# Patient Record
Sex: MALE | Race: WHITE | NOT HISPANIC OR LATINO | Employment: UNEMPLOYED | URBAN - METROPOLITAN AREA
[De-identification: names, ages, dates, MRNs, and addresses within clinical notes are randomized per-mention and may not be internally consistent; named-entity substitution may affect disease eponyms.]

---

## 2018-11-03 LAB — HCV AB SER-ACNC: <0.1

## 2018-11-27 LAB — HCV AB SER-ACNC: <0.1

## 2019-02-21 ENCOUNTER — OFFICE VISIT (OUTPATIENT)
Dept: FAMILY MEDICINE CLINIC | Facility: CLINIC | Age: 29
End: 2019-02-21
Payer: COMMERCIAL

## 2019-02-21 VITALS
OXYGEN SATURATION: 98 % | SYSTOLIC BLOOD PRESSURE: 128 MMHG | HEART RATE: 92 BPM | HEIGHT: 70 IN | WEIGHT: 200 LBS | BODY MASS INDEX: 28.63 KG/M2 | DIASTOLIC BLOOD PRESSURE: 84 MMHG

## 2019-02-21 DIAGNOSIS — F41.9 ANXIETY: Primary | ICD-10-CM

## 2019-02-21 DIAGNOSIS — M48.07 SPINAL STENOSIS OF LUMBOSACRAL REGION: ICD-10-CM

## 2019-02-21 DIAGNOSIS — F32.A DEPRESSION, UNSPECIFIED DEPRESSION TYPE: ICD-10-CM

## 2019-02-21 PROBLEM — M48.00 SPINAL STENOSIS: Status: ACTIVE | Noted: 2019-02-21

## 2019-02-21 PROCEDURE — 1036F TOBACCO NON-USER: CPT | Performed by: FAMILY MEDICINE

## 2019-02-21 PROCEDURE — 3008F BODY MASS INDEX DOCD: CPT | Performed by: FAMILY MEDICINE

## 2019-02-21 PROCEDURE — 99204 OFFICE O/P NEW MOD 45 MIN: CPT | Performed by: FAMILY MEDICINE

## 2019-02-21 PROCEDURE — 3725F SCREEN DEPRESSION PERFORMED: CPT | Performed by: FAMILY MEDICINE

## 2019-02-21 RX ORDER — CLONAZEPAM 1 MG/1
1 TABLET ORAL 3 TIMES DAILY
Qty: 90 TABLET | Refills: 3 | Status: SHIPPED | OUTPATIENT
Start: 2019-02-21 | End: 2019-06-13 | Stop reason: SDUPTHER

## 2019-02-21 RX ORDER — HYDROXYZINE PAMOATE 50 MG/1
50 CAPSULE ORAL 3 TIMES DAILY PRN
Qty: 90 CAPSULE | Refills: 2 | Status: SHIPPED | OUTPATIENT
Start: 2019-02-21 | End: 2019-05-26

## 2019-02-21 RX ORDER — TIZANIDINE 4 MG/1
4 TABLET ORAL EVERY 8 HOURS PRN
Qty: 90 TABLET | Refills: 3 | Status: SHIPPED | OUTPATIENT
Start: 2019-02-21 | End: 2019-06-13

## 2019-02-21 RX ORDER — BUPROPION HYDROCHLORIDE 300 MG/1
TABLET ORAL
COMMUNITY
Start: 2019-01-20 | End: 2019-02-21 | Stop reason: SDUPTHER

## 2019-02-21 RX ORDER — CYCLOBENZAPRINE HCL 5 MG
TABLET ORAL
COMMUNITY
Start: 2019-01-08 | End: 2019-02-21

## 2019-02-21 RX ORDER — BUPROPION HYDROCHLORIDE 300 MG/1
300 TABLET ORAL EVERY MORNING
Qty: 90 TABLET | Refills: 3 | Status: SHIPPED | OUTPATIENT
Start: 2019-02-21 | End: 2019-05-26

## 2019-02-21 RX ORDER — DULOXETIN HYDROCHLORIDE 60 MG/1
CAPSULE, DELAYED RELEASE ORAL
COMMUNITY
Start: 2019-01-21 | End: 2019-02-21

## 2019-02-21 RX ORDER — NAPROXEN 500 MG/1
500 TABLET ORAL 2 TIMES DAILY WITH MEALS
Qty: 180 TABLET | Refills: 3 | Status: SHIPPED | OUTPATIENT
Start: 2019-02-21 | End: 2019-05-26

## 2019-02-21 NOTE — PROGRESS NOTES
[address]  [facility]        NAME: Sheldon Pratt is a 33 y o  male  : 1990    MRN: [id number]  DATE: 2019  TIME: 10:54 AM    Assessment and Plan   Anxiety [F41 9]  1  Anxiety  clonazePAM (KlonoPIN) 1 mg tablet    hydrOXYzine pamoate (VISTARIL) 50 mg capsule   2  Depression, unspecified depression type  buPROPion (WELLBUTRIN XL) 300 mg 24 hr tablet   3  Spinal stenosis of lumbosacral region  tiZANidine (ZANAFLEX) 4 mg tablet         Patient Instructions     Patient Instructions   New pt----Hx depr/anx---s/p spinal stenosis/discectomy---surg          Chief Complaint     Chief Complaint   Patient presents with    New Patient     Med Refills     Depression     elevated PHQ-9         History of Present Illness       Pt w/ lumbar surg x2---depr and anxiety for many yrs---currently on wellbutrin---tapering off cymbalta due to side effects    Depression   Pertinent negatives include no abdominal pain, arthralgias, chest pain, congestion, fatigue, fever, myalgias, nausea, numbness, sore throat, vomiting or weakness  Review of Systems   Review of Systems   Constitutional: Negative for fatigue, fever and unexpected weight change  HENT: Negative for congestion, sinus pain and sore throat  Eyes: Negative for visual disturbance  Respiratory: Negative for shortness of breath and wheezing  Cardiovascular: Negative for chest pain and palpitations  Gastrointestinal: Negative for abdominal pain, nausea and vomiting  Musculoskeletal: Positive for back pain  Negative for arthralgias and myalgias  Neurological: Negative for syncope, weakness and numbness  Psychiatric/Behavioral: Positive for depression and dysphoric mood  Negative for confusion and suicidal ideas  The patient is nervous/anxious            Current Medications       Current Outpatient Medications:     buPROPion (WELLBUTRIN XL) 300 mg 24 hr tablet, Take 1 tablet (300 mg total) by mouth every morning, Disp: 90 tablet, Rfl: 3    clonazePAM (KlonoPIN) 1 mg tablet, Take 1 tablet (1 mg total) by mouth 3 (three) times a day, Disp: 90 tablet, Rfl: 3    hydrOXYzine pamoate (VISTARIL) 50 mg capsule, Take 1 capsule (50 mg total) by mouth 3 (three) times a day as needed for itching, Disp: 90 capsule, Rfl: 2    tiZANidine (ZANAFLEX) 4 mg tablet, Take 1 tablet (4 mg total) by mouth every 8 (eight) hours as needed for muscle spasms, Disp: 90 tablet, Rfl: 3    Current Allergies     Allergies as of 02/21/2019    (No Known Allergies)            The following portions of the patient's history were reviewed and updated as appropriate: allergies, current medications, past family history, past medical history, past social history, past surgical history and problem list      Past Medical History:   Diagnosis Date    Allergic     Anxiety     Depression     Spinal stenosis        Past Surgical History:   Procedure Laterality Date    BACK SURGERY      FRACTURE SURGERY      left foot reconstruted     SPINE SURGERY      hernated disc        Family History   Problem Relation Age of Onset    Hypertension Mother     Hyperlipidemia Father     Heart disease Father     Depression Maternal Grandmother     Hypertension Maternal Grandmother     Squamous cell carcinoma Maternal Grandfather     Alcohol abuse Maternal Grandfather     Alzheimer's disease Paternal Grandmother     Dementia Paternal Grandmother     Alcohol abuse Paternal Uncle          Medications have been verified  Objective   /84   Pulse 92   Ht 5' 10" (1 778 m)   Wt 90 7 kg (200 lb)   SpO2 98%   BMI 28 70 kg/m²        Physical Exam     Physical Exam   Constitutional: He is oriented to person, place, and time  Vital signs are normal  He appears well-developed and well-nourished  HENT:   Right Ear: Ear canal normal  Tympanic membrane is not injected  Left Ear: Ear canal normal  Tympanic membrane is not injected     Nose: Nose normal    Mouth/Throat: Oropharynx is clear and moist    Eyes: Pupils are equal, round, and reactive to light  Conjunctivae and EOM are normal  Right eye exhibits no discharge  Left eye exhibits no discharge  Neck: Normal range of motion  Neck supple  No thyromegaly present  Cardiovascular: Normal rate, regular rhythm and normal heart sounds  No murmur heard  Pulmonary/Chest: Effort normal and breath sounds normal  No respiratory distress  He has no wheezes  Abdominal: Soft  Bowel sounds are normal  He exhibits no distension  There is no tenderness  Musculoskeletal: Normal range of motion  Lymphadenopathy:     He has no cervical adenopathy  Neurological: He is alert and oriented to person, place, and time  He has normal strength and normal reflexes  He is not disoriented  No sensory deficit  Gait normal    Skin: Skin is warm and dry  Psychiatric: He has a normal mood and affect   His speech is normal and behavior is normal  Judgment and thought content normal  Cognition and memory are normal

## 2019-02-21 NOTE — PATIENT INSTRUCTIONS
New pt----Hx depr/anx---s/p spinal stenosis/discectomy---surg---add klonopin--did well in past---failed flexeril/cymbalta

## 2019-05-25 ENCOUNTER — HOSPITAL ENCOUNTER (EMERGENCY)
Facility: HOSPITAL | Age: 29
End: 2019-05-26
Attending: EMERGENCY MEDICINE
Payer: COMMERCIAL

## 2019-05-25 DIAGNOSIS — F32.A DEPRESSION: Primary | ICD-10-CM

## 2019-05-25 DIAGNOSIS — R45.851 SUICIDAL IDEATIONS: ICD-10-CM

## 2019-05-25 DIAGNOSIS — F19.10 POLYSUBSTANCE ABUSE (HCC): ICD-10-CM

## 2019-05-25 LAB — ETHANOL EXG-MCNC: 0 MG/DL

## 2019-05-25 PROCEDURE — 99285 EMERGENCY DEPT VISIT HI MDM: CPT

## 2019-05-25 PROCEDURE — 99284 EMERGENCY DEPT VISIT MOD MDM: CPT | Performed by: PHYSICIAN ASSISTANT

## 2019-05-25 PROCEDURE — 82075 ASSAY OF BREATH ETHANOL: CPT | Performed by: PHYSICIAN ASSISTANT

## 2019-05-26 VITALS
RESPIRATION RATE: 16 BRPM | TEMPERATURE: 97.9 F | HEART RATE: 72 BPM | DIASTOLIC BLOOD PRESSURE: 56 MMHG | SYSTOLIC BLOOD PRESSURE: 105 MMHG | OXYGEN SATURATION: 99 %

## 2019-05-26 LAB
AMPHETAMINES SERPL QL SCN: NEGATIVE
BARBITURATES UR QL: NEGATIVE
BENZODIAZ UR QL: NEGATIVE
COCAINE UR QL: POSITIVE
METHADONE UR QL: POSITIVE
OPIATES UR QL SCN: NEGATIVE
PCP UR QL: NEGATIVE
THC UR QL: NEGATIVE

## 2019-05-26 PROCEDURE — 80307 DRUG TEST PRSMV CHEM ANLYZR: CPT | Performed by: PHYSICIAN ASSISTANT

## 2019-05-26 RX ORDER — FLUOXETINE HYDROCHLORIDE 40 MG/1
40 CAPSULE ORAL DAILY
COMMUNITY
End: 2019-06-13 | Stop reason: ALTCHOICE

## 2019-05-26 RX ORDER — FLUOXETINE 10 MG/1
40 CAPSULE ORAL ONCE
Status: COMPLETED | OUTPATIENT
Start: 2019-05-26 | End: 2019-05-26

## 2019-05-26 RX ORDER — LORAZEPAM 0.5 MG/1
0.5 TABLET ORAL ONCE
Status: COMPLETED | OUTPATIENT
Start: 2019-05-26 | End: 2019-05-26

## 2019-05-26 RX ORDER — FLUOXETINE 10 MG/1
40 CAPSULE ORAL DAILY
Status: DISCONTINUED | OUTPATIENT
Start: 2019-05-26 | End: 2019-05-26

## 2019-05-26 RX ORDER — DOXEPIN HYDROCHLORIDE 100 MG/1
10 CAPSULE ORAL
COMMUNITY
End: 2019-06-13

## 2019-05-26 RX ADMIN — LORAZEPAM 0.5 MG: 0.5 TABLET ORAL at 04:29

## 2019-05-26 RX ADMIN — FLUOXETINE 40 MG: 10 CAPSULE ORAL at 12:20

## 2019-06-12 RX ORDER — LORAZEPAM 2 MG/1
TABLET ORAL
COMMUNITY
Start: 2019-06-09 | End: 2019-06-13

## 2019-06-13 ENCOUNTER — OFFICE VISIT (OUTPATIENT)
Dept: FAMILY MEDICINE CLINIC | Facility: CLINIC | Age: 29
End: 2019-06-13
Payer: COMMERCIAL

## 2019-06-13 VITALS
DIASTOLIC BLOOD PRESSURE: 82 MMHG | OXYGEN SATURATION: 98 % | WEIGHT: 202 LBS | BODY MASS INDEX: 28.92 KG/M2 | SYSTOLIC BLOOD PRESSURE: 122 MMHG | HEIGHT: 70 IN | TEMPERATURE: 99.7 F | HEART RATE: 100 BPM

## 2019-06-13 DIAGNOSIS — Z00.00 WELLNESS EXAMINATION: ICD-10-CM

## 2019-06-13 DIAGNOSIS — M48.07 SPINAL STENOSIS OF LUMBOSACRAL REGION: Primary | ICD-10-CM

## 2019-06-13 DIAGNOSIS — F41.9 ANXIETY: ICD-10-CM

## 2019-06-13 PROCEDURE — 3725F SCREEN DEPRESSION PERFORMED: CPT | Performed by: FAMILY MEDICINE

## 2019-06-13 PROCEDURE — 99395 PREV VISIT EST AGE 18-39: CPT | Performed by: FAMILY MEDICINE

## 2019-06-13 PROCEDURE — 3008F BODY MASS INDEX DOCD: CPT | Performed by: FAMILY MEDICINE

## 2019-06-13 PROCEDURE — 99214 OFFICE O/P EST MOD 30 MIN: CPT | Performed by: FAMILY MEDICINE

## 2019-06-13 RX ORDER — FLUOXETINE HYDROCHLORIDE 20 MG/1
CAPSULE ORAL
COMMUNITY
Start: 2019-05-07 | End: 2019-06-13 | Stop reason: SDUPTHER

## 2019-06-13 RX ORDER — NAPROXEN SODIUM 220 MG
TABLET ORAL
Refills: 0 | COMMUNITY
Start: 2019-06-07 | End: 2019-07-26 | Stop reason: HOSPADM

## 2019-06-13 RX ORDER — CLONAZEPAM 1 MG/1
TABLET ORAL
Qty: 120 TABLET | Refills: 3 | Status: SHIPPED | OUTPATIENT
Start: 2019-06-13 | End: 2019-07-26 | Stop reason: HOSPADM

## 2019-06-13 RX ORDER — FLUOXETINE HYDROCHLORIDE 20 MG/1
20 CAPSULE ORAL 2 TIMES DAILY
Qty: 180 CAPSULE | Refills: 3 | Status: SHIPPED | OUTPATIENT
Start: 2019-06-13 | End: 2019-07-26 | Stop reason: HOSPADM

## 2019-06-20 ENCOUNTER — HOSPITAL ENCOUNTER (EMERGENCY)
Facility: HOSPITAL | Age: 29
Discharge: HOME | End: 2019-06-20
Attending: EMERGENCY MEDICINE
Payer: COMMERCIAL

## 2019-06-20 ENCOUNTER — APPOINTMENT (EMERGENCY)
Dept: NON INVASIVE DIAGNOSTICS | Facility: HOSPITAL | Age: 29
End: 2019-06-20
Payer: COMMERCIAL

## 2019-06-20 VITALS
OXYGEN SATURATION: 100 % | WEIGHT: 201.94 LBS | RESPIRATION RATE: 20 BRPM | SYSTOLIC BLOOD PRESSURE: 121 MMHG | BODY MASS INDEX: 28.98 KG/M2 | DIASTOLIC BLOOD PRESSURE: 70 MMHG | TEMPERATURE: 100.4 F | HEART RATE: 77 BPM

## 2019-06-20 DIAGNOSIS — M79.89 SWELLING OF LIMB: ICD-10-CM

## 2019-06-20 DIAGNOSIS — L03.90 CELLULITIS: Primary | ICD-10-CM

## 2019-06-20 PROCEDURE — 99284 EMERGENCY DEPT VISIT MOD MDM: CPT | Performed by: EMERGENCY MEDICINE

## 2019-06-20 PROCEDURE — 99283 EMERGENCY DEPT VISIT LOW MDM: CPT

## 2019-06-20 PROCEDURE — 93971 EXTREMITY STUDY: CPT | Performed by: SURGERY

## 2019-06-20 PROCEDURE — 93971 EXTREMITY STUDY: CPT

## 2019-06-20 RX ORDER — CEPHALEXIN 500 MG/1
500 CAPSULE ORAL 4 TIMES DAILY
Qty: 28 CAPSULE | Refills: 0 | Status: SHIPPED | OUTPATIENT
Start: 2019-06-20 | End: 2019-06-27

## 2019-06-20 RX ORDER — CEPHALEXIN 250 MG/1
500 CAPSULE ORAL ONCE
Status: COMPLETED | OUTPATIENT
Start: 2019-06-20 | End: 2019-06-20

## 2019-06-20 RX ADMIN — CEPHALEXIN 500 MG: 250 CAPSULE ORAL at 07:40

## 2019-06-22 ENCOUNTER — APPOINTMENT (EMERGENCY)
Dept: RADIOLOGY | Facility: HOSPITAL | Age: 29
End: 2019-06-22
Payer: COMMERCIAL

## 2019-06-22 ENCOUNTER — HOSPITAL ENCOUNTER (EMERGENCY)
Facility: HOSPITAL | Age: 29
Discharge: HOME | End: 2019-06-22
Attending: EMERGENCY MEDICINE | Admitting: EMERGENCY MEDICINE
Payer: COMMERCIAL

## 2019-06-22 VITALS
OXYGEN SATURATION: 98 % | DIASTOLIC BLOOD PRESSURE: 68 MMHG | TEMPERATURE: 97.6 F | SYSTOLIC BLOOD PRESSURE: 122 MMHG | RESPIRATION RATE: 18 BRPM | HEART RATE: 70 BPM | WEIGHT: 201.94 LBS | BODY MASS INDEX: 28.98 KG/M2

## 2019-06-22 DIAGNOSIS — L02.91 ABSCESS: ICD-10-CM

## 2019-06-22 DIAGNOSIS — L03.90 CELLULITIS: Primary | ICD-10-CM

## 2019-06-22 LAB
ANION GAP SERPL CALCULATED.3IONS-SCNC: 8 MMOL/L (ref 4–13)
BASOPHILS # BLD AUTO: 0.05 THOUSANDS/ΜL (ref 0–0.1)
BASOPHILS NFR BLD AUTO: 0 % (ref 0–1)
BUN SERPL-MCNC: 10 MG/DL (ref 5–25)
CALCIUM SERPL-MCNC: 9.2 MG/DL (ref 8.3–10.1)
CHLORIDE SERPL-SCNC: 101 MMOL/L (ref 100–108)
CK MB SERPL-MCNC: 3.4 NG/ML (ref 0–5)
CK MB SERPL-MCNC: <1 % (ref 0–2.5)
CK SERPL-CCNC: 421 U/L (ref 39–308)
CO2 SERPL-SCNC: 30 MMOL/L (ref 21–32)
CREAT SERPL-MCNC: 1.04 MG/DL (ref 0.6–1.3)
EOSINOPHIL # BLD AUTO: 0.13 THOUSAND/ΜL (ref 0–0.61)
EOSINOPHIL NFR BLD AUTO: 1 % (ref 0–6)
ERYTHROCYTE [DISTWIDTH] IN BLOOD BY AUTOMATED COUNT: 13.1 % (ref 11.6–15.1)
GFR SERPL CREATININE-BSD FRML MDRD: 97 ML/MIN/1.73SQ M
GLUCOSE SERPL-MCNC: 104 MG/DL (ref 65–140)
HCT VFR BLD AUTO: 35.4 % (ref 36.5–49.3)
HGB BLD-MCNC: 11.8 G/DL (ref 12–17)
IMM GRANULOCYTES # BLD AUTO: 0.08 THOUSAND/UL (ref 0–0.2)
IMM GRANULOCYTES NFR BLD AUTO: 1 % (ref 0–2)
LYMPHOCYTES # BLD AUTO: 1.44 THOUSANDS/ΜL (ref 0.6–4.47)
LYMPHOCYTES NFR BLD AUTO: 9 % (ref 14–44)
MCH RBC QN AUTO: 29.4 PG (ref 26.8–34.3)
MCHC RBC AUTO-ENTMCNC: 33.3 G/DL (ref 31.4–37.4)
MCV RBC AUTO: 88 FL (ref 82–98)
MONOCYTES # BLD AUTO: 1.17 THOUSAND/ΜL (ref 0.17–1.22)
MONOCYTES NFR BLD AUTO: 7 % (ref 4–12)
NEUTROPHILS # BLD AUTO: 13.48 THOUSANDS/ΜL (ref 1.85–7.62)
NEUTS SEG NFR BLD AUTO: 82 % (ref 43–75)
NRBC BLD AUTO-RTO: 0 /100 WBCS
PLATELET # BLD AUTO: 349 THOUSANDS/UL (ref 149–390)
PMV BLD AUTO: 10.3 FL (ref 8.9–12.7)
POTASSIUM SERPL-SCNC: 3.6 MMOL/L (ref 3.5–5.3)
RBC # BLD AUTO: 4.02 MILLION/UL (ref 3.88–5.62)
SODIUM SERPL-SCNC: 139 MMOL/L (ref 136–145)
WBC # BLD AUTO: 16.35 THOUSAND/UL (ref 4.31–10.16)

## 2019-06-22 PROCEDURE — 99283 EMERGENCY DEPT VISIT LOW MDM: CPT

## 2019-06-22 PROCEDURE — 36415 COLL VENOUS BLD VENIPUNCTURE: CPT | Performed by: EMERGENCY MEDICINE

## 2019-06-22 PROCEDURE — 82550 ASSAY OF CK (CPK): CPT | Performed by: EMERGENCY MEDICINE

## 2019-06-22 PROCEDURE — 82553 CREATINE MB FRACTION: CPT | Performed by: EMERGENCY MEDICINE

## 2019-06-22 PROCEDURE — 10061 I&D ABSCESS COMP/MULTIPLE: CPT | Performed by: EMERGENCY MEDICINE

## 2019-06-22 PROCEDURE — 80048 BASIC METABOLIC PNL TOTAL CA: CPT | Performed by: EMERGENCY MEDICINE

## 2019-06-22 PROCEDURE — 99284 EMERGENCY DEPT VISIT MOD MDM: CPT | Performed by: EMERGENCY MEDICINE

## 2019-06-22 PROCEDURE — 85025 COMPLETE CBC W/AUTO DIFF WBC: CPT | Performed by: EMERGENCY MEDICINE

## 2019-06-22 PROCEDURE — 73080 X-RAY EXAM OF ELBOW: CPT

## 2019-06-22 RX ORDER — KETOROLAC TROMETHAMINE 30 MG/ML
15 INJECTION, SOLUTION INTRAMUSCULAR; INTRAVENOUS ONCE
Status: DISCONTINUED | OUTPATIENT
Start: 2019-06-22 | End: 2019-06-22

## 2019-06-22 RX ORDER — SULFAMETHOXAZOLE AND TRIMETHOPRIM 800; 160 MG/1; MG/1
2 TABLET ORAL ONCE
Status: COMPLETED | OUTPATIENT
Start: 2019-06-22 | End: 2019-06-22

## 2019-06-22 RX ORDER — SULFAMETHOXAZOLE AND TRIMETHOPRIM 800; 160 MG/1; MG/1
2 TABLET ORAL 2 TIMES DAILY
Qty: 40 TABLET | Refills: 0 | Status: SHIPPED | OUTPATIENT
Start: 2019-06-22 | End: 2019-07-02

## 2019-06-22 RX ORDER — LIDOCAINE HYDROCHLORIDE AND EPINEPHRINE 10; 10 MG/ML; UG/ML
10 INJECTION, SOLUTION INFILTRATION; PERINEURAL ONCE
Status: COMPLETED | OUTPATIENT
Start: 2019-06-22 | End: 2019-06-22

## 2019-06-22 RX ORDER — NAPROXEN 500 MG/1
500 TABLET ORAL ONCE
Status: COMPLETED | OUTPATIENT
Start: 2019-06-22 | End: 2019-06-22

## 2019-06-22 RX ADMIN — NAPROXEN 500 MG: 500 TABLET ORAL at 09:18

## 2019-06-22 RX ADMIN — SULFAMETHOXAZOLE AND TRIMETHOPRIM 2 TABLET: 800; 160 TABLET ORAL at 10:38

## 2019-06-22 RX ADMIN — LIDOCAINE HYDROCHLORIDE,EPINEPHRINE BITARTRATE 10 ML: 10; .01 INJECTION, SOLUTION INFILTRATION; PERINEURAL at 09:18

## 2019-06-24 ENCOUNTER — HOSPITAL ENCOUNTER (EMERGENCY)
Facility: HOSPITAL | Age: 29
Discharge: HOME | End: 2019-06-24
Attending: EMERGENCY MEDICINE | Admitting: EMERGENCY MEDICINE
Payer: COMMERCIAL

## 2019-06-24 VITALS
TEMPERATURE: 97.2 F | RESPIRATION RATE: 20 BRPM | SYSTOLIC BLOOD PRESSURE: 116 MMHG | OXYGEN SATURATION: 99 % | WEIGHT: 200 LBS | HEART RATE: 63 BPM | HEIGHT: 70 IN | DIASTOLIC BLOOD PRESSURE: 67 MMHG | BODY MASS INDEX: 28.63 KG/M2

## 2019-06-24 DIAGNOSIS — Z51.89 WOUND CHECK, ABSCESS: Primary | ICD-10-CM

## 2019-06-24 PROCEDURE — 99282 EMERGENCY DEPT VISIT SF MDM: CPT

## 2019-06-24 PROCEDURE — 99024 POSTOP FOLLOW-UP VISIT: CPT | Performed by: PHYSICIAN ASSISTANT

## 2019-06-24 NOTE — ED PROVIDER NOTES
History  Chief Complaint   Patient presents with    Wound Check     Patient states he was seen here  for abcess in his L AC  Patient states h enoticed puss today and he came back  Patient is a 34 y/o M that presents to the ED with abscess to left AC  He states he had it packed yesterday and has purulent discharge today and was concerned about the pus  No fevers, chills, nausea, or vomiting  He is currently on bactrim and keflex  History provided by:  Patient  Wound Check    He was treated in the ED yesterday  Previous treatment in the ED includes I&D of abscess  Treatments since wound repair include oral antibiotics  There has been colored discharge from the wound  The redness has improved  The swelling has improved  There is no pain present  He has no difficulty moving the affected extremity or digit  Prior to Admission Medications   Prescriptions Last Dose Informant Patient Reported? Taking?    FLUoxetine (PROzac) 20 mg capsule   No No   Sig: Take 1 capsule (20 mg total) by mouth 2 (two) times a day   METHADONE HCL PO   Yes No   Sig: Take 155 mg by mouth daily   RA SENNA 8 6 MG tablet   Yes No   Sig: take 1 once daily if needed   cephalexin (KEFLEX) 500 mg capsule   No No   Sig: Take 1 capsule (500 mg total) by mouth 4 (four) times a day for 7 days   clonazePAM (KlonoPIN) 1 mg tablet   No No   Si QID   sulfamethoxazole-trimethoprim (BACTRIM DS) 800-160 mg per tablet   No No   Sig: Take 2 tablets by mouth 2 (two) times a day for 10 days smx-tmp DS (BACTRIM) 800-160 mg tabs (1tab q12 D10)      Facility-Administered Medications: None       Past Medical History:   Diagnosis Date    Allergic     Anxiety     Depression     Spinal stenosis        Past Surgical History:   Procedure Laterality Date    BACK SURGERY      FRACTURE SURGERY      left foot reconstruted     SPINE SURGERY      hernated disc        Family History   Problem Relation Age of Onset    Hypertension Mother  Hyperlipidemia Father     Heart disease Father     Depression Maternal Grandmother     Hypertension Maternal Grandmother     Squamous cell carcinoma Maternal Grandfather     Alcohol abuse Maternal Grandfather     Alzheimer's disease Paternal Grandmother     Dementia Paternal Grandmother     Alcohol abuse Paternal Uncle      I have reviewed and agree with the history as documented  Social History     Tobacco Use    Smoking status: Former Smoker     Last attempt to quit: 2017     Years since quittin 8    Smokeless tobacco: Current User    Tobacco comment: Vapes   Substance Use Topics    Alcohol use: Never     Frequency: Never     Binge frequency: Never    Drug use: Yes     Types: Cocaine        Review of Systems   Constitutional: Negative for chills and fever  HENT: Negative  Skin: Positive for wound  All other systems reviewed and are negative  Physical Exam  Physical Exam   Constitutional: He appears well-developed and well-nourished  HENT:   Head: Normocephalic  Eyes: Conjunctivae are normal    Cardiovascular: Normal rate  Pulmonary/Chest: Effort normal    Skin: Skin is warm and dry  No rash noted  No erythema or warmth  He has a packing in left [facility] that has purulent discharge  Nursing note and vitals reviewed        Vital Signs  ED Triage Vitals [19 1559]   Temperature Pulse Respirations Blood Pressure SpO2   (!) 97 2 °F (36 2 °C) 63 20 116/67 99 %      Temp src Heart Rate Source Patient Position - Orthostatic VS BP Location FiO2 (%)   -- -- -- -- --      Pain Score       4           Vitals:    19 1559   BP: 116/67   Pulse: 63         Visual Acuity      ED Medications  Medications - No data to display    Diagnostic Studies  Results Reviewed     None                 No orders to display              Procedures  Procedures       ED Course                               MDM  Number of Diagnoses or Management Options  Wound check, abscess: minor  Patient Progress  Patient progress: stable      Disposition  Final diagnoses:   Wound check, abscess     Time reflects when diagnosis was documented in both MDM as applicable and the Disposition within this note     Time User Action Codes Description Comment    6/24/2019  6:12 PM Eleonora Deng Add [Z51 89] Wound check, abscess       ED Disposition     ED Disposition Condition Date/Time Comment    Discharge Stable Mon Jun 24, 2019  6:12 PM Pilar Earing discharge to home/self care  Follow-up Information     Follow up With Specialties Details Why Contact Info Additional Information    [address] [facility] Emergency Medicine In 1 day for packing removal  [address]  [address]  [phone] [address], [facility], [place], [place], [address]          Discharge Medication List as of 6/24/2019  6:13 PM      CONTINUE these medications which have NOT CHANGED    Details   cephalexin (KEFLEX) 500 mg capsule Take 1 capsule (500 mg total) by mouth 4 (four) times a day for 7 days, Starting Thu 6/20/2019, Until Thu 6/27/2019, Print      clonazePAM (KlonoPIN) 1 mg tablet 1 QID, Normal      FLUoxetine (PROzac) 20 mg capsule Take 1 capsule (20 mg total) by mouth 2 (two) times a day, Starting Thu 6/13/2019, Normal      METHADONE HCL PO Take 155 mg by mouth daily, Historical Med      RA SENNA 8 6 MG tablet take 1 once daily if needed, Historical Med      sulfamethoxazole-trimethoprim (BACTRIM DS) 800-160 mg per tablet Take 2 tablets by mouth 2 (two) times a day for 10 days smx-tmp DS (BACTRIM) 800-160 mg tabs (1tab q12 D10), Starting Sat 6/22/2019, Until Tue 7/2/2019, Print           No discharge procedures on file      ED Provider  Electronically Signed by           Karoline Paz PA-C  06/24/19 3811

## 2019-06-25 ENCOUNTER — TRANSCRIBE ORDERS (OUTPATIENT)
Dept: ADMINISTRATIVE | Facility: HOSPITAL | Age: 29
End: 2019-06-25

## 2019-06-25 ENCOUNTER — HOSPITAL ENCOUNTER (OUTPATIENT)
Dept: NON INVASIVE DIAGNOSTICS | Facility: HOSPITAL | Age: 29
Discharge: HOME | End: 2019-06-25
Payer: COMMERCIAL

## 2019-06-25 ENCOUNTER — HOSPITAL ENCOUNTER (EMERGENCY)
Facility: HOSPITAL | Age: 29
Discharge: HOME | End: 2019-06-25
Attending: EMERGENCY MEDICINE
Payer: COMMERCIAL

## 2019-06-25 VITALS
RESPIRATION RATE: 20 BRPM | DIASTOLIC BLOOD PRESSURE: 70 MMHG | WEIGHT: 199.96 LBS | TEMPERATURE: 98.5 F | SYSTOLIC BLOOD PRESSURE: 121 MMHG | BODY MASS INDEX: 28.69 KG/M2 | HEART RATE: 77 BPM | OXYGEN SATURATION: 100 %

## 2019-06-25 DIAGNOSIS — F11.20 OPIOID TYPE DEPENDENCE, CONTINUOUS (HCC): ICD-10-CM

## 2019-06-25 DIAGNOSIS — F11.20 OPIOID TYPE DEPENDENCE, CONTINUOUS (HCC): Primary | ICD-10-CM

## 2019-06-25 DIAGNOSIS — Z48.00 ENCOUNTER FOR ABSCESS PACKING REMOVAL: Primary | ICD-10-CM

## 2019-06-25 LAB
ATRIAL RATE: 54 BPM
P AXIS: 50 DEGREES
PR INTERVAL: 166 MS
QRS AXIS: 35 DEGREES
QRSD INTERVAL: 96 MS
QT INTERVAL: 448 MS
QTC INTERVAL: 424 MS
T WAVE AXIS: 38 DEGREES
VENTRICULAR RATE: 54 BPM

## 2019-06-25 PROCEDURE — 93005 ELECTROCARDIOGRAM TRACING: CPT

## 2019-06-25 PROCEDURE — 99024 POSTOP FOLLOW-UP VISIT: CPT | Performed by: PHYSICIAN ASSISTANT

## 2019-06-25 PROCEDURE — 93010 ELECTROCARDIOGRAM REPORT: CPT | Performed by: INTERNAL MEDICINE

## 2019-06-25 PROCEDURE — 99282 EMERGENCY DEPT VISIT SF MDM: CPT

## 2019-06-25 NOTE — DISCHARGE INSTRUCTIONS
Warm soaks to your left arm 4-5 times a day for 20-30 minutes  Keep covered until there is no drainage  You may shower now  COntinue your antibiotics  Follow up with family doctor in 3-4 days for recheck

## 2019-06-25 NOTE — ED PROVIDER NOTES
History  Chief Complaint   Patient presents with    Wound Check     Returns to ED for redressing and packing removal of left anti cubital absess  No fever or chills  Patient is a 34 y/o M that presents to the ED for packing removal from abscess left arm  He currently has small amount of purulent drainage and small amount of blood  No surrounding erythema  History provided by:  Patient  Wound Check    He was treated in the ED 2 to 3 days ago  Previous treatment in the ED includes oral antibiotics and I&D of abscess  Treatments since wound repair include oral antibiotics  There has been bloody discharge from the wound  There is no redness present  There is no swelling present  There is no pain present  He has no difficulty moving the affected extremity or digit  Prior to Admission Medications   Prescriptions Last Dose Informant Patient Reported? Taking?    FLUoxetine (PROzac) 20 mg capsule   No No   Sig: Take 1 capsule (20 mg total) by mouth 2 (two) times a day   METHADONE HCL PO   Yes No   Sig: Take 155 mg by mouth daily   RA SENNA 8 6 MG tablet   Yes No   Sig: take 1 once daily if needed   cephalexin (KEFLEX) 500 mg capsule   No No   Sig: Take 1 capsule (500 mg total) by mouth 4 (four) times a day for 7 days   clonazePAM (KlonoPIN) 1 mg tablet   No No   Si QID   sulfamethoxazole-trimethoprim (BACTRIM DS) 800-160 mg per tablet   No No   Sig: Take 2 tablets by mouth 2 (two) times a day for 10 days smx-tmp DS (BACTRIM) 800-160 mg tabs (1tab q12 D10)      Facility-Administered Medications: None       Past Medical History:   Diagnosis Date    Allergic     Anxiety     Depression     Spinal stenosis        Past Surgical History:   Procedure Laterality Date    BACK SURGERY      FRACTURE SURGERY      left foot reconstruted     SPINE SURGERY      hernated disc        Family History   Problem Relation Age of Onset    Hypertension Mother     Hyperlipidemia Father     Heart disease Father    Linda Marcano Depression Maternal Grandmother     Hypertension Maternal Grandmother     Squamous cell carcinoma Maternal Grandfather     Alcohol abuse Maternal Grandfather     Alzheimer's disease Paternal Grandmother     Dementia Paternal Grandmother     Alcohol abuse Paternal Uncle      I have reviewed and agree with the history as documented  Social History     Tobacco Use    Smoking status: Former Smoker     Last attempt to quit: 2017     Years since quittin 8    Smokeless tobacco: Current User    Tobacco comment: Vapes   Substance Use Topics    Alcohol use: Never     Frequency: Never     Binge frequency: Never    Drug use: Yes     Types: Cocaine        Review of Systems   Constitutional: Negative for chills and fever  All other systems reviewed and are negative  Physical Exam  Physical Exam   Constitutional: He appears well-developed and well-nourished  HENT:   Head: Normocephalic and atraumatic  Eyes: Conjunctivae are normal    Cardiovascular: Normal rate  Pulses:       Radial pulses are 2+ on the left side  Pulmonary/Chest: Effort normal    Musculoskeletal:   Packing left AC, no surrounding erythema  He has small amount of purulent drainage, small amount of bloody drainage  No pain  Neurological: He is alert  He is not disoriented  Skin: Skin is warm and dry  No rash noted  He is not diaphoretic  No erythema  No pallor  Nursing note and vitals reviewed        Vital Signs  ED Triage Vitals [19 1121]   Temperature Pulse Respirations Blood Pressure SpO2   98 5 °F (36 9 °C) 77 20 121/70 100 %      Temp Source Heart Rate Source Patient Position - Orthostatic VS BP Location FiO2 (%)   Tympanic Monitor Sitting Right arm --      Pain Score       4           Vitals:    19 1121   BP: 121/70   Pulse: 77   Patient Position - Orthostatic VS: Sitting         Visual Acuity      ED Medications  Medications - No data to display    Diagnostic Studies  Results Reviewed     None No orders to display              Procedures  Procedures       ED Course     1120:  Packing removed from left [facility] with forceps  Bandage applied  MDM  Number of Diagnoses or Management Options  Encounter for abscess packing removal: minor  Patient Progress  Patient progress: improved      Disposition  Final diagnoses:   Encounter for abscess packing removal     Time reflects when diagnosis was documented in both MDM as applicable and the Disposition within this note     Time User Action Codes Description Comment    6/25/2019 11:33 AM Dank Houston [Z48 00] Encounter for abscess packing removal       ED Disposition     ED Disposition Condition Date/Time Comment    Discharge Stable Tue Jun 25, 2019 11:33 AM Reena Castellanos discharge to home/self care  Follow-up Information     Follow up With Specialties Details Why Contact Info    Keri Edmondson MD Family Medicine Call in 3 days For recheck [address]  [address] [address]  [facility] [address]  [phone]            Discharge Medication List as of 6/25/2019 11:35 AM      CONTINUE these medications which have NOT CHANGED    Details   cephalexin (KEFLEX) 500 mg capsule Take 1 capsule (500 mg total) by mouth 4 (four) times a day for 7 days, Starting Thu 6/20/2019, Until Thu 6/27/2019, Print      clonazePAM (KlonoPIN) 1 mg tablet 1 QID, Normal      FLUoxetine (PROzac) 20 mg capsule Take 1 capsule (20 mg total) by mouth 2 (two) times a day, Starting Thu 6/13/2019, Normal      METHADONE HCL PO Take 155 mg by mouth daily, Historical Med      RA SENNA 8 6 MG tablet take 1 once daily if needed, Historical Med      sulfamethoxazole-trimethoprim (BACTRIM DS) 800-160 mg per tablet Take 2 tablets by mouth 2 (two) times a day for 10 days smx-tmp DS (BACTRIM) 800-160 mg tabs (1tab q12 D10), Starting Sat 6/22/2019, Until Tue 7/2/2019, Print           No discharge procedures on file      ED Provider  Electronically Signed by Tiffany Cardenas PA-C  06/25/19 1144

## 2019-06-26 DIAGNOSIS — Z71.89 COMPLEX CARE COORDINATION: Primary | ICD-10-CM

## 2019-07-16 ENCOUNTER — HOSPITAL ENCOUNTER (INPATIENT)
Facility: HOSPITAL | Age: 29
LOS: 9 days | Discharge: HOME | DRG: 751 | End: 2019-07-26
Attending: EMERGENCY MEDICINE | Admitting: PSYCHIATRY & NEUROLOGY
Payer: COMMERCIAL

## 2019-07-16 DIAGNOSIS — Z00.8 MEDICAL CLEARANCE FOR PSYCHIATRIC ADMISSION: Primary | ICD-10-CM

## 2019-07-16 DIAGNOSIS — F32.A DEPRESSION: ICD-10-CM

## 2019-07-16 DIAGNOSIS — F33.2 SEVERE EPISODE OF RECURRENT MAJOR DEPRESSIVE DISORDER, WITHOUT PSYCHOTIC FEATURES (HCC): ICD-10-CM

## 2019-07-16 DIAGNOSIS — R45.851 DEPRESSION WITH SUICIDAL IDEATION: Primary | ICD-10-CM

## 2019-07-16 DIAGNOSIS — F32.A DEPRESSION WITH SUICIDAL IDEATION: Primary | ICD-10-CM

## 2019-07-16 DIAGNOSIS — F41.9 ANXIETY: ICD-10-CM

## 2019-07-16 DIAGNOSIS — Z00.8 MEDICAL CLEARANCE FOR PSYCHIATRIC ADMISSION: ICD-10-CM

## 2019-07-16 DIAGNOSIS — F41.1 GENERALIZED ANXIETY DISORDER: ICD-10-CM

## 2019-07-16 LAB
ALBUMIN SERPL BCP-MCNC: 3.8 G/DL (ref 3.5–5)
ALP SERPL-CCNC: 69 U/L (ref 46–116)
ALT SERPL W P-5'-P-CCNC: 58 U/L (ref 12–78)
AMPHETAMINES SERPL QL SCN: NEGATIVE
ANION GAP SERPL CALCULATED.3IONS-SCNC: 7 MMOL/L (ref 4–13)
AST SERPL W P-5'-P-CCNC: 78 U/L (ref 5–45)
BARBITURATES UR QL: NEGATIVE
BASOPHILS # BLD AUTO: 0.06 THOUSANDS/ΜL (ref 0–0.1)
BASOPHILS NFR BLD AUTO: 1 % (ref 0–1)
BENZODIAZ UR QL: NEGATIVE
BILIRUB SERPL-MCNC: 0.6 MG/DL (ref 0.2–1)
BUN SERPL-MCNC: 15 MG/DL (ref 5–25)
CALCIUM SERPL-MCNC: 8.9 MG/DL (ref 8.3–10.1)
CHLORIDE SERPL-SCNC: 103 MMOL/L (ref 100–108)
CO2 SERPL-SCNC: 30 MMOL/L (ref 21–32)
COCAINE UR QL: NEGATIVE
CREAT SERPL-MCNC: 1.29 MG/DL (ref 0.6–1.3)
EOSINOPHIL # BLD AUTO: 0.4 THOUSAND/ΜL (ref 0–0.61)
EOSINOPHIL NFR BLD AUTO: 5 % (ref 0–6)
ERYTHROCYTE [DISTWIDTH] IN BLOOD BY AUTOMATED COUNT: 13.6 % (ref 11.6–15.1)
ETHANOL EXG-MCNC: 0 MG/DL
GFR SERPL CREATININE-BSD FRML MDRD: 74 ML/MIN/1.73SQ M
GLUCOSE SERPL-MCNC: 119 MG/DL (ref 65–140)
HCT VFR BLD AUTO: 32.7 % (ref 36.5–49.3)
HGB BLD-MCNC: 11 G/DL (ref 12–17)
IMM GRANULOCYTES # BLD AUTO: 0.01 THOUSAND/UL (ref 0–0.2)
IMM GRANULOCYTES NFR BLD AUTO: 0 % (ref 0–2)
LYMPHOCYTES # BLD AUTO: 3.41 THOUSANDS/ΜL (ref 0.6–4.47)
LYMPHOCYTES NFR BLD AUTO: 46 % (ref 14–44)
MCH RBC QN AUTO: 29.3 PG (ref 26.8–34.3)
MCHC RBC AUTO-ENTMCNC: 33.6 G/DL (ref 31.4–37.4)
MCV RBC AUTO: 87 FL (ref 82–98)
METHADONE UR QL: POSITIVE
MONOCYTES # BLD AUTO: 0.79 THOUSAND/ΜL (ref 0.17–1.22)
MONOCYTES NFR BLD AUTO: 11 % (ref 4–12)
NEUTROPHILS # BLD AUTO: 2.72 THOUSANDS/ΜL (ref 1.85–7.62)
NEUTS SEG NFR BLD AUTO: 37 % (ref 43–75)
NRBC BLD AUTO-RTO: 0 /100 WBCS
OPIATES UR QL SCN: NEGATIVE
PCP UR QL: NEGATIVE
PLATELET # BLD AUTO: 270 THOUSANDS/UL (ref 149–390)
PMV BLD AUTO: 11 FL (ref 8.9–12.7)
POTASSIUM SERPL-SCNC: 3.8 MMOL/L (ref 3.5–5.3)
PROT SERPL-MCNC: 8 G/DL (ref 6.4–8.2)
RBC # BLD AUTO: 3.75 MILLION/UL (ref 3.88–5.62)
SODIUM SERPL-SCNC: 140 MMOL/L (ref 136–145)
THC UR QL: NEGATIVE
WBC # BLD AUTO: 7.39 THOUSAND/UL (ref 4.31–10.16)

## 2019-07-16 PROCEDURE — 85025 COMPLETE CBC W/AUTO DIFF WBC: CPT | Performed by: PHYSICIAN ASSISTANT

## 2019-07-16 PROCEDURE — 99285 EMERGENCY DEPT VISIT HI MDM: CPT

## 2019-07-16 PROCEDURE — 82075 ASSAY OF BREATH ETHANOL: CPT | Performed by: PHYSICIAN ASSISTANT

## 2019-07-16 PROCEDURE — 99285 EMERGENCY DEPT VISIT HI MDM: CPT | Performed by: PHYSICIAN ASSISTANT

## 2019-07-16 PROCEDURE — 80307 DRUG TEST PRSMV CHEM ANLYZR: CPT | Performed by: PHYSICIAN ASSISTANT

## 2019-07-16 PROCEDURE — 80053 COMPREHEN METABOLIC PANEL: CPT | Performed by: PHYSICIAN ASSISTANT

## 2019-07-16 PROCEDURE — 36415 COLL VENOUS BLD VENIPUNCTURE: CPT | Performed by: PHYSICIAN ASSISTANT

## 2019-07-16 RX ORDER — TRAZODONE HYDROCHLORIDE 50 MG/1
50 TABLET ORAL
Status: CANCELLED | OUTPATIENT
Start: 2019-07-16

## 2019-07-16 RX ORDER — RISPERIDONE 1 MG/1
2 TABLET, ORALLY DISINTEGRATING ORAL
Status: CANCELLED | OUTPATIENT
Start: 2019-07-16

## 2019-07-16 RX ORDER — LORAZEPAM 2 MG/ML
2 INJECTION INTRAMUSCULAR EVERY 4 HOURS PRN
Status: CANCELLED | OUTPATIENT
Start: 2019-07-16

## 2019-07-16 RX ORDER — ACETAMINOPHEN 325 MG/1
650 TABLET ORAL EVERY 4 HOURS PRN
Status: CANCELLED | OUTPATIENT
Start: 2019-07-16

## 2019-07-16 RX ORDER — ACETAMINOPHEN 325 MG/1
650 TABLET ORAL EVERY 6 HOURS PRN
Status: CANCELLED | OUTPATIENT
Start: 2019-07-16

## 2019-07-16 RX ORDER — OLANZAPINE 10 MG/1
5 INJECTION, POWDER, LYOPHILIZED, FOR SOLUTION INTRAMUSCULAR
Status: CANCELLED | OUTPATIENT
Start: 2019-07-16

## 2019-07-16 RX ORDER — CARISOPRODOL 350 MG/1
TABLET ORAL
COMMUNITY
Start: 2018-06-08 | End: 2019-07-26 | Stop reason: HOSPADM

## 2019-07-16 RX ORDER — HYDROXYZINE 50 MG/1
50 TABLET, FILM COATED ORAL EVERY 4 HOURS PRN
Status: CANCELLED | OUTPATIENT
Start: 2019-07-16

## 2019-07-16 RX ORDER — HALOPERIDOL 5 MG/ML
5 INJECTION INTRAMUSCULAR EVERY 6 HOURS PRN
Status: CANCELLED | OUTPATIENT
Start: 2019-07-16

## 2019-07-16 RX ORDER — IBUPROFEN 400 MG/1
800 TABLET ORAL EVERY 8 HOURS PRN
Status: CANCELLED | OUTPATIENT
Start: 2019-07-16

## 2019-07-16 RX ORDER — NICOTINE 21 MG/24HR
1 PATCH, TRANSDERMAL 24 HOURS TRANSDERMAL DAILY
Status: CANCELLED | OUTPATIENT
Start: 2019-07-17

## 2019-07-16 RX ORDER — MAGNESIUM HYDROXIDE/ALUMINUM HYDROXICE/SIMETHICONE 120; 1200; 1200 MG/30ML; MG/30ML; MG/30ML
30 SUSPENSION ORAL EVERY 4 HOURS PRN
Status: CANCELLED | OUTPATIENT
Start: 2019-07-16

## 2019-07-16 NOTE — ED PROVIDER NOTES
History  Chief Complaint   Patient presents with    Depression     pt presents to ED stating he is severely depressed and has very bad anxiety  States he is not suicidal but if he were to die it would be ok  Pt would like to talk to our crisis dept, pt willing to sign 12      Patient is a 33year old male with PMH of depression, anxiety, chronic back pain, and opioid dependence presents to the ED complaining of worsening anxiety, depression, and SI X 1 week  He stopped taking doxepin 8 days ago due to intolerable side effects including dry mouth and sleeping too long  Now patient reports only sleeping 2-3 hours per night  He has had suicidal ideation in the past  Typically has passive SI where "he would rather just die"  Lately has been thinking of trying to overdose on medications but reports he does not want to do that to himself or hurt his family  Patient reports he is college educated and wants to get back on track and get help  Patient has had inpatient psychiatric stays in the past  He sees a therapist outpatient but not a psychiatrist  Destinee Barrerad are prescribed by PCP  Denies recent stressors, visual/auditory hallucinations, manic symptoms, and HI  Denies headache, dizziness, lightheadedness, chest pain, palpitations, SOB, dyspnea, abdominal pain, weakness, numbness  History provided by:  Patient  Depression   Presenting symptoms: depression and suicidal thoughts    Presenting symptoms: no aggressive behavior, no bizarre behavior, no delusions, no disorganized speech, no hallucinations, no homicidal ideas, no paranoid behavior and no self-mutilation    Degree of incapacity (severity):  Severe  Onset quality:  Gradual  Duration:  1 week  Timing:  Constant  Progression:  Worsening  Chronicity:  Chronic  Context: recent medication change    Context: not alcohol use, not drug abuse, not noncompliant and not stressful life event    Treatment compliance:   All of the time  Associated symptoms: anxiety and insomnia    Associated symptoms: no abdominal pain, no chest pain and no headaches    Risk factors: hx of mental illness    Risk factors: no hx of suicide attempts        Prior to Admission Medications   Prescriptions Last Dose Informant Patient Reported? Taking? FLUoxetine (PROzac) 20 mg capsule   No No   Sig: Take 1 capsule (20 mg total) by mouth 2 (two) times a day   METHADONE HCL PO   Yes No   Sig: Take 155 mg by mouth daily   RA SENNA 8 6 MG tablet   Yes No   Sig: take 1 once daily if needed   carisoprodol (SOMA) 350 mg tablet   Yes Yes   clonazePAM (KlonoPIN) 1 mg tablet   No No   Si QID      Facility-Administered Medications: None       Past Medical History:   Diagnosis Date    Allergic     Anxiety     Depression     Spinal stenosis        Past Surgical History:   Procedure Laterality Date    BACK SURGERY      FRACTURE SURGERY      left foot reconstruted     SPINE SURGERY      hernated disc        Family History   Problem Relation Age of Onset    Hypertension Mother     Hyperlipidemia Father     Heart disease Father     Depression Maternal Grandmother     Hypertension Maternal Grandmother     Squamous cell carcinoma Maternal Grandfather     Alcohol abuse Maternal Grandfather     Alzheimer's disease Paternal Grandmother     Dementia Paternal Grandmother     Alcohol abuse Paternal Uncle      I have reviewed and agree with the history as documented  Social History     Tobacco Use    Smoking status: Former Smoker     Last attempt to quit: 2017     Years since quittin 8    Smokeless tobacco: Current User    Tobacco comment: Vapes   Substance Use Topics    Alcohol use: Never     Frequency: Never     Binge frequency: Never    Drug use: Not Currently     Types: Cocaine        Review of Systems   Constitutional: Negative for chills, diaphoresis and fever  Respiratory: Negative for cough, chest tightness and shortness of breath      Cardiovascular: Negative for chest pain and palpitations  Gastrointestinal: Negative for abdominal pain, constipation, diarrhea, nausea and vomiting  Genitourinary: Negative for dysuria and hematuria  Musculoskeletal: Positive for back pain (chronic)  Skin: Negative for rash  Neurological: Negative for dizziness, seizures, syncope, weakness, numbness and headaches  Psychiatric/Behavioral: Positive for depression, sleep disturbance (insomnia) and suicidal ideas  Negative for hallucinations, homicidal ideas, paranoia and self-injury  The patient is nervous/anxious and has insomnia  All other systems reviewed and are negative  Physical Exam  Physical Exam   Constitutional: He is oriented to person, place, and time  He appears well-developed and well-nourished  He is cooperative  No distress  HENT:   Head: Normocephalic and atraumatic  Nose: Nose normal    Mouth/Throat: Oropharynx is clear and moist    Eyes: Conjunctivae are normal    Neck: Normal range of motion  Cardiovascular: Normal rate and regular rhythm  Exam reveals no gallop and no friction rub  No murmur heard  Pulmonary/Chest: Effort normal and breath sounds normal  No respiratory distress  He has no wheezes  He has no rales  Abdominal: Soft  Bowel sounds are normal  He exhibits no distension and no mass  There is no tenderness  Musculoskeletal: Normal range of motion  He exhibits no edema  Neurological: He is alert and oriented to person, place, and time  Skin: Skin is warm and dry  No rash noted  He is not diaphoretic  No erythema  No pallor  Psychiatric: His speech is normal  Judgment normal  His mood appears anxious  He is slowed  He is not actively hallucinating  Thought content is not paranoid  Cognition and memory are normal  He exhibits a depressed mood  He expresses suicidal ideation  He expresses no homicidal ideation  He expresses suicidal plans (overdose on medications)  Nursing note and vitals reviewed        Vital Signs  ED Triage Vitals Temperature Pulse Respirations Blood Pressure SpO2   07/16/19 1728 07/16/19 1737 07/16/19 1737 07/16/19 1737 07/16/19 1737   99 2 °F (37 3 °C) 66 14 124/79 98 %      Temp Source Heart Rate Source Patient Position - Orthostatic VS BP Location FiO2 (%)   07/16/19 1728 07/16/19 1737 07/16/19 1737 07/16/19 1737 --   Tympanic Monitor Sitting Right arm       Pain Score       07/16/19 1902       No Pain           Vitals:    07/16/19 1737 07/16/19 2035   BP: 124/79 97/58   Pulse: 66 (!) 45   Patient Position - Orthostatic VS: Sitting Lying         Visual Acuity      ED Medications  Medications - No data to display    Diagnostic Studies  Results Reviewed     Procedure Component Value Units Date/Time    Rapid drug screen, urine [[id number]]  (Abnormal) Collected:  07/16/19 1817    Lab Status:  Final result Specimen:  Urine, Clean Catch Updated:  07/16/19 1908     Amph/Meth UR Negative     Barbiturate Ur Negative     Benzodiazepine Urine Negative     Cocaine Urine Negative     Methadone Urine Positive     Opiate Urine Negative     PCP Ur Negative     THC Urine Negative    Narrative:       Presumptive report  If requested, specimen will be sent to reference lab for confirmation  FOR MEDICAL PURPOSES ONLY  IF CONFIRMATION NEEDED PLEASE CONTACT THE LAB WITHIN 5 DAYS      Drug Screen Cutoff Levels:  AMPHETAMINE/METHAMPHETAMINES  1000 ng/mL  BARBITURATES     200 ng/mL  BENZODIAZEPINES     200 ng/mL  COCAINE      300 ng/mL  METHADONE      300 ng/mL  OPIATES      300 ng/mL  PHENCYCLIDINE     25 ng/mL  THC       50 ng/mL      Comprehensive metabolic panel [[id number]]  (Abnormal) Collected:  07/16/19 1810    Lab Status:  Final result Specimen:  Blood from Arm, Right Updated:  07/16/19 1850     Sodium 140 mmol/L      Potassium 3 8 mmol/L      Chloride 103 mmol/L      CO2 30 mmol/L      ANION GAP 7 mmol/L      BUN 15 mg/dL      Creatinine 1 29 mg/dL      Glucose 119 mg/dL      Calcium 8 9 mg/dL      AST 78 U/L      ALT 58 U/L Alkaline Phosphatase 69 U/L      Total Protein 8 0 g/dL      Albumin 3 8 g/dL      Total Bilirubin 0 60 mg/dL      eGFR 74 ml/min/1 73sq m     Narrative:       [place] guidelines for Chronic Kidney Disease (CKD):     Stage 1 with normal or high GFR (GFR > 90 mL/min/1 73 square meters)    Stage 2 Mild CKD (GFR = 60-89 mL/min/1 73 square meters)    Stage 3A Moderate CKD (GFR = 45-59 mL/min/1 73 square meters)    Stage 3B Moderate CKD (GFR = 30-44 mL/min/1 73 square meters)    Stage 4 Severe CKD (GFR = 15-29 mL/min/1 73 square meters)    Stage 5 End Stage CKD (GFR <15 mL/min/1 73 square meters)  Note: GFR calculation is accurate only with a steady state creatinine    CBC and differential [[id number]]  (Abnormal) Collected:  07/16/19 1810    Lab Status:  Final result Specimen:  Blood from Arm, Right Updated:  07/16/19 1833     WBC 7 39 Thousand/uL      RBC 3 75 Million/uL      Hemoglobin 11 0 g/dL      Hematocrit 32 7 %      MCV 87 fL      MCH 29 3 pg      MCHC 33 6 g/dL      RDW 13 6 %      MPV 11 0 fL      Platelets 788 Thousands/uL      nRBC 0 /100 WBCs      Neutrophils Relative 37 %      Immat GRANS % 0 %      Lymphocytes Relative 46 %      Monocytes Relative 11 %      Eosinophils Relative 5 %      Basophils Relative 1 %      Neutrophils Absolute 2 72 Thousands/µL      Immature Grans Absolute 0 01 Thousand/uL      Lymphocytes Absolute 3 41 Thousands/µL      Monocytes Absolute 0 79 Thousand/µL      Eosinophils Absolute 0 40 Thousand/µL      Basophils Absolute 0 06 Thousands/µL     POCT alcohol breath test [[id number]]  (Normal) Resulted:  07/16/19 1754    Lab Status:  Final result Updated:  07/16/19 1754     EXTBreath Alcohol 0 000                 No orders to display              Procedures  Procedures       ED Course  ED Course as of Jul 16 2156   Tue Jul 16, 2019 2044 Patient currently talking to [facility] from crisis over the phone  2132 Patient signed 615, faxed back to [facility]  6390 Care transferred to Dr Chelo Valencia  Bed search in progress  MDM  Number of Diagnoses or Management Options  Depression with suicidal ideation: new and requires workup  Diagnosis management comments: Patient with worsening depression and SI, will consult crisis  Amount and/or Complexity of Data Reviewed  Clinical lab tests: ordered and reviewed    Patient Progress  Patient progress: stable      Disposition  Final diagnoses:   Depression with suicidal ideation     Time reflects when diagnosis was documented in both MDM as applicable and the Disposition within this note     Time User Action Codes Description Comment    7/16/2019  9:55 PM Ac Hernandez Add [F32 9,  Y97 [id number]] Depression with suicidal ideation       ED Disposition     None      Follow-up Information    None         Patient's Medications   Discharge Prescriptions    No medications on file     No discharge procedures on file      ED Provider  Electronically Signed by           Yolette Grant PA-C  07/16/19 2379

## 2019-07-16 NOTE — ED NOTES
This patient UNABLE to provide urine sample  Urine label already scanned       [facility]  07/16/19 9490

## 2019-07-16 NOTE — ED NOTES
Yamileth Boss) called back and acknowledged page, brief description given, Beverly Merritt will call  back     Jose Mena RN  07/16/19 [phone]

## 2019-07-16 NOTE — ED NOTES
Dinner tray delivered to patient        Jed Jackson [facility]  07/16/19 7978

## 2019-07-16 NOTE — ED NOTES
Pt belongings placed into locker #87  Shirt  Shoes  Pants  Bottle of clear liquid  Appears to be water       Kimberlee Martin  07/16/19 2462

## 2019-07-16 NOTE — ED NOTES
Both side rails were pulled up into position due to this patient not being able to stay awake and leaning out of the bed       Aldo Langley  07/16/19 6037

## 2019-07-16 NOTE — ED NOTES
Mother at pt bedside       Yazan Jackson [facility]  07/16/19 3000

## 2019-07-16 NOTE — ED NOTES
Patient brought bookbag filled with clothes, razer, two mobile phones, two juuls, medications, personal items such as shampoo, conditioner, body wash, deodorant        [facility]  07/16/19 1993

## 2019-07-17 PROBLEM — F41.1 GENERALIZED ANXIETY DISORDER: Status: ACTIVE | Noted: 2019-07-17

## 2019-07-17 PROBLEM — F33.2 SEVERE EPISODE OF RECURRENT MAJOR DEPRESSIVE DISORDER, WITHOUT PSYCHOTIC FEATURES (HCC): Status: ACTIVE | Noted: 2019-07-17

## 2019-07-17 PROCEDURE — 99254 IP/OBS CNSLTJ NEW/EST MOD 60: CPT | Performed by: PHYSICIAN ASSISTANT

## 2019-07-17 PROCEDURE — 99223 1ST HOSP IP/OBS HIGH 75: CPT | Performed by: PSYCHIATRY & NEUROLOGY

## 2019-07-17 RX ORDER — ACETAMINOPHEN 325 MG/1
650 TABLET ORAL EVERY 6 HOURS PRN
Status: DISCONTINUED | OUTPATIENT
Start: 2019-07-17 | End: 2019-07-20

## 2019-07-17 RX ORDER — TRAZODONE HYDROCHLORIDE 50 MG/1
50 TABLET ORAL
Status: DISCONTINUED | OUTPATIENT
Start: 2019-07-17 | End: 2019-07-26 | Stop reason: HOSPADM

## 2019-07-17 RX ORDER — ACETAMINOPHEN 325 MG/1
650 TABLET ORAL EVERY 4 HOURS PRN
Status: DISCONTINUED | OUTPATIENT
Start: 2019-07-17 | End: 2019-07-20

## 2019-07-17 RX ORDER — MAGNESIUM HYDROXIDE/ALUMINUM HYDROXICE/SIMETHICONE 120; 1200; 1200 MG/30ML; MG/30ML; MG/30ML
30 SUSPENSION ORAL EVERY 4 HOURS PRN
Status: DISCONTINUED | OUTPATIENT
Start: 2019-07-17 | End: 2019-07-26 | Stop reason: HOSPADM

## 2019-07-17 RX ORDER — IBUPROFEN 800 MG/1
800 TABLET ORAL EVERY 8 HOURS PRN
Status: DISCONTINUED | OUTPATIENT
Start: 2019-07-17 | End: 2019-07-26 | Stop reason: HOSPADM

## 2019-07-17 RX ORDER — MIRTAZAPINE 15 MG/1
15 TABLET, FILM COATED ORAL
Status: DISCONTINUED | OUTPATIENT
Start: 2019-07-17 | End: 2019-07-23

## 2019-07-17 RX ORDER — HALOPERIDOL 5 MG/ML
5 INJECTION INTRAMUSCULAR EVERY 6 HOURS PRN
Status: DISCONTINUED | OUTPATIENT
Start: 2019-07-17 | End: 2019-07-26 | Stop reason: HOSPADM

## 2019-07-17 RX ORDER — METHADONE HYDROCHLORIDE 10 MG/1
30 TABLET ORAL DAILY
Status: DISCONTINUED | OUTPATIENT
Start: 2019-07-17 | End: 2019-07-18

## 2019-07-17 RX ORDER — NICOTINE 21 MG/24HR
1 PATCH, TRANSDERMAL 24 HOURS TRANSDERMAL DAILY
Status: DISCONTINUED | OUTPATIENT
Start: 2019-07-17 | End: 2019-07-26 | Stop reason: HOSPADM

## 2019-07-17 RX ORDER — HYDROXYZINE 50 MG/1
50 TABLET, FILM COATED ORAL EVERY 4 HOURS PRN
Status: DISCONTINUED | OUTPATIENT
Start: 2019-07-17 | End: 2019-07-20

## 2019-07-17 RX ORDER — OLANZAPINE 10 MG/1
5 INJECTION, POWDER, LYOPHILIZED, FOR SOLUTION INTRAMUSCULAR
Status: DISCONTINUED | OUTPATIENT
Start: 2019-07-17 | End: 2019-07-26 | Stop reason: HOSPADM

## 2019-07-17 RX ORDER — LORAZEPAM 1 MG/1
1 TABLET ORAL EVERY 6 HOURS PRN
Status: DISCONTINUED | OUTPATIENT
Start: 2019-07-17 | End: 2019-07-26 | Stop reason: HOSPADM

## 2019-07-17 RX ORDER — LORAZEPAM 2 MG/ML
2 INJECTION INTRAMUSCULAR EVERY 4 HOURS PRN
Status: DISCONTINUED | OUTPATIENT
Start: 2019-07-17 | End: 2019-07-26 | Stop reason: HOSPADM

## 2019-07-17 RX ORDER — RISPERIDONE 2 MG/1
2 TABLET, ORALLY DISINTEGRATING ORAL
Status: DISCONTINUED | OUTPATIENT
Start: 2019-07-17 | End: 2019-07-26 | Stop reason: HOSPADM

## 2019-07-17 RX ADMIN — NICOTINE 1 PATCH: 21 PATCH, EXTENDED RELEASE TRANSDERMAL at 10:44

## 2019-07-17 RX ADMIN — MIRTAZAPINE 15 MG: 15 TABLET, FILM COATED ORAL at 21:08

## 2019-07-17 RX ADMIN — LORAZEPAM 1 MG: 1 TABLET ORAL at 19:37

## 2019-07-17 RX ADMIN — METHADONE HYDROCHLORIDE 30 MG: 10 TABLET ORAL at 15:57

## 2019-07-17 NOTE — PROGRESS NOTES
Pt C/O feeling shaky requesting  Klonopin, stated he was taking  mg qid ordered by his primary care DR, medicated as directed
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
## 2019-07-17 NOTE — PROGRESS NOTES
BRITTANY notified due to pt level of sedation and abnormal VS  At bedside to assess  Pt responded to questions appropriately  Oriented x4  VS obtained  No new orders at this time  Will monitor

## 2019-07-17 NOTE — PLAN OF CARE
Pt progressing  Vital signs are improving  Encouraging pt to be active  Charted VS an hour after Methodone dose  VSS  Pt alert and oriented  Pleasant, was not irritable after being asked to wait  Will continue to monitor vital signs  Denied all symptoms

## 2019-07-17 NOTE — ED NOTES
Patient is accepted at [facility]  Patient is accepted by Dr Malorie Whyte per [employer]     Patient may go to the floor upon completion of report  Nurse report is to be called to  prior to patient transfer  Charge RN from 2W will contact ED for report

## 2019-07-17 NOTE — PROGRESS NOTES
07/17/19 0748   Team Meeting   Meeting Type Daily Rounds   Team Members Present   Team Members Present Physician;Nurse;   Physician Team Member Dr Carrie Barahona Team Member EMMA [facility] Management Team Member Alvarez Fischer   Patient/Family Present   Patient Present No   Patient's Family Present No     201 from [facility] ED  SI with plan to overdose  Recently stopped Doxopin  Has been getting into arguments with his mother  Complaining of back pain  Pt is on Methadone maintenance

## 2019-07-17 NOTE — PROGRESS NOTES
Pt cooperative, pleasant, resting quietly, does not appear sedated  Denies feeling lightheaded or dizzy  Ate dinner, drank fluids  Would like to resume taking klonopin and methadone to avoid seizure/withdrawal  Educated on prn use

## 2019-07-17 NOTE — ED NOTES
Insurance Authorization for admission:   Phone call placed to [facility]  Phone number: [phone]   Spoke to Pete Bates  3 days approved  Level of care:Inpatient Psychiatric 201 admit  Review on to be  dtermined  Authorization # pending admission    EVS (Eligibility Verification System) called - [phone]  Automated system indicates: Eligible for [employer] Authorization for Transportation:    Phone call placed to **  Phone number **  Spoke to **     Authorization #: **

## 2019-07-17 NOTE — H&P
Psychiatric Evaluation - Behavioral Health   Maybelle Fleischer 33 y o  male MRN: [id number]  Unit/Bed#: [facility] 251-01 Encounter: [id number]    Assessment/Plan   Principal Problem:    Severe episode of recurrent major depressive disorder, without psychotic features ([facility] [address] )  Active Problems:    Generalized anxiety disorder    Plan:   1  Check admission labs  2  Collaborate with family for baseline assessment and disposition planning  3  Methadone dosing is conformed but I will not restart methadone as patient is hypotensive and bradycardic  4  Add mirtazapine 15 mg at bedtime for depression and insomnia management  5  Add Ativan 1 mg q 6 hours p r n  For benzodiazepine withdrawal management as patient was on Klonopin  I will not start Klonopin taper as patient is currently hypotensive and bradycardic  Risks, benefits and possible side effects of Medications:   Risks, benefits, and possible side effects of medications explained to patient and patient verbalizes understanding  Chief Complaint: "I don't want to go on living like this"    History of Present Illness     Patient is a 33 y o  male presents on 12 with recent worsening of depression, anxiety and noncompliance with medication for last 1 week  Patient reports diagnosis of major depression and reports recent worsening of depression with low energy, decline in interest, poor sleep, racing mind, guilt, hopelessness and suicidal ideations with plan to overdose on medications  Patient also reports recent worsening of anxiety and reports taking Klonopin 1 mg 4 times daily with poor response  Patient is denying history of manic or psychotic symptoms  Patient was on doxepin for sleep but reports having increased sedation and dry mouth resulting in patient not taking this medication for last 8 days and this resulted in decline in sleep of 2-3 hours only and causing worsening depression and suicidal thoughts      On admission evaluation today patient was evidently sedated but has not received any medications yet  Patient reports that he is catching up on his sleep and later was cooperative with evaluation but with evident sedation  Patient is oriented to time, place and person  Patient reports that he is on fluoxetine morning dose, doxepin bedtime does and Klonopin 4 times daily  He also reports getting methadone 165 mg daily dosing and his last dose was 2 days ago  Patient was in emergency room yesterday so did not receive yesterday his dose of methadone  Patient is currently sedated with hypotension and bradycardia  He understand the risk of restarting these medication and causing risk of worsening of these symptoms and sudden cardiac death  Patient agreed with plan of starting methadone at low dose but only when his vital signs has normalized  Patient remained appropriate during entire evaluation and agreed with above treatment planning  Patient is currently denying any benzodiazepine withdrawal symptoms and agreed with plan of doing benzo taper and starting alternative medication for anxiety once he is more stable from medical standpoint  He is consenting for safety on the unit  Medical Review Of Systems:  sedation    Psychiatric Review Of Systems:  sleep: yes  appetite changes: no  weight changes: no  energy/anergy: yes  interest/pleasure/anhedonia: yes  somatic symptoms: yes  anxiety/panic: yes  linden: no  guilty/hopeless: yes  self injurious behavior/risky behavior: no    Historical Information     Past Psychiatric History:   History prior inpatient psychiatric admissions  Currently in treatment with PCP  Past Suicide attempts: denies  Past Violent behavior: no  Past Psychiatric medication trial:  Prozac, Wellbutrin, Klonopin, methadone    Substance Abuse History:  Denies  Patient is currently on methadone treatment        Social History     Tobacco History     Smoking Status  Light Tobacco Smoker Last attempt to quit  9/1/2017 Smoking Frequency  0 25 packs/day    Smokeless Tobacco Use  Current User          Alcohol History     Alcohol Use Status  Never          Drug Use     Drug Use Status  Not Currently          Sexual Activity     Sexually Active  Not Asked          Activities of Daily Living    Not Asked               Additional Substance Use Detail     Questions Responses    Substance Use Assessment Substance use within the past 12 months    Alcohol Use Frequency Denies use in past 12 months    Cannabis frequency Never used    Comment: Never used on 7/17/2019     Heroin Frequency Past abuse    Cocaine frequency Past occasional use    Comment: Past occasional use on 7/17/2019     Crack Cocaine Frequency Denies use in past 12 months    Methamphetamine Frequency Denies use in past 12 months    Narcotic Frequency Denies use in past 12 months    Benzodiazepine Frequency Denies use in past 12 months    Amphetamine frequency Denies use in past 12 months    Barbituate Frequency Denies use use in past 12 months    Inhalant frequency Never used    Comment: Never used on 7/17/2019     Hallucinogen frequency Never used    Comment: Never used on 7/17/2019     Ecstasy frequency Never used    Comment: Never used on 7/17/2019     Other drug frequency Never used    Comment: Never used on 7/17/2019     Opiate frequency Past abuse    Last reviewed by Ezio Castillo RN on 7/17/2019        I have assessed this patient for substance use within the past 12 months    Family Psychiatric History:   Not known to patient    Social History:  Lives with mother but not sure if he can return  Not employed  Financial stressors    Past Medical History:   Diagnosis Date    Allergic     Anxiety     Depression     Spinal stenosis            Meds/Allergies   all current active meds have been reviewed  No Known Allergies    Objective   Vital signs in last 24 hours:  Temp:  [95 4 °F (35 2 °C)-99 2 °F (37 3 °C)] 97 5 °F (36 4 °C)  HR:  [39-82] 82  Resp:  [14-18] 16  BP: ()/(50-79) 90/53    No intake or output data in the 24 hours ending 07/17/19 1153    Mental Status Evaluation:  Appearance:  casually dressed   Behavior:  guarded   Speech:  soft   Mood:  anxious and depressed   Affect:  constricted   Language: naming objects   Thought Process:  circumstantial   Thought Content:  obsessions   Perceptual Disturbances: None   Risk Potential: Suicidal Ideations without plan, Homicidal Ideations none and Potential for Aggression No   Sensorium:  person and place   Cognition:  grossly intact   Consciousness:  awake    Attention: attention span appeared shorter than expected for age   Intellect: normal   Fund of Knowledge: awareness of current events: fair   Insight:  limited   Judgment: limited   Muscle Strength and Tone: arm(s): bilateral   Gait/Station: inbed   Motor Activity: no abnormal movements     Memory: Short and long term memory  fair       Laboratory results:    I have personally reviewed all pertinent laboratory/tests results    Labs in last 72 hours:   Recent Labs     07/16/19  1810   WBC 7 39   RBC 3 75*   HGB 11 0*   HCT 32 7*      RDW 13 6   NEUTROABS 2 72   SODIUM 140   K 3 8      CO2 30   BUN 15   CREATININE 1 29   GLUC 119   CALCIUM 8 9   AST 78*   ALT 58   ALKPHOS 69   TP 8 0   ALB 3 8   TBILI 0 60     Admission Labs:   Admission on 07/16/2019   Component Date Value    WBC 07/16/2019 7 39     RBC 07/16/2019 3 75*    Hemoglobin 07/16/2019 11 0*    Hematocrit 07/16/2019 32 7*    MCV 07/16/2019 87     MCH 07/16/2019 29 3     MCHC 07/16/2019 33 6     RDW 07/16/2019 13 6     MPV 07/16/2019 11 0     Platelets 18/40/3625 270     nRBC 07/16/2019 0     Neutrophils Relative 07/16/2019 37*    Immat GRANS % 07/16/2019 0     Lymphocytes Relative 07/16/2019 46*    Monocytes Relative 07/16/2019 11     Eosinophils Relative 07/16/2019 5     Basophils Relative 07/16/2019 1     Neutrophils Absolute 07/16/2019 2 72     Immature Grans Absolute 07/16/2019 0 01     Lymphocytes Absolute 07/16/2019 3 41     Monocytes Absolute 07/16/2019 0 79     Eosinophils Absolute 07/16/2019 0 40     Basophils Absolute 07/16/2019 0 06     Sodium 07/16/2019 140     Potassium 07/16/2019 3 8     Chloride 07/16/2019 103     CO2 07/16/2019 30     ANION GAP 07/16/2019 7     BUN 07/16/2019 15     Creatinine 07/16/2019 1 29     Glucose 07/16/2019 119     Calcium 07/16/2019 8 9     AST 07/16/2019 78*    ALT 07/16/2019 58     Alkaline Phosphatase 07/16/2019 69     Total Protein 07/16/2019 8 0     Albumin 07/16/2019 3 8     Total Bilirubin 07/16/2019 0 60     eGFR 07/16/2019 74     Amph/Meth UR 07/16/2019 Negative     Barbiturate Ur 07/16/2019 Negative     Benzodiazepine Urine 07/16/2019 Negative     Cocaine Urine 07/16/2019 Negative     Methadone Urine 07/16/2019 Positive*    Opiate Urine 07/16/2019 Negative     PCP Ur 07/16/2019 Negative     THC Urine 07/16/2019 Negative     EXTBreath Alcohol 07/16/2019 0 000      Risks / Benefits of Treatment:     Risks, benefits, and possible side effects of medications explained to patient  The patient verbalizes understanding and agreement for treatment  Counseling / Coordination of Care:     Patient's presentation on admission and proposed treatment plan discussed with treatment team   Diagnosis, medication changes and treatment plan reviewed with patient  Recent stressors discussed with patient     Events leading to admission reviewed with patient  Importance of medication and treatment compliance reviewed with patient          Inpatient Psychiatric Certification:     Certification: Based upon physical, mental and social evaluations, I certify that inpatient psychiatric services are medically necessary for this patient for a duration of 7 midnights for the treatment of Severe episode of recurrent major depressive disorder, without psychotic features ([facility] [address] )    Available alternative community resources do not meet the patient's mental health care needs  I further attest that an established written individualized plan of care has been implemented and is outlined in the patient's medical records  This note has been constructed using a voice recognition system  There may be translation, syntax,  or grammatical errors  If you have any questions, please contact the dictating provider

## 2019-07-17 NOTE — PROGRESS NOTES
Tech reported that pt had low bpm this am - 43 bpm   Went in to check pt  Pt pale, waxy skin  Manual pulse L radial - 39 bpm   Pulse was strong and steady  Had low pulse last nite on admit    Pt just woke and asking for breakfast

## 2019-07-17 NOTE — PROGRESS NOTES
Pt lying in bed this am   Pt said he didn't feel good from withdrawal   Was sleeping at the time, woke him  Monitoring pts pulse and bp  Both of which have been low this am   Pt encouraged to eat and drink, specially with adding Methodone dosing  Pt alert and cooperative  Denied SI, HI and AVH

## 2019-07-17 NOTE — TREATMENT PLAN
TREATMENT PLAN REVIEW - [address] Koko Atkins Gamma 28 y o  1990 male MRN: [id number]    [address] [place] [address] Room / Bed: [facility] 251/[facility] 251-01 Encounter: [id number]        Admit Date/Time:  7/16/2019  5:26 PM    Treatment Team: Attending Provider: Shirley Mcintyre; Patient Care Technician: Tila Galeana; Care Manager: Naif Garcia, MARCELINO; Registered Nurse: Jessica Jimenez RN; Patient Care Assistant: Pastor Benton;  Patient Care Technician: Dolores Woodruff; Medications RN: Fermin Syed RN; : Kaylin Miramontes    Diagnosis: Principal Problem:    Severe episode of recurrent major depressive disorder, without psychotic features [facility]  Active Problems:    Generalized anxiety disorder    Patient Strengths/Assets: cooperative, compliant with medication, motivated, motivation for treatment/growth, patient is on a voluntary commitment      Patient Barriers/Limitations: low self esteem    Short Term Goals: decrease in depressive symptoms, decrease in anxiety symptoms, decrease in suicidal thoughts    Long Term Goals: improvement in depression, improvement in anxiety, stabilization of mood, free of suicidal thoughts, acceptance of need for psychiatric medications, acceptance of need for psychiatric treatment, acceptance of need for psychiatric follow up after discharge    Progress Towards Goals: starting psychiatric medications as prescribed    Recommended Treatment: medication management, patient medication education, group therapy, milieu therapy, continued Behavioral Health psychiatric evaluation/assessment process     Treatment Frequency: daily medication monitoring, group and milieu therapy daily, monitoring through interdisciplinary rounds, monitoring through weekly patient care conferences    Expected Discharge Date: 7 days - 7/24/2019    Discharge Plan: referral for outpatient medication management with a psychiatrist, referral for outpatient psychotherapy    Treatment Plan Created/Updated By: Tim Johns

## 2019-07-17 NOTE — ED NOTES
Woke patient up to take his V/S  Offered him the restroom and something to eat and drink  Patient denies needing anything at this time       Abril Melo  07/16/19 5576

## 2019-07-17 NOTE — ED NOTES
Intake / Safety assessment completed with patient over the phone which he was agreeable to  Patient very sleepy while talking with crisis worker and provided limited information  Patient presents to ED due to increased depression / anxiety  Suicidal ideation with plan to OD on pills  States he does not want to live  Feeling hopeless / helpless  Denies homicidal ideation or hallucinations  Poor sleep  Patient reports that he is currently being seen by a therapist, but he could not recall therapist name and that he is prescribed medication by his PCP  History of substance use  Patient is requesting to sign [phone] which was faxed to him and Dr mehta signatures

## 2019-07-17 NOTE — TREATMENT PLAN
RN will meet with the patient at least twice per day to assess any concerns and will give education on prescribed medications, diagnosis, and healthy coping skills
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
## 2019-07-18 PROCEDURE — 99233 SBSQ HOSP IP/OBS HIGH 50: CPT | Performed by: PSYCHIATRY & NEUROLOGY

## 2019-07-18 PROCEDURE — 93005 ELECTROCARDIOGRAM TRACING: CPT

## 2019-07-18 RX ORDER — METHADONE HYDROCHLORIDE 10 MG/ML
165 CONCENTRATE ORAL DAILY
Status: DISCONTINUED | OUTPATIENT
Start: 2019-07-18 | End: 2019-07-26 | Stop reason: HOSPADM

## 2019-07-18 RX ORDER — CLONAZEPAM 0.5 MG/1
0.5 TABLET ORAL 3 TIMES DAILY
Status: DISCONTINUED | OUTPATIENT
Start: 2019-07-18 | End: 2019-07-26 | Stop reason: HOSPADM

## 2019-07-18 RX ORDER — METHADONE HYDROCHLORIDE 10 MG/ML
165 CONCENTRATE ORAL DAILY
Status: DISCONTINUED | OUTPATIENT
Start: 2019-07-18 | End: 2019-07-18

## 2019-07-18 RX ORDER — LANOLIN ALCOHOL/MO/W.PET/CERES
3 CREAM (GRAM) TOPICAL
Status: DISCONTINUED | OUTPATIENT
Start: 2019-07-18 | End: 2019-07-26 | Stop reason: HOSPADM

## 2019-07-18 RX ADMIN — MELATONIN 3 MG: at 21:00

## 2019-07-18 RX ADMIN — NICOTINE POLACRILEX 4 MG: 4 GUM, CHEWING ORAL at 15:23

## 2019-07-18 RX ADMIN — NICOTINE 1 PATCH: 21 PATCH, EXTENDED RELEASE TRANSDERMAL at 08:49

## 2019-07-18 RX ADMIN — CLONAZEPAM 0.5 MG: 0.5 TABLET ORAL at 20:59

## 2019-07-18 RX ADMIN — CLONAZEPAM 0.5 MG: 0.5 TABLET ORAL at 12:14

## 2019-07-18 RX ADMIN — NICOTINE POLACRILEX 4 MG: 4 GUM, CHEWING ORAL at 19:49

## 2019-07-18 RX ADMIN — LORAZEPAM 1 MG: 1 TABLET ORAL at 06:28

## 2019-07-18 RX ADMIN — NICOTINE POLACRILEX 4 MG: 4 GUM, CHEWING ORAL at 12:14

## 2019-07-18 RX ADMIN — MIRTAZAPINE 15 MG: 15 TABLET, FILM COATED ORAL at 21:00

## 2019-07-18 RX ADMIN — METHADONE HYDROCHLORIDE 165 MG: 10 CONCENTRATE ORAL at 11:07

## 2019-07-18 RX ADMIN — CLONAZEPAM 0.5 MG: 0.5 TABLET ORAL at 15:48

## 2019-07-18 NOTE — PROGRESS NOTES
Pt slept throughout the night -restless  Woke briefly and states being upset that he is not on his regularly scheduled methadone dose  Was educated to reason for this which pt understanding of  Able to fall back asleep soon after

## 2019-07-18 NOTE — PROGRESS NOTES
Progress Note - Behavioral Health   Vandana Caldwell 33 y o  male MRN: [id number]  Unit/Bed#: [facility] 251-01 Encounter: [id number]    Assessment/Plan   Principal Problem:    Severe episode of recurrent major depressive disorder, without psychotic features ([address] )  Active Problems:    Generalized anxiety disorder    Subjective:  Patient is compliant with medication with no acute side effects  Patient reports persistence of depression, anxiety with passive death wishes  Patient continues to express symptoms consistent with opiate withdrawal symptom of nausea, chills and body ache  Patient reports that he had low blood pressure at baseline and currently is not having any symptoms of hypotension  Patient agreed with plan of resuming the dose of methadone 165 mg daily and monitoring him closely for any signs of worsening hypotension or sedation  Patient was on Klonopin 1 mg 4 times daily dosing but understand the risk of taking this much high dose frequency and agreed with plan of reducing the dose to Klonopin 0 5 mg t i d   We discussed the other medication for anxiety including gabapentin, hydroxyzine, paroxetine-but patient reports either not responding to them or having a side effects on these medication  Patient agreed with plan of staying on lower doses of clonazepam for anxiety management  Patient is consenting for safety on the unit but remained seclusive to self for most part  Patient is encouraged to participate in milieu therapy      Current Medications:    Current Facility-Administered Medications:  acetaminophen 650 mg Oral Q6H PRN Lalito Vieyra MD   acetaminophen 650 mg Oral Q4H PRN Lalito Vieyra MD   aluminum-magnesium hydroxide-simethicone 30 mL Oral Q4H PRN Lalito Vieyra MD   clonazePAM 0 5 mg Oral TID Jesus Edmonds   haloperidol lactate 5 mg Intramuscular Q6H PRN Lalito Vieyra MD   hydrOXYzine HCL 50 mg Oral Q4H PRN Princess See MD   ibuprofen 800 mg Oral Q8H PRN Princess See MD LORazepam 2 mg Intramuscular Q4H PRN Lalito Vieyra MD   LORazepam 1 mg Oral Q6H PRN Jesus Edmonds   magnesium hydroxide 30 mL Oral Daily PRN Princess See MD   methadone 165 mg Oral Daily Jesus Edmonds   mirtazapine 15 mg Oral HS Jesus Edmonds   nicotine 1 patch Transdermal Daily Lalito Vieyra MD   nicotine polacrilex 4 mg Oral Q2H PRN Jesus Edmonds   OLANZapine 5 mg Intramuscular Q3H PRN Princess See MD   risperiDONE 2 mg Oral Q3H PRN Princess See MD   traZODone 50 mg Oral HS PRN Princess See MD       Behavioral Health Medications: all current active meds have been reviewed  Vital signs in last 24 hours:  Temp:  [97 8 °F (36 6 °C)-98 7 °F (37 1 °C)] 98 1 °F (36 7 °C)  HR:  [58-92] 83  Resp:  [15-16] 16  BP: (102-124)/(54-74) 112/66    Laboratory results:    I have personally reviewed all pertinent laboratory/tests results    Labs in last 72 hours:   Recent Labs     07/16/19  1810   WBC 7 39   RBC 3 75*   HGB 11 0*   HCT 32 7*      RDW 13 6   NEUTROABS 2 72   SODIUM 140   K 3 8      CO2 30   BUN 15   CREATININE 1 29   GLUC 119   CALCIUM 8 9   AST 78*   ALT 58   ALKPHOS 69   TP 8 0   ALB 3 8   TBILI 0 60     Admission Labs:   Admission on 07/16/2019   Component Date Value    WBC 07/16/2019 7 39     RBC 07/16/2019 3 75*    Hemoglobin 07/16/2019 11 0*    Hematocrit 07/16/2019 32 7*    MCV 07/16/2019 87     MCH 07/16/2019 29 3     MCHC 07/16/2019 33 6     RDW 07/16/2019 13 6     MPV 07/16/2019 11 0     Platelets 84/43/7612 270     nRBC 07/16/2019 0     Neutrophils Relative 07/16/2019 37*    Immat GRANS % 07/16/2019 0     Lymphocytes Relative 07/16/2019 46*    Monocytes Relative 07/16/2019 11     Eosinophils Relative 07/16/2019 5     Basophils Relative 07/16/2019 1     Neutrophils Absolute 07/16/2019 2 72     Immature Grans Absolute 07/16/2019 0 01     Lymphocytes Absolute 07/16/2019 3 41     Monocytes Absolute 07/16/2019 0 79     Eosinophils Absolute 07/16/2019 0 40     Basophils Absolute 07/16/2019 0 06     Sodium 07/16/2019 140     Potassium 07/16/2019 3 8     Chloride 07/16/2019 103     CO2 07/16/2019 30     ANION GAP 07/16/2019 7     BUN 07/16/2019 15     Creatinine 07/16/2019 1 29     Glucose 07/16/2019 119     Calcium 07/16/2019 8 9     AST 07/16/2019 78*    ALT 07/16/2019 58     Alkaline Phosphatase 07/16/2019 69     Total Protein 07/16/2019 8 0     Albumin 07/16/2019 3 8     Total Bilirubin 07/16/2019 0 60     eGFR 07/16/2019 74     Amph/Meth UR 07/16/2019 Negative     Barbiturate Ur 07/16/2019 Negative     Benzodiazepine Urine 07/16/2019 Negative     Cocaine Urine 07/16/2019 Negative     Methadone Urine 07/16/2019 Positive*    Opiate Urine 07/16/2019 Negative     PCP Ur 07/16/2019 Negative     THC Urine 07/16/2019 Negative     EXTBreath Alcohol 07/16/2019 0 000        Psychiatric Review of Systems:  Behavior over the last 24 hours:  unchanged  Sleep: insomnia  Appetite: normal  Medication side effects: No  ROS: nausea    Mental Status Evaluation:  Appearance:  casually dressed   Behavior:  guarded   Speech:  normal volume   Mood:  anxious and depressed   Affect:  constricted   Language naming objects   Thought Process:  circumstantial   Thought Content:  obsessions   Perceptual Disturbances: None   Risk Potential: Suicidal Ideations without plan, Homicidal Ideations none and Potential for Aggression No   Sensorium:  person, place and time/date   Cognition:  grossly intact   Consciousness:  awake    Attention: attention span appeared shorter than expected for age   Insight:  limited   Judgment: limited   Intellect fair   Gait/Station: normal gait/station   Motor Activity: no abnormal movements     Memory: Short and long term memory  fair     Progress Toward Goals: slow progress    Recommended Treatment:   Increase the dose of methadone to his outpatient dose of 165 mg daily and monitor closely for any signs of worsening hypotension or sedation  Initiate Klonopin at low dose of 0 5 mg t i d  For anxiety management  Check EKG for baseline QTC after methadone which was within normal limit today  Continue mirtazapine 15 mg at bedtime for depression and insomnia management  Add melatonin 3 mg at bedtime for additional benefit with insomnia management  Continue with group therapy, milieu therapy and occupational therapy  Continue following current medications:   Current Facility-Administered Medications:  acetaminophen 650 mg Oral Q6H PRN Lalito Vieyra MD   acetaminophen 650 mg Oral Q4H PRN Lalito Vieyra MD   aluminum-magnesium hydroxide-simethicone 30 mL Oral Q4H PRN Lalito Vieyra MD   clonazePAM 0 5 mg Oral TID Jesus Edmonds   haloperidol lactate 5 mg Intramuscular Q6H PRN Lalito Vieyra MD   hydrOXYzine HCL 50 mg Oral Q4H PRN Lalito Vieyra MD   ibuprofen 800 mg Oral Q8H PRN Lalito Vieyra MD   LORazepam 2 mg Intramuscular Q4H PRN Lalito Vieyra MD   LORazepam 1 mg Oral Q6H PRN Jesus Edmonds   magnesium hydroxide 30 mL Oral Daily PRN Chantal Camarillo MD   methadone 165 mg Oral Daily Jesus Edmonds   mirtazapine 15 mg Oral HS Jesus Edmonds   nicotine 1 patch Transdermal Daily Lalito Vieyra MD   nicotine polacrilex 4 mg Oral Q2H PRN Jesus Edmonds   OLANZapine 5 mg Intramuscular Q3H PRN Lalito Vieyra MD   risperiDONE 2 mg Oral Q3H PRN Chantal Camarillo MD   traZODone 50 mg Oral HS PRN Chantal Camarillo MD       Risks, benefits and possible side effects of Medications:   Risks, benefits, and possible side effects of medications explained to patient and patient verbalizes understanding  This note has been constructed using a voice recognition system  There may be translation, syntax,  or grammatical errors  If you have any questions, please contact the dictating provider

## 2019-07-18 NOTE — PROGRESS NOTES
Treatment team mtg:  Discussed diagnosis and medications  Reviewed vital signs  Discussed methadone dosing  Discussed risks of klonopin QID and will work on ativan taper  Discharge planning for next week

## 2019-07-18 NOTE — PROGRESS NOTES
07/18/19 0810   Team Meeting   Meeting Type Daily Rounds   Team Members Present   Team Members Present Physician;Nurse;   Physician Team Member Dr Anthony Pinon Team Member EMMA [facility] Management Team Member Char/ [address]   Patient/Family Present   Patient Present No   Patient's Family Present No     Seclusive for most of the day  Tremulous during the day received Ativan during the day and again at night  Methadone started at 30 mg  Methadone will increase today  Pt taking Klonopin 1 mg QID  Pt is agreeable to an Ativan taper  Options will be discussed regarding other anti anxiety medications

## 2019-07-18 NOTE — PROGRESS NOTES
Pt feeling withdrawal from klonopin, accepted ativan prn, vitals stable  Pt reports partially effective due to level of tolerance  Some discomfort due to continued withdrawal symptoms

## 2019-07-18 NOTE — PROGRESS NOTES
Pt denies SI/HI/AVH  Seclusive to room  Pleasant during interaction  Reports feeling depressed but that his primary focus is getting the increased dose of Methadone  Says he will be able to attend groups following administration

## 2019-07-18 NOTE — PROGRESS NOTES
Pt was focused on resuming his prior methadone dosage throughout day shift  Remained cooperative but was frequently requesting this  This evening pt appears mildly sedated  Pt reports feeling better after receiving methadone  Pt denies SI but says he is very depressed  Pt said that he will be starting a new antidepressant tomorrow and is hopeful this will help  No questions/concerns at this time

## 2019-07-18 NOTE — PROGRESS NOTES
Fax received from [address] [place] [address] with  Methadone dose  He was getting 90mg  7/2 is the last dose he received there

## 2019-07-19 LAB
ATRIAL RATE: 47 BPM
P AXIS: 58 DEGREES
PR INTERVAL: 168 MS
QRS AXIS: 47 DEGREES
QRSD INTERVAL: 116 MS
QT INTERVAL: 450 MS
QTC INTERVAL: 398 MS
T WAVE AXIS: 50 DEGREES
VENTRICULAR RATE: 47 BPM

## 2019-07-19 PROCEDURE — 99232 SBSQ HOSP IP/OBS MODERATE 35: CPT | Performed by: PSYCHIATRY & NEUROLOGY

## 2019-07-19 PROCEDURE — 93010 ELECTROCARDIOGRAM REPORT: CPT | Performed by: INTERNAL MEDICINE

## 2019-07-19 RX ORDER — ESCITALOPRAM OXALATE 10 MG/1
10 TABLET ORAL DAILY
Status: DISCONTINUED | OUTPATIENT
Start: 2019-07-19 | End: 2019-07-26 | Stop reason: HOSPADM

## 2019-07-19 RX ADMIN — CLONAZEPAM 0.5 MG: 0.5 TABLET ORAL at 21:08

## 2019-07-19 RX ADMIN — NICOTINE POLACRILEX 4 MG: 4 GUM, CHEWING ORAL at 21:08

## 2019-07-19 RX ADMIN — ACETAMINOPHEN 650 MG: 325 TABLET, FILM COATED ORAL at 15:56

## 2019-07-19 RX ADMIN — CLONAZEPAM 0.5 MG: 0.5 TABLET ORAL at 09:06

## 2019-07-19 RX ADMIN — NICOTINE POLACRILEX 4 MG: 4 GUM, CHEWING ORAL at 12:24

## 2019-07-19 RX ADMIN — NICOTINE 1 PATCH: 21 PATCH, EXTENDED RELEASE TRANSDERMAL at 09:05

## 2019-07-19 RX ADMIN — MIRTAZAPINE 15 MG: 15 TABLET, FILM COATED ORAL at 21:08

## 2019-07-19 RX ADMIN — METHADONE HYDROCHLORIDE 165 MG: 10 CONCENTRATE ORAL at 09:07

## 2019-07-19 RX ADMIN — MELATONIN 3 MG: at 21:08

## 2019-07-19 RX ADMIN — CLONAZEPAM 0.5 MG: 0.5 TABLET ORAL at 15:56

## 2019-07-19 RX ADMIN — ESCITALOPRAM OXALATE 10 MG: 10 TABLET ORAL at 12:23

## 2019-07-19 RX ADMIN — NICOTINE POLACRILEX 4 MG: 4 GUM, CHEWING ORAL at 09:11

## 2019-07-19 NOTE — PROGRESS NOTES
Pt is calm and cooperative, pleasant during interaction  Pt OOB briefly this afternoon  Showered and attended dinner  Pt is now napping in bed  Will continue to monitor and support

## 2019-07-19 NOTE — PROGRESS NOTES
Progress Note - Behavioral Health   Gabriela Griffin 33 y o  male MRN: [id number]  Unit/Bed#: [facility] 251-01 Encounter: [id number]    Assessment/Plan   Principal Problem:    Severe episode of recurrent major depressive disorder, without psychotic features ([facility] [address] )  Active Problems:    Generalized anxiety disorder    - will begin patient on Lexapro-10 mg per day  - will monitor EKG closely  - patient has not been given p r n  medications as per now  - symptoms of nausea and diarrhea consistent with opiate withdrawal symptoms have completely improved  - patient will remain on Klonopin 0 5 mg t i d  - will continue to follow closely and in for symptom improvement  Subjective:  No overnight events as per nursing reports  Patient states that his depression remains a 6- 7/10  His anxiety is improving to a subjective scale of 5/10  Patient denies any visual or auditory hallucinations  Patient denies any suicidal or homicidal ideation or intent  He feels safe in the unit and consents for safety  He is beginning to sleep better, claiming he can fall asleep but it is harder to stay asleep  He is having poor appetite  Patient is calm, very cooperative  Denies panic attacks  Displays very fine tremors that are mild in nature, otherwise no signs of withdrawal from benzos  Symptoms of nausea and diarrhea that he presented yesterday are now improved      Current Medications:    Current Facility-Administered Medications:  acetaminophen 650 mg Oral Q6H PRN Lalito Vieyra MD   acetaminophen 650 mg Oral Q4H PRN Lalito Vieyra MD   aluminum-magnesium hydroxide-simethicone 30 mL Oral Q4H PRN Lalito Vieyra MD   clonazePAM 0 5 mg Oral TID Jesus Edmonds   escitalopram 10 mg Oral Daily Jesus Edmonds   haloperidol lactate 5 mg Intramuscular Q6H PRN Lalito Vieyra MD   hydrOXYzine HCL 50 mg Oral Q4H PRN Lalito Vieyra MD   ibuprofen 800 mg Oral Q8H PRN Lalito Vieyra MD   LORazepam 2 mg Intramuscular Q4H PRN Scarlett Ritchie MD   LORazepam 1 mg Oral Q6H PRN Jesus Edmonds   magnesium hydroxide 30 mL Oral Daily PRN Lalito Vieyra MD   melatonin 3 mg Oral HS Jesus Edmonds   methadone 165 mg Oral Daily Jesus Edmonds   mirtazapine 15 mg Oral HS Jesus Edmonds   nicotine 1 patch Transdermal Daily Lalito Vieyra MD   nicotine polacrilex 4 mg Oral Q2H PRN Jesus Edmonds   OLANZapine 5 mg Intramuscular Q3H PRN Scarlett Ritchie MD   risperiDONE 2 mg Oral Q3H PRN Scarlett Ritchie MD   traZODone 50 mg Oral HS PRN Scarlett Ritchie MD       Behavioral Health Medications: all current active meds have been reviewed and continue current psychiatric medications  Vital signs in last 24 hours:  Temp:  [97 9 °F (36 6 °C)-98 4 °F (36 9 °C)] 98 3 °F (36 8 °C)  HR:  [53-69] 56  Resp:  [17-18] 17  BP: (106-110)/(56-63) 109/56    Laboratory results:  I have personally reviewed all pertinent laboratory/tests results  Psychiatric Review of Systems:  Behavior over the last 24 hours:  improved  Sleep: insomnia  Appetite: poor  Medication side effects: No  ROS: no complaints other than a mild fine tremor on hands bilaterally  Mental Status Evaluation:  Appearance:  casually dressed   Behavior:  normal and coopertaive   Speech:  normal pitch and normal volume   Mood:  depressed   Affect:  blunted and mood-congruent   Language naming objects and repeating phrases   Thought Process:  goal directed   Thought Content:  normal, no suicidal or homicidal ideation  Perceptual Disturbances: None and At who visual or auditory hallucinations     Risk Potential: Potential for Aggression No   Sensorium:  person, place, time/date and situation   Cognition:  grossly intact   Consciousness:  alert and awake    Attention: attention span and concentration were age appropriate   Insight:  fair   Judgment: fair   Intellect    Gait/Station: normal gait/station   Motor Activity: no abnormal movements     Memory: Short and long term memory intact     Progress Toward Goals:   Patient is slowly progressing toward her goals  His anxiety feels better controlled and improving day by day  In terms of the depression he feels that he remained depressed  The plan is to start him on Lexapro following with monitor of his EKG to monitor his QTC interval   He concerns for safety in the unit  Patient remains calm awake participates in milieu groups as much as he is able to and is looking forward to improve depressive symptoms slowly  Patient is aware of our treatment plan and consents with it    Recommended Treatment:     Continue with group therapy, milieu therapy and occupational therapy      Continue following current medications:   Current Facility-Administered Medications:  acetaminophen 650 mg Oral Q6H PRN Edmund Eisenmenger, MD   acetaminophen 650 mg Oral Q4H PRN Lalito Vieyra MD   aluminum-magnesium hydroxide-simethicone 30 mL Oral Q4H PRN Lalito Vieyra MD   clonazePAM 0 5 mg Oral TID [facility]   escitalopram 10 mg Oral Daily [facility]   haloperidol lactate 5 mg Intramuscular Q6H PRN Lalito Vieyra MD   hydrOXYzine HCL 50 mg Oral Q4H PRN Lalito Vieyra MD   ibuprofen 800 mg Oral Q8H PRN Lalito Vieyra MD   LORazepam 2 mg Intramuscular Q4H PRN Lalito Vieyra MD   LORazepam 1 mg Oral Q6H PRN [facility]   magnesium hydroxide 30 mL Oral Daily PRN Lalito Vieyra MD   melatonin 3 mg Oral HS [facility]   methadone 165 mg Oral Daily [facility]   mirtazapine 15 mg Oral HS [facility]   nicotine 1 patch Transdermal Daily Lalito Vieyra MD   nicotine polacrilex 4 mg Oral Q2H PRN [facility]   OLANZapine 5 mg Intramuscular Q3H PRN Lalito Vieyra MD   risperiDONE 2 mg Oral Q3H PRN Edmund Eisenmenger, MD   traZODone 50 mg Oral HS PRN Edmund Eisenmenger, MD       Risks, benefits and possible side effects of Medications:   Risks, benefits, and possible side effects of medications explained to patient and patient verbalizes understanding  This note has been constructed using a voice recognition system  There may be translation, syntax,  or grammatical errors  If you have any questions, please contact the dictating provider

## 2019-07-19 NOTE — PLAN OF CARE
Problem: SELF HARM/SUICIDALITY  Goal: Will have no self-injury during hospital stay  Description  INTERVENTIONS:  - Q 15 MINUTES: Routine safety checks  - Q WAKING SHIFT & PRN: Assess risk to determine if routine checks are adequate to maintain patient safety  - Encourage patient to participate actively in care by formulating a plan to combat response to suicidal ideation, identify supports and resources  Outcome: Progressing     Problem: DEPRESSION  Goal: Will be euthymic at discharge  Description  INTERVENTIONS:  - Administer medication as ordered  - Provide emotional support via 1:1 interaction with staff  - Encourage involvement in milieu/groups/activities  - Monitor for social isolation  Outcome: Progressing     Problem: ANXIETY  Goal: Will report anxiety at manageable levels  Description  INTERVENTIONS:  - Administer medication as ordered  - Teach and encourage coping skills  - Provide emotional support  - Assess patient/family for anxiety and ability to cope  Outcome: Progressing  Goal: By discharge: Patient will verbalize 2 strategies to deal with anxiety  Description  Interventions:  - Identify any obvious source/trigger to anxiety  - Staff will assist patient in applying identified coping technique/skills  - Encourage attendance of scheduled groups and activities  Outcome: Progressing     Problem: DISCHARGE PLANNING  Goal: Discharge to home or other facility with appropriate resources  Description  INTERVENTIONS:  - Identify barriers to discharge w/patient and caregiver  - Arrange for needed discharge resources and transportation as appropriate  - Identify discharge learning needs (meds, wound care, etc )  - Arrange for interpretive services to assist at discharge as needed  - Refer to Case Management Department for coordinating discharge planning if the patient needs post-hospital services based on physician/advanced practitioner order or complex needs related to functional status, cognitive ability, or social support system  Outcome: Progressing     Problem: Ineffective Coping  Goal: Participates in unit activities  Description  Interventions:  - Provide therapeutic environment   - Provide required programming   - Redirect inappropriate behaviors   Outcome: Progressing

## 2019-07-19 NOTE — PROGRESS NOTES
Pt calm and pleasant  Visible in milieu throughout morning  Appears less sedated today  No SI  Denies any questions/concerns at this time

## 2019-07-19 NOTE — PROGRESS NOTES
07/19/19 7421   Team Meeting   Meeting Type Daily Rounds   Team Members Present   Team Members Present Physician;Nurse;;Occupational Therapist   Physician Team Member Dr Wilton Armenta Team Member [facility] Management Team Member Alvarez Crews   Patient/Family Present   Patient Present No   Patient's Family Present No     Denying SI  Attended some groups  Pleasant  Pt is likely for dc late next week  Methadone dose has been restarted on previous dose  Klonopin is being reduced

## 2019-07-20 PROCEDURE — 93005 ELECTROCARDIOGRAM TRACING: CPT

## 2019-07-20 PROCEDURE — 99232 SBSQ HOSP IP/OBS MODERATE 35: CPT | Performed by: PSYCHIATRY & NEUROLOGY

## 2019-07-20 RX ORDER — GABAPENTIN 300 MG/1
300 CAPSULE ORAL 3 TIMES DAILY PRN
Status: DISCONTINUED | OUTPATIENT
Start: 2019-07-20 | End: 2019-07-21

## 2019-07-20 RX ORDER — IBUPROFEN 600 MG/1
600 TABLET ORAL EVERY 6 HOURS PRN
Status: DISCONTINUED | OUTPATIENT
Start: 2019-07-20 | End: 2019-07-26 | Stop reason: HOSPADM

## 2019-07-20 RX ORDER — IBUPROFEN 400 MG/1
400 TABLET ORAL EVERY 6 HOURS PRN
Status: DISCONTINUED | OUTPATIENT
Start: 2019-07-20 | End: 2019-07-26 | Stop reason: HOSPADM

## 2019-07-20 RX ADMIN — CLONAZEPAM 0.5 MG: 0.5 TABLET ORAL at 21:30

## 2019-07-20 RX ADMIN — CLONAZEPAM 0.5 MG: 0.5 TABLET ORAL at 08:21

## 2019-07-20 RX ADMIN — IBUPROFEN 400 MG: 400 TABLET ORAL at 09:08

## 2019-07-20 RX ADMIN — CLONAZEPAM 0.5 MG: 0.5 TABLET ORAL at 15:31

## 2019-07-20 RX ADMIN — NICOTINE POLACRILEX 4 MG: 4 GUM, CHEWING ORAL at 08:24

## 2019-07-20 RX ADMIN — NICOTINE 1 PATCH: 21 PATCH, EXTENDED RELEASE TRANSDERMAL at 08:19

## 2019-07-20 RX ADMIN — NICOTINE POLACRILEX 4 MG: 4 GUM, CHEWING ORAL at 12:22

## 2019-07-20 RX ADMIN — GABAPENTIN 300 MG: 300 CAPSULE ORAL at 09:08

## 2019-07-20 RX ADMIN — GABAPENTIN 300 MG: 300 CAPSULE ORAL at 12:21

## 2019-07-20 RX ADMIN — GABAPENTIN 300 MG: 300 CAPSULE ORAL at 21:32

## 2019-07-20 RX ADMIN — METHADONE HYDROCHLORIDE 165 MG: 10 CONCENTRATE ORAL at 08:20

## 2019-07-20 RX ADMIN — MELATONIN 3 MG: at 21:30

## 2019-07-20 RX ADMIN — NICOTINE POLACRILEX 4 MG: 4 GUM, CHEWING ORAL at 21:32

## 2019-07-20 RX ADMIN — MIRTAZAPINE 15 MG: 15 TABLET, FILM COATED ORAL at 21:30

## 2019-07-20 RX ADMIN — ESCITALOPRAM OXALATE 10 MG: 10 TABLET ORAL at 08:21

## 2019-07-20 NOTE — PROGRESS NOTES
Pt pleasant during interaction  Says "I'm living the dream" when asked how he is doing  No SI  Reviewed lexapro with pt  Pt said he has taken several antidepressants in the past and felt they were ineffective  Reviewed importance of taking this regularly and following up with outpatient doctor  Pt hopeful this med will be more effective for him  No questions/concerns at this time

## 2019-07-20 NOTE — PROGRESS NOTES
Progress Note - Behavioral Health     Gerry Bosworth 33 y o  male MRN: @MRN   Unit/Bed#: [facility] 251-01 Encounter: [id number]    Per nursing report and sign out, patient has been seclusive, some sedation with meds  Started on lexapro  pleasant    Gerry Bosworth reports today that he is feeling a little better physically, but still struggling with depression and anxiety  Was on prozac, changed to lexapro  Today, depression 5, anxiety 5/10  He has had hopeless feeling, deathwish, but no SI or HI  No AVH  Atarax makes him feel more anxious he feels, gabapentin made him swell high dose( 800mg )  TID  Energy: low  Sleep: improving  Appetite: hit or miss, some improvement  Medication side effects: No   ROS: back problems (h/o surgeries), old foot injury   4/10 pain today    Mental Status Evaluation:    Appearance:  age appropriate   Behavior:  pleasant, cooperative, with good eye contact   Speech:  Normal volume, regular rate and rhythm   Mood:  depressed and anxious   Affect:  mood congruent, constricted       Thought Process:  Linear and goal directed   Associations: intact associations   Thought Content:  normal and appropriate   Perceptual Disturbances: no auditory or visual hallcunations   Risk Potential: Suicidal ideation - Yes, deathwish but would not hasten death or pursue suicidal behavior  Homicidal ideation - None  Potential for aggression - No   Sensorium:  A&Ox3   Cognition:  grossly intact   Consciousness:  Alert & Awake   Memory:  recent and remote memory grossly intact   Attention: attention span and concentration are age appropriate           Insight:  fair   Judgment: fair   Muscle Strength  Muscle Tone normal  normal   Gait/Station: normal gait/station with good balance   Motor Activity: no abnormal movements       Vital signs in last 24 hours:    Temp:  [97 8 °F (36 6 °C)-98 3 °F (36 8 °C)] 98 3 °F (36 8 °C)  HR:  [56-73] 71  Resp:  [15-17] 15  BP: ()/(51-67) 107/55    Laboratory results:  I have personally reviewed all pertinent laboratory/tests results  Assessment/Plan   Principal Problem:    Severe episode of recurrent major depressive disorder, without psychotic features ([address] )  Active Problems:    Generalized anxiety disorder      Jhonny Neil is tolerating meds ok, still not stable with mental health, need improvement  Contracts for safety  Recommended Treatment:     Planned medication and treatment changes: All current active medications have been reviewed  Encourage group therapy, milieu therapy and occupational therapy  [address] checks as unit standard unless ordered or noted otherwise      Current Facility-Administered Medications:  acetaminophen 650 mg Oral Q6H PRN Scarlett Ritchie MD   acetaminophen 650 mg Oral Q4H PRN Lalito Vieyra MD   aluminum-magnesium hydroxide-simethicone 30 mL Oral Q4H PRN Lalito Vieyra MD   clonazePAM 0 5 mg Oral TID [facility]   escitalopram 10 mg Oral Daily [facility]   haloperidol lactate 5 mg Intramuscular Q6H PRN Lalito Vieyra MD   hydrOXYzine HCL 50 mg Oral Q4H PRN Lalito Vieyra MD   ibuprofen 800 mg Oral Q8H PRN Lalito Vieyra MD   LORazepam 2 mg Intramuscular Q4H PRN Scarlett Ritchie MD   LORazepam 1 mg Oral Q6H PRN [facility]   magnesium hydroxide 30 mL Oral Daily PRN Scarlett Ritchie MD   melatonin 3 mg Oral HS [facility]   methadone 165 mg Oral Daily [facility]   mirtazapine 15 mg Oral HS [facility]   nicotine 1 patch Transdermal Daily Lalito Vieyra MD   nicotine polacrilex 4 mg Oral Q2H PRN [facility]   OLANZapine 5 mg Intramuscular Q3H PRN Lalito Vieyra MD   risperiDONE 2 mg Oral Q3H PRN Scarlett Ritchie MD   traZODone 50 mg Oral HS PRN Lalito Vieyra MD       1) Add gabapentin 300mg TID PRN mild-mod anxiety (helped in past, some edema at high doses, but no issues at low   Atarax makes him anxious/restless)  2) Continue to support patient and engage them in the programs available as feasible and appropriate  Continue case management support and therapy  Continue discharge planning  3) continue meds otherwise  4) Change tylenol to ibuprofen  Risks / Benefits of Treatment:    Risks, benefits, and possible side effects of medications explained to patient and patient verbalizes understanding and agreement for treatment  Counseling / Coordination of Care:    Medication changes reviewed with nursing staff  Medications, treatment progress and treatment plan reviewed with patient        Siva Bee III, DO  7/20/2019  7:14 AM

## 2019-07-20 NOTE — PROGRESS NOTES
Daily rounding   Status: scant, seclusive  No SI  Mild sedation at times  Slept well    Meds: Started lexapro

## 2019-07-21 PROCEDURE — 99232 SBSQ HOSP IP/OBS MODERATE 35: CPT | Performed by: PSYCHIATRY & NEUROLOGY

## 2019-07-21 RX ORDER — GABAPENTIN 300 MG/1
300 CAPSULE ORAL 3 TIMES DAILY
Status: DISCONTINUED | OUTPATIENT
Start: 2019-07-21 | End: 2019-07-26 | Stop reason: HOSPADM

## 2019-07-21 RX ADMIN — CLONAZEPAM 0.5 MG: 0.5 TABLET ORAL at 21:08

## 2019-07-21 RX ADMIN — NICOTINE POLACRILEX 4 MG: 4 GUM, CHEWING ORAL at 08:25

## 2019-07-21 RX ADMIN — CLONAZEPAM 0.5 MG: 0.5 TABLET ORAL at 08:23

## 2019-07-21 RX ADMIN — GABAPENTIN 300 MG: 300 CAPSULE ORAL at 15:35

## 2019-07-21 RX ADMIN — GABAPENTIN 300 MG: 300 CAPSULE ORAL at 21:08

## 2019-07-21 RX ADMIN — ESCITALOPRAM OXALATE 10 MG: 10 TABLET ORAL at 08:23

## 2019-07-21 RX ADMIN — GABAPENTIN 300 MG: 300 CAPSULE ORAL at 09:50

## 2019-07-21 RX ADMIN — MIRTAZAPINE 15 MG: 15 TABLET, FILM COATED ORAL at 21:08

## 2019-07-21 RX ADMIN — METHADONE HYDROCHLORIDE 165 MG: 10 CONCENTRATE ORAL at 08:25

## 2019-07-21 RX ADMIN — CLONAZEPAM 0.5 MG: 0.5 TABLET ORAL at 15:35

## 2019-07-21 RX ADMIN — NICOTINE 1 PATCH: 21 PATCH, EXTENDED RELEASE TRANSDERMAL at 08:22

## 2019-07-21 RX ADMIN — NICOTINE POLACRILEX 4 MG: 4 GUM, CHEWING ORAL at 21:09

## 2019-07-21 RX ADMIN — IBUPROFEN 400 MG: 400 TABLET ORAL at 12:34

## 2019-07-21 RX ADMIN — NICOTINE POLACRILEX 4 MG: 4 GUM, CHEWING ORAL at 12:04

## 2019-07-21 RX ADMIN — NICOTINE POLACRILEX 4 MG: 4 GUM, CHEWING ORAL at 18:23

## 2019-07-21 RX ADMIN — NICOTINE POLACRILEX 4 MG: 4 GUM, CHEWING ORAL at 15:36

## 2019-07-21 RX ADMIN — MELATONIN 3 MG: at 21:08

## 2019-07-21 NOTE — PROGRESS NOTES
Progress Note - Behavioral Health     Jose Horta 33 y o  male MRN: @MRN   Unit/Bed#: [facility] 251-01 Encounter: [id number]    Per nursing report and sign out, patient no acute issues over past 24hr  No SI    Jose Horta reports today that he likes gabapentin, helps some with anxiety  No swelling or issues with it (had in past at higher doses)  Anxiety 5-6/10, depression 6/10  Energy: low  Sleep: improved  Appetite: normal  Medication side effects: No   ROS: foot pain, LBP 3-10    Mental Status Evaluation:    Appearance:  age appropriate   Behavior:  pleasant, cooperative, with good eye contact   Speech:  Normal volume, regular rate and rhythm   Mood:  depressed and anxious   Affect:  mood congruent, constricted       Thought Process:  Linear and goal directed   Associations: intact associations   Thought Content:  normal and appropriate   Perceptual Disturbances: no auditory or visual hallcunations   Risk Potential: Suicidal ideation - Yes, deathwish but would not hasten death or pursue suicidal behavior, contracts for safety on the unit  Homicidal ideation - None  Potential for aggression - No   Sensorium:  A&Ox3   Cognition:  grossly intact   Consciousness:  Alert & Awake   Memory:  recent and remote memory grossly intact   Attention: attention span and concentration are age appropriate           Insight:  fair   Judgment: fair   Muscle Strength  Muscle Tone normal  normal   Gait/Station: normal gait/station with good balance   Motor Activity: no abnormal movements       Vital signs in last 24 hours:    Temp:  [97 9 °F (36 6 °C)-99 2 °F (37 3 °C)] 97 9 °F (36 6 °C)  HR:  [67-79] 77  Resp:  [16-18] 16  BP: (104-118)/(57-62) 118/62    Laboratory results:  I have personally reviewed all pertinent laboratory/tests results      Assessment/Plan   Principal Problem:    Severe episode of recurrent major depressive disorder, [facility] psychotic features [facility]  Active Problems:    Generalized anxiety disorder      Shalom Carvalho Aleida Figueroa is doing a little better with gabapentin, would like it scheduled  Recommended Treatment:     Planned medication and treatment changes: All current active medications have been reviewed  Encourage group therapy, milieu therapy and occupational therapy  [address] checks as unit standard unless ordered or noted otherwise      Current Facility-Administered Medications:  aluminum-magnesium hydroxide-simethicone 30 mL Oral Q4H PRN Lalito Vieyra MD   clonazePAM 0 5 mg Oral TID [facility]   escitalopram 10 mg Oral Daily [facility]   gabapentin 300 mg Oral TID PRN Tanya Niya III, DO   haloperidol lactate 5 mg Intramuscular Q6H PRN Claire Montiel MD   ibuprofen 400 mg Oral Q6H PRN Tanya Niya III, DO   ibuprofen 600 mg Oral Q6H PRN Tanya Niya III, DO   ibuprofen 800 mg Oral Q8H PRN Lalito Vieyra MD   LORazepam 2 mg Intramuscular Q4H PRN Lalito Vieyra MD   [facility]   magnesium hydroxide 30 mL Oral Daily PRN Lalito Vieyra MD   melatonin 3 mg Oral HS [facility]   methadone 165 mg Oral Daily [facility]   mirtazapine 15 mg Oral HS [facility]   nicotine 1 patch Transdermal Daily Lalito Vieyra MD   nicotine polacrilex 4 mg Oral Q2H PRN [facility]   OLANZapine 5 mg Intramuscular Q3H PRN Lalito Vieyra MD   risperiDONE 2 mg Oral Q3H PRN Lalito Vieyra MD   traZODone 50 mg Oral HS PRN Lalito Vieyra MD       1) gabapentin scheduled  2) continue treatment otherwise  3) Continue to support patient and engage them in the programs available as feasible and appropriate  Continue case management support and therapy  Continue discharge planning  Risks / Benefits of Treatment:    Risks, benefits, and possible side effects of medications explained to patient and patient verbalizes understanding and agreement for treatment      Counseling / Coordination of Care:    Medication changes reviewed with nursing staff   Medications, treatment progress and treatment plan reviewed with patient        Eliu Bansal III, DO  7/21/2019  8:21 AM

## 2019-07-21 NOTE — PROGRESS NOTES
Pt lying in bed napping since dinner  Pt reports continued depression, but hopeful new start on Lexapro will help  Denies SI, HI, AVH  Encouraged to join peers watching a movie  Will continue to monitor

## 2019-07-21 NOTE — PROGRESS NOTES
Requested & received Neurontin 300 mg po prn at 2132 for anxiety (Friend=15) Good effect obtained, as observed asleep in bed within the hr  Will continue to monitor

## 2019-07-21 NOTE — PROGRESS NOTES
Gave PRN Ibuprofin 400mg for elevated temp  99 6F  Effective for use  Pt also had towel over his head while he is napping  Temp now 99 2F

## 2019-07-21 NOTE — PROGRESS NOTES
Pt frequently at med room seeking PRNs  Encouraged coping skills  Does not attend groups  Naps most of the day  Out at times but mostly meals, meds and prns

## 2019-07-21 NOTE — PROGRESS NOTES
Pt observed napping on and off throughout morning  Calm and pleasant  Scant in conversation  Denies SI  Denies any questions/concerns

## 2019-07-22 LAB
ATRIAL RATE: 53 BPM
P AXIS: 59 DEGREES
PR INTERVAL: 162 MS
QRS AXIS: 63 DEGREES
QRSD INTERVAL: 104 MS
QT INTERVAL: 444 MS
QTC INTERVAL: 416 MS
T WAVE AXIS: 49 DEGREES
VENTRICULAR RATE: 53 BPM

## 2019-07-22 PROCEDURE — 99232 SBSQ HOSP IP/OBS MODERATE 35: CPT | Performed by: PSYCHIATRY & NEUROLOGY

## 2019-07-22 PROCEDURE — 93010 ELECTROCARDIOGRAM REPORT: CPT | Performed by: INTERNAL MEDICINE

## 2019-07-22 RX ADMIN — CLONAZEPAM 0.5 MG: 0.5 TABLET ORAL at 16:02

## 2019-07-22 RX ADMIN — NICOTINE 1 PATCH: 21 PATCH, EXTENDED RELEASE TRANSDERMAL at 08:21

## 2019-07-22 RX ADMIN — CLONAZEPAM 0.5 MG: 0.5 TABLET ORAL at 21:02

## 2019-07-22 RX ADMIN — MIRTAZAPINE 15 MG: 15 TABLET, FILM COATED ORAL at 21:01

## 2019-07-22 RX ADMIN — NICOTINE POLACRILEX 4 MG: 4 GUM, CHEWING ORAL at 08:22

## 2019-07-22 RX ADMIN — NICOTINE POLACRILEX 4 MG: 4 GUM, CHEWING ORAL at 12:24

## 2019-07-22 RX ADMIN — NICOTINE POLACRILEX 4 MG: 4 GUM, CHEWING ORAL at 16:04

## 2019-07-22 RX ADMIN — METHADONE HYDROCHLORIDE 165 MG: 10 CONCENTRATE ORAL at 08:51

## 2019-07-22 RX ADMIN — MELATONIN 3 MG: at 21:01

## 2019-07-22 RX ADMIN — NICOTINE POLACRILEX 4 MG: 4 GUM, CHEWING ORAL at 21:04

## 2019-07-22 RX ADMIN — ESCITALOPRAM OXALATE 10 MG: 10 TABLET ORAL at 08:19

## 2019-07-22 RX ADMIN — GABAPENTIN 300 MG: 300 CAPSULE ORAL at 08:19

## 2019-07-22 RX ADMIN — CLONAZEPAM 0.5 MG: 0.5 TABLET ORAL at 08:19

## 2019-07-22 RX ADMIN — GABAPENTIN 300 MG: 300 CAPSULE ORAL at 16:02

## 2019-07-22 RX ADMIN — IBUPROFEN 400 MG: 400 TABLET ORAL at 08:21

## 2019-07-22 RX ADMIN — IBUPROFEN 600 MG: 600 TABLET ORAL at 21:04

## 2019-07-22 RX ADMIN — GABAPENTIN 300 MG: 300 CAPSULE ORAL at 21:01

## 2019-07-22 NOTE — PROGRESS NOTES
07/22/19 0729   Team Meeting   Meeting Type Daily Rounds   Team Members Present   Team Members Present Physician;Nurse;;Occupational Therapist   Physician Team Member Dr Jesus Ravi Team Member EMMA [facility] Management Team Member Jillian Soni   OT Team Member Hamida   Patient/Family Present   Patient Present No   Patient's Family Present No     Scant still depressed  Q4 Vitals  No concerns at present

## 2019-07-22 NOTE — PROGRESS NOTES
Pt is pleasant and appropriate during interaction  Denies SI/HI  Mood is slowly improving  Social with peers and roommate  Attending groups and meals  Denies any needs currently

## 2019-07-22 NOTE — PLAN OF CARE
Problem: SELF HARM/SUICIDALITY  Goal: Will have no self-injury during hospital stay  Description  INTERVENTIONS:  - Q 15 MINUTES: Routine safety checks  - Q WAKING SHIFT & PRN: Assess risk to determine if routine checks are adequate to maintain patient safety  - Encourage patient to participate actively in care by formulating a plan to combat response to suicidal ideation, identify supports and resources  Outcome: Progressing     Problem: DEPRESSION  Goal: Will be euthymic at discharge  Description  INTERVENTIONS:  - Administer medication as ordered  - Provide emotional support via 1:1 interaction with staff  - Encourage involvement in milieu/groups/activities  - Monitor for social isolation  Outcome: Progressing     Problem: ANXIETY  Goal: Will report anxiety at manageable levels  Description  INTERVENTIONS:  - Administer medication as ordered  - Teach and encourage coping skills  - Provide emotional support  - Assess patient/family for anxiety and ability to cope  Outcome: Progressing  Goal: By discharge: Patient will verbalize 2 strategies to deal with anxiety  Description  Interventions:  - Identify any obvious source/trigger to anxiety  - Staff will assist patient in applying identified coping technique/skills  - Encourage attendance of scheduled groups and activities  Outcome: Progressing     Problem: Ineffective Coping  Goal: Participates in unit activities  Description  Interventions:  - Provide therapeutic environment   - Provide required programming   - Redirect inappropriate behaviors   Outcome: Progressing

## 2019-07-22 NOTE — PROGRESS NOTES
Pt denies SI/HI  Rates depression a 4 and anxiety a 5 or 6  Pt is hopeful that Lexapro will reduce his sx  Reports eating and sleeping well

## 2019-07-23 PROCEDURE — 99232 SBSQ HOSP IP/OBS MODERATE 35: CPT | Performed by: PSYCHIATRY & NEUROLOGY

## 2019-07-23 RX ORDER — MIRTAZAPINE 15 MG/1
30 TABLET, FILM COATED ORAL
Status: DISCONTINUED | OUTPATIENT
Start: 2019-07-23 | End: 2019-07-26 | Stop reason: HOSPADM

## 2019-07-23 RX ADMIN — NICOTINE POLACRILEX 4 MG: 4 GUM, CHEWING ORAL at 12:05

## 2019-07-23 RX ADMIN — NICOTINE 1 PATCH: 21 PATCH, EXTENDED RELEASE TRANSDERMAL at 08:00

## 2019-07-23 RX ADMIN — CLONAZEPAM 0.5 MG: 0.5 TABLET ORAL at 15:49

## 2019-07-23 RX ADMIN — MIRTAZAPINE 30 MG: 15 TABLET, FILM COATED ORAL at 21:03

## 2019-07-23 RX ADMIN — IBUPROFEN 400 MG: 400 TABLET ORAL at 15:51

## 2019-07-23 RX ADMIN — CLONAZEPAM 0.5 MG: 0.5 TABLET ORAL at 08:07

## 2019-07-23 RX ADMIN — GABAPENTIN 300 MG: 300 CAPSULE ORAL at 15:49

## 2019-07-23 RX ADMIN — MELATONIN 3 MG: at 21:03

## 2019-07-23 RX ADMIN — GABAPENTIN 300 MG: 300 CAPSULE ORAL at 21:03

## 2019-07-23 RX ADMIN — GABAPENTIN 300 MG: 300 CAPSULE ORAL at 08:07

## 2019-07-23 RX ADMIN — METHADONE HYDROCHLORIDE 165 MG: 10 CONCENTRATE ORAL at 08:07

## 2019-07-23 RX ADMIN — NICOTINE POLACRILEX 4 MG: 4 GUM, CHEWING ORAL at 21:05

## 2019-07-23 RX ADMIN — ESCITALOPRAM OXALATE 10 MG: 10 TABLET ORAL at 08:07

## 2019-07-23 RX ADMIN — NICOTINE POLACRILEX 4 MG: 4 GUM, CHEWING ORAL at 08:08

## 2019-07-23 RX ADMIN — CLONAZEPAM 0.5 MG: 0.5 TABLET ORAL at 21:03

## 2019-07-23 RX ADMIN — NICOTINE POLACRILEX 4 MG: 4 GUM, CHEWING ORAL at 15:52

## 2019-07-23 RX ADMIN — NICOTINE POLACRILEX 4 MG: 4 GUM, CHEWING ORAL at 18:49

## 2019-07-23 NOTE — PROGRESS NOTES
Pt calm and cooperative  Said that he is anxious but does not appear anxious  Naps frequently  Out in the milieu more today than before  He said that he sometimes feels "hopeless"  He was studying for a biochemical degree  Encouraged him to get better to complete it  He said that he would like to do that  Offered hope and encouragement    Pt denied SI, HI

## 2019-07-23 NOTE — PROGRESS NOTES
Progress Note - Behavioral Health   Marquita Camarillo 33 y o  male MRN: [id number]  Unit/Bed#: U 251-01 Encounter: [id number]    Assessment/Plan   Principal Problem:    Severe episode of recurrent major depressive disorder, without psychotic features ([facility] [address] )  Active Problems:    Generalized anxiety disorder    - Will continue his current medications   - Tolerating his Methadone dose without any signs of overdose or withdrawal   - Vital signs have remained within normal limits  - Patient is seen more awake  Will continue to encourage participation in group therapy and discourage him from retrieving to room to sleep during daytime  - Will continue to monitor patient's received prn - none at this time as per notes, other than a dose of Ativan prn on 7/18    Subjective:  According to the nursing sign out patient was seen at the nurses station asking for a dose of Ativan p r n  Due to anxiety  He was seen very sleepy throughout the day as per the nursing report  No overnight events  Patient is complaining of "feeling anxious on and off" with a feeling of "racing thoughts" and feeling he "cannot take a full deep breath" secondary to anxiety  He states his depressive symptoms are improving to a 4/10  He denies any visual or auditory hallucinations  Patient denies any suicidal or homicidal thoughts, and instead just states that he is feeling intermittently anxious in the unit  He does feel safe in consents for safety  Patient states has a good appetite, and that is he is sleeping well  Patient states that he is hopeful with his new antidepressive combination treatment, and that he tries to relate am coping skills to deal with symptoms of anxiety  Patient is compliant with medications and shows no side effects or adverse reactions  No signs of manic symptoms or PTSD  No signs of EPS        Current Medications:    Current Facility-Administered Medications:  aluminum-magnesium hydroxide-simethicone 30 mL Oral Q4H PRN Mark Campuzano MD   clonazePAM 0 5 mg Oral TID [facility]   escitalopram 10 mg Oral Daily [facility]   gabapentin 300 mg Oral TID Dedrick Garcia III, DO   haloperidol lactate 5 mg Intramuscular Q6H PRN Mark Campuzano MD   ibuprofen 400 mg Oral Q6H PRN Dedrick Garcia III, DO   ibuprofen 600 mg Oral Q6H PRN Dedrick Garcia III, DO   ibuprofen 800 mg Oral Q8H PRN Lalito Vieyra MD   LORazepam 2 mg Intramuscular Q4H PRN Lalito Vieyra MD   LORazepam 1 mg Oral Q6H PRN Jesus Edmonds   magnesium hydroxide 30 mL Oral Daily PRN Lalito Vieyra MD   melatonin 3 mg Oral HS [facility]   methadone 165 mg Oral Daily [facility]   mirtazapine 15 mg Oral HS [facility]   nicotine 1 patch Transdermal Daily Lalito Vieyra MD   nicotine polacrilex 4 mg Oral Q2H PRN Jesus Edmonds   OLANZapine 5 mg Intramuscular Q3H PRN Lalito Vieyra MD   risperiDONE 2 mg Oral Q3H PRN Mark Campuzano MD   traZODone 50 mg Oral HS PRN Mark Campuzano MD       Behavioral Health Medications: all current active meds have been reviewed and continue current psychiatric medications  Vital signs in last 24 hours:  Temp:  [97 8 °F (36 6 °C)-97 9 °F (36 6 °C)] 97 8 °F (36 6 °C)  HR:  [64-87] 87  Resp:  [18] 18  BP: (114-115)/(57-61) 114/61    Laboratory results:  I have personally reviewed all pertinent laboratory/tests results  Psychiatric Review of Systems:  Behavior over the last 24 hours:  improved  Sleep: hypersomnia  Appetite: normal  Medication side effects: No  ROS: no complaints    Mental Status Evaluation:  Appearance:  casually dressed   Behavior:  normal and No psychomotor agitation  No tremors    Patient remains calm and cooperative   Speech:  increased latency of response and soft   Mood:  anxious   Affect:  flat   Language naming objects and repeating phrases   Thought Process:  goal directed and logical   Thought Content:  normal   Perceptual Disturbances: None   Risk Potential: Suicidal Ideations without plan   Sensorium:  person, place and time/date   Cognition:  grossly intact   Consciousness:  alert and awake    Attention: attention span and concentration were age appropriate   Insight:  limited   Judgment: limited   Intellect    Gait/Station: normal gait/station   Motor Activity: no abnormal movements     Memory: Short and long term memory  intact     Progress Toward Goals:   Patient is progressing towards her goals  Patient continues to be focused on his dose of benzodiazepines from home, and continues to verbalize that he does not feel that he could be discharged on a reduced dose  Patient is tolerating his methadone without any adverse events  Despite patient is stating that he feels anxious he does not display any agitation and is able to collect his thoughts and redirect himself using his coping skills in the unit  After discussing with patient at length I recommended patient to participate in group therapy and milieu groups to learn more coping skills and relaxation techniques and I encouraged him to avoid retrieving to his room to nap during daytime  Recommended Treatment:     Continue with group therapy, milieu therapy and occupational therapy      Continue following current medications:   Current Facility-Administered Medications:  aluminum-magnesium hydroxide-simethicone 30 mL Oral Q4H PRN Lalito Vieyra MD   clonazePAM 0 5 mg Oral TID Jesus Edmonds   escitalopram 10 mg Oral Daily Jesus Edmonds   gabapentin 300 mg Oral TID Nikolay Hoof III, DO   haloperidol lactate 5 mg Intramuscular Q6H PRN Murray Stephen MD   ibuprofen 400 mg Oral Q6H PRN Nikolay Hoof III, DO   ibuprofen 600 mg Oral Q6H PRN Nikolay Hoof III, DO   ibuprofen 800 mg Oral Q8H PRN Lalito Vieyra MD   LORazepam 2 mg Intramuscular Q4H PRN Lalito Vieyra MD   LORazepam 1 mg Oral Q6H PRN Jesus Edmonds   magnesium hydroxide 30 mL Oral Daily PRN Murray Stephen MD   melatonin 3 mg Oral HS Jesus Edmonds   methadone 165 mg Oral Daily Jesus Edmonds   mirtazapine 15 mg Oral HS Jesus Edmonds   nicotine 1 patch Transdermal Daily Lalito Vieyra MD   nicotine polacrilex 4 mg Oral Q2H PRN Jesus Edmonds   OLANZapine 5 mg Intramuscular Q3H PRN Lalito Vieyra MD   risperiDONE 2 mg Oral Q3H PRN Benja Stevenson MD   traZODone 50 mg Oral HS PRN Benja Stevenson MD       Risks, benefits and possible side effects of Medications:   Risks, benefits, and possible side effects of medications explained to patient and patient verbalizes understanding  This note has been constructed using a voice recognition system  There may be translation, syntax,  or grammatical errors  If you have any questions, please contact the dictating provider

## 2019-07-23 NOTE — CASE MANAGEMENT
CM met with pt to discuss dc planning  Pt is still in agreement with referral to [facility]  Pt states he is still feeling anxious but depression has improved  CM will continue to monitor  CM will explore other options such as PHP with pt before dc

## 2019-07-23 NOTE — PROGRESS NOTES
07/23/19 0720   Team Meeting   Meeting Type Daily Rounds   Team Members Present   Team Members Present Physician;Nurse;;Occupational Therapist   Physician Team Member Dr Sherif Christensen Team Member [facility] Management Team Member Rosa Duffy   OT Team Member Hamida   Patient/Family Present   Patient Present No   Patient's Family Present No     Pt is denying SI  Depression and anxiety has improved  Pt is still medication seeking  Pt is agreeable for only a walk in to [facility] drug and alcohol  Pt is motivated to do IOP level of care  Pt slept a lot through the day

## 2019-07-24 PROCEDURE — 99232 SBSQ HOSP IP/OBS MODERATE 35: CPT | Performed by: PSYCHIATRY & NEUROLOGY

## 2019-07-24 RX ADMIN — ESCITALOPRAM OXALATE 10 MG: 10 TABLET ORAL at 08:09

## 2019-07-24 RX ADMIN — CLONAZEPAM 0.5 MG: 0.5 TABLET ORAL at 08:09

## 2019-07-24 RX ADMIN — CLONAZEPAM 0.5 MG: 0.5 TABLET ORAL at 15:47

## 2019-07-24 RX ADMIN — NICOTINE POLACRILEX 4 MG: 4 GUM, CHEWING ORAL at 08:11

## 2019-07-24 RX ADMIN — GABAPENTIN 300 MG: 300 CAPSULE ORAL at 08:09

## 2019-07-24 RX ADMIN — CLONAZEPAM 0.5 MG: 0.5 TABLET ORAL at 20:59

## 2019-07-24 RX ADMIN — NICOTINE POLACRILEX 4 MG: 4 GUM, CHEWING ORAL at 10:36

## 2019-07-24 RX ADMIN — MIRTAZAPINE 30 MG: 15 TABLET, FILM COATED ORAL at 21:00

## 2019-07-24 RX ADMIN — GABAPENTIN 300 MG: 300 CAPSULE ORAL at 20:59

## 2019-07-24 RX ADMIN — MELATONIN 3 MG: at 21:00

## 2019-07-24 RX ADMIN — IBUPROFEN 600 MG: 600 TABLET ORAL at 08:11

## 2019-07-24 RX ADMIN — METHADONE HYDROCHLORIDE 165 MG: 10 CONCENTRATE ORAL at 08:15

## 2019-07-24 RX ADMIN — GABAPENTIN 300 MG: 300 CAPSULE ORAL at 15:47

## 2019-07-24 RX ADMIN — NICOTINE POLACRILEX 4 MG: 4 GUM, CHEWING ORAL at 21:01

## 2019-07-24 RX ADMIN — NICOTINE POLACRILEX 4 MG: 4 GUM, CHEWING ORAL at 15:48

## 2019-07-24 RX ADMIN — NICOTINE 1 PATCH: 21 PATCH, EXTENDED RELEASE TRANSDERMAL at 08:08

## 2019-07-24 NOTE — PROGRESS NOTES
Patient is pleasant during interaction  Denies SI/HI  Rates anxiety a 3 or 4 and depression a 5  Pt says he is starting an anxiety journal that was dropped off by his mom  Visible on unit  Attends groups

## 2019-07-24 NOTE — PROGRESS NOTES
Mother stated pt can not return home after discharge the patient  Requested list of housing  In area so he  Can continue his treatment for hepatitis and get his methadone at clinic in [facility]   Given list of rooms to rent

## 2019-07-24 NOTE — CASE MANAGEMENT
CM met with pt who reports that mother is resistant to pt coming back  Pt is very aware of the difficulty of securing housing in [facility] without income  Pt reports that his mother will ultimately take him back at time of dc  Pt reports that he needs to stay in the area due to connection with [facility] and his need for Methadone Maintenance in the area  Pt also states he would like to go back to [facility] to continue with treatment and get connected with mental health support as well as drug and alcohol support  Anticipated dc of  Friday

## 2019-07-24 NOTE — PROGRESS NOTES
Progress Note - Behavioral Health   Monalisa Rico 33 y o  male MRN: [id number]  Unit/Bed#: [facility] 251-01 Encounter: [id number]    Assessment/Plan   Principal Problem:    Severe episode of recurrent major depressive disorder, without psychotic features ([address] )  Active Problems:    Generalized anxiety disorder    - Remeron was increased yesterday  Patient tolerating well with no sign of side effects  - patient is improving his clinical picture overall  We are planning for a Friday discharge, which patient is agreeing to  - Patient tolerating his recently lowered dose of Clonazepam  Discussed with patient in presence of attending  Patient agreed to this dose as his new dose of clonazepam upon discharge  Subjective:  No overnight events as per nursing notes  Patient states no significant complaints other than a mild migraine  Patient states slept very well and has a good appetite  He denies any signs of anxiety today  He states his depression is decreased to a 4/10  Patient denies any suicidal or homicidal ideation  He denies any visual or auditory hallucinations  Patient is compliant with medications and tolerating accordingly  Patient displays no signs of medications adverse effects  No signs of manic symptoms  No PTSD  Primary interview patient is seen smiling at me a more relaxed and significantly more talkative  Patient went into detail about his past history of spinal stenosis and needed surgery in his 25s which is when he began taking prescription opioids and where he had started having opiate abuse and subsequent addiction    Current Medications:    Current Facility-Administered Medications:  aluminum-magnesium hydroxide-simethicone 30 mL Oral Q4H PRN Lalito Vieyra MD   clonazePAM 0 5 mg Oral TID Jesus Edmonds   escitalopram 10 mg Oral Daily Jesus Edmonds   gabapentin 300 mg Oral TID Guillaume Aaron III, DO   haloperidol lactate 5 mg Intramuscular Q6H PRN Lynn Morales MD   ibuprofen 400 mg Oral Q6H PRN Alejandro Awa III, DO   ibuprofen 600 mg Oral Q6H PRN Alejandro Billings III, DO   ibuprofen 800 mg Oral Q8H PRN Lalito Vieyra MD   LORazepam 2 mg Intramuscular Q4H PRN Lalito Vieyra MD   LORazepam 1 mg Oral Q6H PRN Jesus Edmonds   magnesium hydroxide 30 mL Oral Daily PRN Lalito Vieyra MD   melatonin 3 mg Oral HS Jesus Edmonds   methadone 165 mg Oral Daily Jesusghada Edmonds   mirtazapine 30 mg Oral HS Rommel Mendez MD   nicotine 1 patch Transdermal Daily Lalito Vieyra MD   nicotine polacrilex 4 mg Oral Q2H PRN Jesus Edmonds   OLANZapine 5 mg Intramuscular Q3H PRN Christina Mcqueen MD   risperiDONE 2 mg Oral Q3H PRN Christina Mcqueen MD   traZODone 50 mg Oral HS PRN Christina Mcqueen MD       Behavioral Health Medications: all current active meds have been reviewed  Vital signs in last 24 hours:  Temp:  [96 6 °F (35 9 °C)-97 9 °F (36 6 °C)] 96 6 °F (35 9 °C)  HR:  [71-80] 80  Resp:  [16-18] 16  BP: (110-123)/(72-73) 110/72    Laboratory results:  I have personally reviewed all pertinent laboratory/tests results      Psychiatric Review of Systems:  Behavior over the last 24 hours:  improved  Sleep: normal  Appetite: normal  Medication side effects: No  ROS: no complaints    Mental Status Evaluation:  Appearance:  age appropriate, bearded and casually dressed   Behavior:  normal   Speech:  normal pitch and normal volume   Mood:  normal   Affect:  mood-congruent   Language naming objects and repeating phrases   Thought Process:  normal   Thought Content:  normal   Perceptual Disturbances: None   Risk Potential: Potential for Aggression No   Sensorium:  person, place and time/date   Cognition:  grossly intact   Consciousness:  alert and awake    Attention: attention span and concentration were age appropriate   Insight:  fair   Judgment: fair   Intellect    Gait/Station: normal gait/station and normal balance   Motor Activity: no abnormal movements     Memory: Short and long term memory  intact     Progress Toward Goals:     Recommended Treatment:     Continue with group therapy, milieu therapy and occupational therapy  Continue following current medications:   Current Facility-Administered Medications:  aluminum-magnesium hydroxide-simethicone 30 mL Oral Q4H PRN Lalito Vieyra MD   clonazePAM 0 5 mg Oral TID [facility]   escitalopram 10 mg Oral Daily [facility]   gabapentin 300 mg Oral TID Tanya Niya III, DO   haloperidol lactate 5 mg Intramuscular Q6H PRN Claire Montiel MD   ibuprofen 400 mg Oral Q6H PRN Tanya Niya III, DO   ibuprofen 600 mg Oral Q6H PRN Tanya Niya III, DO   ibuprofen 800 mg Oral Q8H PRN Lalito Vieyra MD   LORazepam 2 mg Intramuscular Q4H PRN Lalito Vieyra MD   LORazepam 1 mg Oral Q6H PRN [facility]   magnesium hydroxide 30 mL Oral Daily PRN Lalito Vieyra MD   melatonin 3 mg Oral HS [facility]   methadone 165 mg Oral Daily [facility]   mirtazapine 30 mg Oral HS Ramiro Fraire MD   nicotine 1 patch Transdermal Daily Lalito Vieyra MD   nicotine polacrilex 4 mg Oral Q2H PRN [facility]   OLANZapine 5 mg Intramuscular Q3H PRN Lalito Vieyra MD   risperiDONE 2 mg Oral Q3H PRN Claire Montiel MD   traZODone 50 mg Oral HS PRN Claire Montiel MD       Risks, benefits and possible side effects of Medications:   Risks, benefits, and possible side effects of medications explained to patient and patient verbalizes understanding  This note has been constructed using a voice recognition system  There may be translation, syntax,  or grammatical errors  If you have any questions, please contact the dictating provider

## 2019-07-24 NOTE — PROGRESS NOTES
07/24/19 0742   Team Meeting   Meeting Type Daily Rounds   Team Members Present   Team Members Present Physician;Nurse;;Occupational Therapist   Physician Team Member Dr Dipika Robles Team Member [facility] Management Team Member Char/ [address]   OT Team Member Hamida   Patient/Family Present   Patient Present No   Patient's Family Present No     Pt denies SI  Calm and cooperative  Feeling hopeless  Standing by med room waiting for his medications  Pt is frequently at the med room throughout the day

## 2019-07-25 LAB
ALBUMIN SERPL BCP-MCNC: 3.1 G/DL (ref 3.5–5)
ALP SERPL-CCNC: 63 U/L (ref 46–116)
ALT SERPL W P-5'-P-CCNC: 70 U/L (ref 12–78)
AST SERPL W P-5'-P-CCNC: 57 U/L (ref 5–45)
BILIRUB DIRECT SERPL-MCNC: 0.12 MG/DL (ref 0–0.2)
BILIRUB SERPL-MCNC: 0.3 MG/DL (ref 0.2–1)
PROT SERPL-MCNC: 7.1 G/DL (ref 6.4–8.2)

## 2019-07-25 PROCEDURE — 80076 HEPATIC FUNCTION PANEL: CPT | Performed by: PSYCHIATRY & NEUROLOGY

## 2019-07-25 PROCEDURE — 99232 SBSQ HOSP IP/OBS MODERATE 35: CPT | Performed by: PHYSICIAN ASSISTANT

## 2019-07-25 RX ADMIN — NICOTINE POLACRILEX 4 MG: 4 GUM, CHEWING ORAL at 11:28

## 2019-07-25 RX ADMIN — NICOTINE POLACRILEX 4 MG: 4 GUM, CHEWING ORAL at 20:59

## 2019-07-25 RX ADMIN — IBUPROFEN 800 MG: 800 TABLET ORAL at 08:02

## 2019-07-25 RX ADMIN — CLONAZEPAM 0.5 MG: 0.5 TABLET ORAL at 08:00

## 2019-07-25 RX ADMIN — CLONAZEPAM 0.5 MG: 0.5 TABLET ORAL at 20:59

## 2019-07-25 RX ADMIN — GABAPENTIN 300 MG: 300 CAPSULE ORAL at 20:58

## 2019-07-25 RX ADMIN — METHADONE HYDROCHLORIDE 165 MG: 10 CONCENTRATE ORAL at 08:03

## 2019-07-25 RX ADMIN — ESCITALOPRAM OXALATE 10 MG: 10 TABLET ORAL at 08:00

## 2019-07-25 RX ADMIN — CLONAZEPAM 0.5 MG: 0.5 TABLET ORAL at 16:02

## 2019-07-25 RX ADMIN — GABAPENTIN 300 MG: 300 CAPSULE ORAL at 16:02

## 2019-07-25 RX ADMIN — MELATONIN 3 MG: at 21:01

## 2019-07-25 RX ADMIN — GABAPENTIN 300 MG: 300 CAPSULE ORAL at 08:00

## 2019-07-25 RX ADMIN — NICOTINE 1 PATCH: 21 PATCH, EXTENDED RELEASE TRANSDERMAL at 08:01

## 2019-07-25 RX ADMIN — NICOTINE POLACRILEX 4 MG: 4 GUM, CHEWING ORAL at 16:02

## 2019-07-25 RX ADMIN — MIRTAZAPINE 30 MG: 15 TABLET, FILM COATED ORAL at 21:00

## 2019-07-25 NOTE — PROGRESS NOTES
Patient pleasant during interaction  Denies SI/HI/AVH  Excited about anticipated discharge tomorrow  Attended groups today, social out in milieu  Currently napping in bedroom

## 2019-07-25 NOTE — PROGRESS NOTES
Pt appears to have slept throughout the night without difficulty  Cooperative with labs this morning

## 2019-07-25 NOTE — PROGRESS NOTES
Pt lying in bed, woken at time of room checks  Pleasant in conversation  Reports anxiety, depression but improving  Denies SI, HI, AVH  Pt reports readiness to D/C on Friday, going to mother's home, temporarily as pt is looking to get own housing  No questions, concerns at this time

## 2019-07-25 NOTE — DISCHARGE INSTR - OTHER ORDERS
If you are in a Crisis situation Call [phone] to speak to a trained crisis worker - Anytime - Day or Night  You may also call one of the numbers below:  Kayla Zaidi:   [phone]

## 2019-07-25 NOTE — CASE MANAGEMENT
CM had family meeting with pt and pt's mother  Mother has agreed for pt to return but has asked for a 4 week plan  This plan will involve pt going to drug and alcohol treatment at [facility] starting Monday  Pt will have to find a job and move out by the end of August  Pt is agreeable to this  Pt signed the [place] waiver form and is ready for dc on Friday  No further concerns at this time

## 2019-07-25 NOTE — PROGRESS NOTES
Patient appeared tired, but pleasant and cooperative during interaction  Reports no SI/HI/AVH, improving depression, and slight anxiety regarding the future  Hopeful for tentative discharge tomorrow, with goals in place to begin working  Attended groups, and visible in milieu this morning

## 2019-07-25 NOTE — CASE MANAGEMENT
SOFIE outreached to Antonio @ [facility] [place] to advise of pt's return on Saturday  Antonio request that this writer fax AVS and July 26th med list to them on day of dc in preparation for pt's return  SOFIE met wit pt who reports that he will go for an intake appointment @ [facility] on Monday July 29th  Pt reports that he schedules rides with [employer] and he will arrange this himself  Pt is able to return to his mother house at time of dc  Pt states his mother is on vacation but she will be back later today

## 2019-07-25 NOTE — PROGRESS NOTES
Progress Note - Behavioral Health   Gerry Bosworth 33 y o  male MRN: [id number]  Unit/Bed#: [facility] 251-01 Encounter: [id number]    Assessment/Plan   Principal Problem:    Severe episode of recurrent major depressive disorder, without psychotic features ([address] )  Active Problems:    Generalized anxiety disorder      Subjective:  Patient cooperative and pleasant during interview  Asked appropriate questions  States depressive symptoms are decreasing and denied suicidal thoughts  Reports increased appetite, adequate sleep, increased energy levels, and improved self-esteem  Appears more future oriented with forward thinking  Anxiety reported as more under control  Did not show signs of linden  Denies psychosis  Medication compliant  Tolerating medications well without serious side effects  Vital signs are stable now  Tentative discharge tomorrow      Current Medications:  Current Facility-Administered Medications   Medication Dose Route Frequency    aluminum-magnesium hydroxide-simethicone (MYLANTA) 200-200-20 mg/5 mL oral suspension 30 mL  30 mL Oral Q4H PRN    clonazePAM (KlonoPIN) tablet 0 5 mg  0 5 mg Oral TID    escitalopram (LEXAPRO) tablet 10 mg  10 mg Oral Daily    gabapentin (NEURONTIN) capsule 300 mg  300 mg Oral TID    haloperidol lactate (HALDOL) injection 5 mg  5 mg Intramuscular Q6H PRN    ibuprofen (MOTRIN) tablet 400 mg  400 mg Oral Q6H PRN    ibuprofen (MOTRIN) tablet 600 mg  600 mg Oral Q6H PRN    ibuprofen (MOTRIN) tablet 800 mg  800 mg Oral Q8H PRN    LORazepam (ATIVAN) 2 mg/mL injection 2 mg  2 mg Intramuscular Q4H PRN    LORazepam (ATIVAN) tablet 1 mg  1 mg Oral Q6H PRN    magnesium hydroxide (MILK OF MAGNESIA) 400 mg/5 mL oral suspension 30 mL  30 mL Oral Daily PRN    melatonin tablet 3 mg  3 mg Oral HS    methadone (DOLOPHINE) 10 mg/mL oral concentrated solution 165 mg  165 mg Oral Daily    mirtazapine (REMERON) tablet 30 mg  30 mg Oral HS    nicotine (NICODERM CQ) 21 mg/24 hr TD 24 hr patch 1 patch  1 patch Transdermal Daily    nicotine polacrilex (NICORETTE) gum 4 mg  4 mg Oral Q2H PRN    OLANZapine (ZyPREXA) IM injection 5 mg  5 mg Intramuscular Q3H PRN    risperiDONE (RisperDAL M-TABS) dispersible tablet 2 mg  2 mg Oral Q3H PRN    traZODone (DESYREL) tablet 50 mg  50 mg Oral HS PRN       Behavioral Health Medications: all current active meds have been reviewed and continue current psychiatric medications  Vitals:  Vitals:    07/25/19 0755   BP: 126/76   Pulse: 79   Resp: 16   Temp: (!) 97 2 °F (36 2 °C)   SpO2: 99%       Laboratory results:    I have personally reviewed all pertinent laboratory/tests results  Most Recent Labs:   Lab Results   Component Value Date    WBC 7 39 07/16/2019    RBC 3 75 (L) 07/16/2019    HGB 11 0 (L) 07/16/2019    HCT 32 7 (L) 07/16/2019     07/16/2019    RDW 13 6 07/16/2019    NEUTROABS 2 72 07/16/2019    SODIUM 140 07/16/2019    K 3 8 07/16/2019     07/16/2019    CO2 30 07/16/2019    BUN 15 07/16/2019    CREATININE 1 29 07/16/2019    GLUC 119 07/16/2019    CALCIUM 8 9 07/16/2019    AST 57 (H) 07/25/2019    ALT 70 07/25/2019    ALKPHOS 63 07/25/2019    TP 7 1 07/25/2019    ALB 3 1 (L) 07/25/2019    TBILI 0 30 07/25/2019       Psychiatric Review of Systems:  Behavior over the last 24 hours:  improved  Sleep: normal  Appetite: normal  Medication side effects: No  ROS: no complaints    Mental Status Evaluation:  Appearance:  casually dressed   Behavior:  less guarded, cooperative   Speech:  normal pitch and normal volume   Mood:  Less depressed and anxious   Affect:  mood-congruent   Language hyperverbal   Thought Process:  normal   Thought Content:  obsessions   Perceptual Disturbances: None   Risk Potential: Denied SI/HI   Potential for aggression: No   Sensorium:  person, place and time/date   Cognition:  grossly intact   Consciousness:  alert and awake    Recent and Remote Memory fair   Attention: attention span and concentration were age appropriate   Insight:  fair   Judgment: fair   Gait/Station: normal gait/station and normal balance   Motor Activity: no abnormal movements     Progress Toward Goals: progressing    Recommended Treatment: Continue with group therapy, milieu therapy and occupational therapy  1   Continue current medications  2  Disposition planning with tentative discharge tomorrow    Risks, benefits and possible side effects of Medications:   Risks, benefits, and possible side effects of medications explained to patient and patient verbalizes understanding        Shona Bateman PA-C

## 2019-07-25 NOTE — PROGRESS NOTES
07/25/19 0723   Team Meeting   Meeting Type Daily Rounds   Team Members Present   Team Members Present Physician;Nurse;;Occupational Therapist   Physician Team Member Dr Baez [facility] Team Member [facility] Management Team Member Char/ [address]   OT Team Member Hamida   Patient/Family Present   Patient Present No   Patient's Family Present No     Pt went to groups  Depression is a stressor  Lab work is done  Slept well  Pt is for dc on Friday

## 2019-07-26 VITALS
HEART RATE: 93 BPM | BODY MASS INDEX: 28 KG/M2 | OXYGEN SATURATION: 96 % | RESPIRATION RATE: 16 BRPM | DIASTOLIC BLOOD PRESSURE: 75 MMHG | SYSTOLIC BLOOD PRESSURE: 136 MMHG | WEIGHT: 195.55 LBS | HEIGHT: 70 IN | TEMPERATURE: 97.4 F

## 2019-07-26 PROCEDURE — 99238 HOSP IP/OBS DSCHRG MGMT 30/<: CPT | Performed by: PSYCHIATRY & NEUROLOGY

## 2019-07-26 RX ORDER — GABAPENTIN 300 MG/1
300 CAPSULE ORAL 3 TIMES DAILY
Qty: 90 CAPSULE | Refills: 1 | Status: SHIPPED | OUTPATIENT
Start: 2019-07-26 | End: 2019-08-14 | Stop reason: HOSPADM

## 2019-07-26 RX ORDER — MIRTAZAPINE 30 MG/1
30 TABLET, FILM COATED ORAL
Qty: 30 TABLET | Refills: 1 | Status: ON HOLD | OUTPATIENT
Start: 2019-07-26 | End: 2019-08-14 | Stop reason: SDUPTHER

## 2019-07-26 RX ORDER — LANOLIN ALCOHOL/MO/W.PET/CERES
3 CREAM (GRAM) TOPICAL
Qty: 30 TABLET | Refills: 1 | Status: SHIPPED | OUTPATIENT
Start: 2019-07-26 | End: 2019-08-14 | Stop reason: HOSPADM

## 2019-07-26 RX ORDER — CLONAZEPAM 0.5 MG/1
0.5 TABLET ORAL 3 TIMES DAILY
Qty: 15 TABLET | Refills: 1 | Status: SHIPPED | OUTPATIENT
Start: 2019-07-26 | End: 2019-08-14 | Stop reason: HOSPADM

## 2019-07-26 RX ORDER — ESCITALOPRAM OXALATE 10 MG/1
10 TABLET ORAL DAILY
Qty: 30 TABLET | Refills: 1 | Status: SHIPPED | OUTPATIENT
Start: 2019-07-27 | End: 2019-08-14 | Stop reason: HOSPADM

## 2019-07-26 RX ORDER — CLONAZEPAM 0.5 MG/1
0.5 TABLET ORAL 3 TIMES DAILY
Qty: 30 TABLET | Refills: 0 | Status: SHIPPED | OUTPATIENT
Start: 2019-07-26 | End: 2019-07-26

## 2019-07-26 RX ADMIN — NICOTINE POLACRILEX 4 MG: 4 GUM, CHEWING ORAL at 08:02

## 2019-07-26 RX ADMIN — METHADONE HYDROCHLORIDE 165 MG: 10 CONCENTRATE ORAL at 08:01

## 2019-07-26 RX ADMIN — GABAPENTIN 300 MG: 300 CAPSULE ORAL at 08:01

## 2019-07-26 RX ADMIN — NICOTINE 1 PATCH: 21 PATCH, EXTENDED RELEASE TRANSDERMAL at 08:02

## 2019-07-26 RX ADMIN — ESCITALOPRAM OXALATE 10 MG: 10 TABLET ORAL at 08:01

## 2019-07-26 RX ADMIN — CLONAZEPAM 0.5 MG: 0.5 TABLET ORAL at 08:01

## 2019-07-26 NOTE — NURSING NOTE
AVS reviewed with patient in detail  Pt verbalized understanding  Expresses readiness for discharge  Denies having questions or concerns

## 2019-07-26 NOTE — PROGRESS NOTES
Status: Pt slept well overnight & is denying any SI/HI  He reports that all symptoms have improved & he is ready for d/c     Medication: Will need 30 days + refill at d/c  D/C: Today - [employer] will need to be arranged as [employer] could not accommodate going to [employer]        07/26/19 0752   Team Meeting   Meeting Type Daily Rounds   Team Members Present   Team Members Present Physician;Nurse;;Occupational Therapist   Physician Team Member Dr Azul Alicia / Sherley Philippe Team Member Dipika [address] Management Team Member Tee Kebede   OT Team Member Rachael   Patient/Family Present   Patient Present No   Patient's Family Present No

## 2019-07-26 NOTE — PROGRESS NOTES
Daily rounds  Status: pt for discharge today  No SI, has been appropriate on unit and reports feeling ready for D/C

## 2019-07-26 NOTE — DISCHARGE SUMMARY
Discharge Summary - [facility] 33 y o  male MRN: [id number]  Unit/Bed#: -01 Encounter: [id number]     Admission Date:   Admission Orders (From admission, onward)     Ordered        07/17/19 0028  Admit Patient to [facility] (use in Admission Navigator for ED to [address] [place] [address])  Once                         Discharge Date: 7/26/2019 11:19 AM    Attending Psychiatrist: No att  providers found    Reason for Admission/HPI:   History of Present Illness     Patient is a 33 y o  male presents with a 201 voluntary committed commitment complaining of worsening of his depressive symptoms, anxiety and noncompliance with his medication for the last week  He reports a diagnosis of major depressive disorder that recently became worse with decreased energy decline in interest poor sleep, racing mind, guilt, hopelessness and suicidal ideations with a plan to overdose on his medication  The patient also reported worsening of his anxiety and reported taking Klonopin 1 mg 4 times daily with very poor response  Of note the patient had been noncompliant with his dose of doxepin for sleep because of sedation and dry mouth  This noncompliance resulted in a decline in sleep and worsening signs of depression and suicidal thoughts  Primary complaints include: anxiety and depression worse  Onset of symptoms was gradual starting 2 week ago with gradually worsening course since that time  The patient was admitted to the psychiatric unit and was started on mirtazapine 15 mg q h s  For depression and insomnia, Ativan 1 mg q 6h p r n  for benzodiazepine withdrawal management as the patient had been on Klonopin  Patient reportedly had been on fluoxetine, doxepin at bedtime, Klonopin 4 times daily, and methadone 165 mg daily      His methadone dosing was confirmed however with the significant sedation that the patient was admitted with, it was not restarted at such a high dose of 165 mg daily  Hospital Course:     During his hospital stay the patient was very sleepy during the daytime and not participating in groups it was decided in treatment consult much at the beginning of, had persistent of depressive symptoms and anxiety with passive death wishes  For management of his anxiety the patient reported multiple trials which included gabapentin, hydroxyzine, paroxetine, but no significant response to these treatments or having side effects from them  During his hospital stay he was started on mirtazapine 15 mg which was slowly increased to 30 mg q h s  for depression and insomnia management  He was also started on escitalopram 10 mg p o  Daily  Patient was started on gabapentin 300 mg t i d   Klonopin was started only at low dosages due to his increased sedative effect on the patient, of 0 5 mg t i d  for anxiety management  Melatonin 3 mg p o  Q h s  Was started to aid with his sleep  He was restarted on Methadone: 165 mg/ day  Discharge the patient reported his anxiety symptoms of being less intense and med more manageable, and he felt he was no longer in that chronic state of depression reporting significant improvement  He participated in group therapy and milieu and had a positive outlook for his future        Mental Status at time of Discharge:     Appearance:  age appropriate and casually dressed   Behavior:  normal and cooperative   Speech:  normal pitch and normal volume   Mood:  normal   Affect:  normal   Thought Process:  goal directed and logical   Thought Content:  normal   Perceptual Disturbances: None   Risk Potential: Potential for Aggression No   Sensorium:  person, place and time/date   Cognition:  grossly intact   Consciousness:  alert and awake    Attention: attention span and concentration were age appropriate   Insight:  fair   Judgment: fair   Gait/Station: normal gait/station and normal balance   Motor Activity: no abnormal movements               Discharge Medications:  See after visit summary for reconciled discharge medications provided to patient and family  Discharge instructions/Information to patient and family:   See after visit summary for information provided to patient and family  Provisions for Follow-Up Care:  See after visit summary for information related to follow-up care and any pertinent home health orders  Behavioral Health Medications:   current meds:   Current Facility-Administered Medications   Medication Dose Route Frequency    aluminum-magnesium hydroxide-simethicone (MYLANTA) 200-200-20 mg/5 mL oral suspension 30 mL  30 mL Oral Q4H PRN    clonazePAM (KlonoPIN) tablet 0 5 mg  0 5 mg Oral TID    escitalopram (LEXAPRO) tablet 10 mg  10 mg Oral Daily    gabapentin (NEURONTIN) capsule 300 mg  300 mg Oral TID    haloperidol lactate (HALDOL) injection 5 mg  5 mg Intramuscular Q6H PRN    ibuprofen (MOTRIN) tablet 400 mg  400 mg Oral Q6H PRN    ibuprofen (MOTRIN) tablet 600 mg  600 mg Oral Q6H PRN    ibuprofen (MOTRIN) tablet 800 mg  800 mg Oral Q8H PRN    LORazepam (ATIVAN) 2 mg/mL injection 2 mg  2 mg Intramuscular Q4H PRN    LORazepam (ATIVAN) tablet 1 mg  1 mg Oral Q6H PRN    magnesium hydroxide (MILK OF MAGNESIA) 400 mg/5 mL oral suspension 30 mL  30 mL Oral Daily PRN    melatonin tablet 3 mg  3 mg Oral HS    methadone (DOLOPHINE) 10 mg/mL oral concentrated solution 165 mg  165 mg Oral Daily    mirtazapine (REMERON) tablet 30 mg  30 mg Oral HS    nicotine (NICODERM CQ) 21 mg/24 hr TD 24 hr patch 1 patch  1 patch Transdermal Daily    nicotine polacrilex (NICORETTE) gum 4 mg  4 mg Oral Q2H PRN    OLANZapine (ZyPREXA) IM injection 5 mg  5 mg Intramuscular Q3H PRN    risperiDONE (RisperDAL M-TABS) dispersible tablet 2 mg  2 mg Oral Q3H PRN    traZODone (DESYREL) tablet 50 mg  50 mg Oral HS PRN

## 2019-07-26 NOTE — CASE MANAGEMENT
Pt's AVS & last dosing was faxed to [facility] @ [phone]  Pt's Summary of Care was electronically forwarded to Dr Migdalia Talamantes

## 2019-07-26 NOTE — PROGRESS NOTES
Pt pleasant during interaction  Visible in milieu and social with peers  No SI  Improved depression and anxiety  Doing well on current medicines  Pt feels ready for discharge today  Reviewed importance of medication compliance and attending outpatient appointments  No questions/concerns at this time

## 2019-07-26 NOTE — DISCHARGE INSTRUCTIONS
Anxiety   WHAT YOU SHOULD KNOW:   Anxiety is a condition that causes you to feel excessive worry, uneasiness, or fear  Family or work stress, smoking, caffeine, and alcohol can increase your risk for anxiety  Certain medicines or health conditions can also increase your risk  Anxiety may begin gradually, and can become a long-term condition if it is not managed or treated  AFTER YOU LEAVE:   Medicines:   · Medicines  can help you feel more calm and relaxed, and decrease your symptoms  · Take your medicine as directed  Contact your healthcare provider if you think your medicine is not helping or if you have side effects  Tell him if you are allergic to any medicine  Keep a list of the medicines, vitamins, and herbs you take  Include the amounts, and when and why you take them  Bring the list or the pill bottles to follow-up visits  Carry your medicine list with you in case of an emergency  Follow up with your healthcare provider within 2 weeks or as directed:  Write down your questions so you remember to ask them during your visits  Manage anxiety:   · Go to counseling as directed  Cognitive behavioral therapy can help you understand and change how you react to events that trigger your symptoms  · Find ways to manage your symptoms  Activities such as exercise, meditation, or listening to music can help you relax  · Practice deep breathing  Breathing can change how your body reacts to stress  Focus on taking slow, deep breaths several times a day, or during an anxiety attack  Breathe in through your nose, and out through your mouth  · Avoid caffeine  Caffeine can make your symptoms worse  Avoid foods or drinks that are meant to increase your energy level  · Limit or avoid alcohol  Ask your healthcare provider if alcohol is safe for you  You may not be able to drink alcohol if you take certain anxiety or depression medicines  Limit alcohol to 1 drink per day if you are a woman   Limit alcohol to 2 drinks per day if you are a man  A drink of alcohol is 12 ounces of beer, 5 ounces of wine, or 1½ ounces of liquor  Contact your healthcare provider if:   · Your symptoms get worse or do not get better with treatment  · You think your medicine may be causing side effects  · Your anxiety keeps you from doing your regular daily activities  · You have new symptoms since your last visit  · You have questions or concerns about your condition or care  Seek care immediately or call 911 if:   · You have chest pain, tightness, or heaviness that may spread to your shoulders, arms, jaw, neck, or back  · You feel like hurting yourself or someone else  · You feel dizzy, lightheaded, or faint  © 2014 [address] is for End User's use only and may not be sold, redistributed or otherwise used for commercial purposes  All illustrations and images included in CareNotes® are the copyrighted property of [employer]  or Cesar Nelson  The above information is an  only  It is not intended as medical advice for individual conditions or treatments  Talk to your doctor, nurse or pharmacist before following any medical regimen to see if it is safe and effective for you  Depression   WHAT YOU NEED TO KNOW:   Depression is a medical condition that causes feelings of sadness or hopelessness that do not go away  Depression may cause you to lose interest in things you used to enjoy  These feelings may interfere with your daily life  DISCHARGE INSTRUCTIONS:   Call 911 for any of the following:   · You think about harming yourself or someone else  Contact your healthcare provider if:   · Your symptoms do not improve  · You cannot make it to your next appointment  · You have new symptoms  · You have questions or concerns about your condition or care  Medicines:   · Antidepressants  may be given to improve or balance your mood   You may need to take this medicine for several weeks before you begin to feel better  · Take your medicine as directed  Contact your healthcare provider if you think your medicine is not helping or if you have side effects  Tell him of her if you are allergic to any medicine  Keep a list of the medicines, vitamins, and herbs you take  Include the amounts, and when and why you take them  Bring the list or the pill bottles to follow-up visits  Carry your medicine list with you in case of an emergency  Therapy  may be used to treat your depression  A therapist will help you learn to cope with your thoughts and feelings  This can be done alone or in a group  It may also be done with family members or a significant other  Self-care:   · Get regular physical activity  Try to exercise for 30 minutes, 3 to 5 days a week  Work with your healthcare provider to develop an exercise plan that you enjoy  Physical activity may improve your symptoms  · Get enough sleep  Create a routine to help you relax before bed  You can listen to music, read, or do yoga  Try to go to bed and wake up at the same time every day  Sleep is important for emotional health  · Eat a variety of healthy foods from all of the food groups  A healthy meal plan is low in fat, salt, and added sugar  Ask your healthcare provider for more information about a meal plan that is right for you  · Do not drink alcohol or use drugs  Alcohol and drugs can make your symptoms worse  Follow up with your healthcare provider as directed: Your healthcare provider will monitor your progress at follow-up visits  He or she will also monitor your medicine if you take antidepressants  Your healthcare provider will ask if the medicine is helping  Tell him or her about any side effects or problems you may have with your medicine  The type or amount of medicine may need to be changed  Write down your questions so you remember to ask them during your visits    © 2017 [facility] [address] is for End User's use only and may not be sold, redistributed or otherwise used for commercial purposes  All illustrations and images included in CareNotes® are the copyrighted property of A [employer] M [employer]  or Cesar Nelson  The above information is an  only  It is not intended as medical advice for individual conditions or treatments  Talk to your doctor, nurse or pharmacist before following any medical regimen to see if it is safe and effective for you

## 2019-07-26 NOTE — CASE MANAGEMENT
CM met with Pt who verbalized readiness for d/c  Pt confirmed his home address & asked if he could have a copy of his methadone dosing, just in the event his provider does not receive the faxed copy  Pt reported he had a bad experience once with a hospital discharge & he was unable to get his dosing  CM reviewed Pt needs to complete a walk-in assessment at [facility] team recommends he go on Monday to get the process started  Pt verbalized understanding & had no questions

## 2019-07-29 ENCOUNTER — TELEPHONE (OUTPATIENT)
Dept: FAMILY MEDICINE CLINIC | Facility: CLINIC | Age: 29
End: 2019-07-29

## 2019-07-29 DIAGNOSIS — M62.838 MUSCLE SPASM: Primary | ICD-10-CM

## 2019-07-29 RX ORDER — CARISOPRODOL 350 MG/1
350 TABLET ORAL 3 TIMES DAILY
Qty: 90 TABLET | Refills: 5 | Status: ON HOLD | OUTPATIENT
Start: 2019-07-29 | End: 2019-08-14 | Stop reason: SDUPTHER

## 2019-07-29 NOTE — TELEPHONE ENCOUNTER
----- Message from Arnol Boyle MA sent at 7/29/2019  8:07 AM EDT -----  Regarding: FW: Prescription Question  Contact: [phone]      ----- Message -----  From: Karena You  Sent: 7/27/2019   1:49 PM EDT  To: [facility] [address] [facility]  Subject: Prescription Question                            I have been having bad muscle spasms in my back due to my spinal problems  I am very sensitive to muscle relaxers  I had to D/C the Tizanidine due to side effects  This problem is ongoing and Dr Guerin is aware  Can a script for Carisoprodol 350mg 1 tab PO q 8 hours be called in? I have been using it on and off as needed for years  Obviously not this weekend  Thank you! [facility]  [address]

## 2019-08-03 ENCOUNTER — HOSPITAL ENCOUNTER (INPATIENT)
Facility: HOSPITAL | Age: 29
LOS: 3 days | DRG: 817 | End: 2019-08-06
Attending: EMERGENCY MEDICINE | Admitting: INTERNAL MEDICINE
Payer: COMMERCIAL

## 2019-08-03 ENCOUNTER — APPOINTMENT (INPATIENT)
Dept: RADIOLOGY | Facility: HOSPITAL | Age: 29
DRG: 817 | End: 2019-08-03
Payer: COMMERCIAL

## 2019-08-03 DIAGNOSIS — R45.851 SUICIDAL IDEATION: ICD-10-CM

## 2019-08-03 DIAGNOSIS — T40.3X1A ACCIDENTAL METHADONE OVERDOSE, INITIAL ENCOUNTER (HCC): Primary | ICD-10-CM

## 2019-08-03 DIAGNOSIS — T40.3X2A: ICD-10-CM

## 2019-08-03 PROBLEM — F41.1 GENERALIZED ANXIETY DISORDER: Status: RESOLVED | Noted: 2019-07-17 | Resolved: 2019-08-03

## 2019-08-03 PROBLEM — F41.9 ANXIETY: Status: RESOLVED | Noted: 2019-02-21 | Resolved: 2019-08-03

## 2019-08-03 PROBLEM — G93.40 ENCEPHALOPATHY: Status: ACTIVE | Noted: 2019-08-03

## 2019-08-03 PROBLEM — M48.00 SPINAL STENOSIS: Chronic | Status: ACTIVE | Noted: 2019-02-21

## 2019-08-03 PROBLEM — F32.A DEPRESSION: Status: RESOLVED | Noted: 2019-02-21 | Resolved: 2019-08-03

## 2019-08-03 PROBLEM — R74.01 ELEVATED AST (SGOT): Status: ACTIVE | Noted: 2019-08-03

## 2019-08-03 PROBLEM — J96.02 ACUTE RESPIRATORY FAILURE WITH HYPERCAPNIA (HCC): Status: ACTIVE | Noted: 2019-08-03

## 2019-08-03 LAB
ALBUMIN SERPL BCP-MCNC: 3.6 G/DL (ref 3.5–5)
ALBUMIN SERPL BCP-MCNC: 3.8 G/DL (ref 3.5–5)
ALP SERPL-CCNC: 61 U/L (ref 46–116)
ALP SERPL-CCNC: 66 U/L (ref 46–116)
ALT SERPL W P-5'-P-CCNC: 61 U/L (ref 12–78)
ALT SERPL W P-5'-P-CCNC: 65 U/L (ref 12–78)
AMPHETAMINES SERPL QL SCN: POSITIVE
ANION GAP SERPL CALCULATED.3IONS-SCNC: 11 MMOL/L (ref 4–13)
ANION GAP SERPL CALCULATED.3IONS-SCNC: 11 MMOL/L (ref 4–13)
APAP SERPL-MCNC: <2 UG/ML (ref 10–20)
AST SERPL W P-5'-P-CCNC: 110 U/L (ref 5–45)
AST SERPL W P-5'-P-CCNC: 120 U/L (ref 5–45)
BACTERIA UR QL AUTO: ABNORMAL /HPF
BARBITURATES UR QL: NEGATIVE
BASE EXCESS BLDA CALC-SCNC: 0 MMOL/L (ref -2–3)
BASOPHILS # BLD AUTO: 0.08 THOUSANDS/ΜL (ref 0–0.1)
BASOPHILS NFR BLD AUTO: 1 % (ref 0–1)
BENZODIAZ UR QL: NEGATIVE
BILIRUB SERPL-MCNC: 0.6 MG/DL (ref 0.2–1)
BILIRUB SERPL-MCNC: 0.6 MG/DL (ref 0.2–1)
BILIRUB UR QL STRIP: NEGATIVE
BUN SERPL-MCNC: 16 MG/DL (ref 5–25)
BUN SERPL-MCNC: 17 MG/DL (ref 5–25)
CALCIUM SERPL-MCNC: 8.9 MG/DL (ref 8.3–10.1)
CALCIUM SERPL-MCNC: 9 MG/DL (ref 8.3–10.1)
CHLORIDE SERPL-SCNC: 103 MMOL/L (ref 100–108)
CHLORIDE SERPL-SCNC: 105 MMOL/L (ref 100–108)
CLARITY UR: CLEAR
CO2 SERPL-SCNC: 25 MMOL/L (ref 21–32)
CO2 SERPL-SCNC: 28 MMOL/L (ref 21–32)
COCAINE UR QL: NEGATIVE
COLOR UR: ABNORMAL
CREAT SERPL-MCNC: 0.98 MG/DL (ref 0.6–1.3)
CREAT SERPL-MCNC: 1.03 MG/DL (ref 0.6–1.3)
EOSINOPHIL # BLD AUTO: 0.32 THOUSAND/ΜL (ref 0–0.61)
EOSINOPHIL NFR BLD AUTO: 3 % (ref 0–6)
ERYTHROCYTE [DISTWIDTH] IN BLOOD BY AUTOMATED COUNT: 14.5 % (ref 11.6–15.1)
ETHANOL EXG-MCNC: 0 MG/DL
GFR SERPL CREATININE-BSD FRML MDRD: 104 ML/MIN/1.73SQ M
GFR SERPL CREATININE-BSD FRML MDRD: 98 ML/MIN/1.73SQ M
GLUCOSE SERPL-MCNC: 67 MG/DL (ref 65–140)
GLUCOSE SERPL-MCNC: 74 MG/DL (ref 65–140)
GLUCOSE SERPL-MCNC: 80 MG/DL (ref 65–140)
GLUCOSE UR STRIP-MCNC: NEGATIVE MG/DL
HCO3 BLDA-SCNC: 26.9 MMOL/L (ref 24–30)
HCT VFR BLD AUTO: 33.2 % (ref 36.5–49.3)
HGB BLD-MCNC: 11.1 G/DL (ref 12–17)
HGB UR QL STRIP.AUTO: NEGATIVE
HIV 1+2 AB+HIV1 P24 AG SERPL QL IA: NORMAL
HIV1 P24 AG SER QL: NORMAL
HYALINE CASTS #/AREA URNS LPF: ABNORMAL /LPF
IMM GRANULOCYTES # BLD AUTO: 0.02 THOUSAND/UL (ref 0–0.2)
IMM GRANULOCYTES NFR BLD AUTO: 0 % (ref 0–2)
KETONES UR STRIP-MCNC: ABNORMAL MG/DL
LEUKOCYTE ESTERASE UR QL STRIP: NEGATIVE
LYMPHOCYTES # BLD AUTO: 2.49 THOUSANDS/ΜL (ref 0.6–4.47)
LYMPHOCYTES NFR BLD AUTO: 26 % (ref 14–44)
MCH RBC QN AUTO: 29.2 PG (ref 26.8–34.3)
MCHC RBC AUTO-ENTMCNC: 33.4 G/DL (ref 31.4–37.4)
MCV RBC AUTO: 87 FL (ref 82–98)
METHADONE UR QL: POSITIVE
MONOCYTES # BLD AUTO: 1.02 THOUSAND/ΜL (ref 0.17–1.22)
MONOCYTES NFR BLD AUTO: 11 % (ref 4–12)
MUCOUS THREADS UR QL AUTO: ABNORMAL
NEUTROPHILS # BLD AUTO: 5.59 THOUSANDS/ΜL (ref 1.85–7.62)
NEUTS SEG NFR BLD AUTO: 59 % (ref 43–75)
NITRITE UR QL STRIP: NEGATIVE
NON-SQ EPI CELLS URNS QL MICRO: ABNORMAL /HPF
NRBC BLD AUTO-RTO: 0 /100 WBCS
OPIATES UR QL SCN: NEGATIVE
PCO2 BLD: 29 MMOL/L (ref 21–32)
PCO2 BLD: 54.8 MM HG (ref 42–50)
PCP UR QL: NEGATIVE
PH BLD: 7.3 [PH] (ref 7.3–7.4)
PH UR STRIP.AUTO: 6 [PH]
PLATELET # BLD AUTO: 288 THOUSANDS/UL (ref 149–390)
PMV BLD AUTO: 10.7 FL (ref 8.9–12.7)
PO2 BLD: 64 MM HG (ref 35–45)
POTASSIUM SERPL-SCNC: 3.2 MMOL/L (ref 3.5–5.3)
POTASSIUM SERPL-SCNC: 3.6 MMOL/L (ref 3.5–5.3)
PROT SERPL-MCNC: 7.3 G/DL (ref 6.4–8.2)
PROT SERPL-MCNC: 7.8 G/DL (ref 6.4–8.2)
PROT UR STRIP-MCNC: ABNORMAL MG/DL
RBC # BLD AUTO: 3.8 MILLION/UL (ref 3.88–5.62)
RBC #/AREA URNS AUTO: ABNORMAL /HPF
SAO2 % BLD FROM PO2: 89 % (ref 95–98)
SODIUM SERPL-SCNC: 141 MMOL/L (ref 136–145)
SODIUM SERPL-SCNC: 142 MMOL/L (ref 136–145)
SP GR UR STRIP.AUTO: >=1.03 (ref 1–1.03)
SPECIMEN SOURCE: ABNORMAL
THC UR QL: NEGATIVE
TROPONIN I SERPL-MCNC: <0.02 NG/ML
TROPONIN I SERPL-MCNC: <0.02 NG/ML
TSH SERPL DL<=0.05 MIU/L-ACNC: 1.69 UIU/ML (ref 0.36–3.74)
UROBILINOGEN UR QL STRIP.AUTO: 0.2 E.U./DL
WBC # BLD AUTO: 9.52 THOUSAND/UL (ref 4.31–10.16)
WBC #/AREA URNS AUTO: ABNORMAL /HPF

## 2019-08-03 PROCEDURE — 87806 HIV AG W/HIV1&2 ANTB W/OPTIC: CPT | Performed by: NURSE PRACTITIONER

## 2019-08-03 PROCEDURE — 99222 1ST HOSP IP/OBS MODERATE 55: CPT | Performed by: INTERNAL MEDICINE

## 2019-08-03 PROCEDURE — 81001 URINALYSIS AUTO W/SCOPE: CPT | Performed by: INTERNAL MEDICINE

## 2019-08-03 PROCEDURE — 82803 BLOOD GASES ANY COMBINATION: CPT

## 2019-08-03 PROCEDURE — 82075 ASSAY OF BREATH ETHANOL: CPT | Performed by: PHYSICIAN ASSISTANT

## 2019-08-03 PROCEDURE — 99285 EMERGENCY DEPT VISIT HI MDM: CPT | Performed by: PHYSICIAN ASSISTANT

## 2019-08-03 PROCEDURE — 82948 REAGENT STRIP/BLOOD GLUCOSE: CPT

## 2019-08-03 PROCEDURE — 71045 X-RAY EXAM CHEST 1 VIEW: CPT

## 2019-08-03 PROCEDURE — 36415 COLL VENOUS BLD VENIPUNCTURE: CPT | Performed by: PHYSICIAN ASSISTANT

## 2019-08-03 PROCEDURE — 80053 COMPREHEN METABOLIC PANEL: CPT | Performed by: PHYSICIAN ASSISTANT

## 2019-08-03 PROCEDURE — 99449 NTRPROF PH1/NTRNET/EHR 31/>: CPT | Performed by: EMERGENCY MEDICINE

## 2019-08-03 PROCEDURE — 80307 DRUG TEST PRSMV CHEM ANLYZR: CPT | Performed by: INTERNAL MEDICINE

## 2019-08-03 PROCEDURE — 84484 ASSAY OF TROPONIN QUANT: CPT | Performed by: INTERNAL MEDICINE

## 2019-08-03 PROCEDURE — 85025 COMPLETE CBC W/AUTO DIFF WBC: CPT | Performed by: NURSE PRACTITIONER

## 2019-08-03 PROCEDURE — 99285 EMERGENCY DEPT VISIT HI MDM: CPT

## 2019-08-03 PROCEDURE — 80074 ACUTE HEPATITIS PANEL: CPT | Performed by: NURSE PRACTITIONER

## 2019-08-03 PROCEDURE — 80053 COMPREHEN METABOLIC PANEL: CPT | Performed by: NURSE PRACTITIONER

## 2019-08-03 PROCEDURE — 84443 ASSAY THYROID STIM HORMONE: CPT | Performed by: INTERNAL MEDICINE

## 2019-08-03 PROCEDURE — 93005 ELECTROCARDIOGRAM TRACING: CPT

## 2019-08-03 PROCEDURE — 99255 IP/OBS CONSLTJ NEW/EST HI 80: CPT | Performed by: NURSE PRACTITIONER

## 2019-08-03 PROCEDURE — NC001 PR NO CHARGE: Performed by: EMERGENCY MEDICINE

## 2019-08-03 PROCEDURE — 80329 ANALGESICS NON-OPIOID 1 OR 2: CPT | Performed by: PHYSICIAN ASSISTANT

## 2019-08-03 RX ORDER — GABAPENTIN 300 MG/1
300 CAPSULE ORAL 3 TIMES DAILY
Status: DISCONTINUED | OUTPATIENT
Start: 2019-08-03 | End: 2019-08-03

## 2019-08-03 RX ORDER — SODIUM CHLORIDE AND POTASSIUM CHLORIDE .9; .15 G/100ML; G/100ML
125 SOLUTION INTRAVENOUS CONTINUOUS
Status: DISCONTINUED | OUTPATIENT
Start: 2019-08-03 | End: 2019-08-04

## 2019-08-03 RX ORDER — MIRTAZAPINE 15 MG/1
30 TABLET, FILM COATED ORAL
Status: DISCONTINUED | OUTPATIENT
Start: 2019-08-03 | End: 2019-08-03

## 2019-08-03 RX ORDER — METHADONE HYDROCHLORIDE 10 MG/5ML
SOLUTION ORAL EVERY 6 HOURS PRN
COMMUNITY
End: 2019-08-14 | Stop reason: HOSPADM

## 2019-08-03 RX ORDER — CLONAZEPAM 0.5 MG/1
0.5 TABLET ORAL 3 TIMES DAILY
Status: DISCONTINUED | OUTPATIENT
Start: 2019-08-03 | End: 2019-08-03

## 2019-08-03 RX ORDER — ESCITALOPRAM OXALATE 10 MG/1
10 TABLET ORAL DAILY
Status: DISCONTINUED | OUTPATIENT
Start: 2019-08-04 | End: 2019-08-06 | Stop reason: HOSPADM

## 2019-08-03 RX ORDER — ONDANSETRON 2 MG/ML
4 INJECTION INTRAMUSCULAR; INTRAVENOUS EVERY 6 HOURS PRN
Status: DISCONTINUED | OUTPATIENT
Start: 2019-08-03 | End: 2019-08-06 | Stop reason: HOSPADM

## 2019-08-03 RX ORDER — NALOXONE HYDROCHLORIDE 0.4 MG/ML
0.1 INJECTION, SOLUTION INTRAMUSCULAR; INTRAVENOUS; SUBCUTANEOUS ONCE
Status: COMPLETED | OUTPATIENT
Start: 2019-08-03 | End: 2019-08-03

## 2019-08-03 RX ORDER — LANOLIN ALCOHOL/MO/W.PET/CERES
3 CREAM (GRAM) TOPICAL
Status: DISCONTINUED | OUTPATIENT
Start: 2019-08-03 | End: 2019-08-06 | Stop reason: HOSPADM

## 2019-08-03 RX ORDER — DOCUSATE SODIUM 100 MG/1
100 CAPSULE, LIQUID FILLED ORAL 2 TIMES DAILY
Status: DISCONTINUED | OUTPATIENT
Start: 2019-08-03 | End: 2019-08-06 | Stop reason: HOSPADM

## 2019-08-03 RX ORDER — FLUTICASONE PROPIONATE 50 MCG
2 SPRAY, SUSPENSION (ML) NASAL DAILY
Status: DISCONTINUED | OUTPATIENT
Start: 2019-08-03 | End: 2019-08-06 | Stop reason: HOSPADM

## 2019-08-03 RX ORDER — LORAZEPAM 2 MG/ML
1 INJECTION INTRAMUSCULAR EVERY 6 HOURS PRN
Status: DISCONTINUED | OUTPATIENT
Start: 2019-08-03 | End: 2019-08-04

## 2019-08-03 RX ORDER — ACETAMINOPHEN 325 MG/1
650 TABLET ORAL EVERY 6 HOURS PRN
Status: DISCONTINUED | OUTPATIENT
Start: 2019-08-03 | End: 2019-08-06 | Stop reason: HOSPADM

## 2019-08-03 RX ADMIN — CLONAZEPAM 0.5 MG: 0.5 TABLET ORAL at 15:45

## 2019-08-03 RX ADMIN — ACETAMINOPHEN 650 MG: 325 TABLET, FILM COATED ORAL at 17:52

## 2019-08-03 RX ADMIN — NALOXONE HYDROCHLORIDE 0.1 MG: 0.4 INJECTION, SOLUTION INTRAMUSCULAR; INTRAVENOUS; SUBCUTANEOUS at 14:26

## 2019-08-03 RX ADMIN — NICOTINE 1 PATCH: 7 PATCH TRANSDERMAL at 15:44

## 2019-08-03 RX ADMIN — LORAZEPAM 1 MG: 2 INJECTION INTRAMUSCULAR; INTRAVENOUS at 15:51

## 2019-08-03 RX ADMIN — SODIUM CHLORIDE, SODIUM LACTATE, POTASSIUM CHLORIDE, AND CALCIUM CHLORIDE 1000 ML: .6; .31; .03; .02 INJECTION, SOLUTION INTRAVENOUS at 15:45

## 2019-08-03 RX ADMIN — GABAPENTIN 300 MG: 300 CAPSULE ORAL at 15:45

## 2019-08-03 RX ADMIN — SODIUM CHLORIDE AND POTASSIUM CHLORIDE 125 ML/HR: .9; .15 SOLUTION INTRAVENOUS at 17:22

## 2019-08-03 RX ADMIN — ENOXAPARIN SODIUM 40 MG: 40 INJECTION SUBCUTANEOUS at 15:46

## 2019-08-03 RX ADMIN — FLUTICASONE PROPIONATE 2 SPRAY: 50 SPRAY, METERED NASAL at 17:18

## 2019-08-03 NOTE — PLAN OF CARE
Problem: NEUROSENSORY - ADULT  Goal: Achieves stable or improved neurological status  Description  INTERVENTIONS  - Monitor and report changes in neurological status  - Initiate measures to prevent increased intracranial pressure  - Maintain blood pressure and fluid volume within ordered parameters to optimize cerebral perfusion  - Monitor temperature, glucose, and sodium or any other associated labs  Initiate appropriate interventions as ordered  - Monitor for seizure activity   - Administer anti-seizure medications as ordered  Outcome: Progressing     Problem: CARDIOVASCULAR - ADULT  Goal: Maintains optimal cardiac output and hemodynamic stability  Description  INTERVENTIONS:  - Monitor I/O, vital signs and rhythm  - Monitor for S/S and trends of decreased cardiac output i e  bleeding, hypotension  - Administer and titrate ordered vasoactive medications to optimize hemodynamic stability  - Assess quality of pulses, skin color and temperature  - Assess for signs of decreased coronary artery perfusion - ex   Angina  - Instruct patient to report change in severity of symptoms  Outcome: Progressing     Problem: RESPIRATORY - ADULT  Goal: Achieves optimal ventilation and oxygenation  Description  INTERVENTIONS:  - Assess for changes in respiratory status  - Assess for changes in mentation and behavior  - Position to facilitate oxygenation and minimize respiratory effort  - Oxygen administration by appropriate delivery method based on oxygen saturation (per order) or ABGs  - Initiate smoking cessation education as indicated  - Encourage broncho-pulmonary hygiene including cough, deep breathe, Incentive Spirometry  - Assess the need for suctioning and aspirate as needed  - Assess and instruct to report SOB or any respiratory difficulty  - Respiratory Therapy support as indicated  Outcome: Progressing     Problem: METABOLIC, FLUID AND ELECTROLYTES - ADULT  Goal: Electrolytes maintained within normal limits  Description  INTERVENTIONS:  - Monitor labs and assess patient for signs and symptoms of electrolyte imbalances  - Administer electrolyte replacement as ordered  - Monitor response to electrolyte replacements, including repeat lab results as appropriate  - Instruct patient on fluid and nutrition as appropriate  Outcome: Progressing     Problem: COPING  Goal: Will report anxiety at manageable levels  Description  INTERVENTIONS:  - Administer medication as ordered  - Teach and encourage coping skills  - Provide emotional support  - Assess patient/family for anxiety and ability to cope  Outcome: Progressing     Problem: SELF HARM  Goal: Effect of psychiatric condition will be minimized and patient will be protected from self harm  Description  INTERVENTIONS:  - Assess impact of patient's symptoms on level of functioning, self-care needs and offer support as indicated  - Assess patient/family knowledge of depression, impact on illness and need for teaching  - Provide emotional support, presence and reassurance  - Assess for possible suicidal thoughts, ideation or self-harm  If patient expresses suicidal thoughts or statements do not leave alone, notify physician/AP immediately, initiate suicide precautions, and determine need for continual observation    - initiate consults and referrals as appropriate (a mental health professional, Spiritual Care  Outcome: Progressing     Problem: SUBSTANCE USE/ABUSE  Goal: Will have no detox symptoms and will verbalize plan for changing substance-related behavior  Description  INTERVENTIONS:  - Monitor physical status and assess for symptoms of withdrawal  - Administer medication as ordered  - Provide emotional support with 1 on 1 interaction with staff  - Encourage recovery focused program/ addiction education  - Assess for verbalization of changing behaviors related to substance abuse  - Initiate consults and referrals as appropriate (Case Management, Spiritual Care, etc )  Outcome: Progressing     Problem: Potential for Falls  Goal: Patient will remain free of falls  Description  INTERVENTIONS:  - Assess patient frequently for physical needs  -  Identify cognitive and physical deficits and behaviors that affect risk of falls  -  [place] fall precautions as indicated by assessment   - Educate patient/family on patient safety including physical limitations  - Instruct patient to call for assistance with activity based on assessment  - Modify environment to reduce risk of injury  - Consider OT/PT consult to assist with strengthening/mobility  Outcome: Progressing     Problem: Prexisting or High Potential for Compromised Skin Integrity  Goal: Skin integrity is maintained or improved  Description  INTERVENTIONS:  - Identify patients at risk for skin breakdown  - Assess and monitor skin integrity  - Assess and monitor nutrition and hydration status  - Monitor labs (i e  albumin)  - Assess for incontinence   - Turn and reposition patient  - Assist with mobility/ambulation  - Relieve pressure over bony prominences  - Avoid friction and shearing  - Provide appropriate hygiene as needed including keeping skin clean and dry  - Evaluate need for skin moisturizer/barrier cream  - Collaborate with interdisciplinary team (i e  Nutrition, Rehabilitation, etc )   - Patient/family teaching  Outcome: Progressing     Problem: Nutrition/Hydration-ADULT  Goal: Nutrient/Hydration intake appropriate for improving, restoring or maintaining nutritional needs  Description  Monitor and assess patient's nutrition/hydration status for malnutrition (ex- brittle hair, bruises, dry skin, pale skin and conjunctiva, muscle wasting, smooth red tongue, and disorientation)  Collaborate with interdisciplinary team and initiate plan and interventions as ordered  Monitor patient's weight and dietary intake as ordered or per policy  Utilize nutrition screening tool and intervene per policy   Determine patient's food preferences and provide high-protein, high-caloric foods as appropriate       INTERVENTIONS:  - Monitor oral intake, urinary output, labs, and treatment plans  - Assess nutrition and hydration status and recommend course of action  - Evaluate amount of meals eaten  - Assist patient with eating if necessary   - Allow adequate time for meals  - Recommend/ encourage appropriate diets, oral nutritional supplements, and vitamin/mineral supplements  - Order, calculate, and assess calorie counts as needed  - Recommend, monitor, and adjust tube feedings and TPN/PPN based on assessed needs  - Assess need for intravenous fluids  - Provide specific nutrition/hydration education as appropriate  - Include patient/family/caregiver in decisions related to nutrition  Outcome: Progressing

## 2019-08-03 NOTE — ED NOTES
Drowsy, sleeps for brief periods, easy to arouse  Resp unlabored  Sitter remains at bedside        Marina Zacarias RN  08/03/19 [id number]

## 2019-08-03 NOTE — PROGRESS NOTES
15:05 -Patient arrived to unit via strecther-Patient aplaced on monitor -he skid himself over to bed  Patient is A&Ox4, Patioent all over body tremors  Patient did state to RN "-My mother knows I am here -please don't call her"  Patient also stated during admission question to suicidal questions-" I wish I never came here-I should have OD on IV Fentanyl , but no I was stupid and came here  "emotional support provided and 1:1 at bedside  Dr Gilbert Parra notified of admission here

## 2019-08-03 NOTE — ED NOTES
Drowsy, sleeps for brief periods, easy to arouse  Resp unlabored  Sitter remains at bedside        Jamari Urias RN  08/03/19 [id number]

## 2019-08-03 NOTE — CONSULTS
[address] 33 y o  male MRN: [id number]  Unit/Bed#: -01 Encounter: [id number]    Physician Requesting Consult: Hussain Molina DO    Reason for Consultation / Chief Complaint:  Overdose    History of Present Illness:  Brian Colbert is a 33 y o  male with past medical history of IV heroin abuse with multiple readmissions/rehab stents was clean for 2-3 months and was transition to oral methadone, depression/anxiety with recent psychiatric admission discharged on  who presents after being found outside on ER bench minimally responsive  Patient states that he had a 3 day binge smoking crystal meth and started with hallucinations  He states Jean Marie Montesapro is making me manic  I am sick of relapsing  I am a fuck up  I just want to be done with all this  My dad asked me to be clean for my grandmother's  and I can even do that    Thus he took methadone 165 mg 2 tabs and 2 tabs of his stoma at 8 o'clock this morning  He is homeless however states he was at his mother's last night who dropped him off at the ER  Patient had notable respiratory depression  He was given Narcan 0 1 mg with improvement  Toxicology was consulted  Labs revealed a normal BMP  However AST was elevated at 120 and baseline is elevated in the 50s  VBG revealed 7 ///  ETOH 0  Patient was given IV fluids  Toxicology is recommending repeat CMP in 2 hours to assure AST down trending  Tylenol level less than 2  On my examination patient is very anxious curled in a ball rocking  He states that he has cramps and hurts all over      History obtained from chart review and the patient      Past Medical History:  Past Medical History:   Diagnosis Date    Allergic     Anxiety     Depression     Spinal stenosis        Past Surgical History:  Past Surgical History:   Procedure Laterality Date    BACK SURGERY      FRACTURE SURGERY      left foot reconstruted     SPINE SURGERY      hernated disc Past Family History:  Family History   Problem Relation Age of Onset    Hypertension Mother     Hyperlipidemia Father     Heart disease Father     Depression Maternal Grandmother     Hypertension Maternal Grandmother     Squamous cell carcinoma Maternal Grandfather     Alcohol abuse Maternal Grandfather     Alzheimer's disease Paternal Grandmother     Dementia Paternal Grandmother     Alcohol abuse Paternal Uncle        Social History:  Social History     Tobacco Use   Smoking Status Former Smoker    Packs/day: 0 25    Last attempt to quit: 2017    Years since quittin 9   Smokeless Tobacco Current User     Social History     Substance and Sexual Activity   Alcohol Use Never    Frequency: Never    Binge frequency: Never     Social History     Substance and Sexual Activity   Drug Use Yes    Frequency: 3 0 times per week    Types: Methamphetamines    Comment: overdosed Methadone, used meth last 3days     Marital Status: Single  Exercise History:  Ambulatory    Home Medications:   Prior to Admission medications    Medication Sig Start Date End Date Taking?  Authorizing Provider   carisoprodol (SOMA) 350 mg tablet Take 1 tablet (350 mg total) by mouth 3 (three) times a day 19  Yes Villa Wheeler MD   escitalopram (LEXAPRO) 10 mg tablet Take 1 tablet (10 mg total) by mouth daily 19  Yes Kin Trejo MD   gabapentin (NEURONTIN) 300 mg capsule Take 1 capsule (300 mg total) by mouth 3 (three) times a day 19  Yes Kin Trejo MD   melatonin 3 mg Take 1 tablet (3 mg total) by mouth daily at bedtime 19  Yes Kin Trejo MD   methadone (DOLOPHINE) 10 MG/5ML solution Take by mouth every 6 (six) hours as needed for moderate pain   Yes Historical Provider, MD   mirtazapine (REMERON) 30 mg tablet Take 1 tablet (30 mg total) by mouth daily at bedtime 19  Yes Kin Trejo MD   clonazePAM (KlonoPIN) 0 5 mg tablet Take 1 tablet (0 5 mg total) by mouth 3 (three) times a day for 5 days 19  Monserrat Duffy MD       Inpatient Medications:  Scheduled Meds:    Current Facility-Administered Medications:  acetaminophen 650 mg Oral Q6H PRN Naomie Boers, DO   clonazePAM 0 5 mg Oral TID Naomie Boers, DO   docusate sodium 100 mg Oral BID Laurel Fly, DO   enoxaparin 40 mg Subcutaneous Daily Laurel Fly, DO   [START ON 2019] escitalopram 10 mg Oral Daily Laurel Fly, DO   fluticasone 2 spray Each Nare Daily Laurel Fly, DO   gabapentin 300 mg Oral TID Laurel Fly, DO   LORazepam 1 mg Intravenous Q6H PRN Laurel Fly, DO   melatonin 3 mg Oral HS Laurel Fly, DO   mirtazapine 30 mg Oral HS Laurel Fly, DO   nicotine 1 patch Transdermal Daily Laurel Fly, DO   ondansetron 4 mg Intravenous Q6H PRN Laurel Fly, DO   sodium chloride 0 9 % with KCl 20 mEq/L 125 mL/hr Intravenous Continuous Laurel Fly, DO     Continuous Infusions:    sodium chloride 0 9 % with KCl 20 mEq/L 125 mL/hr     PRN Meds:    acetaminophen 650 mg Q6H PRN   LORazepam 1 mg Q6H PRN   ondansetron 4 mg Q6H PRN       Allergies:  No Known Allergies    ROS:   Review of Systems   Constitutional: Negative  HENT: Negative  Eyes: Negative  Respiratory: Negative  Cardiovascular: Negative  Gastrointestinal: Negative  Endocrine: Negative  Genitourinary: Negative  Musculoskeletal: Positive for myalgias  Skin: Negative  Allergic/Immunologic: Negative  Neurological: Negative  Hematological: Negative  Psychiatric/Behavioral: Positive for hallucinations, sleep disturbance and suicidal ideas  The patient is nervous/anxious          Vitals:  Vitals:    19 1510 19 1515 19 1520 19 1640   BP: 125/100 125/100 162/88    BP Location:  Right arm     Pulse: 90 103 101 79   Resp: (!) 24 (!) 24 (!) 40 (!) 32   Temp:    98 4 °F (36 9 °C)   TempSrc:    Oral   SpO2: 100% 99% 97% 97%   Weight:       Height:         Temperature:   Temp (24hrs), Av 7 °F (37 1 °C), Min:98 4 °F (36 9 °C), Max:99 °F (37 2 °C)    Current Temperature: 98 4 °F (36 9 °C)    Weights:   IBW: 73 kg  Body mass index is 28 06 kg/m²  Hemodynamic Monitoring:  N/A     Non-Invasive/Invasive Ventilation Settings:  Respiratory    Lab Data (Last 4 hours)    None         O2/Vent Data (Last 4 hours)    None              No results found for: PHART, JWD0KFQ, PO2ART, FEP3KFO, W6MPEEMJ, BEART, SOURCE  SpO2: SpO2: 97 %     Physical Exam:  Physical Exam   Constitutional: He is oriented to person, place, and time  HENT:   Head: Normocephalic and atraumatic  Mouth/Throat: Oropharynx is clear and moist    Eyes: Pupils are equal, round, and reactive to light  Conjunctivae are normal  No scleral icterus  Neck: Neck supple  No tracheal deviation present  Cardiovascular: Normal rate, regular rhythm, normal heart sounds and intact distal pulses  Exam reveals no gallop and no friction rub  No murmur heard  Pulmonary/Chest: Effort normal and breath sounds normal  No respiratory distress  He has no wheezes  He has no rales  Abdominal: Soft  Bowel sounds are normal  He exhibits no distension  There is no tenderness  Musculoskeletal: Normal range of motion  He exhibits no edema, tenderness or deformity  Neurological: He is alert and oriented to person, place, and time  No cranial nerve deficit  Skin: Skin is warm and dry  No rash noted  No erythema  No pallor  Psychiatric:   Anxious/fidgity       Labs:         Invalid input(s):  EOSPCT  Results from last 7 days   Lab Units 08/03/19  1433 08/03/19  1329   SODIUM mmol/L  --  142   POTASSIUM mmol/L  --  3 6   CHLORIDE mmol/L  --  103   CO2 mmol/L  --  28   CO2, I-STAT mmol/L 29  --    ANION GAP mmol/L  --  11   BUN mg/dL  --  17   CREATININE mg/dL  --  1 03   CALCIUM mg/dL  --  8 9   GLUCOSE RANDOM mg/dL  --  74   ALT U/L  --  65   AST U/L  --  120*   ALK PHOS U/L  --  66   ALBUMIN g/dL  --  3 8   TOTAL BILIRUBIN mg/dL  --  0 60                       ABG:    VBG:          Imaging:  Pending  EKG:  NA This was personally reviewed by myself  Micro:        ______________________________________________________________________    Assessment and Plan:   Principal Problem:    Intentional methadone overdose ([facility] )  Active Problems:    Suicidal ideation    Encephalopathy    Elevated AST (SGOT)    Acute respiratory failure with hypercapnia (HCC)    Severe episode of recurrent major depressive disorder, without psychotic features ([facility] )    Somnolence  Resolved Problems:    * No resolved hospital problems  *      Neuro:  Toxic metabolic encephalopathy/intentional methadone and Soma overdose/history of IV drug abuse/history of depression/anxiety  · Toxicology following  · Continue neuro checks monitor for respiratory depression  · Supportive care  · Resume home Remeron/melatonin/Lexapro/Neurontin/Klonopin  · Hold methadone  · Psych consult 1:1    CV:   · No acute issues, per ER normal QT interval not available for me to view on [facility] system    Lung:  Acute hypercarbic respiratory failure secondary to overdose  · Patient received Narcan  · Continuous pulse ox  · Per toxicology if need additional narcan consider narcan gtt and tiger txt toxicology    GI:  Elevated AST/history of hepatitis-C  · Tylenol level less than 2  · Repeat CMP pending per toxicology  · History of hepatitis C without treatment will need to follow up with GI  · Hep panel pending/HIV    F/E/N:  Hypokalemia  · On normal saline with potassium    :  No issues    ID:  No issues    Heme:  CBC pending    Endo:  No issues    Msk/Skin:  No issue    Disposition:  Admitted to step-down  Critical care consult for close airway monitoring  Counseling / Coordination of Care  Total time spent today 45 minutes  Greater than 50% of total time was spent with the patient and / or family counseling and / or coordination of care   A description of the counseling / coordination of care: Plan of care  ______________________________________________________________________    VTE Pharmacologic Prophylaxis: Enoxaparin (Lovenox)  VTE Mechanical Prophylaxis: sequential compression device    Invasive lines and devices: Invasive Devices     Peripheral Intravenous Line            Peripheral IV 08/03/19 Left Antecubital less than 1 day                Code Status: Level 1 - Full Code  POA:    POLST:      Given critical illness, patient length of stay will require greater than two midnights  Portions of the record may have been created with voice recognition software  Occasional wrong word or "sound a like" substitutions may have occurred due to the inherent limitations of voice recognition software  Read the chart carefully and recognize, using context, where substitutions have occurred        DILLON Rojas    Consults

## 2019-08-03 NOTE — ED NOTES
Pair of socks, pair of shoes, shorts,- cell phone, two keys, belt,  Juul,     Pt also had pocket knife (secured with )        Desean Leavitt RN  08/03/19 7410

## 2019-08-03 NOTE — ED NOTES
Drowsy, sleeps for brief periods, easy to arouse  Resp unlabored  Sitter remains at bedside        Stephen Loyola RN  08/03/19 1277

## 2019-08-03 NOTE — ED NOTES
Drowsy, sleeps for brief periods, easy to arouse  Resp unlabored  Sitter remains at bedside        Stephen Loyola RN  08/03/19 7654

## 2019-08-03 NOTE — ED NOTES
Consuelo joyner, unable to do ED charting, staff will do that     Shelton Montiel, MARCELINO  08/03/19 3874

## 2019-08-03 NOTE — ED NOTES
Drowsy, sleeps for brief periods, easy to arouse  Resp unlabored  Sitter remains at bedside        Iftikhar Hidalgo RN  08/03/19 2010

## 2019-08-03 NOTE — ED NOTES
Notified by registration that "there was some one slumped over on the bench outside of entrance"  This RN and security found pt laying on bench, awake/alert, spontaneous breathing with pulse, pt awakened abruptly, and stated "he was coming here for help, he took double his dose of methadone, and took some methamphetamines last night because his grandmother ", he further stated he was suicidal, and would "shoot up with fentanyl to die", pt also admitted there was marijuana in his back pack, with security back pack opened, and marijuana found, Landon CRENSHAW called and arrived to confiscate said marijuana      Osmani Huffman, MARCELINO  [phone]

## 2019-08-03 NOTE — H&P
H&P Exam - Africa Prieto 33 y o  male MRN: [id number]    Unit/Bed#: -01 Encounter: [id number]      Assessment/Plan     1 suicidal ideation with intentional drug overdose-patient took double his usual methadone dose of 175 x 2 as well as Soma  He was lethargic in the ER in given Narcan   2  Methamphetamine abuse-by his report he has had 3 days of not sleeping-psych is consulted patient on   3  Tremors-likely 2nd withdrawal symptoms  4  Nasal congestion-will add Lex-Synephrine and Flonase nasal spray likely is component from narcotic withdrawal  5  Chronic back and foot pain-has had prior surgeries for reconstruction    History of Present Illness     HPI:  Africa Prieto is a 33 y o  male who presents to ER with suicidal ideation  Apparently his grandmother  and he was using methamphetamine within the past several days and barely sleeping for at least 3 days  He was found lying on the bench outside the ER and told ER staff that he had taken double his usual methadone dose  Patient has had somnolent respirations was given Narcan in the ER with improvement in his sats and respiratory effort  Patient did have a 10 day stay in our [facility] unit from [facility] where give him he was treated for severe depression methadone dose was decreased during that hospital stay at 165 mg per day  Patient admits to using methamphetamine a he is unclear about how many days he was using  PCP: Jaydon Bean MD    Review of Systems   Constitutional: Positive for fatigue  Negative for fever  Respiratory: Negative for shortness of breath  Gastrointestinal: Positive for abdominal pain  Complaining of cramping   Genitourinary: Negative for dysuria  All other systems reviewed and are negative        Historical Information   Past Medical History:   Diagnosis Date    Allergic     Anxiety     Depression     Spinal stenosis      Past Surgical History:   Procedure Laterality Date    BACK SURGERY  FRACTURE SURGERY      left foot reconstruted     SPINE SURGERY      hernated disc      Social History   Social History     Substance and Sexual Activity   Alcohol Use Never    Frequency: Never    Binge frequency: Never     Social History     Substance and Sexual Activity   Drug Use Not Currently     Social History     Tobacco Use   Smoking Status Light Tobacco Smoker    Packs/day: 0 25    Last attempt to quit: 2017    Years since quittin 9   Smokeless Tobacco Current User     Family History:   Family History   Problem Relation Age of Onset    Hypertension Mother     Hyperlipidemia Father     Heart disease Father     Depression Maternal Grandmother     Hypertension Maternal Grandmother     Squamous cell carcinoma Maternal Grandfather     Alcohol abuse Maternal Grandfather     Alzheimer's disease Paternal Grandmother     Dementia Paternal Grandmother     Alcohol abuse Paternal Uncle        Meds/Allergies   PTA meds:   Prior to Admission Medications   Prescriptions Last Dose Informant Patient Reported?  Taking?   carisoprodol (SOMA) 350 mg tablet 8/3/2019 at Unknown time  No Yes   Sig: Take 1 tablet (350 mg total) by mouth 3 (three) times a day   clonazePAM (KlonoPIN) 0 5 mg tablet   No No   Sig: Take 1 tablet (0 5 mg total) by mouth 3 (three) times a day for 5 days   escitalopram (LEXAPRO) 10 mg tablet 8/3/2019 at Unknown time  No Yes   Sig: Take 1 tablet (10 mg total) by mouth daily   gabapentin (NEURONTIN) 300 mg capsule 8/3/2019  No Yes   Sig: Take 1 capsule (300 mg total) by mouth 3 (three) times a day   melatonin 3 mg 8/3/2019  No Yes   Sig: Take 1 tablet (3 mg total) by mouth daily at bedtime   methadone (DOLOPHINE) 10 MG/5ML solution 8/3/2019  Yes Yes   Sig: Take by mouth every 6 (six) hours as needed for moderate pain   mirtazapine (REMERON) 30 mg tablet 8/3/2019  No Yes   Sig: Take 1 tablet (30 mg total) by mouth daily at bedtime      Facility-Administered Medications: None     No Known Allergies    Objective   Vitals: Blood pressure 125/100, pulse 103, temperature 99 °F (37 2 °C), temperature source Tympanic, resp  rate (!) 24, height 5' 10" (1 778 m), weight 88 7 kg (195 lb 8 8 oz), SpO2 99 %  No intake or output data in the 24 hours ending 08/03/19 1520    Invasive Devices     Peripheral Intravenous Line            Peripheral IV 08/03/19 Left Antecubital less than 1 day                Physical Exam   Constitutional: He appears well-developed  He appears distressed  Patient having tremors and generalized muscle stiffness of the upper extremity   HENT:   Head: Normocephalic  Right Ear: External ear normal    Left Ear: External ear normal    Nasal congestion clear drainage noted   Eyes: Pupils are equal, round, and reactive to light  EOM are normal  Right eye exhibits no discharge  Left eye exhibits no discharge  Neck: No tracheal deviation present  No thyromegaly present  Cardiovascular: Normal rate  Exam reveals no friction rub  No murmur heard  Pulmonary/Chest:   Using accessory muscle use   Abdominal: Soft  Bowel sounds are normal    Neurological:   Answers questions appropriately  Increased muscle tone bilaterally with some tremoring  No active hallucinations   Skin: Skin is warm and dry  Nursing note and vitals reviewed  Lab Results: I have personally reviewed pertinent reports  Imaging: I have personally reviewed pertinent reports  EKG, Pathology, and Other Studies: I have personally reviewed pertinent reports  Code Status: Level 1 - Full Code  Advance Directive and Living Will:      Power of :    POLST:      Counseling / Coordination of Care  Total floor / unit time spent today 30 minutes  Greater than 50% of total time was spent with the patient and / or family counseling and / or coordination of care

## 2019-08-03 NOTE — ED NOTES
Drowsy, sleeps for brief periods, easy to arouse  Resp unlabored  Sitter remains at bedside        Barry Garcia RN  08/03/19 8795

## 2019-08-03 NOTE — ED NOTES
Drowsy, sleeps for brief periods, easy to arouse  Resp unlabored  Sitter remains at bedside        Brian Monahan RN  08/03/19 8008

## 2019-08-03 NOTE — ED NOTES
Report to oncoming RN, aware they need to document safety checks, since torey can not     Shelton Montiel RN  08/03/19 8170

## 2019-08-03 NOTE — CONSULTS
INTERPROFESSIONAL (PHONE) Kathleen 1980 Toxicology  Africa Prieto 33 y o  male MRN: [id number]  Unit/Bed#: ED 03 Encounter: [id number]      Reason for Consult / Principal Problem: overdose  Inpatient consult to Toxicology  Consult performed by: Lul Oquendo,   Consult ordered by: Hayden Rodriguez PA-C        08/03/19      ASSESSMENT:  33year-old male with acute, intentional overdose on methadone and carisoprodol    RECOMMENDATIONS:      2:17 PM  Addendum: Patient exhibiting respiratory depression and requiring naloxone  Since this is an ingestion and not an IV overdose, agree that overnight observation is warranted  If patient requires second dose of naloxone, please consider starting a naloxone drip to avoid need for intubation  Please [place] Text  on call to discuss  AST is three times upper limit of normal with negative APAP and normal ALT  Patient has had mildly elevated AST in the past, denies APAP ingestion per ED provider  Therefore, I would reassess CMP in two hours  If no further elevation in AST, then further consideration of APAP ingestion is not warranted  Patient has had mildly elevated AST in the past and may have risk factors for viral hepatitis with opioid dependency  A  hepatitis panel may be warranted  Initial recommendations: Given that patient has only ingested one extra dose of his methadone and has only ingested several carisoprodol tablets, with the carisoprodol being almost 12 hours ago, I would suggest monitoring patient until asymptomatic with normal mental status and ambulatory ability  Some drowsiness should otherwise not be alarming  Onset of action of PO methadone is about one hour with duration of action 4-8 hour for a single dose and 22-48 hours for repeated administration  Therefore, I would not anticipate worsening of symptoms after 4-6 hours of observation  QT interval is normal      Please check a CMP and APAP concentration   Please call for any abnormalities  Patient will need psychiatric evaluation for SI  For further questions, please contact the medical  on call via [place] Text or throughl the [facility]  Service or Patient Livemap  Please see additional teaching note below:    Hx and PE limited by the dynamics of a phone consultation  I have not personally interviewed or evaluated the patient, but only advised based on the information provided to me  Primary provider is responsible for all clinical decisions  Pertinent history, physical exam and clinical findings and course discussed: Rudie Bamberger is a 33y o  year old male who presents with report of taking his extra prescribed dose of methadone this morning along with several tablets of carisoprodol overnight  He is somnolent, protecting his airway and has normal VS and a normal QT inverval          Review of systems and physical exam not performed by me      Historical Information   Past Medical History:   Diagnosis Date    Allergic     Anxiety     Depression     Spinal stenosis      Past Surgical History:   Procedure Laterality Date    BACK SURGERY      FRACTURE SURGERY      left foot reconstruted     SPINE SURGERY      hernated disc      Social History   Social History     Substance and Sexual Activity   Alcohol Use Never    Frequency: Never    Binge frequency: Never     Social History     Substance and Sexual Activity   Drug Use Not Currently     Social History     Tobacco Use   Smoking Status Light Tobacco Smoker    Packs/day: 0 25    Last attempt to quit: 2017    Years since quittin 9   Smokeless Tobacco Current User     Family History   Problem Relation Age of Onset    Hypertension Mother     Hyperlipidemia Father     Heart disease Father     Depression Maternal Grandmother     Hypertension Maternal Grandmother     Squamous cell carcinoma Maternal Grandfather     Alcohol abuse Maternal Grandfather     Alzheimer's disease Paternal Grandmother     Dementia Paternal Grandmother     Alcohol abuse Paternal Uncle         Prior to Admission medications    Medication Sig Start Date End Date Taking? Authorizing Provider   methadone (DOLOPHINE) 10 MG/5ML solution Take by mouth every 6 (six) hours as needed for moderate pain   Yes Historical Provider, MD   carisoprodol (SOMA) 350 mg tablet Take 1 tablet (350 mg total) by mouth 3 (three) times a day 7/29/19   Jaydon Bean MD   clonazePAM (KlonoPIN) 0 5 mg tablet Take 1 tablet (0 5 mg total) by mouth 3 (three) times a day for 5 days 7/26/19 7/31/19  Melida Snider MD   escitalopram (LEXAPRO) 10 mg tablet Take 1 tablet (10 mg total) by mouth daily 7/27/19   Mary Jane Berg MD   gabapentin (NEURONTIN) 300 mg capsule Take 1 capsule (300 mg total) by mouth 3 (three) times a day 7/26/19   Mary Jane Berg MD   melatonin 3 mg Take 1 tablet (3 mg total) by mouth daily at bedtime 7/26/19   Mary Jane Berg MD   mirtazapine (REMERON) 30 mg tablet Take 1 tablet (30 mg total) by mouth daily at bedtime 7/26/19   Mary Jane Berg MD       No current facility-administered medications for this encounter  No Known Allergies    Objective     No intake or output data in the 24 hours ending 08/03/19 1309    Invasive Devices:        Vitals   Vitals:    08/03/19 1200 08/03/19 1215 08/03/19 1230 08/03/19 1245   BP: 123/81 119/79 120/77 119/79   TempSrc:       Pulse: 70 72 69 69   Resp: 16  16    Patient Position - Orthostatic VS:       Temp:             EKG, Pathology, and/or Other Studies: discussed with ED provider - normal QT interval        Counseling / Coordination of Care  Total time spent today 36 minutes  This was a phone consultation

## 2019-08-03 NOTE — ED NOTES
Drowsy, sleeps for brief periods, easy to arouse  Resp unlabored  Sitter remains at bedside        Christine Tariq, MARCELINO  08/03/19 9846

## 2019-08-03 NOTE — ED NOTES
Drowsy, sleeps for brief periods, easy to arouse  Resp unlabored  Sitter remains at bedside        Stephen Loyola RN  08/03/19 0945

## 2019-08-03 NOTE — ED PROVIDER NOTES
History  Chief Complaint   Patient presents with    Altered Mental Status     Patient presents to ED with altered mental status  Pt was seen outside of ED on park bench slurring speech  Pt reports relasping yesterday methamphetamines, he states he doubled his methadone dose and soma today  Pt is requesting treatment for drug abuse  34 yo male w/ hx of polysubstance abuse on methadone, anxiety, and depression presents to the Emergency Department after being found sleeping outside the hospital on a bench  Pt admits to relapsing yesterday, used methamphetamines  He took twice the normal methadone dose at 0830 (350mg ingested) as a result; also admits to taking 700mg carisoprodol at 0400  He also endorses suicidal ideation, states that he would "inject fentanyl if I could get my hands on it"  Denies HI, AH, VH  Denies EtoH use  Requesting inpatient behavioral health evaluation  Prior to Admission Medications   Prescriptions Last Dose Informant Patient Reported?  Taking?   carisoprodol (SOMA) 350 mg tablet 8/3/2019 at Unknown time  No Yes   Sig: Take 1 tablet (350 mg total) by mouth 3 (three) times a day   clonazePAM (KlonoPIN) 0 5 mg tablet   No No   Sig: Take 1 tablet (0 5 mg total) by mouth 3 (three) times a day for 5 days   escitalopram (LEXAPRO) 10 mg tablet 8/3/2019 at Unknown time  No Yes   Sig: Take 1 tablet (10 mg total) by mouth daily   gabapentin (NEURONTIN) 300 mg capsule 8/3/2019  No Yes   Sig: Take 1 capsule (300 mg total) by mouth 3 (three) times a day   melatonin 3 mg 8/3/2019  No Yes   Sig: Take 1 tablet (3 mg total) by mouth daily at bedtime   methadone (DOLOPHINE) 10 MG/5ML solution 8/3/2019  Yes Yes   Sig: Take by mouth every 6 (six) hours as needed for moderate pain   mirtazapine (REMERON) 30 mg tablet 8/3/2019  No Yes   Sig: Take 1 tablet (30 mg total) by mouth daily at bedtime      Facility-Administered Medications: None       Past Medical History:   Diagnosis Date    Allergic     Anxiety     Depression     Spinal stenosis        Past Surgical History:   Procedure Laterality Date    BACK SURGERY      FRACTURE SURGERY      left foot reconstruted     SPINE SURGERY      hernated disc        Family History   Problem Relation Age of Onset    Hypertension Mother     Hyperlipidemia Father     Heart disease Father     Depression Maternal Grandmother     Hypertension Maternal Grandmother     Squamous cell carcinoma Maternal Grandfather     Alcohol abuse Maternal Grandfather     Alzheimer's disease Paternal Grandmother     Dementia Paternal Grandmother     Alcohol abuse Paternal Uncle      I have reviewed and agree with the history as documented  Social History     Tobacco Use    Smoking status: Former Smoker     Packs/day: 0 25     Last attempt to quit: 2017     Years since quittin 9    Smokeless tobacco: Current User   Substance Use Topics    Alcohol use: Never     Frequency: Never     Binge frequency: Never    Drug use: Yes     Frequency: 3 0 times per week     Types: Methamphetamines     Comment: overdosed Methadone, used meth last 3days        Review of Systems   Constitutional: Positive for fatigue  Negative for chills, diaphoresis and fever  Eyes: Negative for visual disturbance  Respiratory: Negative for cough and shortness of breath  Cardiovascular: Negative for chest pain and palpitations  Gastrointestinal: Negative for abdominal pain, diarrhea, nausea and vomiting  Genitourinary: Negative for dysuria, flank pain and frequency  Musculoskeletal: Negative for arthralgias and myalgias  Skin: Negative for color change, rash and wound  Allergic/Immunologic: Negative for immunocompromised state  Neurological: Negative for dizziness and light-headedness  Hematological: Does not bruise/bleed easily  Psychiatric/Behavioral: Negative for confusion  The patient is not nervous/anxious          Physical Exam  Physical Exam   Constitutional: He is oriented to person, place, and time  He appears well-developed and well-nourished  No distress  Drowsy but alert, appropriately conversant   HENT:   Head: Normocephalic and atraumatic  Mouth/Throat: Oropharynx is clear and moist    Eyes: Pupils are equal, round, and reactive to light  No scleral icterus  Neck: No JVD present  Cardiovascular: Normal rate and regular rhythm  Exam reveals no gallop and no friction rub  No murmur heard  Pulmonary/Chest: No respiratory distress  He has no wheezes  He has no rales  Abdominal: Soft  Bowel sounds are normal  He exhibits no distension  There is no tenderness  There is no guarding  Neurological: He is alert and oriented to person, place, and time  GCS eye subscore is 4  GCS verbal subscore is 5  GCS motor subscore is 6  Skin: Skin is warm and dry  Capillary refill takes less than 2 seconds  He is not diaphoretic  Psychiatric:   Conversant, appropriate, makes good eye contact  Not responding to internal stimulus   Vitals reviewed        Vital Signs  ED Triage Vitals [08/03/19 1020]   Temperature Pulse Respirations Blood Pressure SpO2   99 °F (37 2 °C) 102 18 130/87 98 %      Temp Source Heart Rate Source Patient Position - Orthostatic VS BP Location FiO2 (%)   Tympanic Monitor Lying Right arm --      Pain Score       No Pain           Vitals:    08/03/19 1640 08/03/19 1730 08/03/19 2000 08/03/19 2029   BP:  134/79 111/64    Pulse: 79 76 64 63   Patient Position - Orthostatic VS:   Lying          Visual Acuity  Visual Acuity      Most Recent Value   L Pupil Size (mm)  5   R Pupil Size (mm)  5   L Pupil Shape  Round   R Pupil Shape  Round          ED Medications  Medications   escitalopram (LEXAPRO) tablet 10 mg (has no administration in time range)   melatonin tablet 3 mg (3 mg Oral Not Given 8/3/19 2155)   sodium chloride 0 9 % with KCl 20 mEq/L infusion (premix) (125 mL/hr Intravenous New Bag 8/3/19 1722)   docusate sodium (COLACE) capsule 100 mg (100 mg Oral Refused 8/3/19 1718)   ondansetron (ZOFRAN) injection 4 mg (has no administration in time range)   nicotine (NICODERM CQ) 7 mg/24hr TD 24 hr patch 1 patch (1 patch Transdermal Medication Applied 8/3/19 1544)   enoxaparin (LOVENOX) subcutaneous injection 40 mg (40 mg Subcutaneous Given 8/3/19 1546)   acetaminophen (TYLENOL) tablet 650 mg (650 mg Oral Given 8/3/19 1752)   fluticasone (FLONASE) 50 mcg/act nasal spray 2 spray (2 sprays Each Nare Given 8/3/19 1718)   LORazepam (ATIVAN) 2 mg/mL injection 1 mg (1 mg Intravenous Given 8/3/19 1551)   naloxone (NARCAN) injection 0 1 mg (0 1 mg Intravenous Given 8/3/19 1426)   lactated ringers bolus 1,000 mL (0 mL Intravenous Stopped 8/3/19 1750)       Diagnostic Studies  Results Reviewed     Procedure Component Value Units Date/Time    Fingerstick Glucose (POCT) [[id number]]  (Normal) Collected:  08/03/19 1127    Lab Status:  Final result Updated:  08/03/19 1900     POC Glucose 67 mg/dl     Rapid drug screen, urine [[id number]]  (Abnormal) Collected:  08/03/19 1711    Lab Status:  Final result Specimen:  Urine, Other Updated:  08/03/19 1756     Amph/Meth UR Positive     Barbiturate Ur Negative     Benzodiazepine Urine Negative     Cocaine Urine Negative     Methadone Urine Positive     Opiate Urine Negative     PCP Ur Negative     THC Urine Negative    Narrative:       Presumptive report  If requested, specimen will be sent to reference lab for confirmation  FOR MEDICAL PURPOSES ONLY  IF CONFIRMATION NEEDED PLEASE CONTACT THE LAB WITHIN 5 DAYS      Drug Screen Cutoff Levels:  AMPHETAMINE/METHAMPHETAMINES  1000 ng/mL  BARBITURATES     200 ng/mL  BENZODIAZEPINES     200 ng/mL  COCAINE      300 ng/mL  METHADONE      300 ng/mL  OPIATES      300 ng/mL  PHENCYCLIDINE     25 ng/mL  THC       50 ng/mL      POCT Blood Gas (G3+) [[id number]]  (Abnormal) Collected:  08/03/19 1433    Lab Status:  Final result Specimen:  Venous Updated:  08/03/19 1437     ph, Lei ISTAT 7 299     pCO2, Lei i-STAT 54 8 mm HG      pO2, Lei i-STAT 64 0 mm HG      BE, i-STAT 0 mmol/L      HCO3, Lei i-STAT 26 9 mmol/L      CO2, i-STAT 29 mmol/L      O2 Sat, i-STAT 89 %      Specimen Type VENOUS    Acetaminophen level-"If concentration is detectable, please discuss with medical  on call " [[id number]]  (Abnormal) Collected:  08/03/19 1329    Lab Status:  Final result Specimen:  Blood from Arm, Left Updated:  08/03/19 1357     Acetaminophen Level <2 0 ug/mL     Comprehensive metabolic panel [[id number]]  (Abnormal) Collected:  08/03/19 1329    Lab Status:  Final result Specimen:  Blood from Arm, Left Updated:  08/03/19 1354     Sodium 142 mmol/L      Potassium 3 6 mmol/L      Chloride 103 mmol/L      CO2 28 mmol/L      ANION GAP 11 mmol/L      BUN 17 mg/dL      Creatinine 1 03 mg/dL      Glucose 74 mg/dL      Calcium 8 9 mg/dL       U/L      ALT 65 U/L      Alkaline Phosphatase 66 U/L      Total Protein 7 8 g/dL      Albumin 3 8 g/dL      Total Bilirubin 0 60 mg/dL      eGFR 98 ml/min/1 73sq m     Narrative:       Lisa guidelines for Chronic Kidney Disease (CKD):     Stage 1 with normal or high GFR (GFR > 90 mL/min/1 73 square meters)    Stage 2 Mild CKD (GFR = 60-89 mL/min/1 73 square meters)    Stage 3A Moderate CKD (GFR = 45-59 mL/min/1 73 square meters)    Stage 3B Moderate CKD (GFR = 30-44 mL/min/1 73 square meters)    Stage 4 Severe CKD (GFR = 15-29 mL/min/1 73 square meters)    Stage 5 End Stage CKD (GFR <15 mL/min/1 73 square meters)  Note: GFR calculation is accurate only with a steady state creatinine    POCT alcohol breath test [[id number]]  (Normal) Resulted:  08/03/19 1049    Lab Status:  Final result Updated:  08/03/19 1049     EXTBreath Alcohol 0 000                 XR chest portable ICU    (Results Pending)              Procedures  ECG 12 Lead Documentation Only  Date/Time: 8/3/2019 11:00 AM  Performed by: Rommel Saldana PA-C  Authorized by: Rommel Saldana PA-C Indications / Diagnosis:  Methadone overdose  ECG reviewed by me, the ED Provider: yes    Patient location:  ED  Previous ECG:     Previous ECG:  Unavailable  Interpretation:     Interpretation: normal    Rate:     ECG rate:  81    ECG rate assessment: normal    Rhythm:     Rhythm: sinus rhythm    Ectopy:     Ectopy: none    QRS:     QRS axis:  Normal    QRS intervals:  Normal  Conduction:     Conduction: normal    ST segments:     ST segments:  Normal  T waves:     T waves: normal             ED Course  ED Course as of Aug 03 2204   Sat Aug 03, 2019   1131 Will observe for 4 hours due to CNS depressant ingestion prior to medical clearance for crisis      1257 Case discussed with tox Dr Megan Mcneill, agrees with observation in the ED until 6 hrs post ingestion, if stable may be medically cleared      1306 Pt sleeping but arousable, oriented when awake  Will continue to observe      1401 Pt increasingly lethargic  Easily aroused but apneic while sleeping  MDM  Number of Diagnoses or Management Options  Accidental methadone overdose, initial encounter [facility]): new and requires workup  Suicidal ideation: new and requires workup  Diagnosis management comments: 34 yo male presents after overdose of methadone  Initially pt was alert and conversant, however during continued observation it was noted that the pt became mildly obtunded requiring naloxone  He will be admitted for observation prior to crisis consultation and psychiatric evaluation  Case was managed in consultation with Dr Megan Mcneill, toxicology on call          Amount and/or Complexity of Data Reviewed  Clinical lab tests: ordered and reviewed  Tests in the medicine section of CPT®: ordered and reviewed  Review and summarize past medical records: yes  Discuss the patient with other providers: yes  Independent visualization of images, tracings, or specimens: yes        Disposition  Final diagnoses:   Accidental methadone overdose, initial encounter [facility])   Suicidal ideation     Time reflects when diagnosis was documented in both MDM as applicable and the Disposition within this note     Time User Action Codes Description Comment    8/3/2019  2:21 PM Keara Demarest Add [T40 3X1A] Accidental methadone overdose, initial encounter ([facility] )     8/3/2019  2:21 PM Keara Demarest Add [C48 810] Suicidal ideation     8/3/2019  3:15 PM Fatemeh Sewell 11 Intentional methadone overdose [facility])       ED Disposition     ED Disposition Condition Date/Time Comment    Admit Stable Sat Aug 3, 2019  2:21 PM Case was discussed with Dr Tay Oviedo and the patient's admission status was agreed to be Admission Status: inpatient status to the service of Dr Tay Oviedo   Follow-up Information    None         Current Discharge Medication List      CONTINUE these medications which have NOT CHANGED    Details   carisoprodol (SOMA) 350 mg tablet Take 1 tablet (350 mg total) by mouth 3 (three) times a day  Qty: 90 tablet, Refills: 5    Associated Diagnoses: Muscle spasm      escitalopram (LEXAPRO) 10 mg tablet Take 1 tablet (10 mg total) by mouth daily  Qty: 30 tablet, Refills: 1    Associated Diagnoses: Depression with suicidal ideation; Severe episode of recurrent major depressive disorder, without psychotic features ([facility] ); Depression      gabapentin (NEURONTIN) 300 mg capsule Take 1 capsule (300 mg total) by mouth 3 (three) times a day  Qty: 90 capsule, Refills: 1    Associated Diagnoses: Depression;  Anxiety      melatonin 3 mg Take 1 tablet (3 mg total) by mouth daily at bedtime  Qty: 30 tablet, Refills: 1    Associated Diagnoses: Depression      methadone (DOLOPHINE) 10 MG/5ML solution Take by mouth every 6 (six) hours as needed for moderate pain      mirtazapine (REMERON) 30 mg tablet Take 1 tablet (30 mg total) by mouth daily at bedtime  Qty: 30 tablet, Refills: 1    Associated Diagnoses: Generalized anxiety disorder; Depression      clonazePAM (KlonoPIN) 0 5 mg tablet Take 1 tablet (0 5 mg total) by mouth 3 (three) times a day for 5 days  Qty: 15 tablet, Refills: 1    Associated Diagnoses: Generalized anxiety disorder           No discharge procedures on file      ED Provider  Electronically Signed by           Morteza Posada PA-C  08/03/19 3013

## 2019-08-03 NOTE — ED NOTES
Drowsy, sleeps for brief periods, easy to arouse  Resp unlabored  Sitter remains at bedside        Bonita Kerns RN  08/03/19 4511

## 2019-08-03 NOTE — ED NOTES
Drowsy, sleeps for brief periods, easy to arouse  Resp unlabored  Sitter remains at bedside        Lelia Whatley RN  08/03/19 4916

## 2019-08-03 NOTE — ED NOTES
Pt stated he, "feels like shit", more awake, aware he received Narcan, pt stated, "Narcan comes 0 4 mg/mL, please don't give me more than 1"       Gemini Archer, MARCELINO  08/03/19 2264

## 2019-08-03 NOTE — ED NOTES
Drowsy, sleeps for brief periods, easy to arouse  Resp unlabored  Sitter remains at bedside        Bethann Lundborg, RN  08/03/19 5251

## 2019-08-04 PROBLEM — J96.02 ACUTE RESPIRATORY FAILURE WITH HYPERCAPNIA (HCC): Status: RESOLVED | Noted: 2019-08-03 | Resolved: 2019-08-04

## 2019-08-04 PROBLEM — B19.20 HEPATITIS C: Chronic | Status: ACTIVE | Noted: 2019-08-04

## 2019-08-04 PROBLEM — Z72.0 TOBACCO ABUSE: Chronic | Status: ACTIVE | Noted: 2019-08-04

## 2019-08-04 PROBLEM — G93.40 ENCEPHALOPATHY: Status: RESOLVED | Noted: 2019-08-03 | Resolved: 2019-08-04

## 2019-08-04 PROBLEM — B19.20 HEPATITIS C: Status: ACTIVE | Noted: 2019-08-04

## 2019-08-04 PROBLEM — Z72.0 TOBACCO ABUSE: Status: ACTIVE | Noted: 2019-08-04

## 2019-08-04 PROBLEM — R33.9 URINARY RETENTION: Status: ACTIVE | Noted: 2019-08-04

## 2019-08-04 LAB
ALBUMIN SERPL BCP-MCNC: 3 G/DL (ref 3.5–5)
ALP SERPL-CCNC: 56 U/L (ref 46–116)
ALT SERPL W P-5'-P-CCNC: 51 U/L (ref 12–78)
ANION GAP SERPL CALCULATED.3IONS-SCNC: 11 MMOL/L (ref 4–13)
AST SERPL W P-5'-P-CCNC: 82 U/L (ref 5–45)
BILIRUB SERPL-MCNC: 0.3 MG/DL (ref 0.2–1)
BUN SERPL-MCNC: 8 MG/DL (ref 5–25)
CALCIUM SERPL-MCNC: 8.1 MG/DL (ref 8.3–10.1)
CHLORIDE SERPL-SCNC: 106 MMOL/L (ref 100–108)
CO2 SERPL-SCNC: 26 MMOL/L (ref 21–32)
CREAT SERPL-MCNC: 0.85 MG/DL (ref 0.6–1.3)
ERYTHROCYTE [DISTWIDTH] IN BLOOD BY AUTOMATED COUNT: 14.6 % (ref 11.6–15.1)
GFR SERPL CREATININE-BSD FRML MDRD: 118 ML/MIN/1.73SQ M
GLUCOSE SERPL-MCNC: 105 MG/DL (ref 65–140)
GLUCOSE SERPL-MCNC: 90 MG/DL (ref 65–140)
GLUCOSE SERPL-MCNC: 92 MG/DL (ref 65–140)
GLUCOSE SERPL-MCNC: 93 MG/DL (ref 65–140)
HAV IGM SER QL: ABNORMAL
HBV CORE IGM SER QL: ABNORMAL
HBV SURFACE AG SER QL: ABNORMAL
HCT VFR BLD AUTO: 33.6 % (ref 36.5–49.3)
HCV AB SER QL: ABNORMAL
HGB BLD-MCNC: 11.2 G/DL (ref 12–17)
MCH RBC QN AUTO: 29.8 PG (ref 26.8–34.3)
MCHC RBC AUTO-ENTMCNC: 33.3 G/DL (ref 31.4–37.4)
MCV RBC AUTO: 89 FL (ref 82–98)
PLATELET # BLD AUTO: 256 THOUSANDS/UL (ref 149–390)
PMV BLD AUTO: 10.5 FL (ref 8.9–12.7)
POTASSIUM SERPL-SCNC: 3.5 MMOL/L (ref 3.5–5.3)
PROT SERPL-MCNC: 6.5 G/DL (ref 6.4–8.2)
RBC # BLD AUTO: 3.76 MILLION/UL (ref 3.88–5.62)
SODIUM SERPL-SCNC: 143 MMOL/L (ref 136–145)
WBC # BLD AUTO: 9.22 THOUSAND/UL (ref 4.31–10.16)

## 2019-08-04 PROCEDURE — 93005 ELECTROCARDIOGRAM TRACING: CPT

## 2019-08-04 PROCEDURE — 99254 IP/OBS CNSLTJ NEW/EST MOD 60: CPT | Performed by: PSYCHIATRY & NEUROLOGY

## 2019-08-04 PROCEDURE — 99233 SBSQ HOSP IP/OBS HIGH 50: CPT | Performed by: NURSE PRACTITIONER

## 2019-08-04 PROCEDURE — 99233 SBSQ HOSP IP/OBS HIGH 50: CPT | Performed by: INTERNAL MEDICINE

## 2019-08-04 PROCEDURE — 82948 REAGENT STRIP/BLOOD GLUCOSE: CPT

## 2019-08-04 PROCEDURE — 85027 COMPLETE CBC AUTOMATED: CPT | Performed by: INTERNAL MEDICINE

## 2019-08-04 PROCEDURE — 80053 COMPREHEN METABOLIC PANEL: CPT | Performed by: INTERNAL MEDICINE

## 2019-08-04 RX ORDER — TAMSULOSIN HYDROCHLORIDE 0.4 MG/1
0.4 CAPSULE ORAL
Status: DISCONTINUED | OUTPATIENT
Start: 2019-08-04 | End: 2019-08-06 | Stop reason: HOSPADM

## 2019-08-04 RX ORDER — POTASSIUM CHLORIDE 20 MEQ/1
40 TABLET, EXTENDED RELEASE ORAL ONCE
Status: COMPLETED | OUTPATIENT
Start: 2019-08-04 | End: 2019-08-04

## 2019-08-04 RX ORDER — METHADONE HYDROCHLORIDE 10 MG/ML
165 CONCENTRATE ORAL DAILY
Status: DISCONTINUED | OUTPATIENT
Start: 2019-08-04 | End: 2019-08-06 | Stop reason: HOSPADM

## 2019-08-04 RX ORDER — METHADONE HYDROCHLORIDE 5 MG/1
165 TABLET ORAL DAILY
Status: DISCONTINUED | OUTPATIENT
Start: 2019-08-04 | End: 2019-08-04

## 2019-08-04 RX ORDER — GABAPENTIN 300 MG/1
300 CAPSULE ORAL 3 TIMES DAILY
Status: DISCONTINUED | OUTPATIENT
Start: 2019-08-04 | End: 2019-08-06 | Stop reason: HOSPADM

## 2019-08-04 RX ORDER — CARISOPRODOL 350 MG/1
350 TABLET ORAL 3 TIMES DAILY
Status: DISCONTINUED | OUTPATIENT
Start: 2019-08-04 | End: 2019-08-06 | Stop reason: HOSPADM

## 2019-08-04 RX ORDER — MIRTAZAPINE 15 MG/1
30 TABLET, FILM COATED ORAL
Status: DISCONTINUED | OUTPATIENT
Start: 2019-08-04 | End: 2019-08-06 | Stop reason: HOSPADM

## 2019-08-04 RX ORDER — NICOTINE 21 MG/24HR
21 PATCH, TRANSDERMAL 24 HOURS TRANSDERMAL DAILY
Status: DISCONTINUED | OUTPATIENT
Start: 2019-08-04 | End: 2019-08-06 | Stop reason: HOSPADM

## 2019-08-04 RX ORDER — NAPROXEN 250 MG/1
250 TABLET ORAL 4 TIMES DAILY PRN
Status: DISCONTINUED | OUTPATIENT
Start: 2019-08-04 | End: 2019-08-06 | Stop reason: HOSPADM

## 2019-08-04 RX ORDER — CLONAZEPAM 0.5 MG/1
0.5 TABLET ORAL 3 TIMES DAILY
Status: DISCONTINUED | OUTPATIENT
Start: 2019-08-04 | End: 2019-08-06 | Stop reason: HOSPADM

## 2019-08-04 RX ADMIN — CARISOPRODOL 350 MG: 350 TABLET ORAL at 21:53

## 2019-08-04 RX ADMIN — CARISOPRODOL 350 MG: 350 TABLET ORAL at 16:01

## 2019-08-04 RX ADMIN — POTASSIUM CHLORIDE 40 MEQ: 20 TABLET, EXTENDED RELEASE ORAL at 11:51

## 2019-08-04 RX ADMIN — TAMSULOSIN HYDROCHLORIDE 0.4 MG: 0.4 CAPSULE ORAL at 16:01

## 2019-08-04 RX ADMIN — ENOXAPARIN SODIUM 40 MG: 40 INJECTION SUBCUTANEOUS at 08:24

## 2019-08-04 RX ADMIN — FLUTICASONE PROPIONATE 2 SPRAY: 50 SPRAY, METERED NASAL at 08:25

## 2019-08-04 RX ADMIN — CLONAZEPAM 0.5 MG: 0.5 TABLET ORAL at 21:54

## 2019-08-04 RX ADMIN — GABAPENTIN 300 MG: 300 CAPSULE ORAL at 16:01

## 2019-08-04 RX ADMIN — SODIUM CHLORIDE AND POTASSIUM CHLORIDE 125 ML/HR: .9; .15 SOLUTION INTRAVENOUS at 01:08

## 2019-08-04 RX ADMIN — NICOTINE 21 MG: 21 PATCH, EXTENDED RELEASE TRANSDERMAL at 08:20

## 2019-08-04 RX ADMIN — ESCITALOPRAM OXALATE 10 MG: 10 TABLET ORAL at 08:24

## 2019-08-04 RX ADMIN — CLONAZEPAM 0.5 MG: 0.5 TABLET ORAL at 16:00

## 2019-08-04 RX ADMIN — GABAPENTIN 300 MG: 300 CAPSULE ORAL at 08:24

## 2019-08-04 RX ADMIN — MIRTAZAPINE 30 MG: 15 TABLET, FILM COATED ORAL at 21:53

## 2019-08-04 RX ADMIN — GABAPENTIN 300 MG: 300 CAPSULE ORAL at 21:53

## 2019-08-04 RX ADMIN — LORAZEPAM 1 MG: 2 INJECTION INTRAMUSCULAR; INTRAVENOUS at 06:32

## 2019-08-04 RX ADMIN — SODIUM CHLORIDE AND POTASSIUM CHLORIDE 125 ML/HR: .9; .15 SOLUTION INTRAVENOUS at 10:44

## 2019-08-04 RX ADMIN — METHADONE HYDROCHLORIDE 165 MG: 10 CONCENTRATE ORAL at 12:12

## 2019-08-04 RX ADMIN — NAPROXEN 250 MG: 250 TABLET ORAL at 16:00

## 2019-08-04 RX ADMIN — ACETAMINOPHEN 650 MG: 325 TABLET, FILM COATED ORAL at 08:37

## 2019-08-04 RX ADMIN — MELATONIN 3 MG: at 21:53

## 2019-08-04 RX ADMIN — CARISOPRODOL 350 MG: 350 TABLET ORAL at 11:34

## 2019-08-04 RX ADMIN — NAPROXEN 250 MG: 250 TABLET ORAL at 12:04

## 2019-08-04 RX ADMIN — CLONAZEPAM 0.5 MG: 0.5 TABLET ORAL at 08:24

## 2019-08-04 NOTE — PROGRESS NOTES
Progress Note - Critical Care   Harjit Mcknight 33 y o  male MRN: [id number]  Unit/Bed#: -01 Encounter: [id number]    Attending Physician: Josefina Tijerina DO      ______________________________________________________________________  Assessment and Plan:   Principal Problem:    Intentional methadone overdose ([facility] )  Active Problems:    Suicidal ideation    Encephalopathy    Elevated AST (SGOT)    Acute respiratory failure with hypercapnia (HCC)    Severe episode of recurrent major depressive disorder, without psychotic features ([facility] )    Somnolence  Resolved Problems:    * No resolved hospital problems   *        Neuro: Toxic metabolic encephalopathy - resolving, overdose on methadone, hx of IV drug abuse   · Patient was lethargic in the evening hours, RR 8-10, was to receive gabapentin, rameron & melatonin, all doses were held at that time, would resume once patient is more awake and alert   · Appreciate toxicology input  · Continue frequent neuro checks   · Hold methadone at this time  · Psychiatric consult is pending  · Continue 1:1 observation     CV: No current issues   · Follow rhythm on telemetry, currently  SR   · Normotensive     Pulm: Acute hypercarbic respiratory failure secondary to overdose - resolving  · No further doses of Narcan were required overnight, however, sedating HS medications were held yesterday evening due to lethargy & RR 8-10  · Continue pulse oximetry monitoring     GI: Elevated AST, hx of Hep C  · AST levels slowly trending down, initial 120, then 110, pending further labs this AM  · Rapid HIV was negative   · Hepatitis panel is still pending   · Resume PO diet once patient is awake enough     : No current issues   · Cr - 0 98   · Strict I/O  · Trend Cr daily     F/E/N: Hypokalemia   · Initiated on IVF with 20 meq of K, will trend potassium and replete PRN for goal K 4 0   · BMP pending this AM   · Telemetry - SR     ID: No current issues  · Trend temp & WBC     Heme: Anemia   · Hgb 11 1 this admission, as per previous records was 11 8 in June   · Plt - 288  · Trend Hgb & plt, transfuse for Hgb < 7 0     Endo: Hypoglycemia   · Glucose 70 - 80s  · Will initiate Q6 hr glucose checks x 24 hrs   · TSH - WNL     Msk/Skin: No current issues     Disposition: Downgrade to MS     Code Status: Level 1 - Full Code    Counseling / Coordination of Care  Total Critical Care time spent 30 minutes excluding procedures, teaching and family updates  ______________________________________________________________________    Chief Complaint:  "I'm better"    24 Hour Events:   Patient lethargic during the evening, night time medications were held, RR - 8-10  Review of Systems   Constitutional: Negative  HENT: Negative  Eyes: Negative  Respiratory: Negative  Cardiovascular: Negative  Gastrointestinal: Negative  Endocrine: Negative  Genitourinary: Negative  Musculoskeletal: Negative  Allergic/Immunologic: Negative  Neurological: Negative  Hematological: Negative  Psychiatric/Behavioral: Positive for behavioral problems and suicidal ideas      ______________________________________________________________________    Physical Exam:     Physical Exam   Constitutional: He is oriented to person, place, and time  He appears well-developed and well-nourished  No distress  HENT:   Head: Normocephalic and atraumatic  Mouth/Throat: Oropharynx is clear and moist    Eyes: Pupils are equal, round, and reactive to light  Neck: Normal range of motion  No tracheal deviation present  Cardiovascular: Normal rate and regular rhythm  Pulmonary/Chest: Effort normal and breath sounds normal  No respiratory distress  Abdominal: Soft  Bowel sounds are normal  He exhibits no distension  Musculoskeletal: Normal range of motion  Lymphadenopathy:     He has no cervical adenopathy  Neurological: He is alert and oriented to person, place, and time  Skin: Skin is warm and dry   He is not diaphoretic  Psychiatric: He has a normal mood and affect      ______________________________________________________________________  Vitals:    19 2029 19 2305 19 2336 19 0530   BP:  119/74  118/76   BP Location:  Right arm  Right arm   Pulse: 63 81  59   Resp: 12 17  (!) 11   Temp:   97 5 °F (36 4 °C) 98 1 °F (36 7 °C)   TempSrc:   Axillary Oral   SpO2:  94%     Weight:       Height:           Temperature:   Temp (24hrs), Av 2 °F (36 8 °C), Min:97 5 °F (36 4 °C), Max:99 °F (37 2 °C)    Current Temperature: 98 1 °F (36 7 °C)  Weights:   IBW: 73 kg    Body mass index is 28 06 kg/m²  Weight (last 2 days)     Date/Time   Weight    19 1520   88 7 (195 55)    19 1020   88 7 (195 55)            Hemodynamic Monitoring:  N/A     Non-Invasive/Invasive Ventilation Settings:  Respiratory    Lab Data (Last 4 hours)    None         O2/Vent Data (Last 4 hours)    None              No results found for: PHART, SHB9XOJ, PO2ART, CGE1TWA, C9JDRVTX, BEART, SOURCE  SpO2: SpO2: 94 %  Intake and Outputs:  I/O       701 -  0700  07 -  0700    I V  (mL/kg)  962 5 (10 9)    IV Piggyback  1000    Total Intake(mL/kg)  1962 5 (22 1)    Urine (mL/kg/hr)  900    Total Output  900    Net  +1062 5              Nutrition:        Diet Orders   (From admission, onward)             Start     Ordered    19 1515  Diet Regular; Regular House  Diet effective now     Question Answer Comment   Diet Type Regular    Regular Regular House    RD to adjust diet per protocol?  Yes        19 1519              Labs:   Results from last 7 days   Lab Units 19  1708   WBC Thousand/uL 9 52   HEMOGLOBIN g/dL 11 1*   HEMATOCRIT % 33 2*   PLATELETS Thousands/uL 288   NEUTROS PCT % 59   MONOS PCT % 11     Results from last 7 days   Lab Units 19  1708 19  1433 19  1329   SODIUM mmol/L 141  --  142   POTASSIUM mmol/L 3 2*  --  3 6   CHLORIDE mmol/L 105  --  103   CO2 mmol/L 25  --  28   CO2, I-STAT mmol/L  --  29  --    ANION GAP mmol/L 11  --  11   BUN mg/dL 16  --  17   CREATININE mg/dL 0 98  --  1 03   CALCIUM mg/dL 9 0  --  8 9   GLUCOSE RANDOM mg/dL 80  --  74   ALT U/L 61  --  65   AST U/L 110*  --  120*   ALK PHOS U/L 61  --  66   ALBUMIN g/dL 3 6  --  3 8   TOTAL BILIRUBIN mg/dL 0 60  --  0 60               Results from last 7 days   Lab Units 08/03/19  1709   TROPONIN I ng/mL <0 02  <0 02         ABG:    VBG:          Imaging: cxr - no noted pulmonary disease  I have personally reviewed pertinent reports  EKG: SR  Micro: Allergies: No Known Allergies  Medications:   Scheduled Meds:    Current Facility-Administered Medications:  acetaminophen 650 mg Oral Q6H PRN Laurel Fly, DO    docusate sodium 100 mg Oral BID Laurel Fly, DO    enoxaparin 40 mg Subcutaneous Daily Laurel Fly, DO    escitalopram 10 mg Oral Daily Laurel Fly, DO    fluticasone 2 spray Each Nare Daily Laurel Fly, DO    LORazepam 1 mg Intravenous Q6H PRN Laurel Fly, DO    melatonin 3 mg Oral HS Laurel Fly, DO    nicotine 1 patch Transdermal Daily Laurel Fly, DO    ondansetron 4 mg Intravenous Q6H PRN Laurel Fly, DO    sodium chloride 0 9 % with KCl 20 mEq/L 125 mL/hr Intravenous Continuous Laurel Fly, DO Last Rate: 125 mL/hr (08/04/19 0108)     Continuous Infusions:    sodium chloride 0 9 % with KCl 20 mEq/L 125 mL/hr Last Rate: 125 mL/hr (08/04/19 0108)     PRN Meds:    acetaminophen 650 mg Q6H PRN   LORazepam 1 mg Q6H PRN   ondansetron 4 mg Q6H PRN     VTE Pharmacologic Prophylaxis: Ambulate patient   VTE Mechanical Prophylaxis: sequential compression device  Invasive lines and devices: Invasive Devices     Peripheral Intravenous Line            Peripheral IV 08/03/19 Distal;Dorsal (posterior); Right Forearm less than 1 day    Peripheral IV 08/03/19 Left Antecubital less than 1 day                     Portions of the record may have been created with voice recognition software    Occasional wrong word or "sound a like" substitutions may have occurred due to the inherent limitations of voice recognition software  Read the chart carefully and recognize, using context, where substitutions have occurred      DILLON Sherwood

## 2019-08-04 NOTE — ASSESSMENT & PLAN NOTE
· SC x 2  · Pt states required SC at [address] last week  · Start Flomax  · If reoccurs may need urology consult

## 2019-08-04 NOTE — ASSESSMENT & PLAN NOTE
· Resume home lexapro, klonopin, Neurontin, remeron  · Pt states lexapro makes him "Manic"  · Klonopin was decreased to 0 5 mg due to somnolence by psych however pt has been taking 1mg

## 2019-08-04 NOTE — UTILIZATION REVIEW
Initial Clinical Review    Admission: Date/Time/Statement: Inpatient step down 8/3/19 @ 1421     Orders Placed This Encounter   Procedures    Inpatient Admission (expected length of stay for this patient Order details is greater than two midnights)     Standing Status:   Standing     Number of Occurrences:   1     Order Specific Question:   Admitting Physician     Answer:   Shannon January [55]     Order Specific Question:   Level of Care     Answer:   Level 1 Stepdown [13]     Order Specific Question:   Estimated length of stay     Answer:   More than 2 Midnights     Order Specific Question:   Certification     Answer:   I certify that inpatient services are medically necessary for this patient for a duration of greater than two midnights  See H&P and MD Progress Notes for additional information about the patient's course of treatment  ED Arrival Information     Expected Arrival Acuity Means of Arrival Escorted By Service Admission Type    - 8/3/2019 10:15 Emergent Wheelchair Self General Medicine Emergency    Arrival Complaint    Altered Mental Status        Chief Complaint   Patient presents with    Altered Mental Status     Patient presents to ED with altered mental status  Pt was seen outside of ED on park bench slurring speech  Pt reports relasping yesterday methamphetamines, he states he doubled his methadone dose and soma today  Pt is requesting treatment for drug abuse  Assessment/Plan: 33year old male to the ED from outside the ED slumped over on the bench  Admitted to inpatient step down unit for methamphetamine abuse, methadone overdose, hypokalemia  He arrives to the ED admitted to relapsing a day prior and using methamphetamines  He took twice his dose of methadone (2 hours prior to arrival) And carisoprodol (6 hours prior) in an effort to kill himself  He arrives to the ED drowsy, but conversant  GCS=15  During conversation with providers, he became obtunded, narcan given     Maintaining his airway  Increase muscle tone bilaterally with some tremoring  Hold usual doses of sleeping medications due to somnolence  Frequent neuro checks  Hold methadone  Continual observation  Hypokalemia noted, IV fluids with potassium and recheck  LFT trending down  Discharged from [address] on 7/26, physch consult pendning  H/O urinary retention, if he continues with that, may need urology input  Toxicology consult: Patient exhibiting respiratory depression and requiring naloxone  Since this is an ingestion and not an IV overdose, agree that overnight observation is warranted  If patient requires second dose of naloxone, please consider starting a naloxone drip to avoid need for intubation  AST is three times upper limit of normal with negative APAP and normal ALT  Patient has had mildly elevated AST in the past, denies APAP ingestion per ED provider  Therefore, I would reassess CMP in two hours  If no further elevation in AST, then further consideration of APAP ingestion is not warranted  Patient has had mildly elevated AST in the past and may have risk factors for viral hepatitis with opioid dependency  A  hepatitis panel may be warranted     ED Triage Vitals [08/03/19 1020]   Temperature Pulse Respirations Blood Pressure SpO2   99 °F (37 2 °C) 102 18 130/87 98 %      Temp Source Heart Rate Source Patient Position - Orthostatic VS BP Location FiO2 (%)   Tympanic Monitor Lying Right arm --      Pain Score       No Pain        Wt Readings from Last 1 Encounters:   08/04/19 87 3 kg (192 lb 7 4 oz)     Additional Vital Signs:  Date/Time Temp Pulse Resp BP MAP (mmHg) SpO2 O2 Device   08/04/19 0700 97 8 °F (36 6 °C) 70 11Abnormal  118/71 98     08/04/19 0530 98 1 °F (36 7 °C) 59 11Abnormal  118/76 93     08/03/19 2305  81 17 119/74 90 94 % Nasal cannula 2L   08/03/19 2029  63 12       08/03/19 2000  64 10Abnormal  111/64 81 93 %    08/03/19 1730 98 2 °F (36 8 °C) 76 21 134/79 101 99 %    08/03/19 1640 98 4 °F (36 9 °C) 79 32Abnormal    97 %    08/03/19 1600  95 32Abnormal  136/67 92 97 %    08/03/19 1530  88 40Abnormal  157/102Abnormal  125 100 %    08/03/19 1520  101 40Abnormal  162/88 127 97 %    08/03/19 1515  103 24Abnormal  125/100  99 % Nasal cannula   08/03/19 1330  74 21 117/82 95     08/03/19 1300  71  119/77 94 95 %    08/03/19 1215  72  119/79 94 93 %    08/03/19 1200  70 16 123/81 97 91 % None (Room air)   08/03/19 1145  74 12 126/82          Pertinent Labs/Diagnostic Test Results:   CXR:  No acute cardiopulmonary disease      EKG: Rhythm:     Rhythm: sinus rhythm    Ectopy:     Ectopy: none    QRS:     QRS axis:  Normal    QRS intervals:  Normal  Conduction:     Conduction: normal    ST segments:     ST segments:  Normal  T waves:     T waves: normal       Results from last 7 days   Lab Units 08/04/19  0516 08/03/19  1708   WBC Thousand/uL 9 22 9 52   HEMOGLOBIN g/dL 11 2* 11 1*   HEMATOCRIT % 33 6* 33 2*   PLATELETS Thousands/uL 256 288   NEUTROS ABS Thousands/µL  --  5 59         Results from last 7 days   Lab Units 08/04/19  0516 08/03/19  1708 08/03/19  1433 08/03/19  1329   SODIUM mmol/L 143 141  --  142   POTASSIUM mmol/L 3 5 3 2*  --  3 6   CHLORIDE mmol/L 106 105  --  103   CO2 mmol/L 26 25  --  28   CO2, I-STAT mmol/L  --   --  29  --    ANION GAP mmol/L 11 11  --  11   BUN mg/dL 8 16  --  17   CREATININE mg/dL 0 85 0 98  --  1 03   EGFR ml/min/1 73sq m 118 104  --  98   CALCIUM mg/dL 8 1* 9 0  --  8 9     Results from last 7 days   Lab Units 08/04/19  0516 08/03/19  1708 08/03/19  1329   AST U/L 82* 110* 120*   ALT U/L 51 61 65   ALK PHOS U/L 56 61 66   TOTAL PROTEIN g/dL 6 5 7 3 7 8   ALBUMIN g/dL 3 0* 3 6 3 8   TOTAL BILIRUBIN mg/dL 0 30 0 60 0 60     Results from last 7 days   Lab Units 08/04/19  0617 08/03/19  1127   POC GLUCOSE mg/dl 93 67     Results from last 7 days   Lab Units 08/04/19  0516 08/03/19  1708 08/03/19  1329   GLUCOSE RANDOM mg/dL 105 80 74 Results from last 7 days   Lab Units 08/03/19  1433   PH, DAMIAN I-STAT  7 299*   PCO2, DAMIAN ISTAT mm HG 54 8*   PO2, DAMIAN ISTAT mm HG 64 0*   HCO3, DAMIAN ISTAT mmol/L 26 9   I STAT BASE EXC mmol/L 0   I STAT O2 SAT % 89*         Results from last 7 days   Lab Units 08/03/19  1709   TROPONIN I ng/mL <0 02  <0 02             Results from last 7 days   Lab Units 08/03/19  1707   TSH 3RD GENERATON uIU/mL 1 687       Results from last 7 days   Lab Units 08/03/19  1708   HEP B S AG  Non-reactive   HEP C AB  High Reactive*   HEP B C IGM  Non-reactive       Results from last 7 days   Lab Units 08/03/19  1710   CLARITY UA  Clear   COLOR UA  Dk Yellow   SPEC GRAV UA  >=1 030   PH UA  6 0   GLUCOSE UA mg/dl Negative   KETONES UA mg/dl Trace*   BLOOD UA  Negative   PROTEIN UA mg/dl Trace*   NITRITE UA  Negative   BILIRUBIN UA  Negative   UROBILINOGEN UA E U /dl 0 2   LEUKOCYTES UA  Negative   WBC UA /hpf 4-10*   RBC UA /hpf 2-4*   BACTERIA UA /hpf Occasional   EPITHELIAL CELLS WET PREP /hpf Occasional   MUCUS THREADS  Occasional*         Results from last 7 days   Lab Units 08/03/19  1711   AMPH/METH  Positive*   BARBITURATE UR  Negative   BENZODIAZEPINE UR  Negative   COCAINE UR  Negative   METHADONE URINE  Positive*   OPIATE UR  Negative   PCP UR  Negative   THC UR  Negative     Results from last 7 days   Lab Units 08/03/19  1329   ACETAMINOPHEN LVL ug/mL <2 0*       ED Treatment:   Medication Administration from 08/03/2019 1015 to 08/03/2019 1502       Date/Time Order Dose Route Action     08/03/2019 1426 naloxone (NARCAN) injection 0 1 mg 0 1 mg Intravenous Given        Past Medical History:   Diagnosis Date    Allergic     Anxiety     Depression     Spinal stenosis        Admitting Diagnosis: Suicidal ideation [R45 851]  Altered mental status [R41 82]  [facility] Rita Sanon  Age/Sex: 33 y o  male  Admission Orders:  Fingerstick glucose every 6 hours  I/O  Up with assist  Continual observation for safety  Current Facility-Administered Medications:  acetaminophen 650 mg Oral Q6H PRN   clonazePAM 0 5 mg Oral TID   docusate sodium 100 mg Oral BID   enoxaparin 40 mg Subcutaneous Daily   escitalopram 10 mg Oral Daily   fluticasone 2 spray Each Nare Daily   gabapentin 300 mg Oral TID   LORazepam 1 mg Intravenous Q6H PRN   melatonin 3 mg Oral HS   mirtazapine 30 mg Oral HS   nicotine 21 mg Transdermal Daily   ondansetron 4 mg Intravenous Q6H PRN   sodium chloride 0 9 % with KCl 20 mEq/L 125 mL/hr Intravenous Continuous   tamsulosin 0 4 mg Oral Daily With Dinner       IP CONSULT TO TOXICOLOGY  IP CONSULT TO MEDICAL CRITICAL CARE  IP CONSULT TO PSYCHIATRY  IP CONSULT TO CASE MANAGEMENT    Network Utilization Review Department  Phone: [phone]; Fax [phone]  [email or web address]  ATTENTION: Please call with any questions or concerns to [phone]  and carefully listen to the prompts so that you are directed to the right person  Send all requests for admission clinical reviews, approved or denied determinations and any other requests to fax [phone]   All voicemails are confidential

## 2019-08-04 NOTE — ASSESSMENT & PLAN NOTE
· Took Methadone 165mg x 2 and Soma 350mg x 2 at 0800 8/3/2019  · Toxicology consulted  · Narcan 0 1mg x 1 no further doses required  · RUDS + amp/meth, ETOH neg, Tylenol <2  · Supportive Care  · Medical clearance for psych once urinary retention resolved  · Consider resuming methadone to prevent withdrawal

## 2019-08-04 NOTE — CONSULTS
Consultation - Behavioral Health     Identification Data: Luther Moreland 33 y o  male MRN: [id number]  Unit/Bed#: -01 Encounter: [id number]    08/04/19  5:10 PM    Consults    Chief Complaint: depression and medication overdose    History of Present Illness     Physician Requesting Consult: Shayne Wells DO  Reason for Consult / Principal Problem:Intentional methadone overdose ([facility] )    Patient is a 33 y o  male with a history of depression anxiety and drug additions, who was admitted initially to the medical service due to respiratory distress related to methadone overdose  Patient said that he was released from [address] [place] [address] inpatient unit one month ago, and he sees his primary care doctor and attend methadone clinic daily with 165mg/day with take home med on weekend  He missed his psychiatrist appointment, his medications were managed by his PCP include Lexapro Remeron, Klonopin and Gabapentin  His grandmother passed away recently, he relapsed on methamphetamine for 3 days to deal with sadness, he had suicide thoughts for one week, and he over dosed on methadone yesterday ( with over 300mg taken), he went to the local ER for help  He denied hallucinations, no paranoid thoughts  Psychiatric Review Of Systems: + depress mood, irritable, anxious, suicide thoughts        Historical Information     Past Psychiatric History:  History of depression and anxiety, recent released from psychiatric inpatient unit one month ago  Substance Abuse History: had years of opiate and amphetamine addiction, also had Benzo dependence  He is on methadone clinic with 165mg/ day      Family Psychiatric History: N/A        Social History: single, not working      Past Medical History:    History of Seizures: no  History of Head injury with loss of consciousness:no  Surgical History:    Past Medical History:   Diagnosis Date    Allergic     Anxiety     Depression     Spinal stenosis        Medical Review Of Systems: + respiratory discomfort        Meds/Allergies         No Known Allergies    Objective     Vital signs in last 24 hours:    Temp:  [97 5 °F (36 4 °C)-98 3 °F (36 8 °C)] 98 3 °F (36 8 °C)  HR:  [59-81] 60  Resp:  [10-21] 17  BP: (111-134)/(64-79) 117/68      Intake/Output Summary (Last 24 hours) at 8/4/2019 1710  Last data filed at 8/4/2019 1101  Gross per 24 hour   Intake 4040 42 ml   Output 1150 ml   Net 2890 42 ml       Mental Status Evaluation:    Appearance:  Lying in bed, looked tired   Behavior: Answer questions, slightly irritable   Speech:  Clear fluent soft   Mood:  depress   Affect:  depress   Language: English   Thought Process:  Goal oriented   Thought Content:  No paranoid thoughts   Perceptual Disturbances: No active hallucinaitons   Risk Potential: Mod to high for self harm   Sensorium:  X 3   Cognition:  intact   Consciousness:  Awake alert   Attention: fair   Intellect: average   Fund of Knowledge: average   Insight:  Fair, understand his symptoms and stressors   Judgment: Limited, recent suicide behaviors   Muscle Strength Muscle Tone: intact   Gait/Station: Bed bound   Motor Activity: No hand tremors noticed       Laboratory Results:  I have personally reviewed all laboratory results      Admission on 08/03/2019   Component Date Value    Amph/Meth UR 08/03/2019 Positive*    Barbiturate Ur 08/03/2019 Negative     Benzodiazepine Urine 08/03/2019 Negative     Cocaine Urine 08/03/2019 Negative     Methadone Urine 08/03/2019 Positive*    Opiate Urine 08/03/2019 Negative     PCP Ur 08/03/2019 Negative     THC Urine 08/03/2019 Negative     EXTBreath Alcohol 08/03/2019 0 000     Sodium 08/03/2019 142     Potassium 08/03/2019 3 6     Chloride 08/03/2019 103     CO2 08/03/2019 28     ANION GAP 08/03/2019 11     BUN 08/03/2019 17     Creatinine 08/03/2019 1 03     Glucose 08/03/2019 74     Calcium 08/03/2019 8 9     AST 08/03/2019 120*    ALT 08/03/2019 65     Alkaline Phosphatase 08/03/2019 66     Total Protein 08/03/2019 7 8     Albumin 08/03/2019 3 8     Total Bilirubin 08/03/2019 0 60     eGFR 08/03/2019 98     Acetaminophen Level 08/03/2019 <2 0*    ph, Lei ISTAT 08/03/2019 7 299*    pCO2, Lei i-STAT 08/03/2019 54 8*    pO2, Lei i-STAT 08/03/2019 64 0*    BE, i-STAT 08/03/2019 0     HCO3, Lei i-STAT 08/03/2019 26 9     CO2, i-STAT 08/03/2019 29     O2 Sat, i-STAT 08/03/2019 89*    Specimen Type 08/03/2019 VENOUS     TSH 3RD GENERATON 08/03/2019 1 687     Troponin I 08/03/2019 <0 02     Clarity, UA 08/03/2019 Clear     Color, UA 08/03/2019 Dk Yellow     Specific Gravity, UA 08/03/2019 >=1 030     pH, UA 08/03/2019 6 0     Glucose, UA 08/03/2019 Negative     Ketones, UA 08/03/2019 Trace*    Blood, UA 08/03/2019 Negative     Protein, UA 08/03/2019 Trace*    Nitrite, UA 08/03/2019 Negative     Bilirubin, UA 08/03/2019 Negative     Urobilinogen, UA 08/03/2019 0 2     Leukocytes, UA 08/03/2019 Negative     WBC, UA 08/03/2019 4-10*    RBC, UA 08/03/2019 2-4*    Hyaline Casts, UA 08/03/2019 0-1*    Bacteria, UA 08/03/2019 Occasional     Epithelial Cells 08/03/2019 Occasional     MUCUS THREADS 08/03/2019 Occasional*    Troponin I 08/03/2019 <0 02     Sodium 08/03/2019 141     Potassium 08/03/2019 3 2*    Chloride 08/03/2019 105     CO2 08/03/2019 25     ANION GAP 08/03/2019 11     BUN 08/03/2019 16     Creatinine 08/03/2019 0 98     Glucose 08/03/2019 80     Calcium 08/03/2019 9 0     AST 08/03/2019 110*    ALT 08/03/2019 61     Alkaline Phosphatase 08/03/2019 61     Total Protein 08/03/2019 7 3     Albumin 08/03/2019 3 6     Total Bilirubin 08/03/2019 0 60     eGFR 08/03/2019 104     WBC 08/03/2019 9 52     RBC 08/03/2019 3 80*    Hemoglobin 08/03/2019 11 1*    Hematocrit 08/03/2019 33 2*    MCV 08/03/2019 87     MCH 08/03/2019 29 2     MCHC 08/03/2019 33 4     RDW 08/03/2019 14 5     MPV 08/03/2019 10 7     Platelets 86/41/3502 288  nRBC 08/03/2019 0     Neutrophils Relative 08/03/2019 59     Immat GRANS % 08/03/2019 0     Lymphocytes Relative 08/03/2019 26     Monocytes Relative 08/03/2019 11     Eosinophils Relative 08/03/2019 3     Basophils Relative 08/03/2019 1     Neutrophils Absolute 08/03/2019 5 59     Immature Grans Absolute 08/03/2019 0 02     Lymphocytes Absolute 08/03/2019 2 49     Monocytes Absolute 08/03/2019 1 02     Eosinophils Absolute 08/03/2019 0 32     Basophils Absolute 08/03/2019 0 08     Hepatitis B Surface Ag 08/03/2019 Non-reactive     Hep A IgM 08/03/2019 Non-reactive     Hepatitis C Ab 08/03/2019 High Reactive*    Hep B C IgM 08/03/2019 Non-reactive     Rapid HIV 1 AND 2 08/03/2019 Non-Reactive     HIV-1 P24 Ag Screen 08/03/2019 Non-Reactive     POC Glucose 08/03/2019 67     WBC 08/04/2019 9 22     RBC 08/04/2019 3 76*    Hemoglobin 08/04/2019 11 2*    Hematocrit 08/04/2019 33 6*    MCV 08/04/2019 89     MCH 08/04/2019 29 8     MCHC 08/04/2019 33 3     RDW 08/04/2019 14 6     Platelets 39/61/8424 256     MPV 08/04/2019 10 5     Sodium 08/04/2019 143     Potassium 08/04/2019 3 5     Chloride 08/04/2019 106     CO2 08/04/2019 26     ANION GAP 08/04/2019 11     BUN 08/04/2019 8     Creatinine 08/04/2019 0 85     Glucose 08/04/2019 105     Calcium 08/04/2019 8 1*    AST 08/04/2019 82*    ALT 08/04/2019 51     Alkaline Phosphatase 08/04/2019 56     Total Protein 08/04/2019 6 5     Albumin 08/04/2019 3 0*    Total Bilirubin 08/04/2019 0 30     eGFR 08/04/2019 118     POC Glucose 08/04/2019 93     POC Glucose 08/04/2019 90     POC Glucose 08/04/2019 92          Assessment/Plan     Principal Problem:    Intentional methadone overdose (HCC)  Active Problems:    Severe episode of recurrent major depressive disorder, without psychotic features (HCC)    Suicidal ideation    Elevated AST (SGOT)    Tobacco abuse    Urinary retention    Hepatitis C      Assessment:    Diagnosis: Major depression, Anxiety NOS, r/o Bipolar disorder, Opiate use disorder, Amphetamine use disorder      Plan/Medication changes:     Planned medication changes:    Patient had chronic mental illness, recently under stress ( grand mother pass away), relapsed on Methamphetamine for a few days, depression getting worse, had suicide thoughts for a week, overdose on Methadone,  He had poor coping skills, intrusive, high risk of self harm, will admit to psychiatric inpatient unit once he is medically clear  Agreed on current psychiatrics medication management, will follow up tomorrow          Kimberli Brock MD  [phone]

## 2019-08-04 NOTE — NURSING NOTE
Pt is drowsy but easily arouses with stimulation  AO*4  RR 9-12 at this time    K 3 2 - pt now receiving IVF with potassium, will recheck labs in [facility]

## 2019-08-04 NOTE — PROGRESS NOTES
Progress Note - Carolann Hood 1990, 33 y o  male MRN: [id number]    Unit/Bed#: -01 Encounter: [id number]    Primary Care Provider: Sonny Gamez MD   Date and time admitted to hospital: 8/3/2019 10:23 AM        * Intentional methadone overdose ([facility] )  Assessment & Plan  · Took Methadone 165mg x 2 and Soma 350mg x 2 at 0800 8/3/2019  · Toxicology consulted  · Narcan 0 1mg x 1 no further doses required  · RUDS + amp/meth, ETOH neg, Tylenol <2  · Supportive Care  · Medical clearance for psych once urinary retention resolved  · Consider resuming methadone to prevent withdrawal     Suicidal ideation  Assessment & Plan  · Recent admit 201 inpt psych with d/c on 7/26  · Psych consult pending  · 1:1    Elevated AST (SGOT)  Assessment & Plan  · Improving  · Tylenol <2  · Per pt hx Hep C never sought treatment  · Await Hep panel  · Outpt GI f/u    Severe episode of recurrent major depressive disorder, without psychotic features ([place] [address] )  Assessment & Plan  · Resume home lexapro, klonopin, Neurontin, remeron  · Pt states lexapro makes him "Manic"  · Klonopin was decreased to 0 5 mg due to somnolence by psych however pt has been taking 1mg    Urinary retention  Assessment & Plan  · SC x 2  · Pt states required SC [facility] last week  · Start Flomax  · If reoccurs may need urology consult    Tobacco abuse  Assessment & Plan  · Nicotine patch/smoking cessation      VTE Pharmacologic Prophylaxis:   Pharmacologic: Enoxaparin (Lovenox)  Mechanical VTE Prophylaxis in Place: Yes    Patient Centered Rounds: I have performed bedside rounds with nursing staff today  Discussions with Specialists or Other Care Team Provider: will await psych consult    Education and Discussions with Family / Patient: patient    Time Spent for Care: 30 minutes  More than 50% of total time spent on counseling and coordination of care as described above      Current Length of Stay: 1 day(s)    Current Patient Status: Inpatient Certification Statement: The patient will continue to require additional inpatient hospital stay due to urinary retention    Discharge Plan: pending psych eval    Code Status: Level 1 - Full Code      Subjective:   Pt c/o abdominal cramps  Objective:     Vitals:   Temp (24hrs), Av 2 °F (36 8 °C), Min:97 5 °F (36 4 °C), Max:99 °F (37 2 °C)    Temp:  [97 5 °F (36 4 °C)-99 °F (37 2 °C)] 98 1 °F (36 7 °C)  HR:  [] 59  Resp:  [10-40] 11  BP: (111-162)/() 118/76  SpO2:  [91 %-100 %] 94 %  Body mass index is 27 62 kg/m²  Input and Output Summary (last 24 hours): Intake/Output Summary (Last 24 hours) at 2019 6957  Last data filed at 2019 0601  Gross per 24 hour   Intake 2572 92 ml   Output 2050 ml   Net 522 92 ml       Physical Exam:     Physical Exam   Constitutional: He is oriented to person, place, and time  HENT:   Head: Normocephalic and atraumatic  Mouth/Throat: Oropharynx is clear and moist    Eyes: Pupils are equal, round, and reactive to light  Conjunctivae are normal  No scleral icterus  Neck: Neck supple  No tracheal deviation present  Cardiovascular: Normal rate, regular rhythm, normal heart sounds and intact distal pulses  Exam reveals no gallop and no friction rub  No murmur heard  Pulmonary/Chest: Effort normal and breath sounds normal  No respiratory distress  He has no wheezes  He has no rales  Abdominal: Soft  Bowel sounds are normal  He exhibits no distension  There is no tenderness  Musculoskeletal: Normal range of motion  He exhibits no edema, tenderness or deformity  Neurological: He is alert and oriented to person, place, and time  No cranial nerve deficit  Skin: Skin is warm and dry  No rash noted  No erythema  No pallor     Psychiatric:   Mildly anxious         Additional Data:     Labs:    Results from last 7 days   Lab Units 19  0516 19  1708   WBC Thousand/uL 9 22 9 52   HEMOGLOBIN g/dL 11 2* 11 1*   HEMATOCRIT % 33 6* 33 2* PLATELETS Thousands/uL 256 288   NEUTROS PCT %  --  59   LYMPHS PCT %  --  26   MONOS PCT %  --  11   EOS PCT %  --  3     Results from last 7 days   Lab Units 08/04/19  0516   SODIUM mmol/L 143   POTASSIUM mmol/L 3 5   CHLORIDE mmol/L 106   CO2 mmol/L 26   BUN mg/dL 8   CREATININE mg/dL 0 85   ANION GAP mmol/L 11   CALCIUM mg/dL 8 1*   ALBUMIN g/dL 3 0*   TOTAL BILIRUBIN mg/dL 0 30   ALK PHOS U/L 56   ALT U/L 51   AST U/L 82*   GLUCOSE RANDOM mg/dL 105         Results from last 7 days   Lab Units 08/04/19  0617 08/03/19  1127   POC GLUCOSE mg/dl 93 67                   * I Have Reviewed All Lab Data Listed Above  * Additional Pertinent Lab Tests Reviewed: Kendy 66 Admission Reviewed    Imaging:    Imaging Reports Reviewed Today Include: cxr  Imaging Personally Reviewed by Myself Includes:  cxr    Recent Cultures (last 7 days):           Last 24 Hours Medication List:     Current Facility-Administered Medications:  acetaminophen 650 mg Oral Q6H PRN Laurel Fly, DO    docusate sodium 100 mg Oral BID Laurel Fly, DO    enoxaparin 40 mg Subcutaneous Daily Laurel Fly, DO    escitalopram 10 mg Oral Daily Laurel Fly, DO    fluticasone 2 spray Each Nare Daily Laurel Fly, DO    LORazepam 1 mg Intravenous Q6H PRN Laurel Fly, DO    melatonin 3 mg Oral HS Laurel Fly, DO    nicotine 1 patch Transdermal Daily Laurel Fly, DO    ondansetron 4 mg Intravenous Q6H PRN Laurel Fly, DO    sodium chloride 0 9 % with KCl 20 mEq/L 125 mL/hr Intravenous Continuous Laurel Fly, DO Last Rate: 125 mL/hr (08/04/19 0108)        Today, Patient Was Seen By: DILLON Roque    ** Please Note: Dictation voice to text software may have been used in the creation of this document   **

## 2019-08-04 NOTE — ASSESSMENT & PLAN NOTE
· Improving  · Tylenol <2  · Per pt hx Hep C never sought treatment  · Await Hep panel  · Outpt GI f/u

## 2019-08-05 LAB
ATRIAL RATE: 59 BPM
ATRIAL RATE: 81 BPM
P AXIS: 71 DEGREES
P AXIS: 73 DEGREES
PR INTERVAL: 166 MS
PR INTERVAL: 174 MS
QRS AXIS: 86 DEGREES
QRS AXIS: 87 DEGREES
QRSD INTERVAL: 100 MS
QRSD INTERVAL: 106 MS
QT INTERVAL: 400 MS
QT INTERVAL: 464 MS
QTC INTERVAL: 459 MS
QTC INTERVAL: 464 MS
T WAVE AXIS: 23 DEGREES
T WAVE AXIS: 52 DEGREES
VENTRICULAR RATE: 59 BPM
VENTRICULAR RATE: 81 BPM

## 2019-08-05 PROCEDURE — 93010 ELECTROCARDIOGRAM REPORT: CPT | Performed by: INTERNAL MEDICINE

## 2019-08-05 PROCEDURE — 99232 SBSQ HOSP IP/OBS MODERATE 35: CPT | Performed by: HOSPITALIST

## 2019-08-05 RX ADMIN — GABAPENTIN 300 MG: 300 CAPSULE ORAL at 15:49

## 2019-08-05 RX ADMIN — CARISOPRODOL 350 MG: 350 TABLET ORAL at 08:47

## 2019-08-05 RX ADMIN — METHADONE HYDROCHLORIDE 165 MG: 10 CONCENTRATE ORAL at 09:08

## 2019-08-05 RX ADMIN — NICOTINE 21 MG: 21 PATCH, EXTENDED RELEASE TRANSDERMAL at 08:51

## 2019-08-05 RX ADMIN — FLUTICASONE PROPIONATE 2 SPRAY: 50 SPRAY, METERED NASAL at 08:49

## 2019-08-05 RX ADMIN — CLONAZEPAM 0.5 MG: 0.5 TABLET ORAL at 08:47

## 2019-08-05 RX ADMIN — GABAPENTIN 300 MG: 300 CAPSULE ORAL at 08:49

## 2019-08-05 RX ADMIN — NAPROXEN 250 MG: 250 TABLET ORAL at 10:37

## 2019-08-05 RX ADMIN — MIRTAZAPINE 30 MG: 15 TABLET, FILM COATED ORAL at 21:31

## 2019-08-05 RX ADMIN — CLONAZEPAM 0.5 MG: 0.5 TABLET ORAL at 15:49

## 2019-08-05 RX ADMIN — DOCUSATE SODIUM 100 MG: 100 CAPSULE, LIQUID FILLED ORAL at 08:48

## 2019-08-05 RX ADMIN — CLONAZEPAM 0.5 MG: 0.5 TABLET ORAL at 21:32

## 2019-08-05 RX ADMIN — CARISOPRODOL 350 MG: 350 TABLET ORAL at 15:48

## 2019-08-05 RX ADMIN — CARISOPRODOL 350 MG: 350 TABLET ORAL at 21:31

## 2019-08-05 RX ADMIN — MELATONIN 3 MG: at 21:31

## 2019-08-05 RX ADMIN — ENOXAPARIN SODIUM 40 MG: 40 INJECTION SUBCUTANEOUS at 08:48

## 2019-08-05 RX ADMIN — ESCITALOPRAM OXALATE 10 MG: 10 TABLET ORAL at 08:48

## 2019-08-05 RX ADMIN — TAMSULOSIN HYDROCHLORIDE 0.4 MG: 0.4 CAPSULE ORAL at 15:49

## 2019-08-05 RX ADMIN — GABAPENTIN 300 MG: 300 CAPSULE ORAL at 21:31

## 2019-08-05 NOTE — ASSESSMENT & PLAN NOTE
· Took Methadone 165mg x 2 and Soma 350mg x 2 at 0800 8/3/2019  · Toxicology consulted  · Narcan 0 1mg x 1 no further doses required  · RUDS + amp/meth, ETOH neg, Tylenol <2  · Supportive Care  · Methadone resumed

## 2019-08-05 NOTE — PHYSICAL THERAPY NOTE
PT screen  ORder rec'd chart reviewed  Spoke with nurse who states that pt is amb ind no device and appears to have no PT needs at this time    Screen only d/c PT

## 2019-08-05 NOTE — PROGRESS NOTES
Progress Note - August Gottlieb 1990, 33 y o  male MRN: [id number]    Unit/Bed#: [address] [place] [address] Encounter: [id number]    Primary Care Provider: Leslee Ferrer MD   Date and time admitted to hospital: 8/3/2019 10:23 AM        Hepatitis C  Assessment & Plan  -see above    Urinary retention  Assessment & Plan  -cont Flomax  -patient reports he is able to urinate    Tobacco abuse  Assessment & Plan  · Nicotine patch/smoking cessation    Elevated AST (SGOT)  Assessment & Plan  · Improving  · Tylenol <2  · HCV POS  · Outpt GI f/u    Suicidal ideation  Assessment & Plan  -psych managing    Severe episode of recurrent major depressive disorder, without psychotic features ([facility] [address] )  Assessment & Plan  -cont lexapro, klonopin, remeron  -psych managing      * Intentional methadone overdose ([facility] [address] )  Assessment & Plan  · Took Methadone 165mg x 2 and Soma 350mg x 2 at 0800 8/3/2019  · Toxicology consulted  · Narcan 0 1mg x 1 no further doses required  · RUDS + amp/meth, ETOH neg, Tylenol <2  · Supportive Care  · Methadone resumed      VTE Pharmacologic Prophylaxis:   Pharmacologic: Enoxaparin (Lovenox)  Mechanical VTE Prophylaxis in Place: No    Patient Centered Rounds: I have performed bedside rounds with nursing staff today  Education and Discussions with Family / Patient: Pt    Time Spent for Care: 15 minutes  More than 50% of total time spent on counseling and coordination of care as described above  Current Length of Stay: 2 day(s)    Current Patient Status: Inpatient   Certification Statement: The patient will continue to require additional inpatient hospital stay due to suicide attempt    Discharge Plan: P psych    Code Status: Level 1 - Full Code      Subjective:   Pt reports anxiety, Denies urinary retention      Objective:     Vitals:   Temp (24hrs), Av 1 °F (36 7 °C), Min:97 6 °F (36 4 °C), Max:98 6 °F (37 °C)    Temp:  [97 6 °F (36 4 °C)-98 6 °F (37 °C)] 97 6 °F (36 4 °C)  HR:  [59-72] 72  Resp:  [16-17] 17  BP: (112-134)/(66-74) 134/66  SpO2:  [98 %-100 %] 99 %  Body mass index is 28 31 kg/m²  Input and Output Summary (last 24 hours):     No intake or output data in the 24 hours ending 08/05/19 1456    Physical Exam:     Physical Exam  Gen: NAD, AAOx3, well developed, well nourished  Eyes: EOMI, PERRLA, no scleral icterus  ENMT:  no nasal discharge, no otic discharge, moist mucous membranes  Neck:  Supple  Cardiovascular:  Regular rate and rhythm, normal S1-S2, no murmurs, rubs, or gallops  Lungs:  Clear to auscultation bilaterally, no wheezes, or rales, or rhonchi  Abdomen:  Positive bowel sounds, soft, nontender, nondistended, no palpable organomegaly   Skin:  Intact, no obvious lesions or rashes, no edema  Neuro: Cranial nerves 2-12 are intact, non-focal, 5/5 strength in all 4 extremities      Additional Data:     Labs:    Results from last 7 days   Lab Units 08/04/19  0516 08/03/19  1708   WBC Thousand/uL 9 22 9 52   HEMOGLOBIN g/dL 11 2* 11 1*   HEMATOCRIT % 33 6* 33 2*   PLATELETS Thousands/uL 256 288   NEUTROS PCT %  --  59   LYMPHS PCT %  --  26   MONOS PCT %  --  11   EOS PCT %  --  3     Results from last 7 days   Lab Units 08/04/19  0516   SODIUM mmol/L 143   POTASSIUM mmol/L 3 5   CHLORIDE mmol/L 106   CO2 mmol/L 26   BUN mg/dL 8   CREATININE mg/dL 0 85   ANION GAP mmol/L 11   CALCIUM mg/dL 8 1*   ALBUMIN g/dL 3 0*   TOTAL BILIRUBIN mg/dL 0 30   ALK PHOS U/L 56   ALT U/L 51   AST U/L 82*   GLUCOSE RANDOM mg/dL 105         Results from last 7 days   Lab Units 08/04/19  1703 08/04/19  1133 08/04/19  0617 08/03/19  1127   POC GLUCOSE mg/dl 92 90 93 67                   * I Have Reviewed All Lab Data Listed Above  * Additional Pertinent Lab Tests Reviewed:  Kendy [facility] Admission Reviewed    Recent Cultures (last 7 days):           Last 24 Hours Medication List:     Current Facility-Administered Medications:  acetaminophen 650 mg Oral Q6H PRN DILLON Jennings   carisoprodol 350 mg Oral TID "[employer] Jobs, CRNP   clonazePAM 0 5 mg Oral TID "[employer] Jobs, CRNP   docusate sodium 100 mg Oral BID Lonza Jobs, CRNP   enoxaparin 40 mg Subcutaneous Daily "[employer] Jobs, CRNP   escitalopram 10 mg Oral Daily [facility], CRNP   fluticasone 2 spray Each Nare Daily [facility], CRNP   gabapentin 300 mg Oral TID "[employer] Jobs, CRNP   melatonin 3 mg Oral HS "[employer] Jobs, CRNP   methadone 165 mg Oral Daily [facility], CRNP   mirtazapine 30 mg Oral HS [facility], CRNP   naproxen 250 mg Oral 4x Daily PRN "[employer] Jobs, CRNP   nicotine 21 mg Transdermal Daily [facility], CRNP   ondansetron 4 mg Intravenous Q6H PRN "[employer] Jobs, CRNP   tamsulosin 0 4 mg Oral Daily With Dinner "[employer] Jobs, CRNP        Today, Patient Was Seen By: Franky Bloch, MD    ** Please Note: Dictation voice to text software may have been used in the creation of this document   **

## 2019-08-05 NOTE — OCCUPATIONAL THERAPY NOTE
Occupational Therapy Screen    Patient Name: Peggy Prince  Today's Date: 8/5/2019    OT orders received and chart reviewed  Spoke to RN who states pt has no OT needs  Spoke to 1:1 aide who states pt is ambulating to/from bathroom without difficulty  Pt appears to be at functional baseline and has no OT needs  Please reconsult if needs arise      [employer], OT

## 2019-08-05 NOTE — SOCIAL WORK
LOS: 2  Pt is not a documented bundle  Pt is a readmission for altered mental status and suicidal ideation  Received case management consult for drug, alcohol, mental health  Met with Pt  Pt presents AA&Ox3  Discussed role of   Pt reports he is currently staying with his mother but does not believe he can return there  Pt reports that he has been for inpt treatment in past at [place], [place] and recent past at [facility]  Pt reports he gets his methadone at [address] and has had his counseling through [place]  Pt reports he attends AA meetings  Pt reports he is aware of [facility] but " it is hard to get appointments " Pt reports that he is facing charges for marijuana charges when he was found by police  Pt reports his family is supportive but will not let him live with them  Pt reports he does not have friends  CM reviewed 30 day readmission with Pt  Pt reports his grandmother passed away as well as family issues, drug issues and possibility of being homeless triggered the overdose  Pt reports he still has suicidal ideation  Pt reports that he wants inpt treatment  Await psych recommendations  Will follow

## 2019-08-05 NOTE — PLAN OF CARE
Problem: NEUROSENSORY - ADULT  Goal: Achieves stable or improved neurological status  Description  INTERVENTIONS  - Monitor and report changes in neurological status  - Initiate measures to prevent increased intracranial pressure  - Maintain blood pressure and fluid volume within ordered parameters to optimize cerebral perfusion  - Monitor temperature, glucose, and sodium or any other associated labs  Initiate appropriate interventions as ordered  - Monitor for seizure activity   - Administer anti-seizure medications as ordered  Outcome: Progressing     Problem: CARDIOVASCULAR - ADULT  Goal: Maintains optimal cardiac output and hemodynamic stability  Description  INTERVENTIONS:  - Monitor I/O, vital signs and rhythm  - Monitor for S/S and trends of decreased cardiac output i e  bleeding, hypotension  - Administer and titrate ordered vasoactive medications to optimize hemodynamic stability  - Assess quality of pulses, skin color and temperature  - Assess for signs of decreased coronary artery perfusion - ex   Angina  - Instruct patient to report change in severity of symptoms  Outcome: Progressing     Problem: RESPIRATORY - ADULT  Goal: Achieves optimal ventilation and oxygenation  Description  INTERVENTIONS:  - Assess for changes in respiratory status  - Assess for changes in mentation and behavior  - Position to facilitate oxygenation and minimize respiratory effort  - Oxygen administration by appropriate delivery method based on oxygen saturation (per order) or ABGs  - Initiate smoking cessation education as indicated  - Encourage broncho-pulmonary hygiene including cough, deep breathe, Incentive Spirometry  - Assess the need for suctioning and aspirate as needed  - Assess and instruct to report SOB or any respiratory difficulty  - Respiratory Therapy support as indicated  Outcome: Progressing     Problem: METABOLIC, FLUID AND ELECTROLYTES - ADULT  Goal: Electrolytes maintained within normal limits  Description  INTERVENTIONS:  - Monitor labs and assess patient for signs and symptoms of electrolyte imbalances  - Administer electrolyte replacement as ordered  - Monitor response to electrolyte replacements, including repeat lab results as appropriate  - Instruct patient on fluid and nutrition as appropriate  Outcome: Progressing     Problem: COPING  Goal: Will report anxiety at manageable levels  Description  INTERVENTIONS:  - Administer medication as ordered  - Teach and encourage coping skills  - Provide emotional support  - Assess patient/family for anxiety and ability to cope  Outcome: Progressing     Problem: SELF HARM  Goal: Effect of psychiatric condition will be minimized and patient will be protected from self harm  Description  INTERVENTIONS:  - Assess impact of patient's symptoms on level of functioning, self-care needs and offer support as indicated  - Assess patient/family knowledge of depression, impact on illness and need for teaching  - Provide emotional support, presence and reassurance  - Assess for possible suicidal thoughts, ideation or self-harm  If patient expresses suicidal thoughts or statements do not leave alone, notify physician/AP immediately, initiate suicide precautions, and determine need for continual observation    - initiate consults and referrals as appropriate (a mental health professional, Spiritual Care  Outcome: Progressing     Problem: SUBSTANCE USE/ABUSE  Goal: Will have no detox symptoms and will verbalize plan for changing substance-related behavior  Description  INTERVENTIONS:  - Monitor physical status and assess for symptoms of withdrawal  - Administer medication as ordered  - Provide emotional support with 1 on 1 interaction with staff  - Encourage recovery focused program/ addiction education  - Assess for verbalization of changing behaviors related to substance abuse  - Initiate consults and referrals as appropriate (Case Management, Spiritual Care, etc )  Outcome: Progressing     Problem: Potential for Falls  Goal: Patient will remain free of falls  Description  INTERVENTIONS:  - Assess patient frequently for physical needs  -  Identify cognitive and physical deficits and behaviors that affect risk of falls  -  [place] fall precautions as indicated by assessment   - Educate patient/family on patient safety including physical limitations  - Instruct patient to call for assistance with activity based on assessment  - Modify environment to reduce risk of injury  - Consider OT/PT consult to assist with strengthening/mobility  Outcome: Progressing     Problem: Prexisting or High Potential for Compromised Skin Integrity  Goal: Skin integrity is maintained or improved  Description  INTERVENTIONS:  - Identify patients at risk for skin breakdown  - Assess and monitor skin integrity  - Assess and monitor nutrition and hydration status  - Monitor labs (i e  albumin)  - Assess for incontinence   - Turn and reposition patient  - Assist with mobility/ambulation  - Relieve pressure over bony prominences  - Avoid friction and shearing  - Provide appropriate hygiene as needed including keeping skin clean and dry  - Evaluate need for skin moisturizer/barrier cream  - Collaborate with interdisciplinary team (i e  Nutrition, Rehabilitation, etc )   - Patient/family teaching  Outcome: Progressing     Problem: Nutrition/Hydration-ADULT  Goal: Nutrient/Hydration intake appropriate for improving, restoring or maintaining nutritional needs  Description  Monitor and assess patient's nutrition/hydration status for malnutrition (ex- brittle hair, bruises, dry skin, pale skin and conjunctiva, muscle wasting, smooth red tongue, and disorientation)  Collaborate with interdisciplinary team and initiate plan and interventions as ordered  Monitor patient's weight and dietary intake as ordered or per policy  Utilize nutrition screening tool and intervene per policy   Determine patient's food preferences and provide high-protein, high-caloric foods as appropriate       INTERVENTIONS:  - Monitor oral intake, urinary output, labs, and treatment plans  - Assess nutrition and hydration status and recommend course of action  - Evaluate amount of meals eaten  - Assist patient with eating if necessary   - Allow adequate time for meals  - Recommend/ encourage appropriate diets, oral nutritional supplements, and vitamin/mineral supplements  - Order, calculate, and assess calorie counts as needed  - Recommend, monitor, and adjust tube feedings and TPN/PPN based on assessed needs  - Assess need for intravenous fluids  - Provide specific nutrition/hydration education as appropriate  - Include patient/family/caregiver in decisions related to nutrition  Outcome: Progressing

## 2019-08-06 ENCOUNTER — HOSPITAL ENCOUNTER (INPATIENT)
Facility: HOSPITAL | Age: 29
LOS: 8 days | Discharge: HOME | DRG: 751 | End: 2019-08-14
Attending: PSYCHIATRY & NEUROLOGY | Admitting: PSYCHIATRY & NEUROLOGY
Payer: COMMERCIAL

## 2019-08-06 VITALS
RESPIRATION RATE: 20 BRPM | HEIGHT: 70 IN | HEART RATE: 68 BPM | TEMPERATURE: 97.7 F | SYSTOLIC BLOOD PRESSURE: 115 MMHG | OXYGEN SATURATION: 98 % | BODY MASS INDEX: 28.25 KG/M2 | DIASTOLIC BLOOD PRESSURE: 63 MMHG | WEIGHT: 197.31 LBS

## 2019-08-06 DIAGNOSIS — F41.1 GENERALIZED ANXIETY DISORDER: ICD-10-CM

## 2019-08-06 DIAGNOSIS — M48.07 SPINAL STENOSIS OF LUMBOSACRAL REGION: Chronic | ICD-10-CM

## 2019-08-06 DIAGNOSIS — F32.A DEPRESSION: ICD-10-CM

## 2019-08-06 DIAGNOSIS — F33.2 SEVERE EPISODE OF RECURRENT MAJOR DEPRESSIVE DISORDER, WITHOUT PSYCHOTIC FEATURES (HCC): ICD-10-CM

## 2019-08-06 DIAGNOSIS — M62.838 MUSCLE SPASM: ICD-10-CM

## 2019-08-06 DIAGNOSIS — F11.20 OPIOID DEPENDENCE (HCC): ICD-10-CM

## 2019-08-06 DIAGNOSIS — G47.00 INSOMNIA: Primary | ICD-10-CM

## 2019-08-06 DIAGNOSIS — R33.9 URINARY RETENTION: ICD-10-CM

## 2019-08-06 DIAGNOSIS — F41.9 ANXIETY: ICD-10-CM

## 2019-08-06 DIAGNOSIS — T40.3X2A: ICD-10-CM

## 2019-08-06 PROCEDURE — 99232 SBSQ HOSP IP/OBS MODERATE 35: CPT | Performed by: PHYSICIAN ASSISTANT

## 2019-08-06 PROCEDURE — 99239 HOSP IP/OBS DSCHRG MGMT >30: CPT | Performed by: HOSPITALIST

## 2019-08-06 RX ORDER — HYDROXYZINE HYDROCHLORIDE 25 MG/1
25 TABLET, FILM COATED ORAL EVERY 6 HOURS PRN
Status: DISCONTINUED | OUTPATIENT
Start: 2019-08-06 | End: 2019-08-14 | Stop reason: HOSPADM

## 2019-08-06 RX ORDER — ACETAMINOPHEN 325 MG/1
975 TABLET ORAL EVERY 6 HOURS PRN
Status: DISCONTINUED | OUTPATIENT
Start: 2019-08-06 | End: 2019-08-07

## 2019-08-06 RX ORDER — BENZTROPINE MESYLATE 1 MG/ML
1 INJECTION INTRAMUSCULAR; INTRAVENOUS EVERY 6 HOURS PRN
Status: CANCELLED | OUTPATIENT
Start: 2019-08-06

## 2019-08-06 RX ORDER — OLANZAPINE 10 MG/1
10 INJECTION, POWDER, LYOPHILIZED, FOR SOLUTION INTRAMUSCULAR EVERY 8 HOURS PRN
Status: CANCELLED | OUTPATIENT
Start: 2019-08-06

## 2019-08-06 RX ORDER — RISPERIDONE 1 MG/1
1 TABLET, ORALLY DISINTEGRATING ORAL
Status: DISCONTINUED | OUTPATIENT
Start: 2019-08-06 | End: 2019-08-14 | Stop reason: HOSPADM

## 2019-08-06 RX ORDER — TAMSULOSIN HYDROCHLORIDE 0.4 MG/1
0.4 CAPSULE ORAL
Status: CANCELLED | OUTPATIENT
Start: 2019-08-06

## 2019-08-06 RX ORDER — MAGNESIUM HYDROXIDE/ALUMINUM HYDROXICE/SIMETHICONE 120; 1200; 1200 MG/30ML; MG/30ML; MG/30ML
30 SUSPENSION ORAL EVERY 4 HOURS PRN
Status: DISCONTINUED | OUTPATIENT
Start: 2019-08-06 | End: 2019-08-14 | Stop reason: HOSPADM

## 2019-08-06 RX ORDER — LANOLIN ALCOHOL/MO/W.PET/CERES
3 CREAM (GRAM) TOPICAL
Status: CANCELLED | OUTPATIENT
Start: 2019-08-06

## 2019-08-06 RX ORDER — OLANZAPINE 10 MG/1
10 TABLET ORAL EVERY 8 HOURS PRN
Status: DISCONTINUED | OUTPATIENT
Start: 2019-08-06 | End: 2019-08-14 | Stop reason: HOSPADM

## 2019-08-06 RX ORDER — FLUTICASONE PROPIONATE 50 MCG
2 SPRAY, SUSPENSION (ML) NASAL DAILY
Status: DISCONTINUED | OUTPATIENT
Start: 2019-08-07 | End: 2019-08-14 | Stop reason: HOSPADM

## 2019-08-06 RX ORDER — BENZTROPINE MESYLATE 1 MG/ML
1 INJECTION INTRAMUSCULAR; INTRAVENOUS EVERY 6 HOURS PRN
Status: DISCONTINUED | OUTPATIENT
Start: 2019-08-06 | End: 2019-08-14 | Stop reason: HOSPADM

## 2019-08-06 RX ORDER — HYDROXYZINE HYDROCHLORIDE 25 MG/1
25 TABLET, FILM COATED ORAL EVERY 6 HOURS PRN
Status: CANCELLED | OUTPATIENT
Start: 2019-08-06

## 2019-08-06 RX ORDER — HYDROXYZINE HYDROCHLORIDE 25 MG/1
50 TABLET, FILM COATED ORAL EVERY 6 HOURS PRN
Status: CANCELLED | OUTPATIENT
Start: 2019-08-06

## 2019-08-06 RX ORDER — TAMSULOSIN HYDROCHLORIDE 0.4 MG/1
0.4 CAPSULE ORAL
Status: DISCONTINUED | OUTPATIENT
Start: 2019-08-06 | End: 2019-08-14 | Stop reason: HOSPADM

## 2019-08-06 RX ORDER — DOCUSATE SODIUM 100 MG/1
100 CAPSULE, LIQUID FILLED ORAL 2 TIMES DAILY
Status: CANCELLED | OUTPATIENT
Start: 2019-08-06

## 2019-08-06 RX ORDER — FLUTICASONE PROPIONATE 50 MCG
2 SPRAY, SUSPENSION (ML) NASAL DAILY
Status: CANCELLED | OUTPATIENT
Start: 2019-08-07

## 2019-08-06 RX ORDER — ESCITALOPRAM OXALATE 10 MG/1
10 TABLET ORAL DAILY
Status: DISCONTINUED | OUTPATIENT
Start: 2019-08-07 | End: 2019-08-07

## 2019-08-06 RX ORDER — GABAPENTIN 300 MG/1
300 CAPSULE ORAL 3 TIMES DAILY
Status: DISCONTINUED | OUTPATIENT
Start: 2019-08-06 | End: 2019-08-09

## 2019-08-06 RX ORDER — MIRTAZAPINE 15 MG/1
30 TABLET, FILM COATED ORAL
Status: DISCONTINUED | OUTPATIENT
Start: 2019-08-06 | End: 2019-08-14 | Stop reason: HOSPADM

## 2019-08-06 RX ORDER — METHADONE HYDROCHLORIDE 10 MG/ML
165 CONCENTRATE ORAL DAILY
Status: DISCONTINUED | OUTPATIENT
Start: 2019-08-07 | End: 2019-08-14 | Stop reason: HOSPADM

## 2019-08-06 RX ORDER — HALOPERIDOL 5 MG
5 TABLET ORAL EVERY 6 HOURS PRN
Status: CANCELLED | OUTPATIENT
Start: 2019-08-06

## 2019-08-06 RX ORDER — LANOLIN ALCOHOL/MO/W.PET/CERES
3 CREAM (GRAM) TOPICAL
Status: DISCONTINUED | OUTPATIENT
Start: 2019-08-06 | End: 2019-08-07

## 2019-08-06 RX ORDER — MAGNESIUM HYDROXIDE/ALUMINUM HYDROXICE/SIMETHICONE 120; 1200; 1200 MG/30ML; MG/30ML; MG/30ML
30 SUSPENSION ORAL EVERY 4 HOURS PRN
Status: CANCELLED | OUTPATIENT
Start: 2019-08-06

## 2019-08-06 RX ORDER — BENZTROPINE MESYLATE 1 MG/1
1 TABLET ORAL EVERY 6 HOURS PRN
Status: DISCONTINUED | OUTPATIENT
Start: 2019-08-06 | End: 2019-08-14 | Stop reason: HOSPADM

## 2019-08-06 RX ORDER — HALOPERIDOL 5 MG/ML
5 INJECTION INTRAMUSCULAR EVERY 6 HOURS PRN
Status: DISCONTINUED | OUTPATIENT
Start: 2019-08-06 | End: 2019-08-14 | Stop reason: HOSPADM

## 2019-08-06 RX ORDER — IBUPROFEN 600 MG/1
600 TABLET ORAL EVERY 8 HOURS PRN
Status: CANCELLED | OUTPATIENT
Start: 2019-08-06

## 2019-08-06 RX ORDER — IBUPROFEN 600 MG/1
600 TABLET ORAL EVERY 8 HOURS PRN
Status: DISCONTINUED | OUTPATIENT
Start: 2019-08-06 | End: 2019-08-07

## 2019-08-06 RX ORDER — RISPERIDONE 1 MG/1
1 TABLET, ORALLY DISINTEGRATING ORAL
Status: CANCELLED | OUTPATIENT
Start: 2019-08-06

## 2019-08-06 RX ORDER — NICOTINE 21 MG/24HR
21 PATCH, TRANSDERMAL 24 HOURS TRANSDERMAL DAILY
Status: CANCELLED | OUTPATIENT
Start: 2019-08-07

## 2019-08-06 RX ORDER — BENZTROPINE MESYLATE 1 MG/1
1 TABLET ORAL EVERY 6 HOURS PRN
Status: CANCELLED | OUTPATIENT
Start: 2019-08-06

## 2019-08-06 RX ORDER — NICOTINE 21 MG/24HR
21 PATCH, TRANSDERMAL 24 HOURS TRANSDERMAL DAILY
Status: DISCONTINUED | OUTPATIENT
Start: 2019-08-07 | End: 2019-08-10

## 2019-08-06 RX ORDER — DOCUSATE SODIUM 100 MG/1
100 CAPSULE, LIQUID FILLED ORAL 2 TIMES DAILY
Status: DISCONTINUED | OUTPATIENT
Start: 2019-08-06 | End: 2019-08-08

## 2019-08-06 RX ORDER — OLANZAPINE 5 MG/1
10 TABLET ORAL EVERY 8 HOURS PRN
Status: CANCELLED | OUTPATIENT
Start: 2019-08-06

## 2019-08-06 RX ORDER — HALOPERIDOL 5 MG
5 TABLET ORAL EVERY 6 HOURS PRN
Status: DISCONTINUED | OUTPATIENT
Start: 2019-08-06 | End: 2019-08-14 | Stop reason: HOSPADM

## 2019-08-06 RX ORDER — CLONAZEPAM 0.5 MG/1
0.5 TABLET ORAL 3 TIMES DAILY
Status: DISCONTINUED | OUTPATIENT
Start: 2019-08-06 | End: 2019-08-08

## 2019-08-06 RX ORDER — HYDROXYZINE 50 MG/1
50 TABLET, FILM COATED ORAL EVERY 6 HOURS PRN
Status: DISCONTINUED | OUTPATIENT
Start: 2019-08-06 | End: 2019-08-14 | Stop reason: HOSPADM

## 2019-08-06 RX ORDER — OLANZAPINE 10 MG/1
10 INJECTION, POWDER, LYOPHILIZED, FOR SOLUTION INTRAMUSCULAR EVERY 8 HOURS PRN
Status: DISCONTINUED | OUTPATIENT
Start: 2019-08-06 | End: 2019-08-14 | Stop reason: HOSPADM

## 2019-08-06 RX ORDER — GABAPENTIN 300 MG/1
300 CAPSULE ORAL 3 TIMES DAILY
Status: CANCELLED | OUTPATIENT
Start: 2019-08-06

## 2019-08-06 RX ORDER — MIRTAZAPINE 15 MG/1
30 TABLET, FILM COATED ORAL
Status: CANCELLED | OUTPATIENT
Start: 2019-08-06

## 2019-08-06 RX ORDER — ACETAMINOPHEN 325 MG/1
350 TABLET ORAL EVERY 6 HOURS PRN
Status: DISCONTINUED | OUTPATIENT
Start: 2019-08-06 | End: 2019-08-14 | Stop reason: HOSPADM

## 2019-08-06 RX ORDER — ACETAMINOPHEN 325 MG/1
350 TABLET ORAL EVERY 6 HOURS PRN
Status: CANCELLED | OUTPATIENT
Start: 2019-08-06

## 2019-08-06 RX ORDER — METHADONE HYDROCHLORIDE 10 MG/ML
165 CONCENTRATE ORAL DAILY
Status: CANCELLED | OUTPATIENT
Start: 2019-08-07

## 2019-08-06 RX ORDER — HALOPERIDOL 5 MG/ML
5 INJECTION INTRAMUSCULAR EVERY 6 HOURS PRN
Status: CANCELLED | OUTPATIENT
Start: 2019-08-06

## 2019-08-06 RX ORDER — ESCITALOPRAM OXALATE 10 MG/1
10 TABLET ORAL DAILY
Status: CANCELLED | OUTPATIENT
Start: 2019-08-07

## 2019-08-06 RX ORDER — CLONAZEPAM 0.5 MG/1
0.5 TABLET ORAL 3 TIMES DAILY
Status: CANCELLED | OUTPATIENT
Start: 2019-08-06

## 2019-08-06 RX ORDER — HYDROXYZINE HYDROCHLORIDE 25 MG/1
75 TABLET, FILM COATED ORAL EVERY 6 HOURS PRN
Status: CANCELLED | OUTPATIENT
Start: 2019-08-06

## 2019-08-06 RX ORDER — ACETAMINOPHEN 325 MG/1
975 TABLET ORAL EVERY 6 HOURS PRN
Status: CANCELLED | OUTPATIENT
Start: 2019-08-06

## 2019-08-06 RX ADMIN — NICOTINE POLACRILEX 2 MG: 2 GUM, CHEWING BUCCAL at 21:02

## 2019-08-06 RX ADMIN — GABAPENTIN 300 MG: 300 CAPSULE ORAL at 17:38

## 2019-08-06 RX ADMIN — METHADONE HYDROCHLORIDE 165 MG: 10 CONCENTRATE ORAL at 09:12

## 2019-08-06 RX ADMIN — MIRTAZAPINE 30 MG: 15 TABLET, FILM COATED ORAL at 21:02

## 2019-08-06 RX ADMIN — CLONAZEPAM 0.5 MG: 0.5 TABLET ORAL at 17:37

## 2019-08-06 RX ADMIN — GABAPENTIN 300 MG: 300 CAPSULE ORAL at 09:03

## 2019-08-06 RX ADMIN — FLUTICASONE PROPIONATE 2 SPRAY: 50 SPRAY, METERED NASAL at 09:11

## 2019-08-06 RX ADMIN — CLONAZEPAM 0.5 MG: 0.5 TABLET ORAL at 21:01

## 2019-08-06 RX ADMIN — NICOTINE POLACRILEX 2 MG: 2 GUM, CHEWING BUCCAL at 17:40

## 2019-08-06 RX ADMIN — NICOTINE 21 MG: 21 PATCH, EXTENDED RELEASE TRANSDERMAL at 09:04

## 2019-08-06 RX ADMIN — GABAPENTIN 300 MG: 300 CAPSULE ORAL at 21:01

## 2019-08-06 RX ADMIN — CLONAZEPAM 0.5 MG: 0.5 TABLET ORAL at 09:03

## 2019-08-06 RX ADMIN — MELATONIN 3 MG: at 21:01

## 2019-08-06 RX ADMIN — CARISOPRODOL 350 MG: 350 TABLET ORAL at 09:03

## 2019-08-06 RX ADMIN — TAMSULOSIN HYDROCHLORIDE 0.4 MG: 0.4 CAPSULE ORAL at 17:38

## 2019-08-06 RX ADMIN — ESCITALOPRAM OXALATE 10 MG: 10 TABLET ORAL at 09:03

## 2019-08-06 RX ADMIN — ACETAMINOPHEN 650 MG: 325 TABLET, FILM COATED ORAL at 12:51

## 2019-08-06 NOTE — PROGRESS NOTES
Progress Note - Rudie Bamberger 1990, 33 y o  male MRN: [id number]    Unit/Bed#: [address] [place] [address] Encounter: [id number]    Primary Care Provider: Willetta Spatz, MD   Date and time admitted to hospital: 8/3/2019 10:23 AM        Hepatitis C  Assessment & Plan  -see above    Urinary retention  Assessment & Plan  -cont Flomax  -patient reports he continues to be able to urinate    Tobacco abuse  Assessment & Plan  · Nicotine patch/smoking cessation    Elevated AST (SGOT)  Assessment & Plan  · Improving  · Tylenol <2  · HCV POS  · Outpt GI f/u    Suicidal ideation  Assessment & Plan  -psych managing  -will d/c to inpt psych    Severe episode of recurrent major depressive disorder, without psychotic features ([facility] [address] )  Assessment & Plan  -cont lexapro, klonopin, remeron  -psych managing      * Intentional methadone overdose ([facility] [address] )  Assessment & Plan  · Took Methadone 165mg x 2 and Soma 350mg x 2 at 0800 8/3/2019  · Toxicology consulted  · Narcan 0 1mg x 1 was given, no further doses required  · RUDS + amp/meth, ETOH neg, Tylenol <2  · Supportive Care  · Methadone resumed      Patient is medically cleared for discharge to inpatient psych on 2019  VTE Pharmacologic Prophylaxis: Lovenox    Patient Centered Rounds: I have performed bedside rounds with nursing staff today  Education and Discussions with Family / Patient: Pt    Time Spent for Care: 20 minutes  More than 50% of total time spent on counseling and coordination of care as described above  Current Length of Stay: 3 day(s)    Current Patient Status: Inpatient   Certification Statement: The patient will continue to require additional inpatient hospital stay due to suicide attempt    Discharge Plan: today    Code Status: Level 1 - Full Code      Subjective:   No new complaints      Objective:     Vitals:   Temp (24hrs), Av 6 °F (36 4 °C), Min:97 5 °F (36 4 °C), Max:97 7 °F (36 5 °C)    Temp:  [97 5 °F (36 4 °C)-97 7 °F (36 5 °C)] 97 7 °F (36 5 °C)  HR: [52-68] 68  Resp:  [16-20] 20  BP: (115-126)/(58-75) 115/63  SpO2:  [96 %-100 %] 98 %  Body mass index is 28 31 kg/m²  Input and Output Summary (last 24 hours): Intake/Output Summary (Last 24 hours) at 8/6/2019 1147  Last data filed at 8/6/2019 0900  Gross per 24 hour   Intake 480 ml   Output    Net 480 ml       Physical Exam:     Physical Exam   Constitutional: He is oriented to person, place, and time  He appears well-developed and well-nourished  HENT:   Head: Normocephalic and atraumatic  Eyes: Pupils are equal, round, and reactive to light  EOM are normal    Neck: Normal range of motion  Neck supple  Cardiovascular: Normal rate, regular rhythm and normal heart sounds  Pulmonary/Chest: Effort normal and breath sounds normal    Abdominal: Soft  Bowel sounds are normal    Musculoskeletal: He exhibits no edema  Neurological: He is alert and oriented to person, place, and time  No cranial nerve deficit  Skin: Skin is warm and dry  Psychiatric: He has a normal mood and affect  Vitals reviewed  Additional Data:     Labs:    Results from last 7 days   Lab Units 08/04/19  0516 08/03/19  1708   WBC Thousand/uL 9 22 9 52   HEMOGLOBIN g/dL 11 2* 11 1*   HEMATOCRIT % 33 6* 33 2*   PLATELETS Thousands/uL 256 288   NEUTROS PCT %  --  59   LYMPHS PCT %  --  26   MONOS PCT %  --  11   EOS PCT %  --  3     Results from last 7 days   Lab Units 08/04/19  0516   SODIUM mmol/L 143   POTASSIUM mmol/L 3 5   CHLORIDE mmol/L 106   CO2 mmol/L 26   BUN mg/dL 8   CREATININE mg/dL 0 85   ANION GAP mmol/L 11   CALCIUM mg/dL 8 1*   ALBUMIN g/dL 3 0*   TOTAL BILIRUBIN mg/dL 0 30   ALK PHOS U/L 56   ALT U/L 51   AST U/L 82*   GLUCOSE RANDOM mg/dL 105         Results from last 7 days   Lab Units 08/04/19  1703 08/04/19  1133 08/04/19  0617 08/03/19  1127   POC GLUCOSE mg/dl 92 90 93 67                   * I Have Reviewed All Lab Data Listed Above  * Additional Pertinent Lab Tests Reviewed:  All Labs For Current Hospital Admission Reviewed    Recent Cultures (last 7 days):           Last 24 Hours Medication List:     Current Facility-Administered Medications:  acetaminophen 650 mg Oral Q6H PRN DILLON Mon   carisoprodol 350 mg Oral TID DILLON Mon   clonazePAM 0 5 mg Oral TID DILLON Mon   docusate sodium 100 mg Oral BID DILLON Mon   enoxaparin 40 mg Subcutaneous Daily DILLON Mon   escitalopram 10 mg Oral Daily DILLON Flaherty   fluticasone 2 spray Each Nare Daily DILLON Flaherty   gabapentin 300 mg Oral TID DILLON Mon   melatonin 3 mg Oral HS DILLON Mon   methadone 165 mg Oral Daily DILLON Flaherty   mirtazapine 30 mg Oral HS DILLON Flaherty   naproxen 250 mg Oral 4x Daily PRN DILLON Mon   nicotine 21 mg Transdermal Daily DILLON Flaherty   ondansetron 4 mg Intravenous Q6H PRN DILLON Mon   tamsulosin 0 4 mg Oral Daily With Dinner DILLON Mon        Today, Patient Was Seen By: Meg Ocampo MD    ** Please Note: Dictation voice to text software may have been used in the creation of this document   **

## 2019-08-06 NOTE — DISCHARGE SUMMARY
Discharge- Bernie Rowe 1990, 33 y o  male MRN: [id number]    Unit/Bed#: [address] [place] [address] Encounter: [id number]    Primary Care Provider: Og King MD   Date and time admitted to hospital: 8/3/2019 10:23 AM        Hepatitis C  Assessment & Plan  -see above    Urinary retention  Assessment & Plan  -cont Flomax  -patient reports he continues to be able to urinate    Tobacco abuse  Assessment & Plan  · Nicotine patch/smoking cessation    Elevated AST (SGOT)  Assessment & Plan  · Improving  · Tylenol <2  · HCV POS  · Outpt GI f/u    Suicidal ideation  Assessment & Plan  -psych managing  -will d/c to inpt psych    Severe episode of recurrent major depressive disorder, without psychotic features ([facility] [address] )  Assessment & Plan  -cont lexapro, klonopin, remeron  -psych managing      * Intentional methadone overdose ([facility] [address] )  Assessment & Plan  · Took Methadone 165mg x 2 and Soma 350mg x 2 at 0800 8/3/2019  · Toxicology consulted  · Narcan 0 1mg x 1 was given, no further doses required  · RUDS + amp/meth, ETOH neg, Tylenol <2  · Supportive Care  · Methadone resumed      Discharging Physician / Practitioner: Jeff Rodriguez MD  PCP: Og King MD  Admission Date:   Admission Orders (From admission, onward)     Ordered        08/03/19 1421  Inpatient Admission (expected length of stay for this patient Order details is greater than two midnights)  Once         Signed and Held  DISCHARGE READMIT Admit Patient to [address] [place] [address] (use with Discharge Readmit Navigator in [facility] Discharge Readmit scenario including from any IP unit or different campus ED to [facility])  Nurse to release order when pt  arrives to [facility] Unit    Once                   Discharge Date: 08/06/19    Resolved Problems  Date Reviewed: 8/6/2019          Resolved    Somnolence 8/4/2019     Resolved by  DILLON Townsend    Encephalopathy 8/4/2019     Resolved by  DILLON Townsend    Acute respiratory failure with hypercapnia ([facility] [address] ) 8/4/2019 Resolved by  Angi Carr, [facility] Stay:  · Psychiatry    Procedures Performed:   · None    Significant Findings / Test Results:   · See above    Incidental Findings:   · None     Test Results Pending at Discharge (will require follow up): · None     Outpatient Tests Requested:  · None    Complications:  None    Reason for Admission:  Suicidal ideation    Hospital Course: Harjit Mcknight is a 33 y o  male patient who originally presented to the hospital on 8/3/2019 due to suicidal ideation as outlined in the H&P done on admission  Please see above list of diagnoses and related plan for additional information  Condition at Discharge: good     Discharge Day Visit / Exam:     * Please refer to separate progress note for these details *    Discharge instructions/Information to patient and family:   See after visit summary for information provided to patient and family  Provisions for Follow-Up Care:  See after visit summary for information related to follow-up care and any pertinent home health orders  Disposition:     Inpatient Psychiatry at [facility] to [facility]:   · Not Applicable to this Patient - Not Applicable to this Patient    Planned Readmission: Inpt psych     Discharge Statement:  I spent 32 minutes discharging the patient  This time was spent on the day of discharge  I had direct contact with the patient on the day of discharge  Greater than 50% of the total time was spent examining patient, answering all patient questions, arranging and discussing plan of care with patient as well as directly providing post-discharge instructions  Additional time then spent on discharge activities  Discharge Medications:  See after visit summary for reconciled discharge medications provided to patient and family        ** Please Note: This note has been constructed using a voice recognition system **
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
## 2019-08-06 NOTE — ASSESSMENT & PLAN NOTE
· Took Methadone 165mg x 2 and Soma 350mg x 2 at 0800 8/3/2019  · Toxicology consulted  · Narcan 0 1mg x 1 was given, no further doses required  · RUDS + amp/meth, ETOH neg, Tylenol <2  · Supportive Care  · Methadone resumed

## 2019-08-06 NOTE — PLAN OF CARE
Problem: NEUROSENSORY - ADULT  Goal: Achieves stable or improved neurological status  Description  INTERVENTIONS  - Monitor and report changes in neurological status  - Initiate measures to prevent increased intracranial pressure  - Maintain blood pressure and fluid volume within ordered parameters to optimize cerebral perfusion  - Monitor temperature, glucose, and sodium or any other associated labs  Initiate appropriate interventions as ordered  - Monitor for seizure activity   - Administer anti-seizure medications as ordered  Outcome: Progressing     Problem: CARDIOVASCULAR - ADULT  Goal: Maintains optimal cardiac output and hemodynamic stability  Description  INTERVENTIONS:  - Monitor I/O, vital signs and rhythm  - Monitor for S/S and trends of decreased cardiac output i e  bleeding, hypotension  - Administer and titrate ordered vasoactive medications to optimize hemodynamic stability  - Assess quality of pulses, skin color and temperature  - Assess for signs of decreased coronary artery perfusion - ex   Angina  - Instruct patient to report change in severity of symptoms  Outcome: Progressing     Problem: RESPIRATORY - ADULT  Goal: Achieves optimal ventilation and oxygenation  Description  INTERVENTIONS:  - Assess for changes in respiratory status  - Assess for changes in mentation and behavior  - Position to facilitate oxygenation and minimize respiratory effort  - Oxygen administration by appropriate delivery method based on oxygen saturation (per order) or ABGs  - Initiate smoking cessation education as indicated  - Encourage broncho-pulmonary hygiene including cough, deep breathe, Incentive Spirometry  - Assess the need for suctioning and aspirate as needed  - Assess and instruct to report SOB or any respiratory difficulty  - Respiratory Therapy support as indicated  Outcome: Progressing     Problem: METABOLIC, FLUID AND ELECTROLYTES - ADULT  Goal: Electrolytes maintained within normal limits  Description  INTERVENTIONS:  - Monitor labs and assess patient for signs and symptoms of electrolyte imbalances  - Administer electrolyte replacement as ordered  - Monitor response to electrolyte replacements, including repeat lab results as appropriate  - Instruct patient on fluid and nutrition as appropriate  Outcome: Progressing     Problem: COPING  Goal: Will report anxiety at manageable levels  Description  INTERVENTIONS:  - Administer medication as ordered  - Teach and encourage coping skills  - Provide emotional support  - Assess patient/family for anxiety and ability to cope  Outcome: Progressing     Problem: SELF HARM  Goal: Effect of psychiatric condition will be minimized and patient will be protected from self harm  Description  INTERVENTIONS:  - Assess impact of patient's symptoms on level of functioning, self-care needs and offer support as indicated  - Assess patient/family knowledge of depression, impact on illness and need for teaching  - Provide emotional support, presence and reassurance  - Assess for possible suicidal thoughts, ideation or self-harm  If patient expresses suicidal thoughts or statements do not leave alone, notify physician/AP immediately, initiate suicide precautions, and determine need for continual observation    - initiate consults and referrals as appropriate (a mental health professional, Spiritual Care  Outcome: Progressing     Problem: SUBSTANCE USE/ABUSE  Goal: Will have no detox symptoms and will verbalize plan for changing substance-related behavior  Description  INTERVENTIONS:  - Monitor physical status and assess for symptoms of withdrawal  - Administer medication as ordered  - Provide emotional support with 1 on 1 interaction with staff  - Encourage recovery focused program/ addiction education  - Assess for verbalization of changing behaviors related to substance abuse  - Initiate consults and referrals as appropriate (Case Management, Spiritual Care, etc )  Outcome: Progressing  Goal: By discharge, will develop insight into their chemical dependency and sustain motivation to continue in recovery  Description  INTERVENTIONS:  - Attends all daily group sessions and scheduled AA groups  - Actively practices coping skills through participation in the therapeutic community and adherence to program rules  - Reviews and completes assignments from individual treatment plan  - Assist patient development of understanding of their personal cycle of addiction and relapse triggers  Outcome: Progressing  Goal: By discharge, patient will have ongoing treatment plan addressing chemical dependency  Description  INTERVENTIONS:  - Assist patient with resources and/or appointments for ongoing recovery based living  Outcome: Progressing     Problem: Potential for Falls  Goal: Patient will remain free of falls  Description  INTERVENTIONS:  - Assess patient frequently for physical needs  -  Identify cognitive and physical deficits and behaviors that affect risk of falls    -  Morris fall precautions as indicated by assessment   - Educate patient/family on patient safety including physical limitations  - Instruct patient to call for assistance with activity based on assessment  - Modify environment to reduce risk of injury  - Consider OT/PT consult to assist with strengthening/mobility  Outcome: Progressing     Problem: Prexisting or High Potential for Compromised Skin Integrity  Goal: Skin integrity is maintained or improved  Description  INTERVENTIONS:  - Identify patients at risk for skin breakdown  - Assess and monitor skin integrity  - Assess and monitor nutrition and hydration status  - Monitor labs (i e  albumin)  - Assess for incontinence   - Turn and reposition patient  - Assist with mobility/ambulation  - Relieve pressure over bony prominences  - Avoid friction and shearing  - Provide appropriate hygiene as needed including keeping skin clean and dry  - Evaluate need for skin moisturizer/barrier cream  - Collaborate with interdisciplinary team (i e  Nutrition, Rehabilitation, etc )   - Patient/family teaching  Outcome: Progressing     Problem: Nutrition/Hydration-ADULT  Goal: Nutrient/Hydration intake appropriate for improving, restoring or maintaining nutritional needs  Description  Monitor and assess patient's nutrition/hydration status for malnutrition (ex- brittle hair, bruises, dry skin, pale skin and conjunctiva, muscle wasting, smooth red tongue, and disorientation)  Collaborate with interdisciplinary team and initiate plan and interventions as ordered  Monitor patient's weight and dietary intake as ordered or per policy  Utilize nutrition screening tool and intervene per policy  Determine patient's food preferences and provide high-protein, high-caloric foods as appropriate       INTERVENTIONS:  - Monitor oral intake, urinary output, labs, and treatment plans  - Assess nutrition and hydration status and recommend course of action  - Evaluate amount of meals eaten  - Assist patient with eating if necessary   - Allow adequate time for meals  - Recommend/ encourage appropriate diets, oral nutritional supplements, and vitamin/mineral supplements  - Order, calculate, and assess calorie counts as needed  - Recommend, monitor, and adjust tube feedings and TPN/PPN based on assessed needs  - Assess need for intravenous fluids  - Provide specific nutrition/hydration education as appropriate  - Include patient/family/caregiver in decisions related to nutrition  Outcome: Progressing     Problem: SELF HARM/SUICIDALITY  Goal: Will have no self-injury during hospital stay  Description  INTERVENTIONS:  - Q 15 MINUTES: Routine safety checks  - Q WAKING SHIFT & PRN: Assess risk to determine if routine checks are adequate to maintain patient safety  - Encourage patient to participate actively in care by formulating a plan to combat response to suicidal ideation, identify supports and resources  Outcome: Progressing     Problem: PSYCHOSIS  Goal: Will report no hallucinations or delusions  Description  Interventions:  - Administer medication as  ordered  - Every waking shifts and PRN assess for the presence of hallucinations and or delusions  - Assist with reality testing to support increasing orientation  - Assess if patient's hallucinations or delusions are encouraging self-harm or harm to others and intervene as appropriate  Outcome: Progressing     Problem: SLEEP DISTURBANCE  Goal: Will exhibit normal sleeping pattern  Description  Interventions:  -  Assess the patients sleep pattern, noting recent changes  - Administer medication as ordered  - Decrease environmental stimuli, including noise, as appropriate during the night  - Encourage the patient to actively participate in unit groups and or exercise during the day to enhance ability to achieve adequate sleep at night  - Assess the patient, in the morning, encouraging a description of sleep experience  Outcome: Progressing

## 2019-08-06 NOTE — PLAN OF CARE
Problem: SELF HARM/SUICIDALITY  Goal: Will have no self-injury during hospital stay  Description  INTERVENTIONS:  - Q 15 MINUTES: Routine safety checks  - Q WAKING SHIFT & PRN: Assess risk to determine if routine checks are adequate to maintain patient safety  - Encourage patient to participate actively in care by formulating a plan to combat response to suicidal ideation, identify supports and resources  Outcome: Progressing     Problem: DEPRESSION  Goal: Will be euthymic at discharge  Description  INTERVENTIONS:  - Administer medication as ordered  - Provide emotional support via 1:1 interaction with staff  - Encourage involvement in milieu/groups/activities  - Monitor for social isolation  Outcome: Progressing     Problem: ANXIETY  Goal: Will report anxiety at manageable levels  Description  INTERVENTIONS:  - Administer medication as ordered  - Teach and encourage coping skills  - Provide emotional support  - Assess patient/family for anxiety and ability to cope  Outcome: Progressing  Goal: By discharge: Patient will verbalize 2 strategies to deal with anxiety  Description  Interventions:  - Identify any obvious source/trigger to anxiety  - Staff will assist patient in applying identified coping technique/skills  - Encourage attendance of scheduled groups and activities  Outcome: Progressing     Problem: SUBSTANCE USE/ABUSE  Goal: Will have no detox symptoms and will verbalize plan for changing substance-related behavior  Description  INTERVENTIONS:  - Monitor physical status and assess for symptoms of withdrawal  - Administer medication as ordered  - Provide emotional support with 1 on 1 interaction with staff  - Encourage recovery focused program/ addiction education  - Assess for verbalization of changing behaviors related to substance abuse  - Initiate consults and referrals as appropriate (Case Management, Spiritual Care, etc )  Outcome: Progressing  Goal: By discharge, will develop insight into their chemical dependency and sustain motivation to continue in recovery  Description  INTERVENTIONS:  - Attends all daily group sessions and scheduled AA groups  - Actively practices coping skills through participation in the therapeutic community and adherence to program rules  - Reviews and completes assignments from individual treatment plan  - Assist patient development of understanding of their personal cycle of addiction and relapse triggers  Outcome: Progressing  Goal: By discharge, patient will have ongoing treatment plan addressing chemical dependency  Description  INTERVENTIONS:  - Assist patient with resources and/or appointments for ongoing recovery based living  Outcome: Progressing     Problem: SLEEP DISTURBANCE  Goal: Will exhibit normal sleeping pattern  Description  Interventions:  -  Assess the patients sleep pattern, noting recent changes  - Administer medication as ordered  - Decrease environmental stimuli, including noise, as appropriate during the night  - Encourage the patient to actively participate in unit groups and or exercise during the day to enhance ability to achieve adequate sleep at night  - Assess the patient, in the morning, encouraging a description of sleep experience  Outcome: Progressing

## 2019-08-06 NOTE — CONSULTS
Consultation - [facility] 33 y o  male MRN: [id number]  Unit/Bed#: 2 [place] 214-01 Encounter: [id number]    Assessment/Plan     Assessment:  Severe episode of recurrent major depressive disorder, without psychotic features  Intentional methadone overdose  Anxiety    Plan:   1  Continue current psychiatric medications that he was previously discharged on  2  Patient will require continued 1:1 observation and transfer to [facility] when medically cleared  3  Is willing to sign voluntary 201 commitment  Risks, benefits and possible side effects of Medications:   Risks, benefits, and possible side effects of medications explained to patient and patient verbalizes understanding  Chief Complaint: "I am very depressed and anxious"    History of Present Illness   Physician Requesting Consult: Danial Chilel DO  Reason for Consult / Principal Problem: Suicide attempt    Patient is a 50-year-old male who was found outside of [address] on bench in an altered state  Was found to have overdosed on double the dosage of Methadone, Soma, and stated he would inject fentanyl if he get his hands on it  Unclear how patient obtained double the dosage of Methadone since he goes to Methadone clinic  He also admitted to relapsing on methamphetamine which also showed up positive on UDS  Other than currently prescribed Methadone and methamphetamine no other drugs were found in his system  Patient reported medication compliance after leaving hospital   He did state he resumed taking Klonopin at 1mg 3 times a day after discharge (was told to take   5mg TID)  In hospitalization last time patient had unstable vitals that showed bradycardia and hypotension  He was seen largely sedated where Methadone dosage and Klonopin both were reduced  Methadone was resumed at prior prescribed dose by end of hospitalization  Patient was focused on going back on previously prescribed Klonopin dosing    He did state that he is anxious but appeared largely comfortable eating ice cream during interview  Reports main stressor was recent death of his grandmother  He also is now facing marijuana charges after having it found on him when he was outside of the hospital   States he is unsure if he is able to return to mother's home and and is questionably homeless at this time  Reports ongoing depressive symptoms with poor sleep, anhedonia, lack of energy, guilt, hopelessness, passive death wishes  Contracts for safety  Reports ongoing anxiety with racing thoughts  Denied panic attacks  Denied psychosis  Does not show signs of linden  Was not irritable and was overall cooperative  Willing to sign voluntary 201  Not interested in inpatient rehab or IOP services  Would require lower dose of Methadone and stopping Klonopin for this route of treatment  Inpatient consult to Psychiatry  Consult performed by: Ronda Orantes PA-C  Consult ordered by: Dennie Gunner, MD          Psychiatric Review Of Systems:  sleep: yes  appetite changes: no  weight changes: no  energy/anergy: yes  interest/pleasure/anhedonia: yes  somatic symptoms: no  anxiety/panic: yes  linden: no  guilty/hopeless: yes  self injurious behavior/risky behavior: yes (overdose)    Historical Information   Past Psychiatric History:   Prior inpatient psychiatric hospitalizations (last was 7/17/19 to 7/26/19)  Currently in treatment with PCP    In process of getting outpatient psychiatrist   Followed up with PCP, Therapist, and Methadone clinic after DC  Past Suicide attempts: yes  Past Violent behavior: no  Past Psychiatric medication trial: multiple    Substance Abuse History:  Yes, Methamphetamine, heroin, cocaine, THC  Use of Alcohol: prior    History of IP/OP rehabilitation program: yes  Smoking history: yes  Use of Caffeine: occasional    Family Psychiatric History:   denied    Social History  Education: high school diploma/GED  Learning Disabilities: none  Marital history: single  Living arrangement, social support: lives with mother  May now be homeless  Occupational History: unemployed  Functioning Relationships: supportive family    Other Pertinent History: denied    Traumatic History:   Abuse: denied  Other Traumatic Events: denied    Past Medical History:   Diagnosis Date    Allergic     Anxiety     Depression     Spinal stenosis        Medical Review Of Systems:  Review of Systems    Meds/Allergies   all current active meds have been reviewed and current meds:   Current Facility-Administered Medications   Medication Dose Route Frequency    acetaminophen (TYLENOL) tablet 650 mg  650 mg Oral Q6H PRN    carisoprodol (SOMA) tablet 350 mg  350 mg Oral TID    clonazePAM (KlonoPIN) tablet 0 5 mg  0 5 mg Oral TID    docusate sodium (COLACE) capsule 100 mg  100 mg Oral BID    enoxaparin (LOVENOX) subcutaneous injection 40 mg  40 mg Subcutaneous Daily    escitalopram (LEXAPRO) tablet 10 mg  10 mg Oral Daily    fluticasone (FLONASE) 50 mcg/act nasal spray 2 spray  2 spray Each Nare Daily    gabapentin (NEURONTIN) capsule 300 mg  300 mg Oral TID    melatonin tablet 3 mg  3 mg Oral HS    methadone (DOLOPHINE) 10 mg/mL oral concentrated solution 165 mg  165 mg Oral Daily    mirtazapine (REMERON) tablet 30 mg  30 mg Oral HS    naproxen (NAPROSYN) tablet 250 mg  250 mg Oral 4x Daily PRN    nicotine (NICODERM CQ) 21 mg/24 hr TD 24 hr patch 21 mg  21 mg Transdermal Daily    ondansetron (ZOFRAN) injection 4 mg  4 mg Intravenous Q6H PRN    tamsulosin (FLOMAX) capsule 0 4 mg  0 4 mg Oral Daily With Dinner     No Known Allergies    Objective   Vital signs in last 24 hours:  Temp:  [97 5 °F (36 4 °C)-97 7 °F (36 5 °C)] 97 7 °F (36 5 °C)  HR:  [52-68] 68  Resp:  [16-20] 20  BP: (115-126)/(58-75) 115/63      Intake/Output Summary (Last 24 hours) at 8/6/2019 1125  Last data filed at 8/6/2019 0900  Gross per 24 hour   Intake 480 ml   Output    Net 480 ml Mental Status Evaluation:  Appearance:  casually dressed   Behavior:  guarded   Speech:  pressured   Mood:  anxious and depressed   Affect:  anxious   Language: appropriate   Thought Process:  goal directed and linear   Thought Content:  obsessions   Perceptual Disturbances: None   Risk Potential: Suicidal Ideations without plan  Denied HI   Potential for aggression: no   Sensorium:  person, place and time/date   Cognition:  grossly intact   Consciousness:  alert and awake    Attention: attention span appeared shorter than expected for age   Intellect: within normal limits   Fund of Knowledge: awareness of current events: yes   Insight:  poor   Judgment: poor   Gait/Station: normal gait/station and normal balance   Motor Activity: no abnormal movements     Lab Results: reviewed  Imaging Studies: reviewe  EKG, Pathology, and Other Studies: reviewed    Code Status: Level 1 - Full Code    Ronda Orantes PA-C

## 2019-08-06 NOTE — SOCIAL WORK
Continue to follow  Call received from [address] at [address] can accept Pt today  Call placed to [facility], spoke with [place]([phone]), 3 days will be approved  2[facility] needs to call [address] [place] [address] when Pt gets to unit to obtain auth number  Spoke with [address] to inform of  conversation of with Siddhartha Joy at [address] [place] [address]  Pt informed of acceptance on 2[facility]  Call placed to Pt's mother per Pt's permission, Pt's mother informed that Pt was accepted on [facility] and in agreement  Pt's nurse informed to call 2[facility] nurses station for report

## 2019-08-06 NOTE — SOCIAL WORK
Continue to follow   informed Pt is medically cleared for psych  Met with Pt  Pt requesting to return to 2[facility] for treatment  Call placed to Pt's mother([place]: [phone]) per Pt's permission  Pt's mother informed Pt is requesting [facility] for inpt treatment  Pt's mother in agreement  Pt's mother informed  that Pt has a [address]  through [facility] who will be meeting with Pt today about future housing  Faxed 201 to psych intake  Await return call and determination

## 2019-08-06 NOTE — PROGRESS NOTES
Belongings Patient Admitted With:    At bedside:  2 underwear  2 pants  2 shirts  Deodorant  Soap  Lotion  2 socks  Shorts  2 books    Security:  Ticket for [id number] came over with patient from [facility] unit    Locker:  [facility]  3 chargers  2 sunglasses  1 deodorant  2 soaps  Lotion  Umbrella  [place]  Electric razor  Toothbrush  Toothpaste  Face wipes  Cleansing clothes  2 vape cartriges  2 vape pens  [employer] w/jose Stark and key  [employer]  Cell phone  Folder w/papers  Wash cloth  2 pairs of ear buds  2 chapsticks  Tile  Eyedrops  Wallet: cards, insurance cards

## 2019-08-06 NOTE — TREATMENT PLAN
RN will assess patient at least twice daily for symptoms of admission and educate patient on diagnosis, medications and coping skills

## 2019-08-07 ENCOUNTER — TRANSITIONAL CARE MANAGEMENT (OUTPATIENT)
Dept: FAMILY MEDICINE CLINIC | Facility: CLINIC | Age: 29
End: 2019-08-07

## 2019-08-07 PROBLEM — F15.10 METHAMPHETAMINE ABUSE (HCC): Status: ACTIVE | Noted: 2019-08-07

## 2019-08-07 PROCEDURE — 99223 1ST HOSP IP/OBS HIGH 75: CPT | Performed by: PSYCHIATRY & NEUROLOGY

## 2019-08-07 PROCEDURE — 99232 SBSQ HOSP IP/OBS MODERATE 35: CPT | Performed by: PHYSICIAN ASSISTANT

## 2019-08-07 RX ORDER — CARISOPRODOL 350 MG/1
350 TABLET ORAL EVERY 6 HOURS PRN
Status: DISCONTINUED | OUTPATIENT
Start: 2019-08-07 | End: 2019-08-14 | Stop reason: HOSPADM

## 2019-08-07 RX ORDER — LANOLIN ALCOHOL/MO/W.PET/CERES
6 CREAM (GRAM) TOPICAL
Status: DISCONTINUED | OUTPATIENT
Start: 2019-08-07 | End: 2019-08-14 | Stop reason: HOSPADM

## 2019-08-07 RX ORDER — ARIPIPRAZOLE 5 MG/1
5 TABLET ORAL DAILY
Status: DISCONTINUED | OUTPATIENT
Start: 2019-08-07 | End: 2019-08-14 | Stop reason: HOSPADM

## 2019-08-07 RX ORDER — ACETAMINOPHEN 325 MG/1
650 TABLET ORAL EVERY 6 HOURS PRN
Status: DISCONTINUED | OUTPATIENT
Start: 2019-08-07 | End: 2019-08-07

## 2019-08-07 RX ORDER — NAPROXEN 500 MG/1
500 TABLET ORAL 2 TIMES DAILY PRN
Status: DISCONTINUED | OUTPATIENT
Start: 2019-08-07 | End: 2019-08-14 | Stop reason: HOSPADM

## 2019-08-07 RX ORDER — ACETAMINOPHEN 325 MG/1
975 TABLET ORAL EVERY 8 HOURS PRN
Status: DISCONTINUED | OUTPATIENT
Start: 2019-08-07 | End: 2019-08-07

## 2019-08-07 RX ADMIN — MELATONIN 6 MG: at 21:01

## 2019-08-07 RX ADMIN — NICOTINE POLACRILEX 2 MG: 2 GUM, CHEWING BUCCAL at 21:03

## 2019-08-07 RX ADMIN — CLONAZEPAM 0.5 MG: 0.5 TABLET ORAL at 21:01

## 2019-08-07 RX ADMIN — CARISOPRODOL 350 MG: 350 TABLET ORAL at 15:50

## 2019-08-07 RX ADMIN — NICOTINE POLACRILEX 2 MG: 2 GUM, CHEWING BUCCAL at 12:08

## 2019-08-07 RX ADMIN — GABAPENTIN 300 MG: 300 CAPSULE ORAL at 21:01

## 2019-08-07 RX ADMIN — NICOTINE 21 MG: 21 PATCH, EXTENDED RELEASE TRANSDERMAL at 09:45

## 2019-08-07 RX ADMIN — GABAPENTIN 300 MG: 300 CAPSULE ORAL at 15:47

## 2019-08-07 RX ADMIN — DOCUSATE SODIUM 100 MG: 100 CAPSULE, LIQUID FILLED ORAL at 18:35

## 2019-08-07 RX ADMIN — NICOTINE POLACRILEX 2 MG: 2 GUM, CHEWING BUCCAL at 08:14

## 2019-08-07 RX ADMIN — GABAPENTIN 300 MG: 300 CAPSULE ORAL at 08:14

## 2019-08-07 RX ADMIN — TAMSULOSIN HYDROCHLORIDE 0.4 MG: 0.4 CAPSULE ORAL at 15:47

## 2019-08-07 RX ADMIN — FLUTICASONE PROPIONATE 2 SPRAY: 50 SPRAY, METERED NASAL at 08:15

## 2019-08-07 RX ADMIN — CARISOPRODOL 350 MG: 350 TABLET ORAL at 09:44

## 2019-08-07 RX ADMIN — ESCITALOPRAM OXALATE 10 MG: 10 TABLET ORAL at 08:14

## 2019-08-07 RX ADMIN — MIRTAZAPINE 30 MG: 15 TABLET, FILM COATED ORAL at 21:01

## 2019-08-07 RX ADMIN — CLONAZEPAM 0.5 MG: 0.5 TABLET ORAL at 08:14

## 2019-08-07 RX ADMIN — NICOTINE POLACRILEX 2 MG: 2 GUM, CHEWING BUCCAL at 15:47

## 2019-08-07 RX ADMIN — CLONAZEPAM 0.5 MG: 0.5 TABLET ORAL at 15:47

## 2019-08-07 RX ADMIN — ARIPIPRAZOLE 5 MG: 5 TABLET ORAL at 09:44

## 2019-08-07 RX ADMIN — METHADONE HYDROCHLORIDE 165 MG: 10 CONCENTRATE ORAL at 08:37

## 2019-08-07 NOTE — PROGRESS NOTES
Patient seen and examined  Recent transfer from [facility]  Initially admitted to [facility] ICU with intentional methadone overdose  He was found to have hepatitis C with elevated AST  He has been advised to follow up with GI upon discharge  He reports worsening of his withdrawal symptoms with anxiety, nausea, and shakiness  He denies other changes to his health since transfer  Gen: agitated, hyperactive, walking the hallway  Card: RRR S1 S2 without murmur, rub, gallop  Lungs: Clear to auscultation, without wheezes, crackles, or rhonchi  Abd: soft, non-tender, non-distended, active bowel sounds       Medically cleared for inpatient psychiatric evaluation and treatment

## 2019-08-07 NOTE — PROGRESS NOTES
Pt calm and talkative during 1:1,  States he is feeling tired today and thinks he may be getting a sickness  Reports that he has a low grade fever saying his temp was a little over 99 this morning  Pt states that he is very tired today adding "I have not been sleeping well so it is catching up to me "  Reports that Medication Soma gave effective relief from his back pain which was given at 1550  Pt has been visibile on the unit  Cooperative with medication  Denies SI/HI and offers no complaints  Will continue to monitor
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
## 2019-08-07 NOTE — PROGRESS NOTES
08/07/19 0741   Team Meeting   Meeting Type Daily Rounds   Team Members Present   Team Members Present Physician;Nurse;   Physician Team Member Dr Sammi Vega Team Member [facility] Team Member Char/ [address]   Patient/Family Present   Patient Present No   Patient's Family Present No     Pt is a [address]  Transferred from [address] [place] [address] on Methadone  Narcaned in ED  Plan for overdose on Fentanyl  Pt is now homeless  Mother will not take pt back at time of dc  Pt is not agreeable to rehab

## 2019-08-07 NOTE — H&P
Psychiatric Evaluation - Behavioral Health   Tala Cali 33 y o  male MRN: [id number]  Unit/Bed#: [facility] 093-79 Encounter: [id number]    Assessment/Plan   Principal Problem:    Severe episode of recurrent major depressive disorder, without psychotic features ([facility] )  Active Problems:    Methamphetamine abuse ([facility] )    Plan:   1  Check admission labs  2  Collaborate with family for baseline assessment and disposition planning  3  Continue mirtazapine 30 mg at bedtime for depression and insomnia management  4  Discontinue Lexapro as patient report having hypomanic/manic symptoms after Lexapro was added and agreed with plan of adding Abilify 5 mg daily for depression augmentation and mood stabilization  5  Increase melatonin to 6 mg at bedtime for insomnia management  6  Will collaborate with recovery homes and consider taper of Klonopin if they do not except patient is on benzodiazepine  Patient is in agreement with this planning  7  Continue methadone 165 mg daily  Risks, benefits and possible side effects of Medications:   Risks, benefits, and possible side effects of medications explained to patient and patient verbalizes understanding  Chief Complaint: "I don't want to go on living like this"    History of Present Illness     Patient is a 33 y o  male presents on 12 from a medical unit where he was admitted for overdose on unknown amount of methadone  Patient reports he recently found out that his grandmother  and this resulted in worsening of depression and anxiety and patient ending up relapsing on unknown amount of methamphetamine  Patient was recently admitted under my care for similar presentation and was discharged on 19  Patient was appropriate during evaluation and reports that after discharge from hospital, he felt more activated on Lexapro in terms of decline in sleep, racing thoughts, increased irritability, rapid speech and impulsivity    Patient continues to verbalize that he these manic, hypomanic symptoms came 1st before he relapsed on methamphetamine  Discussed the possibility of underlying bipolar disorder based on this history and he agreed with plan of discontinuing Lexapro and adding Abilify for mood stabilization and continuing mirtazapine for depression management  Patient is not in agreement with rehabilitation but is consenting for safety on the unit and agreed with the above treatment planning  Also see psychiatry consultation note while patient was admitted on a medical unit recently before this transfer to inpatient psychiatry unit  Medical Review Of Systems:  none    Psychiatric Review Of Systems:  sleep: yes  appetite changes: no  weight changes: no  energy/anergy: yes  interest/pleasure/anhedonia: no  somatic symptoms: yes  anxiety/panic: yes  linden: no  guilty/hopeless: yes  self injurious behavior/risky behavior: yes    Historical Information     Past Psychiatric History:   Multiple prior inpatient psychiatric admissions  Currently in treatment with PCP  Past Suicide attempts:  Yes-recent overdose attempt  Past Violent behavior:  Denies  Past Psychiatric medication trial:  Fluoxetine, Lexapro, Wellbutrin, Klonopin, methadone    Substance Abuse History:  Patient recently relapsed on methamphetamine-reports using for 2 days only in last week  I spent time with patient in counseling and education on risk of substance abuse  Assessed him motivation and encouraged patient for treatment  Brief intervention done       Social History     Tobacco History     Smoking Status  Former Smoker Quit date  9/1/2017 Smoking Frequency  0 25 packs/day    Smokeless Tobacco Use  Current User    Tobacco Comment  Pt uses a "vape" pen - use is equivalent to approximately 1 pack per day          Alcohol History     Alcohol Use Status  Never          Drug Use     Drug Use Status  Yes Types  Methamphetamines Frequency   3 times/week Comment  overdosed Methadone, used meth last 3days Sexual Activity     Sexually Active  Not Currently          Activities of Daily Living    Not Asked               Additional Substance Use Detail     Questions Responses    Substance Use Assessment Substance use within the past 12 months    Alcohol Use Frequency Denies use in past 12 months    Cannabis frequency Past occasional use    Comment: Never used on 7/17/2019 Never used ->Past occasional use on 8/6/2019     Heroin Frequency Past abuse    Heroin Last Use & Amount last use December 2016    Cocaine frequency Past occasional use    Comment: Past occasional use on 7/17/2019     Crack Cocaine Frequency Denies use in past 12 months    Methamphetamine Frequency 3 or more times/week    Cocaine last use 5/6/19    Comment: 5/6/2019 on 8/6/2019     Methamphetamine Last Use & Amount last use 8/2/2019    Narcotic Frequency Denies use in past 12 months    Benzodiazepine Frequency Denies use in past 12 months    Amphetamine frequency Denies use in past 12 months    Barbituate Frequency Denies use use in past 12 months    Inhalant frequency Never used    Comment: Never used on 7/17/2019     Hallucinogen frequency Never used    Comment: Never used on 7/17/2019     Ecstasy frequency Never used    Comment: Never used on 7/17/2019     Other drug frequency Never used    Comment: Never used on 7/17/2019     Opiate frequency Past abuse    Last reviewed by Diogo Vogel RN on 8/6/2019        I have assessed this patient for substance use within the past 12 months    Family Psychiatric History:   Not known to patient    Social History:  Housing issue- reports he is homeless  Currently not employed  Financial issues    Past Medical History:   Diagnosis Date    [facility]     Allergic     Anxiety     Chronic pain disorder     Depression     Spinal stenosis     [facility]stance [facility] )     [facility] attempt ([email or web address] )            Meds/Allergies   all current active meds have been reviewed  No Known Allergies    Objective   Vital signs in last 24 hours:  Temp:  [98 2 °F (36 8 °C)-99 2 °F (37 3 °C)] 98 2 °F (36 8 °C)  HR:  [52-59] 56  Resp:  [16-18] 16  BP: (112-124)/(57-64) 112/60    No intake or output data in the 24 hours ending 08/07/19 1233    Mental Status Evaluation:  Appearance:  casually dressed   Behavior:  guarded   Speech:  soft   Mood:  anxious and depressed   Affect:  constricted   Language: naming objects   Thought Process:  circumstantial   Thought Content:  obsessions   Perceptual Disturbances: None   Risk Potential: Suicidal Ideations without plan, Homicidal Ideations none and Potential for Aggression No   Sensorium:  person and place   Cognition:  grossly intact   Consciousness:  awake    Attention: attention span appeared shorter than expected for age   Intellect: normal   Fund of Knowledge: awareness of current events: fair   Insight:  limited   Judgment: limited   Muscle Strength and Tone: arm(s): bilateral   Gait/Station: normal gait/station   Motor Activity: no abnormal movements     Memory: Short and long term memory  fair       Laboratory results:    I have personally reviewed all pertinent laboratory/tests results  Labs in last 72 hours: No results for input(s): WBC, RBC, HGB, HCT, PLT, RDW, NEUTROABS, SODIUM, K, CL, CO2, BUN, CREATININE, GLUCOSE, GLUC, GLUF, CALCIUM, AST, ALT, ALKPHOS, TP, ALB, TBILI, CHOLESTEROL, HDL, TRIG, LDLCALC, VALPROICTOT, CARBAMAZEPIN, LITHIUM, AMMONIA, [id number], FREET4, T3FREE, PREGTESTUR, PREGSERUM, HCG, HCGQUANT, RPR in the last 72 hours      Invalid input(s):  RBC  Admission Labs:   Admission on 08/03/2019, Discharged on 08/06/2019   Component Date Value    Amph/Meth UR 08/03/2019 Positive*    Barbiturate Ur 08/03/2019 Negative     Benzodiazepine Urine 08/03/2019 Negative     Cocaine Urine 08/03/2019 Negative     Methadone Urine 08/03/2019 Positive*    Opiate Urine 08/03/2019 Negative     PCP Ur 08/03/2019 Negative     THC Urine 08/03/2019 Negative     EXTBreath Alcohol 08/03/2019 0 000     Sodium 08/03/2019 142     Potassium 08/03/2019 3 6     Chloride 08/03/2019 103     CO2 08/03/2019 28     ANION GAP 08/03/2019 11     BUN 08/03/2019 17     Creatinine 08/03/2019 1 03     Glucose 08/03/2019 74     Calcium 08/03/2019 8 9     AST 08/03/2019 120*    ALT 08/03/2019 65     Alkaline Phosphatase 08/03/2019 66     Total Protein 08/03/2019 7 8     Albumin 08/03/2019 3 8     Total Bilirubin 08/03/2019 0 60     eGFR 08/03/2019 98     Acetaminophen Level 08/03/2019 <2 0*    ph, Lei ISTAT 08/03/2019 7 299*    pCO2, Lei i-STAT 08/03/2019 54 8*    pO2, Lei i-STAT 08/03/2019 64 0*    BE, i-STAT 08/03/2019 0     HCO3, Lei i-STAT 08/03/2019 26 9     CO2, i-STAT 08/03/2019 29     O2 Sat, i-STAT 08/03/2019 89*    Specimen Type 08/03/2019 VENOUS     TSH 3RD GENERATON 08/03/2019 1 687     Troponin I 08/03/2019 <0 02     Clarity, UA 08/03/2019 Clear     Color, UA 08/03/2019 Dk Yellow     Specific Gravity, UA 08/03/2019 >=1 030     pH, UA 08/03/2019 6 0     Glucose, UA 08/03/2019 Negative     Ketones, UA 08/03/2019 Trace*    Blood, UA 08/03/2019 Negative     Protein, UA 08/03/2019 Trace*    Nitrite, UA 08/03/2019 Negative     Bilirubin, UA 08/03/2019 Negative     Urobilinogen, UA 08/03/2019 0 2     Leukocytes, UA 08/03/2019 Negative     WBC, UA 08/03/2019 4-10*    RBC, UA 08/03/2019 2-4*    Hyaline Casts, UA 08/03/2019 0-1*    Bacteria, UA 08/03/2019 Occasional     Epithelial Cells 08/03/2019 Occasional     MUCUS THREADS 08/03/2019 Occasional*    Troponin I 08/03/2019 <0 02     Sodium 08/03/2019 141     Potassium 08/03/2019 3 2*    Chloride 08/03/2019 105     CO2 08/03/2019 25     ANION GAP 08/03/2019 11     BUN 08/03/2019 16     Creatinine 08/03/2019 0 98     Glucose 08/03/2019 80     Calcium 08/03/2019 9 0     AST 08/03/2019 110*    ALT 08/03/2019 61     Alkaline Phosphatase 08/03/2019 61     Total Protein 08/03/2019 7 3     Albumin 08/03/2019 3 6     Total Bilirubin 08/03/2019 0 60     eGFR 08/03/2019 104     WBC 08/03/2019 9 52     RBC 08/03/2019 3 80*    Hemoglobin 08/03/2019 11 1*    Hematocrit 08/03/2019 33 2*    MCV 08/03/2019 87     MCH 08/03/2019 29 2     MCHC 08/03/2019 33 4     RDW 08/03/2019 14 5     MPV 08/03/2019 10 7     Platelets 55/43/0059 288     nRBC 08/03/2019 0     Neutrophils Relative 08/03/2019 59     Immat GRANS % 08/03/2019 0     Lymphocytes Relative 08/03/2019 26     Monocytes Relative 08/03/2019 11     Eosinophils Relative 08/03/2019 3     Basophils Relative 08/03/2019 1     Neutrophils Absolute 08/03/2019 5 59     Immature Grans Absolute 08/03/2019 0 02     Lymphocytes Absolute 08/03/2019 2 49     Monocytes Absolute 08/03/2019 1 02     Eosinophils Absolute 08/03/2019 0 32     Basophils Absolute 08/03/2019 0 08     Hepatitis B Surface Ag 08/03/2019 Non-reactive     Hep A IgM 08/03/2019 Non-reactive     Hepatitis C Ab 08/03/2019 High Reactive*    Hep B C IgM 08/03/2019 Non-reactive     Rapid HIV 1 AND 2 08/03/2019 Non-Reactive     HIV-1 P24 Ag Screen 08/03/2019 Non-Reactive     POC Glucose 08/03/2019 67     WBC 08/04/2019 9 22     RBC 08/04/2019 3 76*    Hemoglobin 08/04/2019 11 2*    Hematocrit 08/04/2019 33 6*    MCV 08/04/2019 89     MCH 08/04/2019 29 8     MCHC 08/04/2019 33 3     RDW 08/04/2019 14 6     Platelets 71/06/0086 256     MPV 08/04/2019 10 5     Sodium 08/04/2019 143     Potassium 08/04/2019 3 5     Chloride 08/04/2019 106     CO2 08/04/2019 26     ANION GAP 08/04/2019 11     BUN 08/04/2019 8     Creatinine 08/04/2019 0 85     Glucose 08/04/2019 105     Calcium 08/04/2019 8 1*    AST 08/04/2019 82*    ALT 08/04/2019 51     Alkaline Phosphatase 08/04/2019 56     Total Protein 08/04/2019 6 5     Albumin 08/04/2019 3 0*    Total Bilirubin 08/04/2019 0 30     eGFR 08/04/2019 118     POC Glucose 08/04/2019 93     POC Glucose 08/04/2019 90     POC Glucose 08/04/2019 92     Ventricular Rate 08/03/2019 81     Atrial Rate 08/03/2019 81     ND Interval 08/03/2019 166     QRSD Interval 08/03/2019 100     QT Interval 08/03/2019 400     QTC Interval 08/03/2019 464     P Axis 08/03/2019 73     QRS Axis 08/03/2019 87     T Wave Clark 08/03/2019 23     Ventricular Rate 08/04/2019 59     Atrial Rate 08/04/2019 59     ND Interval 08/04/2019 174     QRSD Interval 08/04/2019 106     QT Interval 08/04/2019 464     QTC Interval 08/04/2019 459     P Axis 08/04/2019 71     QRS Axis 08/04/2019 86     T Wave Axis 08/04/2019 52      Risks / Benefits of Treatment:     Risks, benefits, and possible side effects of medications explained to patient  The patient verbalizes understanding and agreement for treatment  Counseling / Coordination of Care:     Patient's presentation on admission and proposed treatment plan discussed with treatment team   Diagnosis, medication changes and treatment plan reviewed with patient  Recent stressors discussed with patient     Events leading to admission reviewed with patient  Importance of medication and treatment compliance reviewed with patient  Inpatient Psychiatric Certification:     Certification: Based upon physical, mental and social evaluations, I certify that inpatient psychiatric services are medically necessary for this patient for a duration of 7 midnights for the treatment of Severe episode of recurrent major depressive disorder, without psychotic features ([facility] )    Available alternative community resources do not meet the patient's mental health care needs  I further attest that an established written individualized plan of care has been implemented and is outlined in the patient's medical records  This note has been constructed using a voice recognition system  There may be translation, syntax,  or grammatical errors   If you have any questions, please contact the dictating provider

## 2019-08-07 NOTE — TREATMENT PLAN
TREATMENT PLAN REVIEW - [address] 33 y o  1990 male MRN: [id number]    [address] [place] [address] Room / Bed: [place] [address]/[facility] 968-78 Encounter: [id number]        Admit Date/Time:  8/6/2019  3:08 PM    Treatment Team: Attending Provider: Robert Canela; Patient Care Technician: Shaun Torres; Patient Care Technician: Marilyn Leslie; Registered Nurse: Antonio Finch RN; Care Manager: Renee Dsouza RN; : Jazlyn Lopez; Patient Care Technician: Digna Zeng; Patient Care Assistant: Jovanny Pérez; Registered Nurse:  Nilda Pearl RN; Patient Care Assistant: Nima Portillo; Occupational Therapy Assistant: STEPHANY Salas    Diagnosis: Principal Problem:    Severe episode of recurrent major depressive disorder, without psychotic features ([facility] )  Active Problems:    Methamphetamine abuse ([facility] )    Patient Strengths/Assets: cooperative, good past treatment response, motivation for treatment/growth, patient is on a voluntary commitment      Patient Barriers/Limitations: low self esteem, poor support system, substance abuse    Short Term Goals: decrease in depressive symptoms, decrease in anxiety symptoms, decrease in suicidal thoughts    Long Term Goals: improvement in depression, improvement in anxiety, stabilization of mood, free of suicidal thoughts, acceptance of need for psychiatric medications, acceptance of need for psychiatric treatment, acceptance of need for psychiatric follow up after discharge    Progress Towards Goals: starting psychiatric medications as prescribed    Recommended Treatment: medication management, patient medication education, group therapy, milieu therapy, continued Behavioral Health psychiatric evaluation/assessment process     Treatment Frequency: daily medication monitoring, group and milieu therapy daily, monitoring through interdisciplinary rounds, monitoring through weekly patient care conferences    Expected Discharge Date: 7 days - 8/14/2019    Discharge Plan: referral for outpatient medication management with a psychiatrist, referral for outpatient psychotherapy    Treatment Plan Created/Updated By: Avtar Mathews

## 2019-08-08 PROCEDURE — 99232 SBSQ HOSP IP/OBS MODERATE 35: CPT | Performed by: PSYCHIATRY & NEUROLOGY

## 2019-08-08 RX ORDER — CLONAZEPAM 0.5 MG/1
0.5 TABLET ORAL 2 TIMES DAILY
Status: DISCONTINUED | OUTPATIENT
Start: 2019-08-09 | End: 2019-08-11

## 2019-08-08 RX ORDER — CLONAZEPAM 0.5 MG/1
0.5 TABLET ORAL 3 TIMES DAILY
Status: COMPLETED | OUTPATIENT
Start: 2019-08-08 | End: 2019-08-08

## 2019-08-08 RX ORDER — SENNA PLUS 8.6 MG/1
1 TABLET ORAL
Status: DISCONTINUED | OUTPATIENT
Start: 2019-08-08 | End: 2019-08-14 | Stop reason: HOSPADM

## 2019-08-08 RX ADMIN — NICOTINE POLACRILEX 2 MG: 2 GUM, CHEWING BUCCAL at 08:11

## 2019-08-08 RX ADMIN — NICOTINE POLACRILEX 2 MG: 2 GUM, CHEWING BUCCAL at 21:01

## 2019-08-08 RX ADMIN — FLUTICASONE PROPIONATE 2 SPRAY: 50 SPRAY, METERED NASAL at 08:09

## 2019-08-08 RX ADMIN — ARIPIPRAZOLE 5 MG: 5 TABLET ORAL at 08:07

## 2019-08-08 RX ADMIN — CLONAZEPAM 0.5 MG: 0.5 TABLET ORAL at 17:01

## 2019-08-08 RX ADMIN — TAMSULOSIN HYDROCHLORIDE 0.4 MG: 0.4 CAPSULE ORAL at 17:02

## 2019-08-08 RX ADMIN — CARISOPRODOL 350 MG: 350 TABLET ORAL at 17:56

## 2019-08-08 RX ADMIN — NICOTINE POLACRILEX 2 MG: 2 GUM, CHEWING BUCCAL at 10:10

## 2019-08-08 RX ADMIN — CARISOPRODOL 350 MG: 350 TABLET ORAL at 08:11

## 2019-08-08 RX ADMIN — METHADONE HYDROCHLORIDE 165 MG: 10 CONCENTRATE ORAL at 08:07

## 2019-08-08 RX ADMIN — NICOTINE 21 MG: 21 PATCH, EXTENDED RELEASE TRANSDERMAL at 08:11

## 2019-08-08 RX ADMIN — GABAPENTIN 300 MG: 300 CAPSULE ORAL at 17:02

## 2019-08-08 RX ADMIN — CLONAZEPAM 0.5 MG: 0.5 TABLET ORAL at 08:07

## 2019-08-08 RX ADMIN — GABAPENTIN 300 MG: 300 CAPSULE ORAL at 21:01

## 2019-08-08 RX ADMIN — SENNOSIDES 8.6 MG: 8.6 TABLET, FILM COATED ORAL at 21:01

## 2019-08-08 RX ADMIN — GABAPENTIN 300 MG: 300 CAPSULE ORAL at 08:07

## 2019-08-08 RX ADMIN — MELATONIN 6 MG: at 21:01

## 2019-08-08 RX ADMIN — CLONAZEPAM 0.5 MG: 0.5 TABLET ORAL at 21:01

## 2019-08-08 RX ADMIN — DOCUSATE SODIUM 100 MG: 100 CAPSULE, LIQUID FILLED ORAL at 08:07

## 2019-08-08 RX ADMIN — MIRTAZAPINE 30 MG: 15 TABLET, FILM COATED ORAL at 21:01

## 2019-08-08 RX ADMIN — NAPROXEN 500 MG: 500 TABLET ORAL at 17:03

## 2019-08-08 RX ADMIN — NICOTINE POLACRILEX 2 MG: 2 GUM, CHEWING BUCCAL at 17:03

## 2019-08-08 NOTE — PROGRESS NOTES
Pt napping during the day  Reports feeling tired from starting Abilify as well as not sleeping well prior on medical unit prior to admission  He states doing crystal meth and being awake for 3 days before he arrived at the ED  Denies SI but reports feeling depressed and hopeless  Pt does express hope for Abilify to be effective and to be discharged soon

## 2019-08-08 NOTE — PLAN OF CARE
Problem: SELF HARM/SUICIDALITY  Goal: Will have no self-injury during hospital stay  Description  INTERVENTIONS:  - Q 15 MINUTES: Routine safety checks  - Q WAKING SHIFT & PRN: Assess risk to determine if routine checks are adequate to maintain patient safety  - Encourage patient to participate actively in care by formulating a plan to combat response to suicidal ideation, identify supports and resources  Outcome: Progressing     Problem: DEPRESSION  Goal: Will be euthymic at discharge  Description  INTERVENTIONS:  - Administer medication as ordered  - Provide emotional support via 1:1 interaction with staff  - Encourage involvement in milieu/groups/activities  - Monitor for social isolation  Outcome: Progressing     Problem: ANXIETY  Goal: Will report anxiety at manageable levels  Description  INTERVENTIONS:  - Administer medication as ordered  - Teach and encourage coping skills  - Provide emotional support  - Assess patient/family for anxiety and ability to cope  Outcome: Progressing  Goal: By discharge: Patient will verbalize 2 strategies to deal with anxiety  Description  Interventions:  - Identify any obvious source/trigger to anxiety  - Staff will assist patient in applying identified coping technique/skills  - Encourage attendance of scheduled groups and activities  Outcome: Progressing     Problem: SUBSTANCE USE/ABUSE  Goal: Will have no detox symptoms and will verbalize plan for changing substance-related behavior  Description  INTERVENTIONS:  - Monitor physical status and assess for symptoms of withdrawal  - Administer medication as ordered  - Provide emotional support with 1 on 1 interaction with staff  - Encourage recovery focused program/ addiction education  - Assess for verbalization of changing behaviors related to substance abuse  - Initiate consults and referrals as appropriate (Case Management, Spiritual Care, etc )  Outcome: Progressing  Goal: By discharge, will develop insight into their chemical dependency and sustain motivation to continue in recovery  Description  INTERVENTIONS:  - Attends all daily group sessions and scheduled AA groups  - Actively practices coping skills through participation in the therapeutic community and adherence to program rules  - Reviews and completes assignments from individual treatment plan  - Assist patient development of understanding of their personal cycle of addiction and relapse triggers  Outcome: Progressing  Goal: By discharge, patient will have ongoing treatment plan addressing chemical dependency  Description  INTERVENTIONS:  - Assist patient with resources and/or appointments for ongoing recovery based living  Outcome: Progressing

## 2019-08-08 NOTE — UTILIZATION REVIEW
Notification of Discharge  This is a Notification of Discharge from our facility [address]  Please be advised that this patient has been discharge from our facility  Below you will find the admission and discharge date and time including the patients disposition  PRESENTATION DATE: 8/3/2019 10:23 AM  OBS ADMISSION DATE:   IP ADMISSION DATE: 8/3/19 1421   DISCHARGE DATE: 8/6/2019  3:05 PM  DISPOSITION: [address]   Admission Orders listed below:  Admission Orders (From admission, onward)     Ordered        08/03/19 1421  Inpatient Admission (expected length of stay for this patient Order details is greater than two midnights)  Once         Signed and Held  DISCHARGE READMIT Admit Patient to [address] [place] [address] (use with Discharge Readmit Navigator in [place] [address] Discharge Readmit scenario including from any IP unit or different campus ED to [facility])  Nurse to release order when pt  arrives to [address] [place] [address]  Once,   Status:  Canceled                   Please contact the UR Department if additional information is required to close this patient's authorization/case  [facility] Utilization Review Department  Phone: [phone]; Fax [phone]  [email or web address]  ATTENTION: Please call with any questions or concerns to [phone]  and carefully listen to the prompts so that you are directed to the right person  Send all requests for admission clinical reviews, approved or denied determinations and any other requests to fax [phone]   All voicemails are confidential

## 2019-08-08 NOTE — CASE MANAGEMENT
CM met with pt to discuss dc planning  Pt provided with number for Patrick Larsen for [facility] to call her tonight  Pt agreeable to this  CM will continue to follow

## 2019-08-08 NOTE — PROGRESS NOTES
08/08/19 0766   Team Meeting   Meeting Type Daily Rounds   Team Members Present   Team Members Present Physician;   Physician Team Member Dr Garrison Lantigua Team Member [facility] Management Team Member Alvarez Crews   Patient/Family Present   Patient Present No   Patient's Family Present No     Pt needy  Pt took [facility] for back pain  Pt is hopeful to go to Recovery House  Pt is not agreeable for inpatient rehab  Pt is starting on a Klonopin taper due to potential problems with dispensation planning

## 2019-08-08 NOTE — PROGRESS NOTES
Treatment team mtg:  Diagnosis depression r/o bipolar  Initiated abilify  Goal improvement in depression and anxiety with no suicidal ideations  Rehab is not possibility due to high dose of methadone  Plan to taper klonopin over next couple days

## 2019-08-08 NOTE — CASE MANAGEMENT
CM outreached to mother who is supportive of pt being tapered off of Klonopin  Mother would like an application placed for [address]  Mother reports that pt's CPS - Benjamín Griffith ([phone]) is working on an application  CM outreached to Pt's CPS worker- Benjamín Griffith who agrees that pt is a high risk for relapse  Benjamín Griffith is not sure if pt has ever been to [facility]  Benjamín Griffith also states she will reach out to programs to see if pt meets criteria  Benjamín Griffith is also concerned that pt's methadone levels may be a barrier

## 2019-08-08 NOTE — CASE MANAGEMENT
SOFIE outreached to Lina Pemberton ([phone]) who has a Recovery House in the area  Silvia Pollen reports that she knows pt very well as is eager to help him  However, Silvia Pollen reports that pt is not able to return to the [address] [facility] [employer] that she runs in [facility] due to an issue with another resident that happened earlier this year  Silvia Pollen reports that it was an issue of trust and the particular resident is still living at the recovery house  Silvia Pollen reports that she is going to call some people on Thursday to try and see if someone else will be able to take pt  Silvia Pollen reports that pt is not going to be accepted into a residence while taking Klonopin  Silvia Pollen asks this writer to follow up with her later on Thursday

## 2019-08-08 NOTE — PROGRESS NOTES
Progress Note - Behavioral Health   Unique Erickson 33 y o  male MRN: [id number]  Unit/Bed#: Joseluis Moeller 872-40 Encounter: [id number]    Assessment/Plan   Principal Problem:    Severe episode of recurrent major depressive disorder, without psychotic features ([facility] )  Active Problems:    Methamphetamine abuse ([facility] )      Subjective:  Patient overall cooperative and agrees to treatment team plan  Reports he is fearful of reducing Klonopin dosing but understands reason for taper  Reports at this time feeling less impulsive and is regretful for overdose attempt  Reports depressive symptoms are slowly decreasing with reduction of passive death wishes  Contracts for safety  Appears more future oriented however is unclear with disposition plan is at this time  Does not endorse signs of linden  Denied psychosis  Denied delusional material   Medication compliant  Tolerating medications well without serious side effects      Current Medications:  Current Facility-Administered Medications   Medication Dose Route Frequency    acetaminophen (TYLENOL) tablet 325 mg  325 mg Oral Q6H PRN    aluminum-magnesium hydroxide-simethicone (MYLANTA) 200-200-20 mg/5 mL oral suspension 30 mL  30 mL Oral Q4H PRN    ARIPiprazole (ABILIFY) tablet 5 mg  5 mg Oral Daily    benztropine (COGENTIN) injection 1 mg  1 mg Intramuscular Q6H PRN    benztropine (COGENTIN) tablet 1 mg  1 mg Oral Q6H PRN    carisoprodol (SOMA) tablet 350 mg  350 mg Oral Q6H PRN    clonazePAM (KlonoPIN) tablet 0 5 mg  0 5 mg Oral TID    [START ON 8/9/2019] clonazePAM (KlonoPIN) tablet 0 5 mg  0 5 mg Oral BID    fluticasone (FLONASE) 50 mcg/act nasal spray 2 spray  2 spray Each Nare Daily    gabapentin (NEURONTIN) capsule 300 mg  300 mg Oral TID    haloperidol (HALDOL) tablet 5 mg  5 mg Oral Q6H PRN    haloperidol lactate (HALDOL) injection 5 mg  5 mg Intramuscular Q6H PRN    hydrOXYzine HCL (ATARAX) tablet 25 mg  25 mg Oral Q6H PRN    hydrOXYzine HCL (ATARAX) tablet 50 mg  50 mg Oral Q6H PRN    hydrOXYzine HCL (ATARAX) tablet 75 mg  75 mg Oral Q6H PRN    magnesium hydroxide (MILK OF MAGNESIA) 400 mg/5 mL oral suspension 30 mL  30 mL Oral Daily PRN    melatonin tablet 6 mg  6 mg Oral HS    methadone (DOLOPHINE) 10 mg/mL oral concentrated solution 165 mg  165 mg Oral Daily    mirtazapine (REMERON) tablet 30 mg  30 mg Oral HS    naproxen (NAPROSYN) tablet 500 mg  500 mg Oral BID PRN    nicotine (NICODERM CQ) 21 mg/24 hr TD 24 hr patch 21 mg  21 mg Transdermal Daily    nicotine polacrilex (NICORETTE) gum 2 mg  2 mg Oral Q2H PRN    OLANZapine (ZyPREXA) IM injection 10 mg  10 mg Intramuscular Q8H PRN    OLANZapine (ZyPREXA) tablet 10 mg  10 mg Oral Q8H PRN    risperiDONE (RisperDAL M-TABS) dispersible tablet 1 mg  1 mg Oral Q3H PRN    senna (SENOKOT) tablet 8 6 mg  1 tablet Oral HS    tamsulosin (FLOMAX) capsule 0 4 mg  0 4 mg Oral Daily With [place] Medications: all current active meds have been reviewed and continue current psychiatric medications  Vitals:  Vitals:    08/08/19 0902   BP: 139/83   Pulse: 89   Resp: 17   Temp: (!) 97 4 °F (36 3 °C)   SpO2: 97%       Laboratory results:    I have personally reviewed all pertinent laboratory/tests results    Most Recent Labs:   Lab Results   Component Value Date    WBC 9 22 08/04/2019    RBC 3 76 (L) 08/04/2019    HGB 11 2 (L) 08/04/2019    HCT 33 6 (L) 08/04/2019     08/04/2019    RDW 14 6 08/04/2019    NEUTROABS 5 59 08/03/2019    SODIUM 143 08/04/2019    K 3 5 08/04/2019     08/04/2019    CO2 26 08/04/2019    BUN 8 08/04/2019    CREATININE 0 85 08/04/2019    GLUC 105 08/04/2019    CALCIUM 8 1 (L) 08/04/2019    AST 82 (H) 08/04/2019    ALT 51 08/04/2019    ALKPHOS 56 08/04/2019    TP 6 5 08/04/2019    ALB 3 0 (L) 08/04/2019    TBILI 0 30 08/04/2019    [id number] 1 687 08/03/2019       Psychiatric Review of Systems:  Behavior over the last 24 hours:  improved  Sleep: normal  Appetite: normal  Medication side effects: No  ROS: chronic pain    Mental Status Evaluation:  Appearance:  casually dressed   Behavior:  less guarded, cooperative   Speech:  normal pitch and normal volume   Mood:  anxious and depressed   Affect:  appears less anxious   Language hyperverbal   Thought Process:  circumstantial   Thought Content:  obsessions   Perceptual Disturbances: None   Risk Potential: Reduction of passive death wishes  Denied HI  Potential for aggression: No   Sensorium:  person, place and time/date   Cognition:  grossly intact   Consciousness:  alert and awake    Recent and Remote Memory fair   Attention: attention span and concentration were age appropriate   Insight:  partial   Judgment: improving   Gait/Station: normal gait/station and normal balance   Motor Activity: no abnormal movements     Progress Toward Goals: slow progression    Recommended Treatment: Continue with group therapy, milieu therapy and occupational therapy  1   Plan to start Klonopin taper from   5mg TID to  5mg BID due to limitations in housing placement due to benzodiazpine use  Consider increase in Gabapentin  2   Per patient preference switch Colace to [place] for constipation  3  Continue other medications  4  Disposition planning     Risks, benefits and possible side effects of Medications:   Risks, benefits, and possible side effects of medications explained to patient and patient verbalizes understanding        Fatou Good PA-C

## 2019-08-08 NOTE — PLAN OF CARE
Problem: SELF HARM/SUICIDALITY  Goal: Will have no self-injury during hospital stay  Description  INTERVENTIONS:  - Q 15 MINUTES: Routine safety checks  - Q WAKING SHIFT & PRN: Assess risk to determine if routine checks are adequate to maintain patient safety  - Encourage patient to participate actively in care by formulating a plan to combat response to suicidal ideation, identify supports and resources  Outcome: Progressing     Problem: DEPRESSION  Goal: Will be euthymic at discharge  Description  INTERVENTIONS:  - Administer medication as ordered  - Provide emotional support via 1:1 interaction with staff  - Encourage involvement in milieu/groups/activities  - Monitor for social isolation  Outcome: Progressing     Problem: ANXIETY  Goal: Will report anxiety at manageable levels  Description  INTERVENTIONS:  - Administer medication as ordered  - Teach and encourage coping skills  - Provide emotional support  - Assess patient/family for anxiety and ability to cope  Outcome: Progressing  Goal: By discharge: Patient will verbalize 2 strategies to deal with anxiety  Description  Interventions:  - Identify any obvious source/trigger to anxiety  - Staff will assist patient in applying identified coping technique/skills  - Encourage attendance of scheduled groups and activities  Outcome: Progressing     Problem: SUBSTANCE USE/ABUSE  Goal: Will have no detox symptoms and will verbalize plan for changing substance-related behavior  Description  INTERVENTIONS:  - Monitor physical status and assess for symptoms of withdrawal  - Administer medication as ordered  - Provide emotional support with 1 on 1 interaction with staff  - Encourage recovery focused program/ addiction education  - Assess for verbalization of changing behaviors related to substance abuse  - Initiate consults and referrals as appropriate (Case Management, Spiritual Care, etc )  Outcome: Progressing  Goal: By discharge, will develop insight into their chemical dependency and sustain motivation to continue in recovery  Description  INTERVENTIONS:  - Attends all daily group sessions and scheduled AA groups  - Actively practices coping skills through participation in the therapeutic community and adherence to program rules  - Reviews and completes assignments from individual treatment plan  - Assist patient development of understanding of their personal cycle of addiction and relapse triggers  Outcome: Progressing  Goal: By discharge, patient will have ongoing treatment plan addressing chemical dependency  Description  INTERVENTIONS:  - Assist patient with resources and/or appointments for ongoing recovery based living  Outcome: Progressing     Problem: Ineffective Coping  Goal: Participates in unit activities  Description  Interventions:  - Provide therapeutic environment   - Provide required programming   - Redirect inappropriate behaviors   Outcome: Progressing

## 2019-08-09 PROCEDURE — 99232 SBSQ HOSP IP/OBS MODERATE 35: CPT | Performed by: PSYCHIATRY & NEUROLOGY

## 2019-08-09 RX ORDER — GABAPENTIN 400 MG/1
400 CAPSULE ORAL 3 TIMES DAILY
Status: DISCONTINUED | OUTPATIENT
Start: 2019-08-09 | End: 2019-08-12

## 2019-08-09 RX ADMIN — CLONAZEPAM 0.5 MG: 0.5 TABLET ORAL at 08:25

## 2019-08-09 RX ADMIN — NAPROXEN 500 MG: 500 TABLET ORAL at 17:13

## 2019-08-09 RX ADMIN — METHADONE HYDROCHLORIDE 165 MG: 10 CONCENTRATE ORAL at 08:26

## 2019-08-09 RX ADMIN — MIRTAZAPINE 30 MG: 15 TABLET, FILM COATED ORAL at 21:01

## 2019-08-09 RX ADMIN — ARIPIPRAZOLE 5 MG: 5 TABLET ORAL at 08:25

## 2019-08-09 RX ADMIN — MELATONIN 6 MG: at 21:01

## 2019-08-09 RX ADMIN — SENNOSIDES 8.6 MG: 8.6 TABLET, FILM COATED ORAL at 21:03

## 2019-08-09 RX ADMIN — FLUTICASONE PROPIONATE 2 SPRAY: 50 SPRAY, METERED NASAL at 10:41

## 2019-08-09 RX ADMIN — CARISOPRODOL 350 MG: 350 TABLET ORAL at 08:52

## 2019-08-09 RX ADMIN — NICOTINE POLACRILEX 2 MG: 2 GUM, CHEWING BUCCAL at 17:13

## 2019-08-09 RX ADMIN — GABAPENTIN 400 MG: 400 CAPSULE ORAL at 21:00

## 2019-08-09 RX ADMIN — NICOTINE POLACRILEX 2 MG: 2 GUM, CHEWING BUCCAL at 10:41

## 2019-08-09 RX ADMIN — GABAPENTIN 400 MG: 400 CAPSULE ORAL at 17:12

## 2019-08-09 RX ADMIN — CARISOPRODOL 350 MG: 350 TABLET ORAL at 21:03

## 2019-08-09 RX ADMIN — TAMSULOSIN HYDROCHLORIDE 0.4 MG: 0.4 CAPSULE ORAL at 17:12

## 2019-08-09 RX ADMIN — GABAPENTIN 300 MG: 300 CAPSULE ORAL at 08:25

## 2019-08-09 RX ADMIN — NICOTINE 21 MG: 21 PATCH, EXTENDED RELEASE TRANSDERMAL at 08:27

## 2019-08-09 RX ADMIN — CLONAZEPAM 0.5 MG: 0.5 TABLET ORAL at 17:12

## 2019-08-09 RX ADMIN — NICOTINE POLACRILEX 2 MG: 2 GUM, CHEWING BUCCAL at 08:28

## 2019-08-09 RX ADMIN — NICOTINE POLACRILEX 2 MG: 2 GUM, CHEWING BUCCAL at 21:03

## 2019-08-09 NOTE — PROGRESS NOTES
Pt calm/cooperative  Conversation held with Pt lying in bed  Pt reports being tired and wanting to sleep  Denies SI/HI/AVH, but reports some anxiety r/t discharge  Medication compliant, but is frequently at med room with various somatic complaints, potential DSB  Pt minimally visible in the milieu, only for meals and meds  Did attend community meeting today  After dinner Pt returned to room and has remained there since

## 2019-08-09 NOTE — PLAN OF CARE
Problem: SELF HARM/SUICIDALITY  Goal: Will have no self-injury during hospital stay  Description  INTERVENTIONS:  - Q 15 MINUTES: Routine safety checks  - Q WAKING SHIFT & PRN: Assess risk to determine if routine checks are adequate to maintain patient safety  - Encourage patient to participate actively in care by formulating a plan to combat response to suicidal ideation, identify supports and resources  Outcome: Progressing     Problem: DEPRESSION  Goal: Will be euthymic at discharge  Description  INTERVENTIONS:  - Administer medication as ordered  - Provide emotional support via 1:1 interaction with staff  - Encourage involvement in milieu/groups/activities  - Monitor for social isolation  Outcome: Progressing     Problem: ANXIETY  Goal: Will report anxiety at manageable levels  Description  INTERVENTIONS:  - Administer medication as ordered  - Teach and encourage coping skills  - Provide emotional support  - Assess patient/family for anxiety and ability to cope  Outcome: Progressing  Goal: By discharge: Patient will verbalize 2 strategies to deal with anxiety  Description  Interventions:  - Identify any obvious source/trigger to anxiety  - Staff will assist patient in applying identified coping technique/skills  - Encourage attendance of scheduled groups and activities  Outcome: Progressing     Problem: SUBSTANCE USE/ABUSE  Goal: Will have no detox symptoms and will verbalize plan for changing substance-related behavior  Description  INTERVENTIONS:  - Monitor physical status and assess for symptoms of withdrawal  - Administer medication as ordered  - Provide emotional support with 1 on 1 interaction with staff  - Encourage recovery focused program/ addiction education  - Assess for verbalization of changing behaviors related to substance abuse  - Initiate consults and referrals as appropriate (Case Management, Spiritual Care, etc )  Outcome: Progressing  Goal: By discharge, will develop insight into their chemical dependency and sustain motivation to continue in recovery  Description  INTERVENTIONS:  - Attends all daily group sessions and scheduled AA groups  - Actively practices coping skills through participation in the therapeutic community and adherence to program rules  - Reviews and completes assignments from individual treatment plan  - Assist patient development of understanding of their personal cycle of addiction and relapse triggers  Outcome: Progressing  Goal: By discharge, patient will have ongoing treatment plan addressing chemical dependency  Description  INTERVENTIONS:  - Assist patient with resources and/or appointments for ongoing recovery based living  Outcome: Progressing     Problem: SLEEP DISTURBANCE  Goal: Will exhibit normal sleeping pattern  Description  Interventions:  -  Assess the patients sleep pattern, noting recent changes  - Administer medication as ordered  - Decrease environmental stimuli, including noise, as appropriate during the night  - Encourage the patient to actively participate in unit groups and or exercise during the day to enhance ability to achieve adequate sleep at night  - Assess the patient, in the morning, encouraging a description of sleep experience  Outcome: Not Progressing     Problem: Ineffective Coping  Goal: Participates in unit activities  Description  Interventions:  - Provide therapeutic environment   - Provide required programming   - Redirect inappropriate behaviors   Outcome: Not Progressing

## 2019-08-09 NOTE — CASE MANAGEMENT
CM started filling out the [address] and Centralized Residential Referral Form for Adult Mental Health Residential Programs  CM asked pt to complete the last page which is asking for pt's interests and supports  Pt states he will complete it and get it back to this writer today  Pt is still very concerned about where he will live at [place]  Pt reports that he tried to get hold of [facility] last night and will try again today  CM will continue to follow

## 2019-08-09 NOTE — CASE MANAGEMENT
CM sent CRR referral for [place] to [email or web address] who reports that as she  she will send the referral to the most appropriate place  Dc Miguel advised this writer that she has sent the referral to [employer] (no number)  Pt signed REYNA for Dc Miguel to send the referral on to Deepa  CM will await call back to check status of application  Pt reports that he will call [facility] and stay in contact with mother who is still a support but cannot have pt live with her at time of dc  CM will continue to follow

## 2019-08-09 NOTE — PROGRESS NOTES
Pt cont's to c/o chronic back pain, given prn soma with good relief  Pt states he doesn't think he will be discharged until the end of next week  Very anxious about where he will go after discharge, cannot return to his mother's house "she has her own mental health issues that she has to work on"  Pt states he needs to stay in [facility]  Wants to cont on methadone maintenance  Has an upcoming court hearing also  "I have a lot to worry about"  Denies SI  Medications reviewed, no questions for nursing at this time

## 2019-08-09 NOTE — PROGRESS NOTES
Progress Note - Behavioral Health   Layla Quintanilla 33 y o  male MRN: [id number]  Unit/Bed#: Jordy 703-31 Encounter: [id number]    Assessment/Plan   Principal Problem:    Severe episode of recurrent major depressive disorder, without psychotic features ([facility] )  Active Problems:    Methamphetamine abuse ([facility] )      Subjective:  Patient focused on current homelessness situation  States he feels very hopeless and has passive death wishes  Contracts for safety  Appears energy, appetite, and sleep are appropriate  Does have some insight into medications  Does say he has anxiety with racing thoughts  Appears to be tolerating Klonopin dosing and frequency reduction well  Denied psychosis  Does not show signs of linden  No irritability  States he does feel irritable  Can be med focused at times  Is compliant with current medications  Tolerating medications well without serious side effects        Current Medications:  Current Facility-Administered Medications   Medication Dose Route Frequency    acetaminophen (TYLENOL) tablet 325 mg  325 mg Oral Q6H PRN    aluminum-magnesium hydroxide-simethicone (MYLANTA) 200-200-20 mg/5 mL oral suspension 30 mL  30 mL Oral Q4H PRN    ARIPiprazole (ABILIFY) tablet 5 mg  5 mg Oral Daily    benztropine (COGENTIN) injection 1 mg  1 mg Intramuscular Q6H PRN    benztropine (COGENTIN) tablet 1 mg  1 mg Oral Q6H PRN    carisoprodol (SOMA) tablet 350 mg  350 mg Oral Q6H PRN    clonazePAM (KlonoPIN) tablet 0 5 mg  0 5 mg Oral BID    fluticasone (FLONASE) 50 mcg/act nasal spray 2 spray  2 spray Each Nare Daily    gabapentin (NEURONTIN) capsule 400 mg  400 mg Oral TID    haloperidol (HALDOL) tablet 5 mg  5 mg Oral Q6H PRN    haloperidol lactate (HALDOL) injection 5 mg  5 mg Intramuscular Q6H PRN    hydrOXYzine HCL (ATARAX) tablet 25 mg  25 mg Oral Q6H PRN    hydrOXYzine HCL (ATARAX) tablet 50 mg  50 mg Oral Q6H PRN    hydrOXYzine HCL (ATARAX) tablet 75 mg  75 mg Oral Q6H PRN    magnesium hydroxide (MILK OF MAGNESIA) 400 mg/5 mL oral suspension 30 mL  30 mL Oral Daily PRN    melatonin tablet 6 mg  6 mg Oral HS    methadone (DOLOPHINE) 10 mg/mL oral concentrated solution 165 mg  165 mg Oral Daily    mirtazapine (REMERON) tablet 30 mg  30 mg Oral HS    naproxen (NAPROSYN) tablet 500 mg  500 mg Oral BID PRN    nicotine (NICODERM CQ) 21 mg/24 hr TD 24 hr patch 21 mg  21 mg Transdermal Daily    nicotine polacrilex (NICORETTE) gum 2 mg  2 mg Oral Q2H PRN    OLANZapine (ZyPREXA) IM injection 10 mg  10 mg Intramuscular Q8H PRN    OLANZapine (ZyPREXA) tablet 10 mg  10 mg Oral Q8H PRN    risperiDONE (RisperDAL M-TABS) dispersible tablet 1 mg  1 mg Oral Q3H PRN    senna (SENOKOT) tablet 8 6 mg  1 tablet Oral HS    tamsulosin (FLOMAX) capsule 0 4 mg  0 4 mg Oral Daily With [employer] Medications: all current active meds have been reviewed and continue current psychiatric medications  Vitals:  Vitals:    08/09/19 0719   BP: 131/68   Pulse: (!) 54   Resp:    Temp: 98 6 °F (37 °C)   SpO2:        Laboratory results:    I have personally reviewed all pertinent laboratory/tests results    Most Recent Labs:   Lab Results   Component Value Date    WBC 9 22 08/04/2019    RBC 3 76 (L) 08/04/2019    HGB 11 2 (L) 08/04/2019    HCT 33 6 (L) 08/04/2019     08/04/2019    RDW 14 6 08/04/2019    NEUTROABS 5 59 08/03/2019    SODIUM 143 08/04/2019    K 3 5 08/04/2019     08/04/2019    CO2 26 08/04/2019    BUN 8 08/04/2019    CREATININE 0 85 08/04/2019    GLUC 105 08/04/2019    CALCIUM 8 1 (L) 08/04/2019    AST 82 (H) 08/04/2019    ALT 51 08/04/2019    ALKPHOS 56 08/04/2019    TP 6 5 08/04/2019    ALB 3 0 (L) 08/04/2019    TBILI 0 30 08/04/2019    [id number] 1 687 08/03/2019       Psychiatric Review of Systems:  Behavior over the last 24 hours:  unchanged  Sleep: normal  Appetite: normal  Medication side effects: No  ROS: chronic generalized pain    Mental Status Evaluation:  Appearance:  casually dressed   Behavior:  guarded   Speech:  normal pitch and normal volume   Mood:  anxious and depressed   Affect:  constricted   Language hyperverbal   Thought Process:  circumstantial and goal directed   Thought Content:  obsessions   Perceptual Disturbances: None   Risk Potential: Reduction of passive death wishes  Denied HI  Potential for aggression: no   Sensorium:  person, place and time/date   Cognition:  grossly intact   Consciousness:  alert and awake    Recent and Remote Memory fair   Attention: attention span appeared shorter than expected for age   Insight:  poor   Judgment: poor   Gait/Station: normal gait/station and normal balance   Motor Activity: no abnormal movements     Progress Toward Goals: slight progression    Recommended Treatment: Continue with group therapy, milieu therapy and occupational therapy  1   Continue Klonopin taper  2  Increase Gabapentin to 400mg TID for anxiety  3  Continue other medications  4  Disposition planning     Risks, benefits and possible side effects of Medications:   Risks, benefits, and possible side effects of medications explained to patient and patient verbalizes understanding        Florence Hernandez PA-C

## 2019-08-10 PROCEDURE — 99232 SBSQ HOSP IP/OBS MODERATE 35: CPT | Performed by: PSYCHIATRY & NEUROLOGY

## 2019-08-10 RX ORDER — NICOTINE 21 MG/24HR
14 PATCH, TRANSDERMAL 24 HOURS TRANSDERMAL DAILY
Status: DISCONTINUED | OUTPATIENT
Start: 2019-08-11 | End: 2019-08-14 | Stop reason: HOSPADM

## 2019-08-10 RX ADMIN — NICOTINE POLACRILEX 4 MG: 4 GUM, CHEWING BUCCAL at 21:12

## 2019-08-10 RX ADMIN — NAPROXEN 500 MG: 500 TABLET ORAL at 08:22

## 2019-08-10 RX ADMIN — CARISOPRODOL 350 MG: 350 TABLET ORAL at 21:12

## 2019-08-10 RX ADMIN — GABAPENTIN 400 MG: 400 CAPSULE ORAL at 08:19

## 2019-08-10 RX ADMIN — CLONAZEPAM 0.5 MG: 0.5 TABLET ORAL at 17:10

## 2019-08-10 RX ADMIN — CARISOPRODOL 350 MG: 350 TABLET ORAL at 14:30

## 2019-08-10 RX ADMIN — TAMSULOSIN HYDROCHLORIDE 0.4 MG: 0.4 CAPSULE ORAL at 16:19

## 2019-08-10 RX ADMIN — NICOTINE 21 MG: 21 PATCH, EXTENDED RELEASE TRANSDERMAL at 08:20

## 2019-08-10 RX ADMIN — MELATONIN 6 MG: at 21:11

## 2019-08-10 RX ADMIN — FLUTICASONE PROPIONATE 2 SPRAY: 50 SPRAY, METERED NASAL at 08:21

## 2019-08-10 RX ADMIN — NICOTINE POLACRILEX 4 MG: 4 GUM, CHEWING BUCCAL at 14:12

## 2019-08-10 RX ADMIN — NICOTINE POLACRILEX 4 MG: 4 GUM, CHEWING BUCCAL at 16:20

## 2019-08-10 RX ADMIN — ARIPIPRAZOLE 5 MG: 5 TABLET ORAL at 08:19

## 2019-08-10 RX ADMIN — SENNOSIDES 8.6 MG: 8.6 TABLET, FILM COATED ORAL at 21:11

## 2019-08-10 RX ADMIN — GABAPENTIN 400 MG: 400 CAPSULE ORAL at 16:20

## 2019-08-10 RX ADMIN — CARISOPRODOL 350 MG: 350 TABLET ORAL at 09:02

## 2019-08-10 RX ADMIN — GABAPENTIN 400 MG: 400 CAPSULE ORAL at 21:11

## 2019-08-10 RX ADMIN — METHADONE HYDROCHLORIDE 165 MG: 10 CONCENTRATE ORAL at 08:19

## 2019-08-10 RX ADMIN — CLONAZEPAM 0.5 MG: 0.5 TABLET ORAL at 08:19

## 2019-08-10 RX ADMIN — MIRTAZAPINE 30 MG: 15 TABLET, FILM COATED ORAL at 21:11

## 2019-08-10 RX ADMIN — NICOTINE POLACRILEX 2 MG: 2 GUM, CHEWING BUCCAL at 12:06

## 2019-08-10 NOTE — PROGRESS NOTES
Daily rounds  Reasons for admission reviewed  Pt med focused  Seclusive to room  Out of meals and meds   No SI

## 2019-08-10 NOTE — PROGRESS NOTES
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

## 2019-08-10 NOTE — PLAN OF CARE
Problem: SELF HARM/SUICIDALITY  Goal: Will have no self-injury during hospital stay  Description  INTERVENTIONS:  - Q 15 MINUTES: Routine safety checks  - Q WAKING SHIFT & PRN: Assess risk to determine if routine checks are adequate to maintain patient safety  - Encourage patient to participate actively in care by formulating a plan to combat response to suicidal ideation, identify supports and resources  Outcome: Progressing     Problem: DEPRESSION  Goal: Will be euthymic at discharge  Description  INTERVENTIONS:  - Administer medication as ordered  - Provide emotional support via 1:1 interaction with staff  - Encourage involvement in milieu/groups/activities  - Monitor for social isolation  Outcome: Progressing     Problem: ANXIETY  Goal: Will report anxiety at manageable levels  Description  INTERVENTIONS:  - Administer medication as ordered  - Teach and encourage coping skills  - Provide emotional support  - Assess patient/family for anxiety and ability to cope  Outcome: Progressing  Goal: By discharge: Patient will verbalize 2 strategies to deal with anxiety  Description  Interventions:  - Identify any obvious source/trigger to anxiety  - Staff will assist patient in applying identified coping technique/skills  - Encourage attendance of scheduled groups and activities  Outcome: Progressing     Problem: SUBSTANCE USE/ABUSE  Goal: Will have no detox symptoms and will verbalize plan for changing substance-related behavior  Description  INTERVENTIONS:  - Monitor physical status and assess for symptoms of withdrawal  - Administer medication as ordered  - Provide emotional support with 1 on 1 interaction with staff  - Encourage recovery focused program/ addiction education  - Assess for verbalization of changing behaviors related to substance abuse  - Initiate consults and referrals as appropriate (Case Management, Spiritual Care, etc )  Outcome: Progressing  Goal: By discharge, will develop insight into their chemical dependency and sustain motivation to continue in recovery  Description  INTERVENTIONS:  - Attends all daily group sessions and scheduled AA groups  - Actively practices coping skills through participation in the therapeutic community and adherence to program rules  - Reviews and completes assignments from individual treatment plan  - Assist patient development of understanding of their personal cycle of addiction and relapse triggers  Outcome: Progressing  Goal: By discharge, patient will have ongoing treatment plan addressing chemical dependency  Description  INTERVENTIONS:  - Assist patient with resources and/or appointments for ongoing recovery based living  Outcome: Progressing     Problem: SLEEP DISTURBANCE  Goal: Will exhibit normal sleeping pattern  Description  Interventions:  -  Assess the patients sleep pattern, noting recent changes  - Administer medication as ordered  - Decrease environmental stimuli, including noise, as appropriate during the night  - Encourage the patient to actively participate in unit groups and or exercise during the day to enhance ability to achieve adequate sleep at night  - Assess the patient, in the morning, encouraging a description of sleep experience  Outcome: Progressing     Problem: Ineffective Coping  Goal: Participates in unit activities  Description  Interventions:  - Provide therapeutic environment   - Provide required programming   - Redirect inappropriate behaviors   Outcome: Progressing

## 2019-08-10 NOTE — PROGRESS NOTES
Progress Note - Behavioral Health   Reena Castellanos 33 y o  male MRN: [id number]  Unit/Bed#: Gina Thomason 719-79 Encounter: [id number]    The patient was seen for continuing care and reviewed with treatment team     Mental Status Evaluation:  Appearance:  Adequate hygiene and grooming   Behavior:  calm and cooperative   Mood:  anxious and depressed   Affect: mood-congruent   Speech: Normal rate and Normal volume   Thought Process:  Goal directed and coherent   Thought Content:  Does not verbalize delusional material   Perceptual Disturbances: Denies hallucinations and does not appear to be responding to internal stimuli   Risk Potential: No suicidal or homicidal ideation but feels hopeless   Attention/Concentration attention span appeared shorter than expected for age   Orientation:   Person, place   Gait/Station: normal gait/station   Motor Activity: No abnormal movement noted     Progress Toward Goals: Patient seen in room with towel over eyes complaining of headache  Knows he is on a planned klonopin taper "I have to for housing issues" and is tolerating his abilify so far  Asks to have reduced strength nicotine patch and more nicotine gum  Will reduce to 0 5 mg tomorrow with plan to end on Monday after AM dose    Assessment/Plan    Severe episode of recurrent major depressive disorder, without psychotic features ([facility] [address] )     Recommended Treatment: Continue with pharmacotherapy, group therapy, milieu therapy and occupational therapy    The patient will be maintained on the following medications:    Current Facility-Administered Medications:  acetaminophen 325 mg Oral Q6H PRN Cheryle Mcknight, PA-C   aluminum-magnesium hydroxide-simethicone 30 mL Oral Q4H PRN Cheryle Mcknight, PA-C   ARIPiprazole 5 mg Oral Daily Jesus Edmonds   benztropine 1 mg Intramuscular Q6H PRN Cheryle Mcknight, PA-C   benztropine 1 mg Oral Q6H PRN Cheryle Mcknight, PA-C   carisoprodol 350 mg Oral Q6H PRN Jesus Edmonds   clonazePAM 0 5 mg Oral BID Ashley Elle, PA-C   fluticasone 2 spray Each Nare Daily Ashley Elle, PA-C   gabapentin 400 mg Oral TID Ashley Elle, PA-C   haloperidol 5 mg Oral Q6H PRN Ashley Elle, PA-C   haloperidol lactate 5 mg Intramuscular Q6H PRN Ashley Elle, PA-C   hydrOXYzine HCL 25 mg Oral Q6H PRN Ashley Elle, PA-C   hydrOXYzine HCL 50 mg Oral Q6H PRN Ashley Elle, PA-C   hydrOXYzine HCL 75 mg Oral Q6H PRN Ashley Elle, PA-C   magnesium hydroxide 30 mL Oral Daily PRN Ashley Elle, PA-C   melatonin 6 mg Oral HS [facility]   methadone 165 mg Oral Daily Ashley Elle, PA-C   mirtazapine 30 mg Oral HS Ashley Elle, PA-C   naproxen 500 mg Oral BID PRN [facility]   nicotine 21 mg Transdermal Daily Ashley Elle, PA-C   nicotine polacrilex 2 mg Oral Q2H PRN Ashley Elle, PA-C   OLANZapine 10 mg Intramuscular Q8H PRN Ashley Elle, PA-C   OLANZapine 10 mg Oral Q8H PRN Ashley Elle, PA-C   risperiDONE 1 mg Oral Q3H PRN Ashley Elle, PA-C   senna 1 tablet Oral HS Ashley Elle, PA-C   tamsulosin 0 4 mg Oral Daily With Sawyer Bautista, BRITTANY       Risks, benefits and possible side effects of Medications:   Risks, benefits, and possible side effects of medications explained to patient and patient verbalizes understanding

## 2019-08-11 PROCEDURE — 99232 SBSQ HOSP IP/OBS MODERATE 35: CPT | Performed by: PSYCHIATRY & NEUROLOGY

## 2019-08-11 RX ORDER — CLONAZEPAM 0.5 MG/1
0.5 TABLET ORAL DAILY
Status: DISCONTINUED | OUTPATIENT
Start: 2019-08-12 | End: 2019-08-13

## 2019-08-11 RX ADMIN — TAMSULOSIN HYDROCHLORIDE 0.4 MG: 0.4 CAPSULE ORAL at 16:26

## 2019-08-11 RX ADMIN — MIRTAZAPINE 30 MG: 15 TABLET, FILM COATED ORAL at 21:09

## 2019-08-11 RX ADMIN — NICOTINE 14 MG: 14 PATCH TRANSDERMAL at 09:39

## 2019-08-11 RX ADMIN — CARISOPRODOL 350 MG: 350 TABLET ORAL at 08:56

## 2019-08-11 RX ADMIN — CARISOPRODOL 350 MG: 350 TABLET ORAL at 22:29

## 2019-08-11 RX ADMIN — GABAPENTIN 400 MG: 400 CAPSULE ORAL at 16:26

## 2019-08-11 RX ADMIN — GABAPENTIN 400 MG: 400 CAPSULE ORAL at 08:14

## 2019-08-11 RX ADMIN — CARISOPRODOL 350 MG: 350 TABLET ORAL at 16:29

## 2019-08-11 RX ADMIN — SENNOSIDES 8.6 MG: 8.6 TABLET, FILM COATED ORAL at 21:09

## 2019-08-11 RX ADMIN — CLONAZEPAM 0.5 MG: 0.5 TABLET ORAL at 08:15

## 2019-08-11 RX ADMIN — ARIPIPRAZOLE 5 MG: 5 TABLET ORAL at 08:15

## 2019-08-11 RX ADMIN — NICOTINE POLACRILEX 4 MG: 4 GUM, CHEWING BUCCAL at 16:52

## 2019-08-11 RX ADMIN — METHADONE HYDROCHLORIDE 165 MG: 10 CONCENTRATE ORAL at 08:17

## 2019-08-11 RX ADMIN — MELATONIN 6 MG: at 21:09

## 2019-08-11 RX ADMIN — GABAPENTIN 400 MG: 400 CAPSULE ORAL at 21:09

## 2019-08-11 RX ADMIN — NICOTINE POLACRILEX 4 MG: 4 GUM, CHEWING BUCCAL at 08:19

## 2019-08-11 RX ADMIN — NICOTINE POLACRILEX 4 MG: 4 GUM, CHEWING BUCCAL at 12:22

## 2019-08-11 RX ADMIN — FLUTICASONE PROPIONATE 2 SPRAY: 50 SPRAY, METERED NASAL at 08:14

## 2019-08-11 RX ADMIN — NICOTINE POLACRILEX 4 MG: 4 GUM, CHEWING BUCCAL at 21:13

## 2019-08-11 NOTE — PROGRESS NOTES
Med Note: Pt requested and received Soma for muscle spasms with good effect  Will continue to monitor

## 2019-08-11 NOTE — PROGRESS NOTES
Requested & received Soma 350 mg po prn muscle spasms at 2112  Good effect obtained, as asleep in bed with respirations regular & easy within the hr

## 2019-08-11 NOTE — PROGRESS NOTES
Progress Note - Behavioral Health   Gabriela Griffin 33 y o  male MRN: [id number]  Unit/Bed#: Daniel Torres 381-44 Encounter: [id number]    The patient was seen for continuing care and reviewed with treatment team     Mental Status Evaluation:  Appearance:  Adequate hygiene and grooming   Behavior:  Dismissive   Mood:  anxious   Affect: mood-incongruent   Speech: Normal rate and Normal volume   Thought Process:  Goal directed and coherent   Thought Content:  Does not verbalize delusional material   Perceptual Disturbances: Denies hallucinations and does not appear to be responding to internal stimuli   Risk Potential: No suicidal or homicidal ideation   Attention/Concentration attention span appeared shorter than expected for age   Orientation:   Person, place, situation   Gait/Station: normal gait/station   Motor Activity: No abnormal movement noted     Progress Toward Goals: Reports he did not sleep due to new psychotic roommate arriving "i'm trying to be helpful but he sounded like Chewbacca all night so im trying to sleep now"  Reports anxiety but appears calm and has many pending events once he is discharged to attend to (court, outpatient f/u)  Assessment/Plan    Severe episode of recurrent major depressive disorder, without psychotic features ([facility] [address] )     Recommended Treatment: Continue with pharmacotherapy, group therapy, milieu therapy and occupational therapy    The patient will be maintained on the following medications:    Current Facility-Administered Medications:  acetaminophen 325 mg Oral Q6H PRN Jenae Cool PA-C   aluminum-magnesium hydroxide-simethicone 30 mL Oral Q4H PRN Jenae Cool PA-C   ARIPiprazole 5 mg Oral Daily Jesus Edmonds   benztropine 1 mg Intramuscular Q6H PRN Jenae Cool PA-C   benztropine 1 mg Oral Q6H PRN Jenae Cool PA-C   carisoprodol 350 mg Oral Q6H PRN Jesus Edmonds   [START ON 8/12/2019] clonazePAM 0 5 mg Oral Daily Mónica Molina MD   fluticasone 2 spray Each Nare Daily [facility], PA-C   gabapentin 400 mg Oral TID [facility], PA-C   haloperidol 5 mg Oral Q6H PRN [facility], PA-C   haloperidol lactate 5 mg Intramuscular Q6H PRN [facility], PA-C   hydrOXYzine HCL 25 mg Oral Q6H PRN [facility], PA-C   hydrOXYzine HCL 50 mg Oral Q6H PRN [facility], PA-C   hydrOXYzine HCL 75 mg Oral Q6H PRN [facility], PA-C   magnesium hydroxide 30 mL Oral Daily PRN [facility], PA-C   melatonin 6 mg Oral HS [facility]   methadone 165 mg Oral Daily [facility], PA-C   mirtazapine 30 mg Oral HS [facility], PA-C   naproxen 500 mg Oral BID PRN [facility]   nicotine 14 mg Transdermal Daily Dennis Veliz MD   nicotine polacrilex 4 mg Oral Q2H PRN Dennis Veliz MD   OLANZapine 10 mg Intramuscular Q8H PRN [facility], PA-C   OLANZapine 10 mg Oral Q8H PRN [facility], PA-C   risperiDONE 1 mg Oral Q3H PRN [facility], PA-C   senna 1 tablet Oral HS [facility], PA-C   tamsulosin 0 4 mg Oral Daily With Sawyer Bautista PA-C       Risks, benefits and possible side effects of Medications:   Risks, benefits, and possible side effects of medications explained to patient and patient verbalizes understanding

## 2019-08-12 PROCEDURE — 99232 SBSQ HOSP IP/OBS MODERATE 35: CPT | Performed by: PSYCHIATRY & NEUROLOGY

## 2019-08-12 RX ORDER — GABAPENTIN 300 MG/1
600 CAPSULE ORAL 3 TIMES DAILY
Status: DISCONTINUED | OUTPATIENT
Start: 2019-08-12 | End: 2019-08-14 | Stop reason: HOSPADM

## 2019-08-12 RX ADMIN — GABAPENTIN 400 MG: 400 CAPSULE ORAL at 08:08

## 2019-08-12 RX ADMIN — TAMSULOSIN HYDROCHLORIDE 0.4 MG: 0.4 CAPSULE ORAL at 16:26

## 2019-08-12 RX ADMIN — CARISOPRODOL 350 MG: 350 TABLET ORAL at 16:28

## 2019-08-12 RX ADMIN — SENNOSIDES 8.6 MG: 8.6 TABLET, FILM COATED ORAL at 21:16

## 2019-08-12 RX ADMIN — MIRTAZAPINE 30 MG: 15 TABLET, FILM COATED ORAL at 21:16

## 2019-08-12 RX ADMIN — NICOTINE POLACRILEX 4 MG: 4 GUM, CHEWING BUCCAL at 10:02

## 2019-08-12 RX ADMIN — NICOTINE POLACRILEX 4 MG: 4 GUM, CHEWING BUCCAL at 21:17

## 2019-08-12 RX ADMIN — METHADONE HYDROCHLORIDE 165 MG: 10 CONCENTRATE ORAL at 08:09

## 2019-08-12 RX ADMIN — CLONAZEPAM 0.5 MG: 0.5 TABLET ORAL at 08:08

## 2019-08-12 RX ADMIN — CARISOPRODOL 350 MG: 350 TABLET ORAL at 09:04

## 2019-08-12 RX ADMIN — MELATONIN 6 MG: at 21:16

## 2019-08-12 RX ADMIN — GABAPENTIN 600 MG: 300 CAPSULE ORAL at 21:16

## 2019-08-12 RX ADMIN — FLUTICASONE PROPIONATE 2 SPRAY: 50 SPRAY, METERED NASAL at 08:08

## 2019-08-12 RX ADMIN — NICOTINE 14 MG: 14 PATCH TRANSDERMAL at 08:08

## 2019-08-12 RX ADMIN — ARIPIPRAZOLE 5 MG: 5 TABLET ORAL at 08:08

## 2019-08-12 RX ADMIN — NICOTINE POLACRILEX 4 MG: 4 GUM, CHEWING BUCCAL at 16:28

## 2019-08-12 RX ADMIN — NICOTINE POLACRILEX 4 MG: 4 GUM, CHEWING BUCCAL at 08:10

## 2019-08-12 RX ADMIN — GABAPENTIN 600 MG: 300 CAPSULE ORAL at 16:26

## 2019-08-12 NOTE — PROGRESS NOTES
Pt drowsy, lying in bed  Answers questions appropriately, but scant conversation  Seclusive to room except for meals and meds  Does not attend groups  Naps throughout the day  Denies SI/HI/AVH  Medication compliant

## 2019-08-12 NOTE — PROGRESS NOTES
Progress Note - Behavioral Health   Deven Corona 33 y o  male MRN: [id number]  Unit/Bed#: Roxie Trevino 219-54 Encounter: [id number]    Assessment/Plan   Principal Problem:    Severe episode of recurrent major depressive disorder, without psychotic features ([facility] )  Active Problems:    Methamphetamine abuse ([facility] )      Subjective:  Patient today cooperative and pleasant during conversation  Did speak negatively about psychotic roommate and got room change  Did appear anxious over this issue due to patient putting on his clothes, stealing his food, and doing bizarre things  Also reports ongoing anxiety due to unstable housing options, most likely returning home, legal issues, and needing treatment for his hepatitis-C  States he missed appointment recently and will need to reschedule  Less seclusive today  Reports depressive symptoms are slowly decreasing with reduction of passive death wishes  Contracts for safety  Sleep was poor due to roommate  Has adequate appetite, energy, and is more forward thinking  Denied psychotic symptoms and delusional material   Did not endorse signs of linden  States mood is more stable  Medication compliant  Tolerating medications well without serious side effects  Upcoming tentative discharge possibly Wednesday  Is on Klonopin taper currently  Will discontinue in the next 1-2 days  Vital signs stable        Current Medications:  Current Facility-Administered Medications   Medication Dose Route Frequency    acetaminophen (TYLENOL) tablet 325 mg  325 mg Oral Q6H PRN    aluminum-magnesium hydroxide-simethicone (MYLANTA) 200-200-20 mg/5 mL oral suspension 30 mL  30 mL Oral Q4H PRN    ARIPiprazole (ABILIFY) tablet 5 mg  5 mg Oral Daily    benztropine (COGENTIN) injection 1 mg  1 mg Intramuscular Q6H PRN    benztropine (COGENTIN) tablet 1 mg  1 mg Oral Q6H PRN    carisoprodol (SOMA) tablet 350 mg  350 mg Oral Q6H PRN    clonazePAM (KlonoPIN) tablet 0 5 mg  0 5 mg Oral Daily    fluticasone (FLONASE) 50 mcg/act nasal spray 2 spray  2 spray Each Nare Daily    gabapentin (NEURONTIN) capsule 600 mg  600 mg Oral TID    haloperidol (HALDOL) tablet 5 mg  5 mg Oral Q6H PRN    haloperidol lactate (HALDOL) injection 5 mg  5 mg Intramuscular Q6H PRN    hydrOXYzine HCL (ATARAX) tablet 25 mg  25 mg Oral Q6H PRN    hydrOXYzine HCL (ATARAX) tablet 50 mg  50 mg Oral Q6H PRN    hydrOXYzine HCL (ATARAX) tablet 75 mg  75 mg Oral Q6H PRN    magnesium hydroxide (MILK OF MAGNESIA) 400 mg/5 mL oral suspension 30 mL  30 mL Oral Daily PRN    melatonin tablet 6 mg  6 mg Oral HS    methadone (DOLOPHINE) 10 mg/mL oral concentrated solution 165 mg  165 mg Oral Daily    mirtazapine (REMERON) tablet 30 mg  30 mg Oral HS    naproxen (NAPROSYN) tablet 500 mg  500 mg Oral BID PRN    nicotine (NICODERM CQ) 14 mg/24hr TD 24 hr patch 14 mg  14 mg Transdermal Daily    nicotine polacrilex (NICORETTE) gum 4 mg  4 mg Oral Q2H PRN    OLANZapine (ZyPREXA) IM injection 10 mg  10 mg Intramuscular Q8H PRN    OLANZapine (ZyPREXA) tablet 10 mg  10 mg Oral Q8H PRN    risperiDONE (RisperDAL M-TABS) dispersible tablet 1 mg  1 mg Oral Q3H PRN    senna (SENOKOT) tablet 8 6 mg  1 tablet Oral HS    tamsulosin (FLOMAX) capsule 0 4 mg  0 4 mg Oral Daily With [employer] Medications: all current active meds have been reviewed and continue current psychiatric medications  Vitals:  Vitals:    08/12/19 0746   BP: 122/68   Pulse: 99   Resp: 20   Temp: 99 7 °F (37 6 °C)   SpO2:        Laboratory results:    I have personally reviewed all pertinent laboratory/tests results    Most Recent Labs:   Lab Results   Component Value Date    WBC 9 22 08/04/2019    RBC 3 76 (L) 08/04/2019    HGB 11 2 (L) 08/04/2019    HCT 33 6 (L) 08/04/2019     08/04/2019    RDW 14 6 08/04/2019    NEUTROABS 5 59 08/03/2019    SODIUM 143 08/04/2019    K 3 5 08/04/2019     08/04/2019    CO2 26 08/04/2019    BUN 8 08/04/2019 CREATININE 0 85 08/04/2019    GLUC 105 08/04/2019    CALCIUM 8 1 (L) 08/04/2019    AST 82 (H) 08/04/2019    ALT 51 08/04/2019    ALKPHOS 56 08/04/2019    TP 6 5 08/04/2019    ALB 3 0 (L) 08/04/2019    TBILI 0 30 08/04/2019    QHO4RSRSYQNL 1 687 08/03/2019       Psychiatric Review of Systems:  Behavior over the last 24 hours:  unchanged  Sleep: normal  Appetite: normal  Medication side effects: No  ROS: no complaints    Mental Status Evaluation:  Appearance:  casually dressed   Behavior:  less guarded, cooperative   Speech:  normal pitch and normal volume   Mood:  anxious and depressed   Affect:  less anxious and depressed   Language appropriate   Thought Process:  goal directed and linear   Thought Content:  obsessions   Perceptual Disturbances: None   Risk Potential: Reduced passive death wishes  Denied HI  Potential for aggression: No   Sensorium:  person, place and time/date   Cognition:  grossly intact   Consciousness:  alert and awake    Recent and Remote Memory fair   Attention: attention span and concentration were age appropriate   Insight:  partial   Judgment: improving   Gait/Station: normal gait/station and normal balance   Motor Activity: no abnormal movements     Progress Toward Goals: unchanged    Recommended Treatment: Continue with group therapy, milieu therapy and occupational therapy  1   Increase Gabapentin to 600mg TID for anxiety and pain management  2  Continue Klonopin   5mg QD  Plan to discontinue tomorrow or Wednesday  3  Continue other medications  4  Disposition planning     Risks, benefits and possible side effects of Medications:   Risks, benefits, and possible side effects of medications explained to patient and patient verbalizes understanding        Shona Bateman PA-C

## 2019-08-12 NOTE — PROGRESS NOTES
08/12/19 0738   Team Meeting   Meeting Type Daily Rounds   Team Members Present   Team Members Present Physician;Nurse;;Occupational Therapist   Physician Team Member Dr Shellie Breaux Team Member EMMA [facility] Management Team Member Char/ [address]   OT Team Member Hamida   Patient/Family Present   Patient Present No   Patient's Family Present No     Anxious and depressed  Pt is still on Klonopin taper  Irritable with roommate  Took Soma last night  Likely dc  Cm completed housing referral for pt  Still no dispensation planning in place

## 2019-08-12 NOTE — CASE MANAGEMENT
CM met with pt who is irritable with milieu rules  Pt also states his room mate has been difficult  Pt reports that he has met with his CPS worker and his mother and they have come up with a contract so that he can return to live with his mother at time of dc  Mother is happy that pt is going to be taken off Klonopin  CM will plan for pt to return to [facility] with [facility] ([facility]) referral in place for added support when a placement becomes available
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
## 2019-08-12 NOTE — PROGRESS NOTES
Awake throughout the morning  Frequent requests throughout the morning, medications, nicotine gum, paper, toiletries, trim nails  Asleep following lunch  When awoken for group patient was irritable stated he had not sleep for the past couple nights due to roommate  Patient woke, mumbled under his breath and returned to room  Reports improved mood since admission

## 2019-08-12 NOTE — PROGRESS NOTES
Patient received soma for generalized spasms at 2229  Medication appears to have been effective as patient appears to have slept throughout the night

## 2019-08-12 NOTE — PLAN OF CARE
Problem: SELF HARM/SUICIDALITY  Goal: Will have no self-injury during hospital stay  Description  INTERVENTIONS:  - Q 15 MINUTES: Routine safety checks  - Q WAKING SHIFT & PRN: Assess risk to determine if routine checks are adequate to maintain patient safety  - Encourage patient to participate actively in care by formulating a plan to combat response to suicidal ideation, identify supports and resources  8/12/2019 1616 by Jose Manuel Jameson RN  Outcome: Progressing  8/12/2019 1616 by Jose Manuel Jameson RN  Outcome: Progressing     Problem: DEPRESSION  Goal: Will be euthymic at discharge  Description  INTERVENTIONS:  - Administer medication as ordered  - Provide emotional support via 1:1 interaction with staff  - Encourage involvement in milieu/groups/activities  - Monitor for social isolation  8/12/2019 1616 by Jose Manuel Jameson RN  Outcome: Progressing  8/12/2019 1616 by Jose Manuel Jameson RN  Outcome: Progressing     Problem: ANXIETY  Goal: Will report anxiety at manageable levels  Description  INTERVENTIONS:  - Administer medication as ordered  - Teach and encourage coping skills  - Provide emotional support  - Assess patient/family for anxiety and ability to cope  8/12/2019 1616 by Jose Manuel Jameson RN  Outcome: Progressing  8/12/2019 1616 by Jose Manuel Jameson RN  Outcome: Progressing  Goal: By discharge: Patient will verbalize 2 strategies to deal with anxiety  Description  Interventions:  - Identify any obvious source/trigger to anxiety  - Staff will assist patient in applying identified coping technique/skills  - Encourage attendance of scheduled groups and activities  8/12/2019 1616 by Jose Manuel Jameson RN  Outcome: Progressing  8/12/2019 1616 by Jose Manuel Jameson RN  Outcome: Progressing     Problem: SUBSTANCE USE/ABUSE  Goal: Will have no detox symptoms and will verbalize plan for changing substance-related behavior  Description  INTERVENTIONS:  - Monitor physical status and assess for symptoms of withdrawal  - Administer medication as ordered  - Provide emotional support with 1 on 1 interaction with staff  - Encourage recovery focused program/ addiction education  - Assess for verbalization of changing behaviors related to substance abuse  - Initiate consults and referrals as appropriate (Case Management, Spiritual Care, etc )  8/12/2019 1616 by Farrah Blair RN  Outcome: Progressing  8/12/2019 1616 by Farrah Blair RN  Outcome: Progressing  Goal: By discharge, will develop insight into their chemical dependency and sustain motivation to continue in recovery  Description  INTERVENTIONS:  - Attends all daily group sessions and scheduled AA groups  - Actively practices coping skills through participation in the therapeutic community and adherence to program rules  - Reviews and completes assignments from individual treatment plan  - Assist patient development of understanding of their personal cycle of addiction and relapse triggers  8/12/2019 1616 by Farrah Blair RN  Outcome: Progressing  8/12/2019 1616 by Farrah Blair RN  Outcome: Progressing  Goal: By discharge, patient will have ongoing treatment plan addressing chemical dependency  Description  INTERVENTIONS:  - Assist patient with resources and/or appointments for ongoing recovery based living  8/12/2019 1616 by Farrah Blair RN  Outcome: Progressing  8/12/2019 1616 by Farrah Blair RN  Outcome: Progressing     Problem: SLEEP DISTURBANCE  Goal: Will exhibit normal sleeping pattern  Description  Interventions:  -  Assess the patients sleep pattern, noting recent changes  - Administer medication as ordered  - Decrease environmental stimuli, including noise, as appropriate during the night  - Encourage the patient to actively participate in unit groups and or exercise during the day to enhance ability to achieve adequate sleep at night  - Assess the patient, in the morning, encouraging a description of sleep experience  8/12/2019 1616 by Farrah Blair RN  Outcome: Progressing  8/12/2019 1616 by Karen Dunbar RN  Outcome: Progressing     Problem: Ineffective Coping  Goal: Participates in unit activities  Description  Interventions:  - Provide therapeutic environment   - Provide required programming   - Redirect inappropriate behaviors   8/12/2019 1616 by Karen Dunbar RN  Outcome: Progressing  8/12/2019 1616 by Karen Dunbar RN  Outcome: Progressing     Problem: DISCHARGE PLANNING  Goal: Discharge to home or other facility with appropriate resources  Description  INTERVENTIONS:  - Identify barriers to discharge w/patient and caregiver  - Arrange for needed discharge resources and transportation as appropriate  - Identify discharge learning needs (meds, wound care, etc )  - Arrange for interpretive services to assist at discharge as needed  - Refer to Case Management Department for coordinating discharge planning if the patient needs post-hospital services based on physician/advanced practitioner order or complex needs related to functional status, cognitive ability, or social support system  Outcome: Progressing

## 2019-08-13 PROCEDURE — 99232 SBSQ HOSP IP/OBS MODERATE 35: CPT | Performed by: PHYSICIAN ASSISTANT

## 2019-08-13 RX ADMIN — SENNOSIDES 8.6 MG: 8.6 TABLET, FILM COATED ORAL at 21:01

## 2019-08-13 RX ADMIN — NICOTINE POLACRILEX 4 MG: 4 GUM, CHEWING BUCCAL at 16:38

## 2019-08-13 RX ADMIN — NICOTINE POLACRILEX 4 MG: 4 GUM, CHEWING BUCCAL at 08:04

## 2019-08-13 RX ADMIN — NICOTINE 14 MG: 14 PATCH TRANSDERMAL at 08:05

## 2019-08-13 RX ADMIN — GABAPENTIN 600 MG: 300 CAPSULE ORAL at 21:01

## 2019-08-13 RX ADMIN — METHADONE HYDROCHLORIDE 165 MG: 10 CONCENTRATE ORAL at 08:02

## 2019-08-13 RX ADMIN — CLONAZEPAM 0.5 MG: 0.5 TABLET ORAL at 08:00

## 2019-08-13 RX ADMIN — FLUTICASONE PROPIONATE 2 SPRAY: 50 SPRAY, METERED NASAL at 09:30

## 2019-08-13 RX ADMIN — MELATONIN 6 MG: at 21:01

## 2019-08-13 RX ADMIN — TAMSULOSIN HYDROCHLORIDE 0.4 MG: 0.4 CAPSULE ORAL at 16:37

## 2019-08-13 RX ADMIN — CARISOPRODOL 350 MG: 350 TABLET ORAL at 16:37

## 2019-08-13 RX ADMIN — NICOTINE POLACRILEX 4 MG: 4 GUM, CHEWING BUCCAL at 11:46

## 2019-08-13 RX ADMIN — NICOTINE POLACRILEX 4 MG: 4 GUM, CHEWING BUCCAL at 21:03

## 2019-08-13 RX ADMIN — GABAPENTIN 600 MG: 300 CAPSULE ORAL at 16:37

## 2019-08-13 RX ADMIN — GABAPENTIN 600 MG: 300 CAPSULE ORAL at 08:00

## 2019-08-13 RX ADMIN — CARISOPRODOL 350 MG: 350 TABLET ORAL at 08:59

## 2019-08-13 RX ADMIN — MIRTAZAPINE 30 MG: 15 TABLET, FILM COATED ORAL at 21:01

## 2019-08-13 RX ADMIN — ARIPIPRAZOLE 5 MG: 5 TABLET ORAL at 08:00

## 2019-08-13 RX ADMIN — ACETAMINOPHEN 325 MG: 325 TABLET, FILM COATED ORAL at 16:37

## 2019-08-13 NOTE — PROGRESS NOTES
Progress Note - Behavioral Health   Mai [place] 33 y o  male MRN: [id number]  Unit/Bed#: Braulio Oliver 294-70 Encounter: [id number]    Assessment/Plan   Principal Problem:    Severe episode of recurrent major depressive disorder, without psychotic features ([facility] )  Active Problems:    Methamphetamine abuse ([facility] )      Subjective:  Patient reports feeling well  No signs of benzodiazepine withdrawal and will discontinue Klonopin today  Reports anxiety is manageable with Gabapentin  Appears more future oriented  Is allowed to return home and is pleased with this option  Reports depressive symptoms have decreased and denies suicidal thoughts  Reports adequate sleep, energy, appetite, and self-esteem  No signs of linden  Denies psychosis  Denied delusional material   Medication compliant  Tolerating medications well without serious side effects        Current Medications:  Current Facility-Administered Medications   Medication Dose Route Frequency    acetaminophen (TYLENOL) tablet 325 mg  325 mg Oral Q6H PRN    aluminum-magnesium hydroxide-simethicone (MYLANTA) 200-200-20 mg/5 mL oral suspension 30 mL  30 mL Oral Q4H PRN    ARIPiprazole (ABILIFY) tablet 5 mg  5 mg Oral Daily    benztropine (COGENTIN) injection 1 mg  1 mg Intramuscular Q6H PRN    benztropine (COGENTIN) tablet 1 mg  1 mg Oral Q6H PRN    carisoprodol (SOMA) tablet 350 mg  350 mg Oral Q6H PRN    fluticasone (FLONASE) 50 mcg/act nasal spray 2 spray  2 spray Each Nare Daily    gabapentin (NEURONTIN) capsule 600 mg  600 mg Oral TID    haloperidol (HALDOL) tablet 5 mg  5 mg Oral Q6H PRN    haloperidol lactate (HALDOL) injection 5 mg  5 mg Intramuscular Q6H PRN    hydrOXYzine HCL (ATARAX) tablet 25 mg  25 mg Oral Q6H PRN    hydrOXYzine HCL (ATARAX) tablet 50 mg  50 mg Oral Q6H PRN    hydrOXYzine HCL (ATARAX) tablet 75 mg  75 mg Oral Q6H PRN    magnesium hydroxide (MILK OF MAGNESIA) 400 mg/5 mL oral suspension 30 mL  30 mL Oral Daily PRN    melatonin tablet 6 mg  6 mg Oral HS    methadone (DOLOPHINE) 10 mg/mL oral concentrated solution 165 mg  165 mg Oral Daily    mirtazapine (REMERON) tablet 30 mg  30 mg Oral HS    naproxen (NAPROSYN) tablet 500 mg  500 mg Oral BID PRN    nicotine (NICODERM CQ) 14 mg/24hr TD 24 hr patch 14 mg  14 mg Transdermal Daily    nicotine polacrilex (NICORETTE) gum 4 mg  4 mg Oral Q2H PRN    OLANZapine (ZyPREXA) IM injection 10 mg  10 mg Intramuscular Q8H PRN    OLANZapine (ZyPREXA) tablet 10 mg  10 mg Oral Q8H PRN    risperiDONE (RisperDAL M-TABS) dispersible tablet 1 mg  1 mg Oral Q3H PRN    senna (SENOKOT) tablet 8 6 mg  1 tablet Oral HS    tamsulosin (FLOMAX) capsule 0 4 mg  0 4 mg Oral Daily With [employer] Medications: all current active meds have been reviewed and continue current psychiatric medications  Vitals:  Vitals:    08/13/19 0726   BP: 106/61   Pulse: 59   Resp: 18   Temp: 98 °F (36 7 °C)   SpO2:        Laboratory results:    I have personally reviewed all pertinent laboratory/tests results    Most Recent Labs:   Lab Results   Component Value Date    WBC 9 22 08/04/2019    RBC 3 76 (L) 08/04/2019    HGB 11 2 (L) 08/04/2019    HCT 33 6 (L) 08/04/2019     08/04/2019    RDW 14 6 08/04/2019    NEUTROABS 5 59 08/03/2019    SODIUM 143 08/04/2019    K 3 5 08/04/2019     08/04/2019    CO2 26 08/04/2019    BUN 8 08/04/2019    CREATININE 0 85 08/04/2019    GLUC 105 08/04/2019    CALCIUM 8 1 (L) 08/04/2019    AST 82 (H) 08/04/2019    ALT 51 08/04/2019    ALKPHOS 56 08/04/2019    TP 6 5 08/04/2019    ALB 3 0 (L) 08/04/2019    TBILI 0 30 08/04/2019    NWQ8HBJBDFMP 1 687 08/03/2019       Psychiatric Review of Systems:  Behavior over the last 24 hours:  improved  Sleep: normal  Appetite: normal  Medication side effects: No  ROS: no complaints    Mental Status Evaluation:  Appearance:  casually dressed   Behavior:  cooperative   Speech:  normal pitch and normal volume   Mood:  less anxious Affect:  mood-congruent   Language appropriate   Thought Process:  logical   Thought Content:  obsessions   Perceptual Disturbances: None   Risk Potential: Denied SI/HI  Potential for aggression: No   Sensorium:  person, place and time/date   Cognition:  grossly intact   Consciousness:  alert and awake    Recent and Remote Memory fair   Attention: attention span and concentration were age appropriate   Insight:  improved   Judgment: fair   Gait/Station: normal gait/station and normal balance   Motor Activity: no abnormal movements     Progress Toward Goals: progressing    Recommended Treatment: Continue with group therapy, milieu therapy and occupational therapy  1   End Klonopin today  2  Continue other medications  3  Disposition planning with tentative discharge tomorrow     Risks, benefits and possible side effects of Medications:   Risks, benefits, and possible side effects of medications explained to patient and patient verbalizes understanding        Shona Bateman PA-C

## 2019-08-13 NOTE — PROGRESS NOTES
Pt out in milieu at start of the shift  Ate lunch and was social with peers in [place] dining room  Pt then returned to his room and is lying in bed with a washcloth over his eyes  Has not been attending groups  Denies SI/HI/AVH  Medication compliant

## 2019-08-13 NOTE — PROGRESS NOTES
Med note: Pt requesting Soma for muscle spasms/pain and medication for lower back pain and a headache (5/10)  Tylenol 325mg provided for lower back pain and Soma provided for muscle spasms at 1637  Pt observed in bed, no apparent distress noted  Medications appear to be effective for use

## 2019-08-13 NOTE — PROGRESS NOTES
Pt irritable after writer woke him, scant conversation  Lying in bed with eyes closed throughout conversation  Denies SI/HI/AVH  Seclusive to room since lunch  Has no questions or concerns at this time  Medication compliant

## 2019-08-13 NOTE — PROGRESS NOTES
08/13/19 0738   Team Meeting   Meeting Type Daily Rounds   Team Members Present   Team Members Present Physician;Nurse;;Occupational Therapist   Physician Team Member Dr Deb Castillo Team Member EMMA [facility] Management Team Member Alvarez Nielsen   OT Team Member Hamida   Patient/Family Present   Patient Present No   Patient's Family Present No     Klonopin taper finished today  Pt is likely for dc on Wednesday Thursday  CM completed housing referral  Pt will [facility] with [facility] walk in after dc  Pt will return to live with is mother now that a housing referral is in place

## 2019-08-13 NOTE — DISCHARGE INSTR - OTHER ORDERS
If you are in a Crisis situation Call [phone] to speak to a trained crisis worker - Anytime - Day or Night  You may also call one of the numbers below:  [employer]     [place]:   [address] Department of    Mental Health/Developmental Programs Gigi Parks [address] [facility]  [address] [place] [address] Place                     [phone]  TDD Line                                           [phone]    ADVOCACY/PEER Becca                               [phone]  Consumer/Family Satisfaction Team    The [address]    [phone]    Disability Rights [place]                             [phone]                                                    [TDD] [phone]                                [address] [facility]  Doron Garnica [address]  [phone]    The following agencies may provide services to people with no insurance and those with Medical Assistance and Insurance:  [address]         [address] [facility]               [phone]  The [facility]               [phone]  Family Service Association [facility]   [address]  [facility]           [phone]      D&A SELF-HELP SUPPORT GROUPS    [place]  Families of people with addiction       [phone] or [phone]  Alcoholics Anonymous [phone] or [phone]  Cocaine Anonymous                                   [phone]  Codependence Anonymous                           [phone]  Double Trouble Groups ([address] [facility])  - [address] [facility]                   [phone]  Narcotics Anonymous                                 [phone] [address] [facility] Dept  of Mental Health/Developmental Programs                                                        [phone]  Information and referrals for case management, residential, and employment services  The following agencies provide services to people with no Insurance and those with [place]:  [place]                             [phone]  [facility] Human Services                                    [phone]  [facility] Case Management)  [facility] & NURSING [facility] SITE                       [address]                                     [phone]    The following agencies provide services and supports to people with Medical Assistance and Insurance:  Family Service Association [facility]* [address] [facility] [address]*                                          [phone]  *Arabic speaking staff available      Camille Mendosa [address]                          [phone]  Karyn Espinoza                                   [phone]  press 1  Department of [facility]  [phone] [facility]:                [address] [facility]:                  [phone]                 - Warm Line: [address]:                  [phone]    ACCESS-Childrens Crisis Support       [phone]  National Suicide Prevention Lifeline      1-800 [address] [facility] after Hours Emergency #            [phone]  press Lea      7-284.584.1008  [address]       [facility]                                        3-724.940.2818  (Network of Victim Assistance)  A [facility] Place Hotline                      4-432.402.6604  24hr  Domestic [place] on Aging Marnie Jacqueline Abuse #) 1-872.877.3228    SELF-HELP/FAMILY [address] [phone]  Peer support, education & socialization in [address]  [phone]  [facility]  Peer Support services that are designed to promote recovery and resiliency  The [place] [facility] [address]     [phone]  Free Family Education Programs to help family members of individuals with an active addiction  [employer]  Ctr      [phone]  [address] [place] [address]  Ctr   [phone]    [facility] [place] Program    [phone]  [place] SOLDIERS & SAILORS [facility] Consumers Association           2-224.182.1597  [facility]                          [phone]  ([facility] on [address] [facility])  Provides individual & family support, education & advocacy  CONTACT Helpline (a Warm Line)          [address] [facility]               [address]             [address]               [address] (MICHA) [address]                    www michabuwalt  [address] [facility], pantry, utility aid & self-sufficiency programs  - [address] [place]                             [phone]  Yan Camejo                            [phone]    Department of Public Welfare                  [phone]  Programs include [employer], [address] [facility] Administration              4-307.337.4913    [facility] INFORMATION    [facility] Transport                        [phone]  Applications and appointment scheduling for [employer], Persons with Disabilities Program, Davi Rosales, and [employer] & [place] Lines                  [phone]  [facility] FOR WOMEN & BABIES Trains                           [phone]  SEPTA TDD Line                                     [address] [facility]                     [phone]  For individuals who are homeless or experiencing a housing-related crisis  [facility] [address]                                 [phone]                 - [address] [place] [address]  Public [facility] for people with disabilities  [address] Association (D&A):  [place] at  [employer]  Projects for Assistance in Transition from Homelessness (ZPOI)                                      [phone] [address]                     [phone]                           [facility]                     [address] [place] [address] Road               [phone]  LQBXZQW PPIIM            [address] [facility] [address]                [address] [facility]     of [address] [place] [address]                                    [phone]                 - [address] Anila                                [address] [place] [address] [address] [facility] Legal Advocacy               [phone]  Protection from [address] [place] [address]

## 2019-08-13 NOTE — CASE MANAGEMENT
Pt is able to return home at time of dc  Pt reports that he is ready for dc  Pt reports that the Klonopin taper has been manageable  Pt has no concerns about not being discharged on that medication  Pt will [facility] to [facility] at time of dc for drug and alcohol assessment  Pt will return to live with mother at facesheet address  CM included all referrals in pt's dc folder  CM will continue to follow

## 2019-08-14 VITALS
BODY MASS INDEX: 28.03 KG/M2 | SYSTOLIC BLOOD PRESSURE: 105 MMHG | HEIGHT: 70 IN | TEMPERATURE: 99.4 F | WEIGHT: 195.77 LBS | RESPIRATION RATE: 15 BRPM | DIASTOLIC BLOOD PRESSURE: 55 MMHG | OXYGEN SATURATION: 98 % | HEART RATE: 65 BPM

## 2019-08-14 PROCEDURE — 99239 HOSP IP/OBS DSCHRG MGMT >30: CPT | Performed by: PSYCHIATRY & NEUROLOGY

## 2019-08-14 RX ORDER — METHADONE HYDROCHLORIDE 10 MG/ML
165 CONCENTRATE ORAL DAILY
Refills: 0
Start: 2019-08-14 | End: 2019-12-30 | Stop reason: ALTCHOICE

## 2019-08-14 RX ORDER — TAMSULOSIN HYDROCHLORIDE 0.4 MG/1
0.4 CAPSULE ORAL
Qty: 14 CAPSULE | Refills: 0 | Status: SHIPPED | OUTPATIENT
Start: 2019-08-14 | End: 2019-11-08 | Stop reason: ALTCHOICE

## 2019-08-14 RX ORDER — ARIPIPRAZOLE 5 MG/1
5 TABLET ORAL DAILY
Qty: 14 TABLET | Refills: 3 | Status: SHIPPED | OUTPATIENT
Start: 2019-08-14 | End: 2019-11-08 | Stop reason: SDUPTHER

## 2019-08-14 RX ORDER — MIRTAZAPINE 30 MG/1
30 TABLET, FILM COATED ORAL
Qty: 14 TABLET | Refills: 3 | Status: SHIPPED | OUTPATIENT
Start: 2019-08-14 | End: 2019-11-08 | Stop reason: SDUPTHER

## 2019-08-14 RX ORDER — GABAPENTIN 300 MG/1
600 CAPSULE ORAL 3 TIMES DAILY
Qty: 90 CAPSULE | Refills: 3 | Status: SHIPPED | OUTPATIENT
Start: 2019-08-14 | End: 2019-08-16 | Stop reason: SDUPTHER

## 2019-08-14 RX ORDER — CARISOPRODOL 350 MG/1
350 TABLET ORAL EVERY 8 HOURS PRN
Qty: 30 TABLET | Refills: 0
Start: 2019-08-14 | End: 2019-08-16 | Stop reason: SDUPTHER

## 2019-08-14 RX ORDER — LANOLIN ALCOHOL/MO/W.PET/CERES
6 CREAM (GRAM) TOPICAL
Qty: 14 TABLET | Refills: 3 | Status: SHIPPED | OUTPATIENT
Start: 2019-08-14 | End: 2019-11-08 | Stop reason: ALTCHOICE

## 2019-08-14 RX ADMIN — FLUTICASONE PROPIONATE 2 SPRAY: 50 SPRAY, METERED NASAL at 08:45

## 2019-08-14 RX ADMIN — NICOTINE 14 MG: 14 PATCH TRANSDERMAL at 08:45

## 2019-08-14 RX ADMIN — METHADONE HYDROCHLORIDE 165 MG: 10 CONCENTRATE ORAL at 08:44

## 2019-08-14 RX ADMIN — ARIPIPRAZOLE 5 MG: 5 TABLET ORAL at 08:43

## 2019-08-14 RX ADMIN — CARISOPRODOL 350 MG: 350 TABLET ORAL at 09:05

## 2019-08-14 RX ADMIN — NICOTINE POLACRILEX 4 MG: 4 GUM, CHEWING BUCCAL at 08:48

## 2019-08-14 RX ADMIN — GABAPENTIN 600 MG: 300 CAPSULE ORAL at 08:44

## 2019-08-14 NOTE — BH TRANSITION RECORD
Contact Information: If you have any questions, concerns, pended studies, tests and/or procedures, or emergencies regarding your inpatient behavioral health visit  Please contact [address] [place] [address] behavioral health unit [phone] and ask to speak to a , nurse or physician  A contact is available 24 hours/ 7 days a week at this number  Summary of Procedures Performed During your Stay:  Below is a list of major procedures performed during your hospital stay and a summary of results:  - No major procedures performed  Pending Studies (From admission, onward)    None        If studies are pending at discharge, follow up with your PCP and/or referring provider

## 2019-08-14 NOTE — DISCHARGE SUMMARY
Discharge Summary - 80Danitza Calhoun Doing 33 y o  male MRN: [id number]  Unit/Bed#: JUSTINA Sterling 385-03 Encounter: [id number]     Admission Date:   Admission Orders (From admission, onward)     Ordered        08/06/19 1525  DISCHARGE READMIT Admit Patient to [address] [place] [address] (use with Discharge Readmit Navigator in Leena Jalloh 1154 Discharge Readmit scenario including from any IP unit or different campus ED to [facility])  Nurse to release order when pt  arrives to [facility] Unit  Once                         Discharge Date: 8/14/19    Attending Psychiatrist: Nilda Clifford MD     Reason for Admission/HPI:   History of Present Illness    Patient is a 24-year-old male who was found outside of [address] on a bench in an altered state  He was found to have overdosed on double the dosage of Methadone, Soma, and stated he would inject fentanyl if he get his hands on it  Unclear how patient obtained double the dosage of Methadone since he goes to Methadone clinic  He also admitted to relapsing on methamphetamine which also showed up positive on UDS  Other than currently prescribed Methadone and methamphetamine no other drugs were found in his system  Patient reported medication compliance after previous [facility] hospitalization (STLQ 7/17/19 to 7/26/19)  He did state he resumed taking Klonopin at 1mg 3 times a day after discharge (was told to take   5mg TID)  In hospitalization last time patient had unstable vitals that showed bradycardia and hypotension  He was seen largely sedated where Methadone dosage and Klonopin both were reduced  Methadone was resumed at prior prescribed dose by end of hospitalization  Patient was focused on going back on previously prescribed Klonopin dosing  He did state that he was anxious but appeared largely comfortable on [employer] unit eating ice cream during interview  Reports main stressor was recent death of his grandmother    He also is now facing marijuana charges after having it found on him when he was outside of the hospital   Stated he was unsure if he is able to return to mother's home and and was questionably homeless at this time  He reported ongoing depressive symptoms with poor sleep, anhedonia, lack of energy, guilt, hopelessness, passive death wishes  He reported ongoing anxiety with racing thoughts  He denied panic attacks  He denied psychosis  He did not show signs of linden but did report feeling more "up and impulsive" after starting Lexapro last hospitalization  He was not irritable and was overall cooperative  He was willing to sign voluntary 201  He was initially not interested in inpatient rehab or IOP services  Patient has prior inpatient psychiatric hospitalizations, does have prior suicide attempts, and is in process of finding outpatient psychiatrist   He was transferred to [facility] when medically cleared      Psychosocial Stressors: poor housing situation, lack of supports, drug abuse      Hospital Course:   Behavioral Health Medications:   current meds:   Current Facility-Administered Medications   Medication Dose Route Frequency    acetaminophen (TYLENOL) tablet 325 mg  325 mg Oral Q6H PRN    aluminum-magnesium hydroxide-simethicone (MYLANTA) 200-200-20 mg/5 mL oral suspension 30 mL  30 mL Oral Q4H PRN    ARIPiprazole (ABILIFY) tablet 5 mg  5 mg Oral Daily    benztropine (COGENTIN) injection 1 mg  1 mg Intramuscular Q6H PRN    benztropine (COGENTIN) tablet 1 mg  1 mg Oral Q6H PRN    carisoprodol (SOMA) tablet 350 mg  350 mg Oral Q6H PRN    fluticasone (FLONASE) 50 mcg/act nasal spray 2 spray  2 spray Each Nare Daily    gabapentin (NEURONTIN) capsule 600 mg  600 mg Oral TID    haloperidol (HALDOL) tablet 5 mg  5 mg Oral Q6H PRN    haloperidol lactate (HALDOL) injection 5 mg  5 mg Intramuscular Q6H PRN    hydrOXYzine HCL (ATARAX) tablet 25 mg  25 mg Oral Q6H PRN    hydrOXYzine HCL (ATARAX) tablet 50 mg  50 mg Oral Q6H PRN    hydrOXYzine HCL (ATARAX) tablet 75 mg  75 mg Oral Q6H PRN    magnesium hydroxide (MILK OF MAGNESIA) 400 mg/5 mL oral suspension 30 mL  30 mL Oral Daily PRN    melatonin tablet 6 mg  6 mg Oral HS    methadone (DOLOPHINE) 10 mg/mL oral concentrated solution 165 mg  165 mg Oral Daily    mirtazapine (REMERON) tablet 30 mg  30 mg Oral HS    naproxen (NAPROSYN) tablet 500 mg  500 mg Oral BID PRN    nicotine (NICODERM CQ) 14 mg/24hr TD 24 hr patch 14 mg  14 mg Transdermal Daily    nicotine polacrilex (NICORETTE) gum 4 mg  4 mg Oral Q2H PRN    OLANZapine (ZyPREXA) IM injection 10 mg  10 mg Intramuscular Q8H PRN    OLANZapine (ZyPREXA) tablet 10 mg  10 mg Oral Q8H PRN    risperiDONE (RisperDAL M-TABS) dispersible tablet 1 mg  1 mg Oral Q3H PRN    senna (SENOKOT) tablet 8 6 mg  1 tablet Oral HS    tamsulosin (FLOMAX) capsule 0 4 mg  0 4 mg Oral Daily With Dinner       Patient was admitted to [address] [place] [address] inpatient psychiatric unit on voluntary 201 commitment for safety and stabilization  On admission patient was continued on Remeron 30mg HS for depression/insomnia, discontinued from Lexapro due to reported hypomanic symptoms after starting medications, started on Abilify 5mg QD for depression/mood stabilization, increased on Melatonin to 6mg HS for insomnia, continued on previously prescribed Methadone 165mg QD for opioid replacement therapy, and continued on Klonopin   5mg TID for anxiety  Due to history of drug addiction and inability to go into facilities for housing while being on benzodiazepines he ultimately agreed to stop medication  Klonopin was then tapered over a course of a few days until discharged  He was started on Gabapentin and titrated to Gabapentin 600mg TID by end of hospitalization for anxiety and pain management  He tolerated medications with no acute side effects  His mood brightened over the course of his treatment, and he was seen in [facility] interacting appropriately with peers    He did not demonstrate dangerous behavior to self or others during his inpatient stay  He did not show signs of benzodiazepine withdrawal   On day of discharge patient had reduced depression, controllable anxiety, denied psychosis, did not show signs of linden, and denied suicidal/homicidal ideations  Mental Status at time of Discharge:     Appearance:  casually dressed   Behavior:  cooperative   Speech:  normal pitch and normal volume   Mood:  euthymic   Affect:  mood-congruent   Thought Process:  normal   Thought Content:  obsessions   Perceptual Disturbances: None   Risk Potential: Denied SI/HI  Potential for aggression: no   Sensorium:  person, place and time/date   Cognition:  grossly intact   Consciousness:  alert and awake    Attention: attention span and concentration were age appropriate   Insight:  fair   Judgment: fair   Gait/Station: normal gait/station and normal balance   Motor Activity: no abnormal movements       Discharge Diagnosis:   Severe episode of recurrent major depressive disorder, without psychotic features  Methamphetamine abuse      Discharge Medications:  See after visit summary for reconciled discharge medications provided to patient and family  Discharge instructions/Information to patient and family:   See after visit summary for information provided to patient and family  Provisions for Follow-Up Care:  See after visit summary for information related to follow-up care and any pertinent home health orders  Discharge Statement   I spent 33 minutes discharging the patient  This time was spent on the day of discharge  I had direct contact with the patient on the day of discharge  On day of discharge patient had mental status exam performed, discharge instructions/medications reviewed, and outpatient planning discussed  He was given 2 weeks with 3 refills of scripts  He denied tobacco cessation therapy after discharge  He did accept IOP services    He was ultimately allowed to return to his mother's house to avoid homelessness if he agreed to her rules  He was discharged home      Jordan Payne PA-C

## 2019-08-14 NOTE — PROGRESS NOTES
Polite and cooperative  Expressed readiness for discharge  Denies SI/HI  Plans to try and obtain employment  Plans to return to mothers  Encouraged compliance with medications and OP appointments  Plans to follow up with [facility] for methadone maintenance  Admits he does attend AA mtgs within the community  No questions or concerns

## 2019-08-14 NOTE — CASE MANAGEMENT
CM received call back from [facility] @ [address] ([phone]) to discuss referral  CM returned call and left voice message

## 2019-08-14 NOTE — CASE MANAGEMENT
CM outreached to pt's mother to advise of dc for today  CM outreached to [facility] to advise of pt's return on 8/15/19 for Methadone Maintenance  CM faxed over latest dosing and date of dosing  CM spoke with [place] who received the fax  [address] is aware of pt's dc and they will open pt up sometime in the next two weeks  Pt is also aware that a CR[facility] referral has been sent to the [facility] and Josie are reviewing the application  Pt is not longer suicidal  Pt's mother feels better about pt returning to her house with the referrals in place  Pt states he is no longer suicidal  Pt will return to [place] address via lyft  Pt will follow up with [facility] drug and alcohol assessment on 8/15/19 @ 9am  No further needs anticipated at time of dc

## 2019-08-14 NOTE — DISCHARGE INSTRUCTIONS
Depression   WHAT YOU NEED TO KNOW:   Depression is a medical condition that causes feelings of sadness or hopelessness that do not go away  Depression may cause you to lose interest in things you used to enjoy  These feelings may interfere with your daily life  DISCHARGE INSTRUCTIONS:   Call 911 for any of the following:   · You think about harming yourself or someone else  Contact your healthcare provider if:   · Your symptoms do not improve  · You cannot make it to your next appointment  · You have new symptoms  · You have questions or concerns about your condition or care  Medicines:   · Antidepressants  may be given to improve or balance your mood  You may need to take this medicine for several weeks before you begin to feel better  · Take your medicine as directed  Contact your healthcare provider if you think your medicine is not helping or if you have side effects  Tell him of her if you are allergic to any medicine  Keep a list of the medicines, vitamins, and herbs you take  Include the amounts, and when and why you take them  Bring the list or the pill bottles to follow-up visits  Carry your medicine list with you in case of an emergency  Therapy  may be used to treat your depression  A therapist will help you learn to cope with your thoughts and feelings  This can be done alone or in a group  It may also be done with family members or a significant other  Self-care:   · Get regular physical activity  Try to exercise for 30 minutes, 3 to 5 days a week  Work with your healthcare provider to develop an exercise plan that you enjoy  Physical activity may improve your symptoms  · Get enough sleep  Create a routine to help you relax before bed  You can listen to music, read, or do yoga  Try to go to bed and wake up at the same time every day  Sleep is important for emotional health  · Eat a variety of healthy foods from all of the food groups    A healthy meal plan is low in fat, salt, and added sugar  Ask your healthcare provider for more information about a meal plan that is right for you  · Do not drink alcohol or use drugs  Alcohol and drugs can make your symptoms worse  Follow up with your healthcare provider as directed: Your healthcare provider will monitor your progress at follow-up visits  He or she will also monitor your medicine if you take antidepressants  Your healthcare provider will ask if the medicine is helping  Tell him or her about any side effects or problems you may have with your medicine  The type or amount of medicine may need to be changed  Write down your questions so you remember to ask them during your visits  © 2017 [address]  Information is for End User's use only and may not be sold, redistributed or otherwise used for commercial purposes  All illustrations and images included in CareNotes® are the copyrighted property of [employer] M [employer]  or [employer]esar Nelson  The above information is an  only  It is not intended as medical advice for individual conditions or treatments  Talk to your doctor, nurse or pharmacist before following any medical regimen to see if it is safe and effective for you  Methamphetamine Abuse   WHAT YOU NEED TO KNOW:   Methamphetamine (meth) abuse is any use of meth, or needing more meth for the same effects you got from smaller amounts  DISCHARGE INSTRUCTIONS:   Seek care immediately if:   · You have chest pain, and your heartbeat or breathing is faster than usual     · You are so nervous that you cannot function  · You feel sick or vomit, or have headaches or trouble breathing while being around or cooking meth  You may also feel dizzy  · Children or others who have been near meth look or act ill, or will not wake up  · You have a seizure  · You want to hurt yourself or someone else  Contact your healthcare provider if:   · You have a fever      · You are using meth and know or think you may be pregnant  · You have withdrawal symptoms and want to start using meth again  · You have questions or concerns about your condition or care  Medicines:   · Medicines  may be given to help you stay calm, reduce depression, or decrease false thoughts  · Take your medicine as directed  Contact your healthcare provider if you think your medicine is not helping or if you have side effects  Tell him or her if you are allergic to any medicine  Keep a list of the medicines, vitamins, and herbs you take  Include the amounts, and when and why you take them  Bring the list or the pill bottles to follow-up visits  Carry your medicine list with you in case of an emergency  Long-term effects of meth abuse:   · Memory and concentration problems  can make it hard to learn or remember information  You may feel confused  You may also do things more slowly than before  · Behavior problems  may include violent or impulsive actions  Impulsive means you act without thinking first      · Physical problems  include heart weakness or damage  Your heart may have trouble working correctly  Men may have a decreased ability to have sex  · Self-care problems  include not keeping yourself clean and not eating properly because you are focused on using meth  Meth may cause you to look older than you really are  · Skin problems  may happen if you start picking at your skin or do not care for needle marks  You may think you see or feel bugs on or under your skin and try to pick them off  Skin picking causes sores to grow, and the sores can get infected  Meth injection causes needle marks on your skin  Needle marks can also get infected  · Mouth problems  can develop from meth use  Meth can cause dry mouth and make you chew, clench, or grind your teeth more than normal  This causes your teeth to wear down  Your teeth may turn dark or black  They may break, crumble, or fall apart   Your teeth may need to be pulled out  Dangers of cooking meth:  Meth is cooked from chemicals and materials that can cause a fire or explosion  These substances can cause severe burns  Do not use meth with other drugs:  Meth is more harmful when you combine it with alcohol or other drugs, such as cannabis or cocaine  This can be life-threatening  Prevent the spread of disease: Your risk for HIV, hepatitis, and other sexually transmitted infections (STIs) is increased  You may have sex more often with different people  You may be less likely to practice safe sex, such as using a condom  People who inject meth often share used or dirty needles  How meth affects unborn or  babies and children:   · If you are pregnant and use meth, your baby may not grow in your womb as he should  He may be born too early or die before birth  Your baby may have problems with his heart, brain, or body development  Do not breastfeed your baby if you use meth  You will give meth to your baby through your breast milk  Ask healthcare providers for more information about treatment programs and drug use while breastfeeding  · Your child may not grow as he should  He also may have trouble learning or managing anger  Meth withdrawal:  Withdrawal occurs when you decrease or stop using a drug you are addicted to  Meth users may have trouble coping with the symptoms of withdrawal and may start using meth again  Withdrawal signs and symptoms go away in days to weeks after you stop using meth   Meth withdrawal can cause the following signs and symptoms:  · Seizures    · Feeling sad or wanting to kill yourself    · Strong cravings for meth    · Feeling tired, sleeping longer than usual or not being able sleep at all, or bad dreams    · Trouble focusing on a task, moving more slowly and taking longer to complete tasks, or feeling restless    · Feeling nervous, angry, hungry, or unwell, or thinking people are trying to hurt you  Meth abuse treatment: · Contingency management  is a program to help meth users stop using drugs by giving rewards  You may be rewarded for staying in treatment or giving drug-free urine samples  You may get rewards for having STI or tuberculosis tests, or getting vaccines to help decrease the spread of disease  Rewards may include vouchers to buy food  · Cognitive behavioral therapy (CBT)  helps you change your thinking and behavior  It can help you manage depression and anxiety caused by meth use  CBT can help you learn good coping skills and ways to manage stress  CBT can be done with you and a talk therapist or in a group with others  · Family therapy and support groups  are meetings with a talk therapist and other people who have used meth or other drugs  Your friends and family may be asked to attend treatment sessions with you  Programs near where you live may support your choice to quit using drugs  Ask your healthcare provider for information about programs in your town  · Harm reduction  is a program to help support your choice to prevent spreading disease  You may be able to return used needles and syringes and replace them with clean ones  Follow up with your healthcare provider as directed:  Write down your questions so you remember to ask them during your visits  © 2017 [facility] Information is for End User's use only and may not be sold, redistributed or otherwise used for commercial purposes  All illustrations and images included in CareNotes® are the copyrighted property of [employer]  or Cesar Nelson  The above information is an  only  It is not intended as medical advice for individual conditions or treatments  Talk to your doctor, nurse or pharmacist before following any medical regimen to see if it is safe and effective for you

## 2019-08-16 DIAGNOSIS — M48.07 SPINAL STENOSIS OF LUMBOSACRAL REGION: Chronic | ICD-10-CM

## 2019-08-16 DIAGNOSIS — F41.9 ANXIETY: ICD-10-CM

## 2019-08-16 DIAGNOSIS — M62.838 MUSCLE SPASM: ICD-10-CM

## 2019-08-16 RX ORDER — GABAPENTIN 300 MG/1
600 CAPSULE ORAL 3 TIMES DAILY
Qty: 90 CAPSULE | Refills: 0 | Status: SHIPPED | OUTPATIENT
Start: 2019-08-16 | End: 2019-11-08 | Stop reason: DRUGHIGH

## 2019-08-16 RX ORDER — CARISOPRODOL 350 MG/1
350 TABLET ORAL EVERY 8 HOURS PRN
Qty: 30 TABLET | Refills: 0 | Status: SHIPPED | OUTPATIENT
Start: 2019-08-16 | End: 2019-12-22 | Stop reason: SDUPTHER

## 2019-08-16 NOTE — TELEPHONE ENCOUNTER
----- Message from Aliya Sudeep sent at 8/16/2019  5:14 AM EDT -----  Regarding: Prescription Question  Contact: [phone]  Pasha Correa PA-C  Pharmacy wont fill the gabapentin caps for another week    Can you please call in the 600mg tabs sig 1 po TID  Otherwise I have no meds   In addition the carisoprodol 350mg 1 po q6 hours for muscle spasms im in the same boat   Appears the pharmacy never returned the nearly full script I brought in to the hospital     Thank you kindly, Ashlie Gruber

## 2019-08-16 NOTE — TELEPHONE ENCOUNTER
Jed Sanchez from [facility] called because pt wants to renew his Carisoprodol 350mg, 12 days early  Pt states her was in the hospital and they keep his medications  Pharmacy needs doctors approval to fill medication

## 2019-08-17 ENCOUNTER — HOSPITAL ENCOUNTER (EMERGENCY)
Facility: HOSPITAL | Age: 29
Discharge: HOME | End: 2019-08-18
Attending: EMERGENCY MEDICINE | Admitting: EMERGENCY MEDICINE
Payer: COMMERCIAL

## 2019-08-17 DIAGNOSIS — F32.A DEPRESSION: Primary | ICD-10-CM

## 2019-08-17 DIAGNOSIS — F19.10 SUBSTANCE ABUSE (HCC): ICD-10-CM

## 2019-08-17 LAB — ETHANOL EXG-MCNC: 0 MG/DL

## 2019-08-17 PROCEDURE — 99285 EMERGENCY DEPT VISIT HI MDM: CPT

## 2019-08-17 PROCEDURE — 99282 EMERGENCY DEPT VISIT SF MDM: CPT | Performed by: PHYSICIAN ASSISTANT

## 2019-08-17 PROCEDURE — 82075 ASSAY OF BREATH ETHANOL: CPT | Performed by: EMERGENCY MEDICINE

## 2019-08-17 NOTE — ED NOTES
Attempted to perform Alcohol Breath Test on patient  Unable to cooperate due to patient being extremely sleepy       Claudell Hoffmann Alim-Frazier  08/17/19 1925

## 2019-08-17 NOTE — ED PROVIDER NOTES
History  Chief Complaint   Patient presents with    Depression     Presents with mother with report of depression, denies SI/HI  D/C from [address] here Wednesday  Pt very sleepy, took 1 SOMA, noncompliant with medications   Detox Evaluation     Requesting substance abuse assistance, methamphetamine, last used today, also had Methadone today  Denies alcohol, denies any OD  Patient is a 32 y/o M with h/o substance abuse and depression brought to the ED by mother for worsening depression and drug abuse  Patient denies SI/HI  He states he used meth at a friend's house last night  He is currently staying at [facility]  Patient is requesting rehab  Patient was just discharged from [facility] 1 week ago  History provided by:  Patient  Depression   Presenting symptoms: depression    Presenting symptoms: no hallucinations, no homicidal ideas, no suicidal thoughts and no suicidal threats    Patient accompanied by:  Parent  Timing:  Constant  Progression:  Worsening  Chronicity:  Chronic  Context: drug abuse    Relieved by:  Nothing  Worsened by:  Nothing  Ineffective treatments:  None tried  Associated symptoms: anxiety, feelings of worthlessness and poor judgment    Associated symptoms: no abdominal pain and no headaches    Risk factors: hx of mental illness    Detox Evaluation   Similar prior episodes: yes    Chronicity:  Chronic  Suspected agents:  Methamphetamines  Associated symptoms: no abdominal pain, no bladder incontinence, no bowel incontinence, no confusion, no hallucinations, no headaches, no nausea, no shortness of breath, no suicidal ideation, no vomiting and no weakness    Risk factors: psychiatric hx        Prior to Admission Medications   Prescriptions Last Dose Informant Patient Reported? Taking?    ARIPiprazole (ABILIFY) 5 mg tablet   No No   Sig: Take 1 tablet (5 mg total) by mouth daily At 9am   carisoprodol (SOMA) 350 mg tablet   No No   Sig: Take 1 tablet (350 mg total) by mouth every 8 (eight) hours as needed for muscle spasms OK TO REFILL--HOSPITAL DESTROYED LAST SCRIPT   gabapentin (NEURONTIN) 300 mg capsule   No No   Sig: Take 2 capsules (600 mg total) by mouth 3 (three) times a day for 14 days At 9am, 4pm, 9pm   melatonin 3 mg   No No   Sig: Take 2 tablets (6 mg total) by mouth daily at bedtime   methadone (DOLOPHINE) 10 mg/mL oral concentrated solution   No No   Sig: Take 16 5 mL (165 mg total) by mouth dailyMax Daily Amount: 165 mg   mirtazapine (REMERON) 30 mg tablet   No No   Sig: Take 1 tablet (30 mg total) by mouth daily at bedtime   tamsulosin (FLOMAX) 0 4 mg   No No   Sig: Take 1 capsule (0 4 mg total) by mouth daily with dinner      Facility-Administered Medications: None       Past Medical History:   Diagnosis Date    Addiction to drug ([facility] )     Allergic     Anxiety     Chronic pain disorder     Depression     Psychiatric disorder     Spinal stenosis     Substance abuse ([facility] )     Suicide attempt ([facility] )        Past Surgical History:   Procedure Laterality Date    BACK SURGERY      FRACTURE SURGERY      left foot reconstruted     SPINE SURGERY      hernated disc        Family History   Problem Relation Age of Onset    Hypertension Mother     Hyperlipidemia Father     Heart disease Father     Depression Maternal Grandmother     Hypertension Maternal Grandmother     Squamous cell carcinoma Maternal Grandfather     Alcohol abuse Maternal Grandfather     Alzheimer's disease Paternal Grandmother     Dementia Paternal Grandmother     Alcohol abuse Paternal Uncle      I have reviewed and agree with the history as documented      Social History     Tobacco Use    Smoking status: Current Every Day Smoker     Packs/day: 0 00     Types: E-Cigarettes     Last attempt to quit: 2017     Years since quittin 9    Smokeless tobacco: Current User    Tobacco comment: Pt uses a "vape" pen - use is equivalent to approximately 1 pack per day   Substance Use Topics    Alcohol use: Never     Frequency: Never     Binge frequency: Never    Drug use: Yes     Frequency: 3 0 times per week     Types: Methamphetamines     Comment: Used Meth today        Review of Systems   Constitutional: Negative for chills and fever  HENT: Negative  Respiratory: Negative for cough and shortness of breath  Gastrointestinal: Negative for abdominal pain, bowel incontinence, nausea and vomiting  Genitourinary: Negative for bladder incontinence  Musculoskeletal: Negative for back pain and neck pain  Skin: Negative for color change and rash  Neurological: Negative for dizziness, weakness, numbness and headaches  Psychiatric/Behavioral: Positive for depression  Negative for confusion, hallucinations, homicidal ideas and suicidal ideas  The patient is nervous/anxious  All other systems reviewed and are negative  Physical Exam  Physical Exam   Constitutional: He is oriented to person, place, and time  He appears well-developed and well-nourished  He is cooperative  He does not appear ill  No distress  HENT:   Head: Normocephalic and atraumatic  Eyes: Conjunctivae are normal  Right pupil is round  Left pupil is round  Pupils are equal    Cardiovascular: Normal rate, regular rhythm and normal heart sounds  No murmur heard  Pulmonary/Chest: Effort normal and breath sounds normal  He has no wheezes  He has no rhonchi  He has no rales  Abdominal: Soft  Normal appearance and bowel sounds are normal  There is no tenderness  Musculoskeletal: Normal range of motion  He exhibits no edema  Neurological: He is alert and oriented to person, place, and time  He has normal strength  No cranial nerve deficit or sensory deficit  Gait normal  GCS eye subscore is 4  GCS verbal subscore is 5  GCS motor subscore is 6  Skin: Skin is warm and dry  No rash noted  He is not diaphoretic  No pallor  Psychiatric: His speech is delayed  He exhibits a depressed mood   He expresses no homicidal and no suicidal ideation  He expresses no suicidal plans and no homicidal plans  Nursing note and vitals reviewed  Vital Signs  ED Triage Vitals [08/17/19 1813]   Temp Pulse Respirations Blood Pressure SpO2   -- 88 20 138/84 98 %      Temp src Heart Rate Source Patient Position - Orthostatic VS BP Location FiO2 (%)   -- Monitor Sitting Left arm --      Pain Score       5           Vitals:    08/17/19 2145 08/17/19 2200 08/17/19 2215 08/17/19 2230   BP: 105/64 104/62 105/61 106/59   Pulse: 67 66 66 64   Patient Position - Orthostatic VS:             Visual Acuity      ED Medications  Medications - No data to display    Diagnostic Studies  Results Reviewed     Procedure Component Value Units Date/Time    POCT alcohol breath test [[id number]]  (Normal) Resulted:  08/17/19 1940    Lab Status:  Final result Updated:  08/17/19 1940     EXTBreath Alcohol 0 00    Rapid drug screen, urine [[id number]]     Lab Status:  No result Specimen:  Urine                  No orders to display              Procedures  Procedures       ED Course  ED Course as of Aug 17 2244   Sat Aug 17, 2019   2244 Care transferred to Dr Ricardo Chavira  MDM  Number of Diagnoses or Management Options  Depression: established and worsening  Substance abuse ([facility] ): established and worsening  Diagnosis management comments: Patient with depression and substance abuse requesting rehab, will consult crisis  Patient denies SI/HI, does not require psychiatric admission          Amount and/or Complexity of Data Reviewed  Clinical lab tests: ordered and reviewed    Patient Progress  Patient progress: stable      Disposition  Final diagnoses:   Depression   Substance abuse ([facility] )     Time reflects when diagnosis was documented in both MDM as applicable and the Disposition within this note     Time User Action Codes Description Comment    8/17/2019  9:34 PM Jenny Guzmán Add [F32 9] Depression     8/17/2019  9:34 PM Jenny Guzmán Add [F19 10] Substance abuse [facility])       ED Disposition     None      Follow-up Information    None         Patient's Medications   Discharge Prescriptions    No medications on file     No discharge procedures on file      ED Provider  Electronically Signed by           Claus Gaston PA-C  08/17/19 3660

## 2019-08-17 NOTE — ED NOTES
Call to [facility], message left on "[address]" to return call to ROSIE Bowser RN  08/17/19 7630

## 2019-08-18 VITALS
SYSTOLIC BLOOD PRESSURE: 121 MMHG | WEIGHT: 195 LBS | HEART RATE: 88 BPM | RESPIRATION RATE: 15 BRPM | HEIGHT: 70 IN | OXYGEN SATURATION: 100 % | DIASTOLIC BLOOD PRESSURE: 81 MMHG | BODY MASS INDEX: 27.92 KG/M2 | TEMPERATURE: 98.1 F

## 2019-08-18 LAB
AMPHETAMINES SERPL QL SCN: POSITIVE
BARBITURATES UR QL: NEGATIVE
BENZODIAZ UR QL: POSITIVE
COCAINE UR QL: POSITIVE
METHADONE UR QL: POSITIVE
OPIATES UR QL SCN: NEGATIVE
PCP UR QL: NEGATIVE
THC UR QL: NEGATIVE

## 2019-08-18 PROCEDURE — 80307 DRUG TEST PRSMV CHEM ANLYZR: CPT | Performed by: EMERGENCY MEDICINE

## 2019-08-18 RX ORDER — METHADONE HYDROCHLORIDE 5 MG/1
165 TABLET ORAL ONCE
Status: DISCONTINUED | OUTPATIENT
Start: 2019-08-18 | End: 2019-08-18

## 2019-08-18 RX ORDER — ARIPIPRAZOLE 5 MG/1
5 TABLET ORAL DAILY
Status: DISCONTINUED | OUTPATIENT
Start: 2019-08-18 | End: 2019-08-18

## 2019-08-18 NOTE — ED CARE HANDOFF
Emergency Department Sign Out Note        Sign out and transfer of care from Dr Amaya Reza  See Separate Emergency Department note  The patient, Sade Puente, was evaluated by the previous provider for polysubstance abuse and depression  Workup Completed:  Medically cleared  ED Course / Workup Pending (followup): Await placement if still desired  No SI or HI  ED Course as of Aug 18 0983   Mike Castellonki Aug 18, 2019   6001  Patient would like to be discharged  Crisis informs me that he in fact is not waiting to see if a bed is opened  There will be no bed for him today but there will be follow-up provided on Monday  We also will give him paperwork that was faxed overnight for his follow-up  Patient declines his morning Abilify  Patient believes he has a dose of methadone at home so I will not give him an extra dose now  Procedures  MDM  Number of Diagnoses or Management Options  Depression: established and worsening  Substance abuse ([facility] ): established and worsening     Amount and/or Complexity of Data Reviewed  Clinical lab tests: reviewed  Discuss the patient with other providers: yes        Disposition  Final diagnoses:   Depression   Substance abuse ([facility] )     Time reflects when diagnosis was documented in both MDM as applicable and the Disposition within this note     Time User Action Codes Description Comment    8/17/2019  9:34 PM Iker Caro Add [F32 9] Depression     8/17/2019  9:34 PM Iker Caro Add [F19 10] Substance abuse [facility])       ED Disposition     ED Disposition Condition Date/Time Comment    Discharge Stable Sun Aug 18, 2019  9:50 AM Sade Puente discharge to home/self care  Follow-up Information     Follow up With Specialties Details Why Contact Info    Call the services provided to you today  Patient's Medications   Discharge Prescriptions    No medications on file     No discharge procedures on file         ED Provider  Electronically Signed by     Mauro Bravo DO  08/18/19 0239

## 2019-08-18 NOTE — ED NOTES
Sleeping, respirations easy and non-labored; repositions self       Nuria Sullivan RN  08/18/19 7655

## 2019-08-18 NOTE — ED NOTES
Awake and alert; listening to music via headphones  Requested and received crackers and gingerale       Jesus Crimes, MARCELINO  08/18/19 2641

## 2019-08-18 NOTE — ED NOTES
Call placed to [facility] to inquire about bed availability; two detox beds available and staff inquired about [facility] coming to meet with patient but was uncertain if a representative is available   Awaiting return call from [facility] with information on [facility] or making referral      LAYTON Camargo  08/17/19 2010

## 2019-08-18 NOTE — DISCHARGE INSTRUCTIONS
Someone from [facility] re[facility] should call you tomorrow  In the meantime contact the other  resources that were provided by the crisis worker  Contact your family doctor as needed

## 2019-08-18 NOTE — ED NOTES
Patient requesting to leave at this time, states he does not want to wait another day at this time  Patient alert and awake, provider notified   Patient provided breakfast       Jignesh Baca RN  08/18/19 4464

## 2019-08-18 NOTE — ED NOTES
Sleeping; respirations easy and non-labored; repositions self       Lawanda Sarkar RN  08/18/19 2109

## 2019-08-18 NOTE — ED NOTES
Rehab bed search to following facilities (options limited due to methadone treatment):    [place]: Spoke with Micheal Farah who reports no beds available  [facility]: No beds available  [place]: Spoke with Maame Davidson who states that due to patient's ongoing Methadone treatment, patient's care would need to be coordinated with a methadone coordinator who will not be back in until Monday  Maame Davidson will send an e-mail asking the coordinator to reach out with patient to discuss treatment options  [place]: Spoke with Nino Chris, no beds available tonight but patient information was provided for follow-up for first available opening   File # [id number]  [place]: No male rehab beds available    Deandre [facility]  08/17/19   1239

## 2019-08-18 NOTE — ED NOTES
Received call back from Artem Pool at [facility] who states that he spoke with the director of nursing who states there is a [address] representative on location at [facility] for patient evaluations  Husam did not have contact information for that representative and was unsure how to contact them  Husam provided phone number for direction, Brook Barry @ [phone]  Call placed to Brook Barry but call went straight to voicemail  Left a message requesting a return call       LAYTON Sheppard  08/17/19 2024

## 2019-10-03 ENCOUNTER — HOSPITAL ENCOUNTER (EMERGENCY)
Facility: HOSPITAL | Age: 29
Discharge: HOME | End: 2019-10-04
Attending: EMERGENCY MEDICINE | Admitting: EMERGENCY MEDICINE
Payer: COMMERCIAL

## 2019-10-03 DIAGNOSIS — R74.8 ELEVATED CK: ICD-10-CM

## 2019-10-03 DIAGNOSIS — T50.901A ACCIDENTAL OVERDOSE, INITIAL ENCOUNTER: Primary | ICD-10-CM

## 2019-10-03 LAB
ALBUMIN SERPL BCP-MCNC: 4.5 G/DL (ref 3.5–5)
ALP SERPL-CCNC: 91 U/L (ref 46–116)
ALT SERPL W P-5'-P-CCNC: 69 U/L (ref 12–78)
ANION GAP SERPL CALCULATED.3IONS-SCNC: 8 MMOL/L (ref 4–13)
APAP SERPL-MCNC: <2 UG/ML (ref 10–20)
AST SERPL W P-5'-P-CCNC: 60 U/L (ref 5–45)
BASOPHILS # BLD AUTO: 0.04 THOUSANDS/ΜL (ref 0–0.1)
BASOPHILS NFR BLD AUTO: 1 % (ref 0–1)
BILIRUB SERPL-MCNC: 0.6 MG/DL (ref 0.2–1)
BUN SERPL-MCNC: 13 MG/DL (ref 5–25)
CALCIUM SERPL-MCNC: 9.6 MG/DL (ref 8.3–10.1)
CHLORIDE SERPL-SCNC: 102 MMOL/L (ref 100–108)
CK MB SERPL-MCNC: 1.9 NG/ML (ref 0–5)
CK MB SERPL-MCNC: <1 % (ref 0–2.5)
CK SERPL-CCNC: 495 U/L (ref 39–308)
CO2 SERPL-SCNC: 31 MMOL/L (ref 21–32)
CREAT SERPL-MCNC: 1.07 MG/DL (ref 0.6–1.3)
EOSINOPHIL # BLD AUTO: 0.08 THOUSAND/ΜL (ref 0–0.61)
EOSINOPHIL NFR BLD AUTO: 1 % (ref 0–6)
ERYTHROCYTE [DISTWIDTH] IN BLOOD BY AUTOMATED COUNT: 13.2 % (ref 11.6–15.1)
ETHANOL SERPL-MCNC: <3 MG/DL (ref 0–3)
GFR SERPL CREATININE-BSD FRML MDRD: 93 ML/MIN/1.73SQ M
GLUCOSE SERPL-MCNC: 92 MG/DL (ref 65–140)
HCT VFR BLD AUTO: 35.6 % (ref 36.5–49.3)
HGB BLD-MCNC: 12.3 G/DL (ref 12–17)
IMM GRANULOCYTES # BLD AUTO: 0.02 THOUSAND/UL (ref 0–0.2)
IMM GRANULOCYTES NFR BLD AUTO: 0 % (ref 0–2)
LYMPHOCYTES # BLD AUTO: 2.63 THOUSANDS/ΜL (ref 0.6–4.47)
LYMPHOCYTES NFR BLD AUTO: 35 % (ref 14–44)
MCH RBC QN AUTO: 29.2 PG (ref 26.8–34.3)
MCHC RBC AUTO-ENTMCNC: 34.6 G/DL (ref 31.4–37.4)
MCV RBC AUTO: 85 FL (ref 82–98)
MONOCYTES # BLD AUTO: 0.9 THOUSAND/ΜL (ref 0.17–1.22)
MONOCYTES NFR BLD AUTO: 12 % (ref 4–12)
NEUTROPHILS # BLD AUTO: 3.83 THOUSANDS/ΜL (ref 1.85–7.62)
NEUTS SEG NFR BLD AUTO: 51 % (ref 43–75)
NRBC BLD AUTO-RTO: 0 /100 WBCS
PLATELET # BLD AUTO: 226 THOUSANDS/UL (ref 149–390)
PMV BLD AUTO: 10.8 FL (ref 8.9–12.7)
POTASSIUM SERPL-SCNC: 3.5 MMOL/L (ref 3.5–5.3)
PROT SERPL-MCNC: 8.4 G/DL (ref 6.4–8.2)
RBC # BLD AUTO: 4.21 MILLION/UL (ref 3.88–5.62)
SALICYLATES SERPL-MCNC: <3 MG/DL (ref 3–20)
SODIUM SERPL-SCNC: 141 MMOL/L (ref 136–145)
TROPONIN I SERPL-MCNC: <0.02 NG/ML
TSH SERPL DL<=0.05 MIU/L-ACNC: 1.15 UIU/ML (ref 0.36–3.74)
WBC # BLD AUTO: 7.5 THOUSAND/UL (ref 4.31–10.16)

## 2019-10-03 PROCEDURE — 93005 ELECTROCARDIOGRAM TRACING: CPT

## 2019-10-03 PROCEDURE — 84484 ASSAY OF TROPONIN QUANT: CPT | Performed by: EMERGENCY MEDICINE

## 2019-10-03 PROCEDURE — 85025 COMPLETE CBC W/AUTO DIFF WBC: CPT | Performed by: EMERGENCY MEDICINE

## 2019-10-03 PROCEDURE — 99284 EMERGENCY DEPT VISIT MOD MDM: CPT

## 2019-10-03 PROCEDURE — 82553 CREATINE MB FRACTION: CPT | Performed by: EMERGENCY MEDICINE

## 2019-10-03 PROCEDURE — 36415 COLL VENOUS BLD VENIPUNCTURE: CPT | Performed by: EMERGENCY MEDICINE

## 2019-10-03 PROCEDURE — 84443 ASSAY THYROID STIM HORMONE: CPT | Performed by: EMERGENCY MEDICINE

## 2019-10-03 PROCEDURE — 99285 EMERGENCY DEPT VISIT HI MDM: CPT | Performed by: EMERGENCY MEDICINE

## 2019-10-03 PROCEDURE — 82550 ASSAY OF CK (CPK): CPT | Performed by: EMERGENCY MEDICINE

## 2019-10-03 PROCEDURE — 80053 COMPREHEN METABOLIC PANEL: CPT | Performed by: EMERGENCY MEDICINE

## 2019-10-03 PROCEDURE — 96360 HYDRATION IV INFUSION INIT: CPT

## 2019-10-03 PROCEDURE — 80329 ANALGESICS NON-OPIOID 1 OR 2: CPT | Performed by: EMERGENCY MEDICINE

## 2019-10-03 PROCEDURE — 80320 DRUG SCREEN QUANTALCOHOLS: CPT | Performed by: EMERGENCY MEDICINE

## 2019-10-03 RX ORDER — CLONAZEPAM 1 MG/1
1 TABLET ORAL 4 TIMES DAILY
COMMUNITY
End: 2019-11-08 | Stop reason: SDUPTHER

## 2019-10-03 RX ORDER — NAPROXEN 250 MG/1
500 TABLET ORAL DAILY
Status: ON HOLD | COMMUNITY
End: 2020-04-27 | Stop reason: SDUPTHER

## 2019-10-03 RX ADMIN — SODIUM CHLORIDE 1000 ML: 0.9 INJECTION, SOLUTION INTRAVENOUS at 23:41

## 2019-10-04 VITALS
HEIGHT: 70 IN | RESPIRATION RATE: 13 BRPM | TEMPERATURE: 98.5 F | OXYGEN SATURATION: 100 % | SYSTOLIC BLOOD PRESSURE: 125 MMHG | DIASTOLIC BLOOD PRESSURE: 76 MMHG | BODY MASS INDEX: 27.93 KG/M2 | HEART RATE: 88 BPM | WEIGHT: 195.11 LBS

## 2019-10-04 VITALS
OXYGEN SATURATION: 100 % | TEMPERATURE: 97.9 F | RESPIRATION RATE: 18 BRPM | DIASTOLIC BLOOD PRESSURE: 71 MMHG | SYSTOLIC BLOOD PRESSURE: 117 MMHG | HEART RATE: 78 BPM

## 2019-10-04 DIAGNOSIS — F11.23 METHADONE WITHDRAWAL (HCC): Primary | ICD-10-CM

## 2019-10-04 LAB
AMPHETAMINES SERPL QL SCN: NEGATIVE
ANION GAP SERPL CALCULATED.3IONS-SCNC: 8 MMOL/L (ref 4–13)
ATRIAL RATE: 89 BPM
BARBITURATES UR QL: NEGATIVE
BENZODIAZ UR QL: POSITIVE
BILIRUB UR QL STRIP: NEGATIVE
BUN SERPL-MCNC: 11 MG/DL (ref 5–25)
CALCIUM SERPL-MCNC: 8.9 MG/DL (ref 8.3–10.1)
CHLORIDE SERPL-SCNC: 105 MMOL/L (ref 100–108)
CK MB SERPL-MCNC: 1.7 NG/ML (ref 0–5)
CK MB SERPL-MCNC: <1 % (ref 0–2.5)
CK SERPL-CCNC: 371 U/L (ref 39–308)
CLARITY UR: CLEAR
CO2 SERPL-SCNC: 29 MMOL/L (ref 21–32)
COCAINE UR QL: NEGATIVE
COLOR UR: NORMAL
CREAT SERPL-MCNC: 0.99 MG/DL (ref 0.6–1.3)
GFR SERPL CREATININE-BSD FRML MDRD: 102 ML/MIN/1.73SQ M
GLUCOSE SERPL-MCNC: 101 MG/DL (ref 65–140)
GLUCOSE SERPL-MCNC: 109 MG/DL (ref 65–140)
GLUCOSE UR STRIP-MCNC: NEGATIVE MG/DL
HGB UR QL STRIP.AUTO: NEGATIVE
KETONES UR STRIP-MCNC: NEGATIVE MG/DL
LEUKOCYTE ESTERASE UR QL STRIP: NEGATIVE
METHADONE UR QL: POSITIVE
NITRITE UR QL STRIP: NEGATIVE
OPIATES UR QL SCN: NEGATIVE
P AXIS: 60 DEGREES
PCP UR QL: NEGATIVE
PH UR STRIP.AUTO: 6 [PH]
POTASSIUM SERPL-SCNC: 3.4 MMOL/L (ref 3.5–5.3)
PR INTERVAL: 176 MS
PROT UR STRIP-MCNC: NEGATIVE MG/DL
QRS AXIS: 66 DEGREES
QRSD INTERVAL: 104 MS
QT INTERVAL: 382 MS
QTC INTERVAL: 464 MS
SODIUM SERPL-SCNC: 142 MMOL/L (ref 136–145)
SP GR UR STRIP.AUTO: 1.01 (ref 1–1.03)
T WAVE AXIS: 48 DEGREES
THC UR QL: NEGATIVE
UROBILINOGEN UR QL STRIP.AUTO: 0.2 E.U./DL
VENTRICULAR RATE: 89 BPM

## 2019-10-04 PROCEDURE — 82550 ASSAY OF CK (CPK): CPT | Performed by: EMERGENCY MEDICINE

## 2019-10-04 PROCEDURE — 80048 BASIC METABOLIC PNL TOTAL CA: CPT | Performed by: EMERGENCY MEDICINE

## 2019-10-04 PROCEDURE — 99284 EMERGENCY DEPT VISIT MOD MDM: CPT | Performed by: EMERGENCY MEDICINE

## 2019-10-04 PROCEDURE — 93010 ELECTROCARDIOGRAM REPORT: CPT | Performed by: INTERNAL MEDICINE

## 2019-10-04 PROCEDURE — 81003 URINALYSIS AUTO W/O SCOPE: CPT | Performed by: EMERGENCY MEDICINE

## 2019-10-04 PROCEDURE — 82948 REAGENT STRIP/BLOOD GLUCOSE: CPT

## 2019-10-04 PROCEDURE — 82553 CREATINE MB FRACTION: CPT | Performed by: EMERGENCY MEDICINE

## 2019-10-04 PROCEDURE — 96361 HYDRATE IV INFUSION ADD-ON: CPT

## 2019-10-04 PROCEDURE — 99285 EMERGENCY DEPT VISIT HI MDM: CPT

## 2019-10-04 PROCEDURE — 80307 DRUG TEST PRSMV CHEM ANLYZR: CPT | Performed by: EMERGENCY MEDICINE

## 2019-10-04 PROCEDURE — 36415 COLL VENOUS BLD VENIPUNCTURE: CPT | Performed by: EMERGENCY MEDICINE

## 2019-10-04 RX ORDER — CLONAZEPAM 0.5 MG/1
1 TABLET ORAL ONCE
Status: COMPLETED | OUTPATIENT
Start: 2019-10-04 | End: 2019-10-04

## 2019-10-04 RX ORDER — TAMSULOSIN HYDROCHLORIDE 0.4 MG/1
0.4 CAPSULE ORAL ONCE
Status: COMPLETED | OUTPATIENT
Start: 2019-10-04 | End: 2019-10-04

## 2019-10-04 RX ADMIN — CLONAZEPAM 1 MG: 0.5 TABLET ORAL at 10:25

## 2019-10-04 RX ADMIN — TAMSULOSIN HYDROCHLORIDE 0.4 MG: 0.4 CAPSULE ORAL at 03:23

## 2019-10-04 RX ADMIN — SODIUM CHLORIDE 1000 ML: 0.9 INJECTION, SOLUTION INTRAVENOUS at 00:31

## 2019-10-04 NOTE — ED NOTES
Patient walked around unit with no gait disturbances noted  Patient coherent and appropriate, patient denies SI/HI         Mickey Best RN  10/04/19 5693

## 2019-10-04 NOTE — ED NOTES
Patient alert to questions, answering appropriately  [facility] unable to transport patient, [facility] rep states patient was recently kicked out of [facility] and would not be allowed back        Paulalexis Hickman RN  10/04/19 6553

## 2019-10-04 NOTE — ED NOTES
[facility] to [facility]  States at 2030 patient was AAOx4 acting normal  Patient was supposed to attend an [place] [address] meeting at 2100 and did not show up  Sam Amaral states he went to check on the patient and states he found him incoherent  Pat called 911 and searched patients room - finding a prescription for soma filled yesterday with 20 missing in pill bottle and a "handful" of benzos  States they also found "a syringe and stolen property" in his room  Inquired about a "silver key fob" that the patient continues to ask about  Sam Amaral states they found the silver fob full of a "handful of benzos and soma" which pat states the patient filled on his own as they are medications that the [facility] does not allow at their facility  Sam Amaral states that when patient is d/c, we are to call patients mother for  because patient is no longer allowed back to [address] [facility] [address]   Pats number for follow up is [place], RN  10/03/19 2240

## 2019-10-04 NOTE — ED NOTES
Patient was given information about [facility] and was told there are pamphlets with a phone number in case he decides that he needs more help        Wang Cross RN  10/04/19 0801

## 2019-10-04 NOTE — ED NOTES
Helped patient attempt to collect urine, could not go to the bathroom at this time  Patient is aware that a urine specimen is needed        Marah Cummings RN  10/03/19 7661

## 2019-10-04 NOTE — ED NOTES
Patient asked to try to urinate again, and could not void  Another bag of fluid was hung  Patient is still aware that a urine sample is needed        Kamran Trujillo RN  10/04/19 0177

## 2019-10-04 NOTE — ED PROVIDER NOTES
History  Chief Complaint   Patient presents with    Altered Mental Status     patient presents to the ED from methadone clinic for lethargy and gait imbalance  Pt reports not being able to take his methadone or his klonipin and reports being worried he will go through seizure withdrawal      Patient brought in by ambulance from methadone clinic for lethargy  Patient states that he is concerned that he is going through withdrawal from methadone as he hasn't had any in 2 days and he is shaky  Patient does admit to taking Klonopin 4 times daily as prescribed  Patient denies SI/ HI  Prior to Admission Medications   Prescriptions Last Dose Informant Patient Reported? Taking?    ARIPiprazole (ABILIFY) 5 mg tablet   No No   Sig: Take 1 tablet (5 mg total) by mouth daily At 9am   carisoprodol (SOMA) 350 mg tablet   No No   Sig: Take 1 tablet (350 mg total) by mouth every 8 (eight) hours as needed for muscle spasms OK TO REFILL--HOSPITAL DESTROYED LAST SCRIPT   clonazePAM (KlonoPIN) 1 mg tablet   Yes No   Sig: Take 1 mg by mouth 4 (four) times a day   gabapentin (NEURONTIN) 300 mg capsule   No No   Sig: Take 2 capsules (600 mg total) by mouth 3 (three) times a day for 14 days At 9am, 4pm, 9pm   melatonin 3 mg   No No   Sig: Take 2 tablets (6 mg total) by mouth daily at bedtime   methadone (DOLOPHINE) 10 mg/mL oral concentrated solution   No No   Sig: Take 16 5 mL (165 mg total) by mouth dailyMax Daily Amount: 165 mg   Patient taking differently: Take 160 mg by mouth daily    mirtazapine (REMERON) 30 mg tablet   No No   Sig: Take 1 tablet (30 mg total) by mouth daily at bedtime   naproxen (NAPROSYN) 250 mg tablet   Yes No   Sig: Take 250 mg by mouth 2 (two) times a day with meals   tamsulosin (FLOMAX) 0 4 mg   No No   Sig: Take 1 capsule (0 4 mg total) by mouth daily with dinner      Facility-Administered Medications: None       Past Medical History:   Diagnosis Date    Addiction to drug ([facility])     Allergic  Anxiety     Chronic pain disorder     Depression     Psychiatric disorder     Spinal stenosis     Sub[facility] ([facility] )     Suicide attempt ([facility] )        Past Surgical History:   Procedure Laterality Date    BACK SURGERY      FRACTURE SURGERY      left foot reconstruted     SPINE SURGERY      hernated disc        Family History   Problem Relation Age of Onset    Hypertension Mother     Hyperlipidemia Father     Heart disease Father     Depression Maternal Grandmother     Hypertension Maternal Grandmother     Squamous cell carcinoma Maternal Grandfather     Alcohol abuse Maternal Grandfather     Alzheimer's disease Paternal Grandmother     Dementia Paternal Grandmother     Alcohol abuse Paternal Uncle      I have reviewed and agree with the history as documented  Social History     Tobacco Use    Smoking status: Former Smoker     Packs/day: 0 00     Types: E-Cigarettes     Last attempt to quit: 2017     Years since quittin 0    Smokeless tobacco: Current User    Tobacco comment: Pt uses a "vape" pen - use is equivalent to approximately 1 pack per day   Substance Use Topics    Alcohol use: Not Currently     Frequency: Never     Binge frequency: Never    Drug use: Not Currently     Frequency: 3 0 times per week     Types: Methamphetamines     Comment: pt states last used 4 months ago         Review of Systems   All other systems reviewed and are negative  Physical Exam  Physical Exam   Constitutional: He appears well-developed and well-nourished  HENT:   Mouth/Throat: Oropharynx is clear and moist    Eyes: Pupils are equal, round, and reactive to light  Conjunctivae and EOM are normal    Neck: Normal range of motion  Neck supple  No spinous process tenderness present  Cardiovascular: Normal rate, regular rhythm, normal heart sounds and intact distal pulses  Pulmonary/Chest: Effort normal and breath sounds normal  No respiratory distress  He has no wheezes     Abdominal: Soft  Bowel sounds are normal  He exhibits no distension  There is no tenderness  Musculoskeletal: Normal range of motion  Neurological: He is alert  He has normal strength  No sensory deficit  He displays a negative Romberg sign  Coordination and gait normal  GCS eye subscore is 4  GCS verbal subscore is 5  GCS motor subscore is 6  Skin: Skin is warm and dry  No rash noted  Psychiatric: He has a normal mood and affect  His speech is normal  Cognition and memory are normal  He expresses no homicidal and no suicidal ideation  Nursing note and vitals reviewed        Vital Signs  ED Triage Vitals   Temperature Pulse Respirations Blood Pressure SpO2   10/04/19 0833 10/04/19 0830 10/04/19 0833 10/04/19 0830 10/04/19 0830   98 5 °F (36 9 °C) 86 18 105/69 97 %      Temp Source Heart Rate Source Patient Position - Orthostatic VS BP Location FiO2 (%)   10/04/19 0833 10/04/19 0833 10/04/19 0833 10/04/19 0833 --   Tympanic Monitor Lying Left arm       Pain Score       10/04/19 0833       No Pain           Vitals:    10/04/19 0900 10/04/19 0915 10/04/19 0930 10/04/19 1015   BP: 117/77 114/71 114/79 125/76   Pulse: 72 68 71 88   Patient Position - Orthostatic VS:             Visual Acuity  Visual Acuity      Most Recent Value   L Pupil Size (mm)  3   R Pupil Size (mm)  3          ED Medications  Medications   clonazePAM (KlonoPIN) tablet 1 mg (1 mg Oral Given 10/4/19 1025)       Diagnostic Studies  Results Reviewed     None                 No orders to display              Procedures  Procedures       ED Course                               MDM  Number of Diagnoses or Management Options  Methadone withdrawal ([facility] ): new and does not require workup  Patient Progress  Patient progress: improved      Disposition  Final diagnoses:   Methadone withdrawal ([facility] )     Time reflects when diagnosis was documented in both MDM as applicable and the Disposition within this note     Time User Action Codes Description Comment 10/4/2019 10:24 AM Jennifer Cannon Add [F11 23] Methadone withdrawal [facility])       ED Disposition     ED Disposition Condition Date/Time Comment    Discharge Stable Fri Oct 4, 2019 10:23 AM Pat Montoya discharge to home/self care  Follow-up Information    None         Discharge Medication List as of 10/4/2019 10:25 AM      CONTINUE these medications which have NOT CHANGED    Details   ARIPiprazole (ABILIFY) 5 mg tablet Take 1 tablet (5 mg total) by mouth daily At 9am, Starting Wed 8/14/2019, Print      carisoprodol (SOMA) 350 mg tablet Take 1 tablet (350 mg total) by mouth every 8 (eight) hours as needed for muscle spasms OK TO REFILL--HOSPITAL DESTROYED LAST SCRIPT, Starting Fri 8/16/2019, Normal      clonazePAM (KlonoPIN) 1 mg tablet Take 1 mg by mouth 4 (four) times a day, Historical Med      gabapentin (NEURONTIN) 300 mg capsule Take 2 capsules (600 mg total) by mouth 3 (three) times a day for 14 days At 9am, 4pm, 9pm, Starting Fri 8/16/2019, Until u 10/3/2019, Normal      melatonin 3 mg Take 2 tablets (6 mg total) by mouth daily at bedtime, Starting Wed 8/14/2019, Print      methadone (DOLOPHINE) 10 mg/mL oral concentrated solution Take 16 5 mL (165 mg total) by mouth dailyMax Daily Amount: 165 mg, Starting Wed 8/14/2019, No Print      mirtazapine (REMERON) 30 mg tablet Take 1 tablet (30 mg total) by mouth daily at bedtime, Starting Wed 8/14/2019, Print      naproxen (NAPROSYN) 250 mg tablet Take 250 mg by mouth 2 (two) times a day with meals, Historical Med      tamsulosin (FLOMAX) 0 4 mg Take 1 capsule (0 4 mg total) by mouth daily with dinner, Starting Wed 8/14/2019, Print           No discharge procedures on file      ED Provider  Electronically Signed by           Miki Floyd DO  10/04/19 2002

## 2019-10-04 NOTE — ED NOTES
Patient's speech is clearer at this time, and patient is more alert        Liz Palomo RN  10/04/19 9724

## 2019-10-04 NOTE — ED NOTES
Dr Agusto Marti ok with patient using lyft service to transport patient home        Layla Montoya, MARCELINO  10/04/19 0086

## 2019-10-04 NOTE — ED NOTES
I spoke to Kristin from [facility] who is on her way here  She will speak  with pt and possibly with pt's mother if needed (mother is coming to  pt)   Per Kristin, at this point pt does not meet criteria because he is currently in a methadone program which also provides meetings/therapy; also pt has adamantly denied any SI/HI   Mother had called pt on phone and Dr Juanita Case spoke with mother on phone with pt's permission; mother requested pt be driven to [facility] because "pt is mentally unstable "  Dr Juanita Case explained that pt does not meet behavioral health criteria and he is currently in a methadone program      Carroll Stern RN  10/04/19 9771

## 2019-10-04 NOTE — ED NOTES
Left message with mart allen to find out more information about events leading up to patients transfer to ED - EMS crew stated that is the employee that called 911 to have patient transported       Jemal Vuong RN  10/03/19 4504

## 2019-10-04 NOTE — ED PROVIDER NOTES
History  Chief Complaint   Patient presents with    Overdose - Accidental     patient presents to ED viz ambulance due to staff stating that he overdosed on his medication  patient admitted to emt staff tht he took 20 muscle relaxers, however, is stating to ED staff that he did not overdose on anything  staff from his rehab house stated that he was "acting normal" an hour ago, and is now acting different and has slurred speech  patient states that this is due to "sleep deprivation"      24-year-old male with past medical history of heroin use currently on methadone presents for evaluation of possible overdose  Patient with slurred speech, alert and oriented x3 though is slow to answer questions and is not willing to provide history of overdose  He is currently staying at a recovery house ([facility]) after being released from [facility] on Tuesday  States that today he took his 4 mg of prescribed Klonopin as well as 3 pills of Soma throughout the day  However per recovery house staff patient was at his mental baseline at untill approximately 1 hour ago when he became sleepy, with slurred , slow speech  They looked at his medications and noted that to 20 pills of Soma (350 mg) were missing from his prescription bottle, his other medications are mirtazapine 30 mg, abilify 5 mg , gabapentin 300 mg, Klonopin 1 mg  He is also supposed to be on methadone though states that he did not receive his does today because he was unable to pee for the drug test   Vital signs reviewed, unremarkable, nonfocal neurologic exam other than slow slurred speech  No rigidity, no clonus, no nystagmus    Patient adamantly denies SI or HI    Further history was provided by mom who called in though patient does not want any information release to the mom at this time mom states that he was with her all day, seemed at his mental baseline, went to court which he did admit to us and then this evening she received a call from [facility] stating that he could not talk or walk straight and was sent to the emergency department, they also stated that patient was stealing pills" and insinuating that he would not be allowed to return to [facility]  Prior to Admission Medications   Prescriptions Last Dose Informant Patient Reported? Taking?    ARIPiprazole (ABILIFY) 5 mg tablet   No Yes   Sig: Take 1 tablet (5 mg total) by mouth daily At 9am   carisoprodol (SOMA) 350 mg tablet   No Yes   Sig: Take 1 tablet (350 mg total) by mouth every 8 (eight) hours as needed for muscle spasms OK TO REFILL--HOSPITAL DESTROYED LAST SCRIPT   clonazePAM (KlonoPIN) 1 mg tablet   Yes Yes   Sig: Take 1 mg by mouth 4 (four) times a day   gabapentin (NEURONTIN) 300 mg capsule   No Yes   Sig: Take 2 capsules (600 mg total) by mouth 3 (three) times a day for 14 days At 9am, 4pm, 9pm   melatonin 3 mg   No No   Sig: Take 2 tablets (6 mg total) by mouth daily at bedtime   methadone (DOLOPHINE) 10 mg/mL oral concentrated solution   No No   Sig: Take 16 5 mL (165 mg total) by mouth dailyMax Daily Amount: 165 mg   mirtazapine (REMERON) 30 mg tablet   No Yes   Sig: Take 1 tablet (30 mg total) by mouth daily at bedtime   naproxen (NAPROSYN) 250 mg tablet   Yes Yes   Sig: Take 250 mg by mouth 2 (two) times a day with meals   tamsulosin (FLOMAX) 0 4 mg   No Yes   Sig: Take 1 capsule (0 4 mg total) by mouth daily with dinner      Facility-Administered Medications: None       Past Medical History:   Diagnosis Date    [facility]     Allergic     Anxiety     Chronic pain disorder     Depression     Psychiatric disorder     Spinal stenosis     [facility]stance abuse [facility]     [facility]icide [facility] ([facility] )        Past Surgical History:   Procedure Laterality Date    BACK SURGERY      FRACTURE SURGERY      left foot reconstruted     SPINE SURGERY      hernated disc        Family History   Problem Relation Age of Onset    Hypertension Mother     Hyperlipidemia Father    Harrisonleela Dumashans Heart disease Father     Depression Maternal Grandmother     Hypertension Maternal Grandmother     Squamous cell carcinoma Maternal Grandfather     Alcohol abuse Maternal Grandfather     Alzheimer's disease Paternal Grandmother     Dementia Paternal Grandmother     Alcohol abuse Paternal Uncle      I have reviewed and agree with the history as documented  Social History     Tobacco Use    Smoking status: Former Smoker     Packs/day: 0 00     Types: E-Cigarettes     Last attempt to quit: 2017     Years since quittin 0    Smokeless tobacco: Current User    Tobacco comment: Pt uses a "vape" pen - use is equivalent to approximately 1 pack per day   Substance Use Topics    Alcohol use: Not Currently     Frequency: Never     Binge frequency: Never    Drug use: Not Currently     Frequency: 3 0 times per week     Types: Methamphetamines     Comment: pt states last used 4 months ago         Review of Systems   Unable to perform ROS: Other (Intoxication)       Physical Exam  Physical Exam   Constitutional: He is oriented to person, place, and time  He appears well-developed and well-nourished  HENT:   Head: Normocephalic and atraumatic  Right Ear: External ear normal    Left Ear: External ear normal    Mouth/Throat: Oropharynx is clear and moist    Eyes: Pupils are equal, round, and reactive to light  Conjunctivae and EOM are normal    Neck: Normal range of motion  Neck supple  No JVD present  No tracheal deviation present  Cardiovascular: Normal rate, regular rhythm and normal heart sounds  Exam reveals no gallop and no friction rub  No murmur heard  Pulmonary/Chest: Effort normal  No stridor  No respiratory distress  He has no wheezes  He has no rales  Abdominal: Soft  He exhibits no distension and no mass  There is no tenderness  There is no rebound and no guarding  Musculoskeletal: Normal range of motion  He exhibits no edema     Neurological: He is alert and oriented to person, place, and time  No cranial nerve deficit  He exhibits normal muscle tone  Coordination normal    No rigidity, no clonus, no nystagmus   Skin: Skin is warm and dry  No rash noted  No erythema  No pallor  Psychiatric: He has a normal mood and affect  His speech is slurred  He is slowed  Cognition and memory are impaired  He expresses no homicidal and no suicidal ideation  He expresses no suicidal plans and no homicidal plans  Nursing note and vitals reviewed        Vital Signs  ED Triage Vitals [10/03/19 2221]   Temperature Pulse Respirations Blood Pressure SpO2   98 3 °F (36 8 °C) 82 20 120/74 100 %      Temp Source Heart Rate Source Patient Position - Orthostatic VS BP Location FiO2 (%)   Temporal Monitor Lying Right arm --      Pain Score       --           Vitals:    10/04/19 0430 10/04/19 0445 10/04/19 0500 10/04/19 0515   BP: 115/78 132/66 112/74 117/71   Pulse: 83 81 95 78   Patient Position - Orthostatic VS: Lying Lying Lying Lying         Visual Acuity  Visual Acuity      Most Recent Value   L Pupil Size (mm)  3   R Pupil Size (mm)  3          ED Medications  Medications   sodium chloride 0 9 % bolus 1,000 mL (0 mL Intravenous Stopped 10/4/19 0031)   sodium chloride 0 9 % bolus 1,000 mL (0 mL Intravenous Stopped 10/4/19 0219)   tamsulosin (FLOMAX) capsule 0 4 mg (0 4 mg Oral Given 10/4/19 0323)       Diagnostic Studies  Results Reviewed     Procedure Component Value Units Date/Time    Basic metabolic panel [[id number]]  (Abnormal) Collected:  10/04/19 0424    Lab Status:  Final result Specimen:  Blood from Arm, Right Updated:  10/04/19 0510     Sodium 142 mmol/L      Potassium 3 4 mmol/L      Chloride 105 mmol/L      CO2 29 mmol/L      ANION GAP 8 mmol/L      BUN 11 mg/dL      Creatinine 0 99 mg/dL      Glucose 101 mg/dL      Calcium 8 9 mg/dL      eGFR 102 ml/min/1 73sq m     Narrative:       [place] guidelines for Chronic Kidney Disease (CKD):     Stage 1 with normal or high GFR (GFR > 90 mL/min/1 73 square meters)    Stage 2 Mild CKD (GFR = 60-89 mL/min/1 73 square meters)    Stage 3A Moderate CKD (GFR = 45-59 mL/min/1 73 square meters)    Stage 3B Moderate CKD (GFR = 30-44 mL/min/1 73 square meters)    Stage 4 Severe CKD (GFR = 15-29 mL/min/1 73 square meters)    Stage 5 End Stage CKD (GFR <15 mL/min/1 73 square meters)  Note: GFR calculation is accurate only with a steady state creatinine    CKMB [[id number]]  (Normal) Collected:  10/04/19 0424    Lab Status:  Final result Specimen:  Blood from Arm, Right Updated:  10/04/19 0510     CK-MB Index <1 0 %      CK-MB 1 7 ng/mL     CK (with reflex to MB) [[id number]]  (Abnormal) Collected:  10/04/19 0424    Lab Status:  Final result Specimen:  Blood from Arm, Right Updated:  10/04/19 0509     Total  U/L     Rapid drug screen, urine [[id number]]  (Abnormal) Collected:  10/04/19 0312    Lab Status:  Final result Specimen:  Urine, Catheter Updated:  10/04/19 0329     Amph/Meth UR Negative     Barbiturate Ur Negative     Benzodiazepine Urine Positive     Cocaine Urine Negative     Methadone Urine Positive     Opiate Urine Negative     PCP Ur Negative     THC Urine Negative    Narrative:       Presumptive report  If requested, specimen will be sent to reference lab for confirmation  FOR MEDICAL PURPOSES ONLY  IF CONFIRMATION NEEDED PLEASE CONTACT THE LAB WITHIN 5 DAYS      Drug Screen Cutoff Levels:  AMPHETAMINE/METHAMPHETAMINES  1000 ng/mL  BARBITURATES     200 ng/mL  BENZODIAZEPINES     200 ng/mL  COCAINE      300 ng/mL  METHADONE      300 ng/mL  OPIATES      300 ng/mL  PHENCYCLIDINE     25 ng/mL  THC       50 ng/mL      UA w Reflex to Microscopic w Reflex to Culture [[id number]] Collected:  10/04/19 0313    Lab Status:  Final result Specimen:  Urine, Straight Cath Updated:  10/04/19 0326     Color, UA Straw     Clarity, UA Clear     Specific Gravity, UA 1 010     pH, UA 6 0     Leukocytes, UA Negative     Nitrite, UA Negative     Protein, UA Negative mg/dl      Glucose, UA Negative mg/dl      Ketones, UA Negative mg/dl      Urobilinogen, UA 0 2 E U /dl      Bilirubin, UA Negative     Blood, UA Negative    Fingerstick Glucose (POCT) [[id number]]  (Normal) Collected:  10/04/19 0101    Lab Status:  Final result Updated:  10/04/19 0102     POC Glucose 109 mg/dl     CKMB [[id number]]  (Normal) Collected:  10/03/19 2213    Lab Status:  Final result Specimen:  Blood from Arm, Right Updated:  10/03/19 2307     CK-MB Index <1 0 %      CK-MB 1 9 ng/mL     TSH [[id number]]  (Normal) Collected:  10/03/19 2213    Lab Status:  Final result Specimen:  Blood from Arm, Right Updated:  10/03/19 2303     TSH 3RD GENERATON 1 150 uIU/mL     Narrative:       Patients undergoing fluorescein dye angiography may retain small amounts of fluorescein in the body for 48-72 hours post procedure  Samples containing fluorescein can produce falsely depressed TSH values  If the patient had this procedure,a specimen should be resubmitted post fluorescein clearance  CK (with reflex to MB) [[id number]]  (Abnormal) Collected:  10/03/19 2213    Lab Status:  Final result Specimen:  Blood from Arm, Right Updated:  10/03/19 2303     Total  U/L     Salicylate level [[id number]]  (Abnormal) Collected:  10/03/19 2213    Lab Status:  Final result Specimen:  Blood from Arm, Right Updated:  30/84/13 5487     Salicylate Lvl <3 mg/dL     Acetaminophen level-If concentration is detectable, please discuss with medical  on call   [[id number]]  (Abnormal) Collected:  10/03/19 2213    Lab Status:  Final result Specimen:  Blood from Arm, Right Updated:  10/03/19 2303     Acetaminophen Level <2 0 ug/mL     Ethanol [[id number]]  (Normal) Collected:  10/03/19 2213    Lab Status:  Final result Specimen:  Blood from Arm, Right Updated:  10/03/19 2251     Ethanol Lvl <3 mg/dL     Troponin I [[id number]]  (Normal) Collected:  10/03/19 2213    Lab Status:  Final result Specimen:  Blood from Arm, Right Updated:  10/03/19 2246     Troponin I <0 02 ng/mL     Comprehensive metabolic panel [[id number]]  (Abnormal) Collected:  10/03/19 2213    Lab Status:  Final result Specimen:  Blood from Arm, Right Updated:  10/03/19 2244     Sodium 141 mmol/L      Potassium 3 5 mmol/L      Chloride 102 mmol/L      CO2 31 mmol/L      ANION GAP 8 mmol/L      BUN 13 mg/dL      Creatinine 1 07 mg/dL      Glucose 92 mg/dL      Calcium 9 6 mg/dL      AST 60 U/L      ALT 69 U/L      Alkaline Phosphatase 91 U/L      Total Protein 8 4 g/dL      Albumin 4 5 g/dL      Total Bilirubin 0 60 mg/dL      eGFR 93 ml/min/1 73sq m     Narrative:       National Kid[employer] Disease Foundation guidelines for Chronic Kidney Disease (CKD):     Stage 1 with normal or high GFR (GFR > 90 mL/min/1 73 square meters)    Stage 2 Mild CKD (GFR = 60-89 mL/min/1 73 square meters)    Stage 3A Moderate CKD (GFR = 45-59 mL/min/1 73 square meters)    Stage 3B Moderate CKD (GFR = 30-44 mL/min/1 73 square meters)    Stage 4 Severe CKD (GFR = 15-29 mL/min/1 73 square meters)    Stage 5 End Stage CKD (GFR <15 mL/min/1 73 square meters)  Note: GFR calculation is accurate only with a steady state creatinine    CBC and differential [[id number]]  (Abnormal) Collected:  10/03/19 2213    Lab Status:  Final result Specimen:  Blood from Arm, Right Updated:  10/03/19 2225     WBC 7 50 Thousand/uL      RBC 4 21 Million/uL      Hemoglobin 12 3 g/dL      Hematocrit 35 6 %      MCV 85 fL      MCH 29 2 pg      MCHC 34 6 g/dL      RDW 13 2 %      MPV 10 8 fL      Platelets 320 Thousands/uL      nRBC 0 /100 WBCs      Neutrophils Relative 51 %      Immat GRANS % 0 %      Lymphocytes Relative 35 %      Monocytes Relative 12 %      Eosinophils Relative 1 %      Basophils Relative 1 %      Neutrophils Absolute 3 83 Thousands/µL      Immature Grans Absolute 0 02 Thousand/uL      Lymphocytes Absolute 2 63 Thousands/µL      Monocytes Absolute 0 90 Thousand/µL      Eosinophils Absolute 0 08 Thousand/µL      Basophils Absolute 0 04 Thousands/µL                  No orders to display              Procedures  Procedures       ED Course  ED Course as of Oct 04 0557   Thu Oct 03, 2019   2246 Procedure Note: EKG  Date/Time: 10/03/19 10:46 PM   Performed by: Paulette Hendrickson  Authorized by: Paulette Hendrickson  Indications / Diagnosis: AMS  ECG reviewed by me, the ED Provider: yes   The EKG demonstrates:  Rhythm: normal sinus  Intervals: normal intervals  Axis: normal axis  QRS/Blocks:normal QRS  ST Changes: No acute ST Changes, no STD/MARCI           2251 Patient apparently is officially evicted from the [facility] as he had contra band in the form of controlled substances, he does state that there are Soma pills in a pill FOB       Fri Oct 04, 2019   0011 Maintaining airway, alert and oriented x3      0042 Awake alert, speech now improved, no current complaints      0251 Patient awake no acute complaints, patient still does not have the urge to pee, states that he normally has a lot of trouble pain and requires Flomax  Bladder scan with significant amount of urine, will straight cath patient      0348 Patient alert, awake at his mental baseline, does admit to taking his Klonopin for his severe anxiety last night and states that he has not been sleeping well as he was worried about his court case yesterday  Denies any suicidal homicidal ideation  Per [facility] report he is not able to go back there because he had unauthorized  benzodiazepines on his person  Offered patient placement and rehab though states that he would like to go home with his friend at this time                                  MDM  Number of Diagnoses or Management Options  Diagnosis management comments: 26-year-old male presents for possible overdose, patient denies any suicidal homicidal ideation    Unclear time line of overdose though vital signs within normal limits at this time, nonfocal neurologic exam will obtain lab work, will contact Toxicology and admit for observation      Disposition  Final diagnoses:   Accidental overdose, initial encounter   Elevated CK     Time reflects when diagnosis was documented in both MDM as applicable and the Disposition within this note     Time User Action Codes Description Comment    10/4/2019  5:13 AM Jennifer Evangelista Add [T50 901A] Accidental overdose, initial encounter     10/4/2019  5:13 AM Jennifer Evangelista Add [R74 8] Elevated CK       ED Disposition     ED Disposition Condition Date/Time Comment    Discharge Stable Fri Oct 4, 2019  5:13 AM [facility] discharge to home/self care              Follow-up Information     Follow up With Specialties Details Why Contact Info Additional Information    Eli Apley, MD Family Medicine Schedule an appointment as soon as possible for a visit in 2 days  [address] [place] [address] 3160 [facility] Emergency Department Emergency Medicine  If symptoms worsen [address] [facility]  [address] [place] [address] [address] ED, [place] [address], [address] [place] [address], [id number]          Discharge Medication List as of 10/4/2019  5:14 AM      CONTINUE these medications which have NOT CHANGED    Details   ARIPiprazole (ABILIFY) 5 mg tablet Take 1 tablet (5 mg total) by mouth daily At 9am, Starting Wed 8/14/2019, Print      carisoprodol (SOMA) 350 mg tablet Take 1 tablet (350 mg total) by mouth every 8 (eight) hours as needed for muscle spasms OK TO REFILL--HOSPITAL DESTROYED LAST SCRIPT, Starting Fri 8/16/2019, Normal      clonazePAM (KlonoPIN) 1 mg tablet Take 1 mg by mouth 4 (four) times a day, Historical Med      gabapentin (NEURONTIN) 300 mg capsule Take 2 capsules (600 mg total) by mouth 3 (three) times a day for 14 days At 9am, 4pm, 9pm, Starting Fri 8/16/2019, Until Thu 10/3/2019, Normal      mirtazapine (REMERON) 30 mg tablet Take 1 tablet (30 mg total) by mouth daily at bedtime, Starting Wed 8/14/2019, Print      naproxen (NAPROSYN) 250 mg tablet Take 250 mg by mouth 2 (two) times a day with meals, Historical Med      tamsulosin (FLOMAX) 0 4 mg Take 1 capsule (0 4 mg total) by mouth daily with dinner, Starting Wed 8/14/2019, Print      melatonin 3 mg Take 2 tablets (6 mg total) by mouth daily at bedtime, Starting Wed 8/14/2019, Print      methadone (DOLOPHINE) 10 mg/mL oral concentrated solution Take 16 5 mL (165 mg total) by mouth dailyMax Daily Amount: 165 mg, Starting Wed 8/14/2019, No Print           No discharge procedures on file      ED Provider  Electronically Signed by           Lazaro Jose MD  10/04/19 1106

## 2019-10-04 NOTE — DISCHARGE INSTRUCTIONS
If symptoms worsen please return to the emergency department otherwise please follow-up with the primary care provider for further care    Your CK which is a measure of muscle breakdown was elevated at today's visit, please have your primary care provider re-evaluate this lab at a future visit if you developed muscle aches, discolored urine or nausea vomiting please return to the emergency department

## 2019-10-22 RX ORDER — CLONAZEPAM 1 MG/1
1 TABLET ORAL 4 TIMES DAILY
Status: CANCELLED | OUTPATIENT
Start: 2019-10-22

## 2019-10-22 NOTE — TELEPHONE ENCOUNTER
----- Message from Johanna Robin sent at 10/22/2019  1:45 PM EDT -----  Regarding: Prescription Question  Contact: [phone]  Good afternoon,    I need my Clonazepam 1mg PO QID called in again because I am on my last refill  It's not urgent as I have enough to last me until the 1st of next month I believe  I would like to switch to the orally disintegrating wafers as opposed to the tablets  They're more discreet and easier to carry around for me  I had an appointment this morning that I missed,  I'm sorry that I was not able to call I was having phone trouble  Please let me know if this can be called in or if I have to schedule another appointment  Thank you very much for all your help  I can be reached by phone at [phone]      Thank you,    Yasmany Sierra

## 2019-10-31 ENCOUNTER — HOSPITAL ENCOUNTER (OUTPATIENT)
Facility: HOSPITAL | Age: 29
Setting detail: OBSERVATION
Discharge: HOME | End: 2019-11-01
Attending: EMERGENCY MEDICINE | Admitting: INTERNAL MEDICINE
Payer: COMMERCIAL

## 2019-10-31 DIAGNOSIS — F13.239 BENZODIAZEPINE WITHDRAWAL (HCC): Primary | ICD-10-CM

## 2019-10-31 PROBLEM — F13.939 BENZODIAZEPINE WITHDRAWAL (HCC): Status: ACTIVE | Noted: 2019-10-31

## 2019-10-31 LAB
ALBUMIN SERPL BCP-MCNC: 4 G/DL (ref 3.5–5)
ALP SERPL-CCNC: 80 U/L (ref 46–116)
ALT SERPL W P-5'-P-CCNC: 56 U/L (ref 12–78)
ANION GAP SERPL CALCULATED.3IONS-SCNC: 6 MMOL/L (ref 4–13)
AST SERPL W P-5'-P-CCNC: 40 U/L (ref 5–45)
BASOPHILS # BLD AUTO: 0.04 THOUSANDS/ΜL (ref 0–0.1)
BASOPHILS NFR BLD AUTO: 0 % (ref 0–1)
BILIRUB SERPL-MCNC: 0.3 MG/DL (ref 0.2–1)
BUN SERPL-MCNC: 13 MG/DL (ref 5–25)
CALCIUM SERPL-MCNC: 9.2 MG/DL (ref 8.3–10.1)
CHLORIDE SERPL-SCNC: 100 MMOL/L (ref 100–108)
CO2 SERPL-SCNC: 30 MMOL/L (ref 21–32)
CREAT SERPL-MCNC: 1.17 MG/DL (ref 0.6–1.3)
EOSINOPHIL # BLD AUTO: 0.16 THOUSAND/ΜL (ref 0–0.61)
EOSINOPHIL NFR BLD AUTO: 2 % (ref 0–6)
ERYTHROCYTE [DISTWIDTH] IN BLOOD BY AUTOMATED COUNT: 13.3 % (ref 11.6–15.1)
ETHANOL SERPL-MCNC: <3 MG/DL (ref 0–3)
GFR SERPL CREATININE-BSD FRML MDRD: 83 ML/MIN/1.73SQ M
GLUCOSE SERPL-MCNC: 85 MG/DL (ref 65–140)
HCT VFR BLD AUTO: 37.3 % (ref 36.5–49.3)
HGB BLD-MCNC: 12.5 G/DL (ref 12–17)
IMM GRANULOCYTES # BLD AUTO: 0.02 THOUSAND/UL (ref 0–0.2)
IMM GRANULOCYTES NFR BLD AUTO: 0 % (ref 0–2)
LYMPHOCYTES # BLD AUTO: 3.88 THOUSANDS/ΜL (ref 0.6–4.47)
LYMPHOCYTES NFR BLD AUTO: 42 % (ref 14–44)
MCH RBC QN AUTO: 29.1 PG (ref 26.8–34.3)
MCHC RBC AUTO-ENTMCNC: 33.5 G/DL (ref 31.4–37.4)
MCV RBC AUTO: 87 FL (ref 82–98)
MONOCYTES # BLD AUTO: 0.62 THOUSAND/ΜL (ref 0.17–1.22)
MONOCYTES NFR BLD AUTO: 7 % (ref 4–12)
NEUTROPHILS # BLD AUTO: 4.57 THOUSANDS/ΜL (ref 1.85–7.62)
NEUTS SEG NFR BLD AUTO: 49 % (ref 43–75)
NRBC BLD AUTO-RTO: 0 /100 WBCS
PLATELET # BLD AUTO: 358 THOUSANDS/UL (ref 149–390)
PMV BLD AUTO: 9.9 FL (ref 8.9–12.7)
POTASSIUM SERPL-SCNC: 3.5 MMOL/L (ref 3.5–5.3)
PROT SERPL-MCNC: 8.5 G/DL (ref 6.4–8.2)
RBC # BLD AUTO: 4.3 MILLION/UL (ref 3.88–5.62)
SODIUM SERPL-SCNC: 136 MMOL/L (ref 136–145)
WBC # BLD AUTO: 9.29 THOUSAND/UL (ref 4.31–10.16)

## 2019-10-31 PROCEDURE — 99220 PR INITIAL OBSERVATION CARE/DAY 70 MINUTES: CPT | Performed by: NURSE PRACTITIONER

## 2019-10-31 PROCEDURE — 99285 EMERGENCY DEPT VISIT HI MDM: CPT

## 2019-10-31 PROCEDURE — 96374 THER/PROPH/DIAG INJ IV PUSH: CPT

## 2019-10-31 PROCEDURE — 99284 EMERGENCY DEPT VISIT MOD MDM: CPT | Performed by: EMERGENCY MEDICINE

## 2019-10-31 PROCEDURE — 96361 HYDRATE IV INFUSION ADD-ON: CPT

## 2019-10-31 PROCEDURE — 80320 DRUG SCREEN QUANTALCOHOLS: CPT | Performed by: EMERGENCY MEDICINE

## 2019-10-31 PROCEDURE — 80053 COMPREHEN METABOLIC PANEL: CPT | Performed by: EMERGENCY MEDICINE

## 2019-10-31 PROCEDURE — 36415 COLL VENOUS BLD VENIPUNCTURE: CPT | Performed by: EMERGENCY MEDICINE

## 2019-10-31 PROCEDURE — 85025 COMPLETE CBC W/AUTO DIFF WBC: CPT | Performed by: EMERGENCY MEDICINE

## 2019-10-31 PROCEDURE — 93005 ELECTROCARDIOGRAM TRACING: CPT

## 2019-10-31 RX ORDER — ARIPIPRAZOLE 5 MG/1
5 TABLET ORAL DAILY
Status: DISCONTINUED | OUTPATIENT
Start: 2019-11-01 | End: 2019-11-01 | Stop reason: HOSPADM

## 2019-10-31 RX ORDER — DIAZEPAM 5 MG/ML
2.5 INJECTION, SOLUTION INTRAMUSCULAR; INTRAVENOUS ONCE
Status: COMPLETED | OUTPATIENT
Start: 2019-10-31 | End: 2019-10-31

## 2019-10-31 RX ORDER — TAMSULOSIN HYDROCHLORIDE 0.4 MG/1
0.4 CAPSULE ORAL
Status: DISCONTINUED | OUTPATIENT
Start: 2019-11-01 | End: 2019-11-01 | Stop reason: HOSPADM

## 2019-10-31 RX ORDER — MIRTAZAPINE 15 MG/1
30 TABLET, FILM COATED ORAL
Status: DISCONTINUED | OUTPATIENT
Start: 2019-10-31 | End: 2019-11-01 | Stop reason: HOSPADM

## 2019-10-31 RX ORDER — ACETAMINOPHEN 325 MG/1
650 TABLET ORAL EVERY 6 HOURS PRN
Status: DISCONTINUED | OUTPATIENT
Start: 2019-10-31 | End: 2019-11-01 | Stop reason: HOSPADM

## 2019-10-31 RX ADMIN — DIAZEPAM 2.5 MG: 5 INJECTION, SOLUTION INTRAMUSCULAR; INTRAVENOUS at 21:30

## 2019-10-31 RX ADMIN — MIRTAZAPINE 30 MG: 15 TABLET, FILM COATED ORAL at 23:17

## 2019-10-31 RX ADMIN — DIAZEPAM 2.5 MG: 5 INJECTION, SOLUTION INTRAMUSCULAR; INTRAVENOUS at 23:17

## 2019-10-31 RX ADMIN — SODIUM CHLORIDE 1000 ML: 0.9 INJECTION, SOLUTION INTRAVENOUS at 21:47

## 2019-10-31 RX ADMIN — SODIUM CHLORIDE 1000 ML: 0.9 INJECTION, SOLUTION INTRAVENOUS at 20:41

## 2019-11-01 ENCOUNTER — TRANSITIONAL CARE MANAGEMENT (OUTPATIENT)
Dept: FAMILY MEDICINE CLINIC | Facility: CLINIC | Age: 29
End: 2019-11-01

## 2019-11-01 VITALS
SYSTOLIC BLOOD PRESSURE: 129 MMHG | OXYGEN SATURATION: 97 % | TEMPERATURE: 98.6 F | HEART RATE: 92 BPM | WEIGHT: 206.2 LBS | DIASTOLIC BLOOD PRESSURE: 73 MMHG | BODY MASS INDEX: 29.52 KG/M2 | HEIGHT: 70 IN | RESPIRATION RATE: 18 BRPM

## 2019-11-01 LAB
AMPHETAMINES SERPL QL SCN: NEGATIVE
ANION GAP SERPL CALCULATED.3IONS-SCNC: 7 MMOL/L (ref 4–13)
ATRIAL RATE: 93 BPM
BARBITURATES UR QL: NEGATIVE
BASOPHILS # BLD AUTO: 0.04 THOUSANDS/ΜL (ref 0–0.1)
BASOPHILS NFR BLD AUTO: 1 % (ref 0–1)
BENZODIAZ UR QL: NEGATIVE
BUN SERPL-MCNC: 10 MG/DL (ref 5–25)
CALCIUM SERPL-MCNC: 9.4 MG/DL (ref 8.3–10.1)
CHLORIDE SERPL-SCNC: 104 MMOL/L (ref 100–108)
CO2 SERPL-SCNC: 28 MMOL/L (ref 21–32)
COCAINE UR QL: NEGATIVE
CREAT SERPL-MCNC: 1.11 MG/DL (ref 0.6–1.3)
EOSINOPHIL # BLD AUTO: 0.17 THOUSAND/ΜL (ref 0–0.61)
EOSINOPHIL NFR BLD AUTO: 2 % (ref 0–6)
ERYTHROCYTE [DISTWIDTH] IN BLOOD BY AUTOMATED COUNT: 13.2 % (ref 11.6–15.1)
GFR SERPL CREATININE-BSD FRML MDRD: 89 ML/MIN/1.73SQ M
GLUCOSE SERPL-MCNC: 85 MG/DL (ref 65–140)
HCT VFR BLD AUTO: 36.5 % (ref 36.5–49.3)
HGB BLD-MCNC: 12.2 G/DL (ref 12–17)
IMM GRANULOCYTES # BLD AUTO: 0.02 THOUSAND/UL (ref 0–0.2)
IMM GRANULOCYTES NFR BLD AUTO: 0 % (ref 0–2)
LYMPHOCYTES # BLD AUTO: 2.71 THOUSANDS/ΜL (ref 0.6–4.47)
LYMPHOCYTES NFR BLD AUTO: 36 % (ref 14–44)
MCH RBC QN AUTO: 29.1 PG (ref 26.8–34.3)
MCHC RBC AUTO-ENTMCNC: 33.4 G/DL (ref 31.4–37.4)
MCV RBC AUTO: 87 FL (ref 82–98)
METHADONE UR QL: POSITIVE
MONOCYTES # BLD AUTO: 0.42 THOUSAND/ΜL (ref 0.17–1.22)
MONOCYTES NFR BLD AUTO: 6 % (ref 4–12)
NEUTROPHILS # BLD AUTO: 4.28 THOUSANDS/ΜL (ref 1.85–7.62)
NEUTS SEG NFR BLD AUTO: 55 % (ref 43–75)
NRBC BLD AUTO-RTO: 0 /100 WBCS
OPIATES UR QL SCN: NEGATIVE
P AXIS: 74 DEGREES
PCP UR QL: NEGATIVE
PLATELET # BLD AUTO: 338 THOUSANDS/UL (ref 149–390)
PMV BLD AUTO: 10.2 FL (ref 8.9–12.7)
POTASSIUM SERPL-SCNC: 4.1 MMOL/L (ref 3.5–5.3)
PR INTERVAL: 178 MS
QRS AXIS: 79 DEGREES
QRSD INTERVAL: 114 MS
QT INTERVAL: 390 MS
QTC INTERVAL: 484 MS
RBC # BLD AUTO: 4.19 MILLION/UL (ref 3.88–5.62)
SODIUM SERPL-SCNC: 139 MMOL/L (ref 136–145)
T WAVE AXIS: 60 DEGREES
THC UR QL: NEGATIVE
VENTRICULAR RATE: 93 BPM
WBC # BLD AUTO: 7.64 THOUSAND/UL (ref 4.31–10.16)

## 2019-11-01 PROCEDURE — 99217 PR OBSERVATION CARE DISCHARGE MANAGEMENT: CPT | Performed by: HOSPITALIST

## 2019-11-01 PROCEDURE — 80048 BASIC METABOLIC PNL TOTAL CA: CPT | Performed by: NURSE PRACTITIONER

## 2019-11-01 PROCEDURE — 80307 DRUG TEST PRSMV CHEM ANLYZR: CPT | Performed by: NURSE PRACTITIONER

## 2019-11-01 PROCEDURE — 93010 ELECTROCARDIOGRAM REPORT: CPT | Performed by: INTERNAL MEDICINE

## 2019-11-01 PROCEDURE — 85025 COMPLETE CBC W/AUTO DIFF WBC: CPT | Performed by: NURSE PRACTITIONER

## 2019-11-01 RX ORDER — METHADONE HYDROCHLORIDE 10 MG/1
160 TABLET ORAL DAILY
Status: DISCONTINUED | OUTPATIENT
Start: 2019-11-01 | End: 2019-11-01 | Stop reason: HOSPADM

## 2019-11-01 RX ADMIN — METHADONE HYDROCHLORIDE 160 MG: 10 TABLET ORAL at 08:52

## 2019-11-01 RX ADMIN — ARIPIPRAZOLE 5 MG: 5 TABLET ORAL at 08:52

## 2019-11-01 NOTE — ASSESSMENT & PLAN NOTE
· Patient reports admission last week to [facility] for rapid Klonopin weaning  · Per [facility] clinic the patient has not been a patient at [facility] since 10/04/19  · Patient shaky, anxious inattentive  · Will be admitted for low-dose benzos in setting of benzodiazepine withdrawal

## 2019-11-01 NOTE — DISCHARGE SUMMARY
Discharge- Tana Logan 1990, 26 y o  male MRN: [id number]    Unit/Bed#: [address] [place] [address] Encounter: [id number]    Primary Care Provider: Araseli Barraza MD   Date and time admitted to hospital: 10/31/2019  8:12 PM        Methamphetamine abuse [facility])  Assessment & Plan  · Patient reports being a patient of [facility] substance abuse support  · Continue methadone at home dose of 160 mg daily    * Benzodiazepine withdrawal ([address] )  Assessment & Plan  · Patient reports admission last week to [facility] for rapid Klonopin weaning  · Per [facility] clinic the patient has not been a patient at [facility] since 10/04/19  · Patient is currently hemodynamically stable  He denies auditory or visual hallucinations  He has a very mild resting tremor  There is no contraindication to discharge at this time  I discussed this with the patient at length and he says he feels much improved since he arrived to the ER  Discharging Physician / Practitioner: Antonina Finch MD  PCP: Araseli Barraza MD  Admission Date:   Admission Orders (From admission, onward)     Ordered        10/31/19 2145  Place in Observation (expected length of stay for this patient is less than two midnights)  Once                   Discharge Date: 11/01/19    Resolved Problems  Date Reviewed: 11/1/2019          Resolved    Anxiety 8/3/2019     Resolved by  Zaira Bonner, [address] [place] [address] Stay:  · None    Procedures Performed:   · None    Significant Findings / Test Results:   · None    Incidental Findings:   · None     Test Results Pending at Discharge (will require follow up): · None     Outpatient Tests Requested:  · None    Complications:  None    Reason for Admission:  Benzodiazepine withdrawal    Hospital Course: Tana Logan is a 26 y o  male patient who originally presented to the hospital on 10/31/2019 due to benzodiazepine withdrawal as outlined in the H&P done yesterday evening      Please see above list of diagnoses and related plan for additional information  Condition at Discharge: good     Discharge Day Visit / Exam:     Subjective:  Patient reports he feels much improved since arrival to the ER  Denies auditory or visual hallucinations  Still with mild tremor  Vitals: Blood Pressure: 129/73 (11/01/19 0721)  Pulse: 92 (11/01/19 0721)  Temperature: 98 6 °F (37 °C) (11/01/19 0721)  Temp Source: Oral (11/01/19 0721)  Respirations: 18 (11/01/19 0721)  Height: 5' 10" (177 8 cm) (10/31/19 2017)  Weight - Scale: 93 5 kg (206 lb 3 2 oz) (10/31/19 2300)  SpO2: 97 % (11/01/19 0721)  Exam:   Physical Exam  Gen: NAD, AAOx3, well developed, well nourished  Eyes: EOMI, PERRLA, no scleral icterus  ENMT:  no nasal discharge, no otic discharge, moist mucous membranes  Neck:  Supple  Cardiovascular:  Regular rate and rhythm, normal S1-S2, no murmurs, rubs, or gallops  Lungs:  Clear to auscultation bilaterally, no wheezes, or rales, or rhonchi  Abdomen:  Positive bowel sounds, soft, nontender, nondistended, no palpable organomegaly   Skin:  Intact, no obvious lesions or rashes, no edema  Neuro: Cranial nerves 2-12 are intact, non-focal, 5/5 strength in all 4 extremities, very mild resting tremor      Discharge instructions/Information to patient and family:   See after visit summary for information provided to patient and family  Provisions for Follow-Up Care:  See after visit summary for information related to follow-up care and any pertinent home health orders  Disposition:     Home    For Discharges to [facility]:   · Not Applicable to this Patient - Not Applicable to this Patient    Planned Readmission: None     Discharge Statement:  I spent 25 minutes discharging the patient  This time was spent on the day of discharge  I had direct contact with the patient on the day of discharge   Greater than 50% of the total time was spent examining patient, answering all patient questions, arranging and discussing plan of care with patient as well as directly providing post-discharge instructions  Additional time then spent on discharge activities  Discharge Medications:  See after visit summary for reconciled discharge medications provided to patient and family        ** Please Note: This note has been constructed using a voice recognition system **

## 2019-11-01 NOTE — ED NOTES
Dr Jones Gamez stated to delay EKG reading until patient is more relaxed     Lukas Jacome RN  10/31/19 2048

## 2019-11-01 NOTE — SOCIAL WORK
LOS: 0  Pt is not a documented bundle  Pt is not a 30 day readmission  Received case management consult re: drug/alcohol  Met with Pt  Pt presents AA&Ox3  Discussed role of case management and discharge planning  Pt reports he lives with family in [address] [place] [address], no steps to enter  Pt reports he is independent with adls and ambulation  Pt reports he drives and goes grocery shopping  Pt reports he goes to [employer] in [facility] or [facility]  Pt reports he has prescription plan and is able to afford medications  Pt reports he does not have living will or POA and declines information for living will or POA  Pt denies hx of VNA  Pt had outpt PT in past  Pt reports he recently was at [facility] clinic and has been at [address] [place] [address] in past  Pt reports he goes to [facility] substance abuse support outpt daily for methadone program  Pt reports his preference for discharge is to return home  Pt reports his mother will transport him home upon discharge  Will follow

## 2019-11-01 NOTE — ASSESSMENT & PLAN NOTE
· Patient reports admission last week to [facility] for rapid Klonopin weaning  · Per [facility] clinic the patient has not been a patient at [facility] since 10/04/19  · Patient is currently hemodynamically stable  He denies auditory or visual hallucinations  He has a very mild resting tremor  There is no contraindication to discharge at this time  I discussed this with the patient at length and he says he feels much improved since he arrived to the ER

## 2019-11-01 NOTE — ASSESSMENT & PLAN NOTE
· Patient reports being a patient of [facility] substance abuse support  · Reports current methadone dose 160 mg will confirm with methadone Clinic

## 2019-11-01 NOTE — ED PROVIDER NOTES
History  Chief Complaint   Patient presents with    Withdrawal - Drug     Pt presents to ED stating that he is going through benzo withdrawl symptoms  Pt presents with shaking, shivering, difficulty talking  Pt is taking methodone and was being tapered off of medication when symptoms started  Began last night and has been getting progressively worse  Taken methodone today and clonopin 7 days ago  This is a 62-year-old male who presents for evaluation benzodiazepine withdrawal he was recently admitted to the [facility] and had a 5 day taper of his Klonopin which he was taking 1 mg 4 times a day for many years he does have a prior history of seizure back in 2015 from benzodiazepine withdrawal he denies any suicidal or homicidal ideation he feels sweaty tremulous feeling confused and having trouble walking although he did ambulate himself into the emergency room  He is also currently on methadone and had his dosage today  History provided by:  Patient and parent  Medical Problem   Location:  Generalized  Quality:  Shakiness and tremulousness  Severity:  Moderate  Onset quality:  Gradual  Timing:  Constant  Progression:  Worsening  Chronicity:  Recurrent  Context:  Benzodiazepine withdrawal  Relieved by:  Nothing  Associated symptoms: nausea        Prior to Admission Medications   Prescriptions Last Dose Informant Patient Reported? Taking?    ARIPiprazole (ABILIFY) 5 mg tablet 10/30/2019 at Unknown time  No Yes   Sig: Take 1 tablet (5 mg total) by mouth daily At 9am   carisoprodol (SOMA) 350 mg tablet Past Month at Unknown time  No Yes   Sig: Take 1 tablet (350 mg total) by mouth every 8 (eight) hours as needed for muscle spasms OK TO REFILL--HOSPITAL DESTROYED LAST SCRIPT   clonazePAM (KlonoPIN) 1 mg tablet Past Week at Unknown time  Yes Yes   Sig: Take 1 mg by mouth 4 (four) times a day   gabapentin (NEURONTIN) 300 mg capsule Past Week at Unknown time  No Yes   Sig: Take 2 capsules (600 mg total) by mouth 3 (three) times a day for 14 days At 9am, 4pm, 9pm   melatonin 3 mg Not Taking at Unknown time  No No   Sig: Take 2 tablets (6 mg total) by mouth daily at bedtime   Patient not taking: Reported on 10/31/2019   methadone (DOLOPHINE) 10 mg/mL oral concentrated solution 10/31/2019 at Unknown time  No Yes   Sig: Take 16 5 mL (165 mg total) by mouth dailyMax Daily Amount: 165 mg   Patient taking differently: Take 160 mg by mouth daily    mirtazapine (REMERON) 30 mg tablet 10/30/2019 at Unknown time  No Yes   Sig: Take 1 tablet (30 mg total) by mouth daily at bedtime   naproxen (NAPROSYN) 250 mg tablet 10/30/2019 at Unknown time  Yes Yes   Sig: Take 250 mg by mouth 2 (two) times a day with meals   tamsulosin (FLOMAX) 0 4 mg 10/30/2019 at Unknown time  No Yes   Sig: Take 1 capsule (0 4 mg total) by mouth daily with dinner      Facility-Administered Medications: None       Past Medical History:   Diagnosis Date    Addiction to drug ([facility] )     Allergic     Anxiety     Chronic pain disorder     Depression     Psychiatric disorder     Spinal stenosis     Substance abuse ([facility] )     Suicide attempt ([facility] )        Past Surgical History:   Procedure Laterality Date    BACK SURGERY      FRACTURE SURGERY      left foot reconstruted     SPINE SURGERY      hernated disc        Family History   Problem Relation Age of Onset    Hypertension Mother     Hyperlipidemia Father     Heart disease Father     Depression Maternal Grandmother     Hypertension Maternal Grandmother     Squamous cell carcinoma Maternal Grandfather     Alcohol abuse Maternal Grandfather     Alzheimer's disease Paternal Grandmother     Dementia Paternal Grandmother     Alcohol abuse Paternal Uncle      I have reviewed and agree with the history as documented      Social History     Tobacco Use    Smoking status: Former Smoker     Packs/day: 0 00     Types: E-Cigarettes     Last attempt to quit: 2017     Years since quittin 1    Smokeless tobacco: Current User    Tobacco comment: Pt uses a "vape" pen - use is equivalent to approximately 1 pack per day   Substance Use Topics    Alcohol use: Not Currently     Frequency: Never     Binge frequency: Never    Drug use: Not Currently     Frequency: 3 0 times per week     Types: Methamphetamines     Comment: pt states last used 4 months ago         Review of Systems   Constitutional: Positive for diaphoresis  Gastrointestinal: Positive for nausea  Neurological: Positive for tremors  Psychiatric/Behavioral: The patient is nervous/anxious  All other systems reviewed and are negative  Physical Exam  Physical Exam   Constitutional: He is oriented to person, place, and time  He appears well-developed and well-nourished  He appears distressed  HENT:   Head: Normocephalic and atraumatic  Right Ear: External ear normal    Left Ear: External ear normal    Nose: Nose normal    Mouth/Throat: Oropharynx is clear and moist    Eyes: Pupils are equal, round, and reactive to light  EOM are normal  Right eye exhibits no discharge  Left eye exhibits no discharge  No scleral icterus  Neck: Neck supple  No JVD present  No tracheal deviation present  Cardiovascular: Regular rhythm and intact distal pulses  Exam reveals no gallop and no friction rub  No murmur heard  Tachycardic   Pulmonary/Chest: Effort normal and breath sounds normal  No stridor  No respiratory distress  Abdominal: Soft  Bowel sounds are normal  He exhibits no distension and no mass  There is no tenderness  There is no guarding  Musculoskeletal: Normal range of motion  He exhibits no edema, tenderness or deformity  Neurological: He is alert and oriented to person, place, and time  No cranial nerve deficit or sensory deficit  Tremulous   Skin: No rash noted  He is diaphoretic     Psychiatric:   Anxious tremulous denying any suicidal or homicidal ideation no visual or auditory hallucinations   Nursing note and vitals reviewed        Vital Signs  ED Triage Vitals [10/31/19 2017]   Temperature Pulse Respirations Blood Pressure SpO2   97 9 °F (36 6 °C) (!) 127 (!) 24 137/66 100 %      Temp Source Heart Rate Source Patient Position - Orthostatic VS BP Location FiO2 (%)   Temporal Monitor Lying Right arm --      Pain Score       No Pain           Vitals:    10/31/19 2017 10/31/19 2045 10/31/19 2115 10/31/19 2130   BP: 137/66  114/62 114/62   Pulse: (!) 127 104 96 99   Patient Position - Orthostatic VS: Lying            Visual Acuity  Visual Acuity      Most Recent Value   L Pupil Size (mm)  3   R Pupil Size (mm)  3          ED Medications  Medications   sodium chloride 0 9 % bolus 1,000 mL (1,000 mL Intravenous New Bag 10/31/19 2147)   sodium chloride 0 9 % bolus 1,000 mL (0 mL Intravenous Stopped 10/31/19 2130)   diazepam (VALIUM) injection 2 5 mg (2 5 mg Intravenous Given 10/31/19 2130)       Diagnostic Studies  Results Reviewed     Procedure Component Value Units Date/Time    Ethanol [[id number]]  (Normal) Collected:  10/31/19 2039    Lab Status:  Final result Specimen:  Blood from Arm, Right Updated:  10/31/19 2116     Ethanol Lvl <3 mg/dL     Comprehensive metabolic panel [[id number]]  (Abnormal) Collected:  10/31/19 2039    Lab Status:  Final result Specimen:  Blood from Arm, Right Updated:  10/31/19 2104     Sodium 136 mmol/L      Potassium 3 5 mmol/L      Chloride 100 mmol/L      CO2 30 mmol/L      ANION GAP 6 mmol/L      BUN 13 mg/dL      Creatinine 1 17 mg/dL      Glucose 85 mg/dL      Calcium 9 2 mg/dL      AST 40 U/L      ALT 56 U/L      Alkaline Phosphatase 80 U/L      Total Protein 8 5 g/dL      Albumin 4 0 g/dL      Total Bilirubin 0 30 mg/dL      eGFR 83 ml/min/1 73sq m     Narrative:       [place] guidelines for Chronic Kidney Disease (CKD):     Stage 1 with normal or high GFR (GFR > 90 mL/min/1 73 square meters)    Stage 2 Mild CKD (GFR = 60-89 mL/min/1 73 square meters)    Stage 3A Moderate CKD (GFR = 45-59 mL/min/1 73 square meters)    Stage 3B Moderate CKD (GFR = 30-44 mL/min/1 73 square meters)    Stage 4 Severe CKD (GFR = 15-29 mL/min/1 73 square meters)    Stage 5 End Stage CKD (GFR <15 mL/min/1 73 square meters)  Note: GFR calculation is accurate only with a steady state creatinine    CBC and differential [[id number]] Collected:  10/31/19 2039    Lab Status:  Final result Specimen:  Blood from Arm, Right Updated:  10/31/19 2047     WBC 9 29 Thousand/uL      RBC 4 30 Million/uL      Hemoglobin 12 5 g/dL      Hematocrit 37 3 %      MCV 87 fL      MCH 29 1 pg      MCHC 33 5 g/dL      RDW 13 3 %      MPV 9 9 fL      Platelets 464 Thousands/uL      nRBC 0 /100 WBCs      Neutrophils Relative 49 %      Immat GRANS % 0 %      Lymphocytes Relative 42 %      Monocytes Relative 7 %      Eosinophils Relative 2 %      Basophils Relative 0 %      Neutrophils Absolute 4 57 Thousands/µL      Immature Grans Absolute 0 02 Thousand/uL      Lymphocytes Absolute 3 88 Thousands/µL      Monocytes Absolute 0 62 Thousand/µL      Eosinophils Absolute 0 16 Thousand/µL      Basophils Absolute 0 04 Thousands/µL     Rapid drug screen, urine [[id number]]     Lab Status:  No result Specimen:  Urine                  No orders to display              Procedures  ECG 12 Lead Documentation Only  Date/Time: 10/31/2019 9:34 PM  Performed by: Tracy Barber DO  Authorized by: Tracy Barber DO     ECG reviewed by me, the ED Provider: yes    Patient location:  ED  Rate:     ECG rate:  93  Rhythm:     Rhythm: sinus rhythm    T waves:     T waves: non-specific    Other findings:     Other findings: prolonged qTc interval             ED Course                               MDM    Disposition  Final diagnoses:   Benzodiazepine withdrawal ([address] )     Time reflects when diagnosis was documented in both MDM as applicable and the Disposition within this note     Time User Action Codes Description Comment    10/31/2019  9:44 PM Savanna Egan Dyllan Wheatley [C75 [id number]] Benzodiazepine withdrawal [facility])       ED Disposition     ED Disposition Condition Date/Time Comment    Admit Stable Thu Oct 31, 2019  9:43 PM Case was discussed with **NP covering Dr Faby Garcia* and the patient's admission status was agreed to be Admission Status: observation status to the service of Dr Faby Garcia   Follow-up Information    None         Patient's Medications   Discharge Prescriptions    No medications on file     No discharge procedures on file      ED Provider  Electronically Signed by           Miller Bernabe DO  10/31/19 9898

## 2019-11-01 NOTE — H&P
H&P- Alphonse Chun 1990, 26 y o  male MRN: [id number]    Unit/Bed#: [address] [place] [address] Encounter: [id number]    Primary Care Provider: Ezio Coleman MD   Date and time admitted to hospital: 10/31/2019  8:12 PM        Benzodiazepine withdrawal ([facility] )  Assessment & Plan  · Patient reports admission last week to [facility] for rapid Klonopin weaning  · Per [facility] clinic the patient has not been a patient at [facility] since 10/04/19  · Patient shaky, anxious inattentive  · Will be admitted for low-dose benzos in setting of benzodiazepine withdrawal      Methamphetamine abuse ([facility] )  Assessment & Plan  · Patient reports being a patient of [facility] substance abuse support  · Reports current methadone dose 160 mg will confirm with methadone Clinic      VTE Prophylaxis: Low VTE risk   / sequential compression device   Code Status:  Level 1 full code  POLST: POLST form is not discussed and not completed at this time  Discussion with family:  Mother at bedside  Anticipated Length of Stay:  Patient will be admitted on an Observation basis with an anticipated length of stay of 2 midnights  Justification for Hospital Stay:  Treatment of benzodiazepine withdrawal  Total Time for Visit, including Counseling / Coordination of Care: 30 minutes  Greater than 50% of this total time spent on direct patient counseling and coordination of care  Chief Complaint:  Tremors, anxiety, diaphoresis associated with benzo diazepam withdrawal  History of Present Illness:    Alphonse Chun is a 26 y o  male with a significant history of methamphetamine abuse currently on methadone, suicidal ideation, anxiety, hepatitis-C (untreated), polysubstance drug abuse, benzodiazepine abuse who presents with complaints of tremors, confusion, trouble walking, sweating  The patient states he has been taking Klonopin 1 mg q i d   For many years and in order to continue in the new drug rehab program he is enrolled in he needed to be off all substances  The patient states he went to [facility] during the week of 10/21 to 10/25 for 5 day taper of his Klonopin  The patient states that yesterday he became tremulous home had difficulty thinking clearly in today the symptoms worsen so he presented to the emergency department  Labs were obtained, CMP was unremarkable, CBC was also within normal limits  Vital signs were stable on presentation  The patient was given diazepam 2 5 mg IV in the emergency room with minimal improvement in symptoms  Review of Systems:    Review of Systems   Constitutional: Positive for fatigue  Respiratory: Negative for shortness of breath  Cardiovascular: Negative for palpitations  Neurological: Positive for dizziness, tremors and weakness  Psychiatric/Behavioral: Positive for confusion and decreased concentration  Past Medical and Surgical History:     Past Medical History:   Diagnosis Date    Addiction to drug ([facility] )     Allergic     Anxiety     Chronic pain disorder     Depression     Psychiatric disorder     Spinal stenosis     Substance abuse ([facility] )     Suicide attempt ([place] [address] )        Past Surgical History:   Procedure Laterality Date    BACK SURGERY      FRACTURE SURGERY      left foot reconstruted     SPINE SURGERY      hernated disc        Meds/Allergies:    Prior to Admission medications    Medication Sig Start Date End Date Taking?  Authorizing Provider   ARIPiprazole (ABILIFY) 5 mg tablet Take 1 tablet (5 mg total) by mouth daily At 9am 8/14/19  Yes Yadira Arelalno PA-C   carisoprodol (SOMA) 350 mg tablet Take 1 tablet (350 mg total) by mouth every 8 (eight) hours as needed for muscle spasms OK TO REFILL--HOSPITAL DESTROYED LAST SCRIPT 8/16/19  Yes Ana Marino MD   clonazePAM (KlonoPIN) 1 mg tablet Take 1 mg by mouth 4 (four) times a day   Yes Historical Provider, MD   gabapentin (NEURONTIN) 300 mg capsule Take 2 capsules (600 mg total) by mouth 3 (three) times a day for 14 days At 9am, 4pm, 9pm 8/16/19 10/31/19 Yes Akira Sullivan MD   methadone (DOLOPHINE) 10 mg/mL oral concentrated solution Take 16 5 mL (165 mg total) by mouth dailyMax Daily Amount: 165 mg  Patient taking differently: Take 160 mg by mouth daily  19  Yes Becca Hernandez PA-C   mirtazapine (REMERON) 30 mg tablet Take 1 tablet (30 mg total) by mouth daily at bedtime 19  Yes Becca Hernandez PA-C   naproxen (NAPROSYN) 250 mg tablet Take 250 mg by mouth 2 (two) times a day with meals   Yes Historical Provider, MD   tamsulosin (FLOMAX) 0 4 mg Take 1 capsule (0 4 mg total) by mouth daily with dinner 19  Yes Becca Hernandez PA-C   melatonin 3 mg Take 2 tablets (6 mg total) by mouth daily at bedtime  Patient not taking: Reported on 10/31/2019 8/14/19   Becca Hernandez PA-C         Allergies: No Known Allergies    Social History:  Substance Use History:   Social History     Substance and Sexual Activity   Alcohol Use Not Currently    Frequency: Never    Drinks per session: Patient refused    Binge frequency: Never     Social History     Tobacco Use   Smoking Status Former Smoker    Packs/day: 0 00    Types: E-Cigarettes    Last attempt to quit: 2017    Years since quittin 1   Smokeless Tobacco Current User   Tobacco Comment    Pt uses a "vape" pen - use is equivalent to approximately 1 pack per day     Social History     Substance and Sexual Activity   Drug Use Not Currently    Frequency: 3 0 times per week    Types: Methamphetamines, Benzodiazepines    Comment: pt states last used 4 months ago        Family History:    Family History   Problem Relation Age of Onset    Hypertension Mother     Hyperlipidemia Father     Heart disease Father     Depression Maternal Grandmother     Hypertension Maternal Grandmother     Squamous cell carcinoma Maternal Grandfather     Alcohol abuse Maternal Grandfather     Alzheimer's disease Paternal Grandmother     Dementia Paternal Grandmother  Alcohol abuse Paternal Uncle        Physical Exam:     Vitals:   Blood Pressure: 111/64 (10/31/19 2200)  Pulse: 90 (10/31/19 2200)  Temperature: 97 9 °F (36 6 °C) (10/31/19 2017)  Temp Source: Temporal (10/31/19 2017)  Respirations: 17 (10/31/19 2200)  Height: 5' 10" (177 8 cm) (10/31/19 2017)  Weight - Scale: 88 5 kg (195 lb 1 7 oz) (10/31/19 2017)  SpO2: 98 % (10/31/19 2200)    Physical Exam   Constitutional: He is oriented to person, place, and time  Vital signs are normal  He appears well-developed and well-nourished  He is cooperative  No distress  HENT:   Head: Normocephalic and atraumatic  Eyes: Pupils are equal, round, and reactive to light  Conjunctivae and EOM are normal  No scleral icterus  Neck: Trachea normal  Neck supple  No JVD present  Cardiovascular: Normal rate, regular rhythm, normal heart sounds and intact distal pulses  Exam reveals no gallop, no distant heart sounds and no friction rub  Pulmonary/Chest: Effort normal and breath sounds normal  No accessory muscle usage  No respiratory distress  Abdominal: Soft  Normal appearance and bowel sounds are normal  There is no tenderness  Neurological: He is alert and oriented to person, place, and time  He has normal strength  He displays tremor  GCS eye subscore is 4  GCS verbal subscore is 5  GCS motor subscore is 6  Skin: Skin is warm, dry and intact  Psychiatric: His speech is delayed  Nursing note and vitals reviewed  Additional Data:     Lab Results: I have personally reviewed pertinent reports        Results from last 7 days   Lab Units 10/31/19  2039   WBC Thousand/uL 9 29   HEMOGLOBIN g/dL 12 5   HEMATOCRIT % 37 3   PLATELETS Thousands/uL 358   NEUTROS PCT % 49   LYMPHS PCT % 42   MONOS PCT % 7   EOS PCT % 2     Results from last 7 days   Lab Units 10/31/19  2039   SODIUM mmol/L 136   POTASSIUM mmol/L 3 5   CHLORIDE mmol/L 100   CO2 mmol/L 30   BUN mg/dL 13   CREATININE mg/dL 1 17   ANION GAP mmol/L 6   CALCIUM mg/dL 9 2   ALBUMIN g/dL 4 0   TOTAL BILIRUBIN mg/dL 0 30   ALK PHOS U/L 80   ALT U/L 56   AST U/L 40   GLUCOSE RANDOM mg/dL 85                       No orders to display     Allscripts / Epic Records Reviewed: Yes     ** Please Note: This note has been constructed using a voice recognition system   **

## 2019-11-01 NOTE — ASSESSMENT & PLAN NOTE
· [facility]  · Continue methadone at home dose of 160 mg daily

## 2019-11-01 NOTE — UTILIZATION REVIEW
Initial Clinical Review    Admission: Date/Time/Statement: OBS 10/31/19 2145  Orders Placed This Encounter   Procedures    Place in Observation (expected length of stay for this patient is less than two midnights)     Standing Status:   Standing     Number of Occurrences:   1     Order Specific Question:   Admitting Physician     Answer:   Judson Haddad [[id number]]     Order Specific Question:   Level of Care     Answer:   Med Surg [[id number]]     ED Arrival Information     Expected Arrival Acuity Means of Arrival Escorted By Service Admission Type    - 10/31/2019 20:05 Urgent Walk-In Family Member Hospitalist Urgent    Arrival Complaint    [address] [facility]        Chief Complaint   Patient presents with    Withdrawal - Drug     Pt presents to ED stating that he is going through benzo withdrawl symptoms  Pt presents with shaking, shivering, difficulty talking  Pt is taking methodone and was being tapered off of medication when symptoms started  Began last night and has been getting progressively worse  Taken methodone today and clonopin 7 days ago  Assessment/Plan: 26 y o  male with a significant history of methamphetamine abuse currently on methadone, suicidal ideation, anxiety, hepatitis-C (untreated), polysubstance drug abuse, benzodiazepine abuse who presents with complaints of tremors, confusion, trouble walking, sweating  The patient states he has been taking Klonopin 1 mg q i d  For many years and in order to continue in the new drug rehab program he is enrolled in he needed to be off all substances  The patient states he went to [facility] during the week of 10/21 to 10/25 for 5 day taper of his Klonopin  The patient states that yesterday he became tremulous home had difficulty thinking clearly in today the symptoms worsen so he presented to the emergency department     Benzodiazepine withdrawal ([facility] )  Assessment & Plan  · Patient reports admission last week to [facility] for rapid Klonopin weaning  · Per [facility] the patient has not been a patient at [facility] since 10/04/19  · Patient shaky, anxious inattentive  · Will be admitted for low-dose benzos in setting of benzodiazepine withdrawal  Methamphetamine abuse ([address] )  Assessment & Plan  · Patient reports being a patient of [facility] substance abuse support  · Reports current methadone dose 160 mg will confirm with methadone Clinic     ED Triage Vitals [10/31/19 2017]   Temperature Pulse Respirations Blood Pressure SpO2   97 9 °F (36 6 °C) (!) 127 (!) 24 137/66 100 %      Temp Source Heart Rate Source Patient Position - Orthostatic VS BP Location FiO2 (%)   Temporal Monitor Lying Right arm --      Pain Score       No Pain        Wt Readings from Last 1 Encounters:   10/31/19 93 5 kg (206 lb 3 2 oz)     Additional Vital Signs:   11/01/19 0721  98 6 °F (37 °C)  92  18  129/73  97 %  None (Room air)  Lying   10/31/19 2300  98 1 °F (36 7 °C)  174Abnormal   19  135/83  99 %  None (Room air)  Lying   10/31/19 2200    90  17  111/64  98 %       10/31/19 2130    99  18  114/62  99 %       10/31/19 2115    96  18  114/62  97 %       10/31/19 2045    104  17    98 %       10/31/19 2043            None (Room air)     10/31/19 2017  97 9 °F (36 6 °C)  127Abnormal   24Abnormal   137/66  100 %  None (Room air)  Lying      Weights (last 14 days)     Date/Time  Weight  Weight Method  Height   10/31/19 2300  93 5 kg (206 lb 3 2 oz)  Standing scale     10/31/19 2017  88 5 kg (195 lb 1 7 oz)  Stated  5' 10" (1 778 m)       Pertinent Labs/Diagnostic Test Results:   Results from last 7 days   Lab Units 11/01/19  0506 10/31/19  2039   WBC Thousand/uL 7 64 9 29   HEMOGLOBIN g/dL 12 2 12 5   HEMATOCRIT % 36 5 37 3   PLATELETS Thousands/uL 338 358   NEUTROS ABS Thousands/µL 4 28 4 57         Results from last 7 days   Lab Units 11/01/19  0506 10/31/19  2039   SODIUM mmol/L 139 136   POTASSIUM mmol/L 4 1 3 5   CHLORIDE mmol/L 104 100   CO2 mmol/L 28 30   ANION GAP mmol/L 7 6   BUN mg/dL 10 13   CREATININE mg/dL 1 11 1 17   EGFR ml/min/1 73sq m 89 83   CALCIUM mg/dL 9 4 9 2     Results from last 7 days   Lab Units 10/31/19  2039   AST U/L 40   ALT U/L 56   ALK PHOS U/L 80   TOTAL PROTEIN g/dL 8 5*   ALBUMIN g/dL 4 0   TOTAL BILIRUBIN mg/dL 0 30         Results from last 7 days   Lab Units 11/01/19  0506 10/31/19  2039   GLUCOSE RANDOM mg/dL 85 85           Results from last 7 days   Lab Units 11/01/19  0133   AMPH/METH  Negative   BARBITURATE UR  Negative   BENZODIAZEPINE UR  Negative   COCAINE UR  Negative   METHADONE URINE  Positive*   OPIATE UR  Negative   PCP UR  Negative   THC UR  Negative     Results from last 7 days   Lab Units 10/31/19  2039   ETHANOL LVL mg/dL <3       ED Treatment:   Medication Administration from 10/31/2019 2005 to 10/31/2019 2236       Date/Time Order Dose Route Action     10/31/2019 2130 sodium chloride 0 9 % bolus 1,000 mL 0 mL Intravenous Stopped     10/31/2019 2041 sodium chloride 0 9 % bolus 1,000 mL 1,000 mL Intravenous New Bag     10/31/2019 2130 diazepam (VALIUM) injection 2 5 mg 2 5 mg Intravenous Given     10/31/2019 2147 sodium chloride 0 9 % bolus 1,000 mL 1,000 mL Intravenous New Bag        Past Medical History:   Diagnosis Date    Addiction to drug ([place] [address] )     Allergic     Anxiety     Chronic pain disorder     Depression     Psychiatric disorder     Spinal stenosis     Substance abuse ([place] [address] )     Suicide attempt ([place] [address] )      Present on Admission:   Benzodiazepine withdrawal ([place] [address] )   (Resolved) Anxiety   Methamphetamine abuse ([place] [address] )      Admitting Diagnosis: Drug withdrawal ([place] [address] ) [F19 939]  Benzodiazepine withdrawal ([place] [address] ) [F13 239]  Age/Sex: 26 y o  male  Admission Orders:  Scheduled Medications:    Medications:  ARIPiprazole 5 mg Oral Daily   methadone 160 mg Oral Daily   mirtazapine 30 mg Oral HS   tamsulosin 0 4 mg Oral Daily With Dinner     Continuous IV Infusions:     PRN Meds:    acetaminophen 650 mg Oral Q6H PRN     IP CONSULT TO CASE MANAGEMENT  telemetry    Network Utilization Review Department  [email or web address]  ATTENTION: Please call with any questions or concerns to [phone] and carefully listen to the prompts so that you are directed to the right person  All voicemails are confidential   Gracy Hernández all requests for admission clinical reviews, approved or denied determinations and any other requests to dedicated fax number below belonging to the campus where the patient is receiving treatment    FACILITY NAME UR FAX NUMBER   ADMISSION DENIALS (Administrative/Medical Necessity) [address] [facility] (Maternity/NICU/Pediatrics) [phone]   [facility] [id number] [place] [address] [address] [facility] [phone]   [address] [facility]  [place] [address] [facility] [phone]   Helon Milder [address] [facility] [address] [place] [address] [phone]

## 2019-11-08 ENCOUNTER — OFFICE VISIT (OUTPATIENT)
Dept: FAMILY MEDICINE CLINIC | Facility: CLINIC | Age: 29
End: 2019-11-08
Payer: COMMERCIAL

## 2019-11-08 VITALS
HEIGHT: 70 IN | DIASTOLIC BLOOD PRESSURE: 88 MMHG | SYSTOLIC BLOOD PRESSURE: 136 MMHG | WEIGHT: 208.2 LBS | OXYGEN SATURATION: 99 % | BODY MASS INDEX: 29.81 KG/M2 | HEART RATE: 97 BPM

## 2019-11-08 DIAGNOSIS — F33.2 SEVERE EPISODE OF RECURRENT MAJOR DEPRESSIVE DISORDER, WITHOUT PSYCHOTIC FEATURES (HCC): ICD-10-CM

## 2019-11-08 DIAGNOSIS — G47.00 INSOMNIA: ICD-10-CM

## 2019-11-08 DIAGNOSIS — F41.1 GENERALIZED ANXIETY DISORDER: ICD-10-CM

## 2019-11-08 DIAGNOSIS — F11.90 CHRONIC, CONTINUOUS USE OF OPIOIDS: ICD-10-CM

## 2019-11-08 DIAGNOSIS — F32.A DEPRESSION: ICD-10-CM

## 2019-11-08 DIAGNOSIS — B18.2 CHRONIC HEPATITIS C WITHOUT HEPATIC COMA (HCC): Primary | Chronic | ICD-10-CM

## 2019-11-08 PROCEDURE — 1111F DSCHRG MED/CURRENT MED MERGE: CPT | Performed by: FAMILY MEDICINE

## 2019-11-08 PROCEDURE — 99214 OFFICE O/P EST MOD 30 MIN: CPT | Performed by: FAMILY MEDICINE

## 2019-11-08 RX ORDER — PHENOL 1.4 %
AEROSOL, SPRAY (ML) MUCOUS MEMBRANE
Qty: 30 TABLET | Refills: 5 | Status: SHIPPED | OUTPATIENT
Start: 2019-11-08 | End: 2020-04-18

## 2019-11-08 RX ORDER — DOXEPIN HYDROCHLORIDE 75 MG/1
75 CAPSULE ORAL
Qty: 30 CAPSULE | Refills: 5 | Status: SHIPPED | OUTPATIENT
Start: 2019-11-08 | End: 2020-04-18

## 2019-11-08 RX ORDER — GABAPENTIN 600 MG/1
600 TABLET ORAL 3 TIMES DAILY
COMMUNITY
End: 2019-11-08 | Stop reason: SDUPTHER

## 2019-11-08 RX ORDER — ARIPIPRAZOLE 5 MG/1
TABLET ORAL
Qty: 30 TABLET | Refills: 5 | Status: SHIPPED | OUTPATIENT
Start: 2019-11-08 | End: 2020-04-18

## 2019-11-08 RX ORDER — DOXEPIN HYDROCHLORIDE 75 MG/1
75 CAPSULE ORAL
COMMUNITY
End: 2019-11-08 | Stop reason: SDUPTHER

## 2019-11-08 RX ORDER — CLONAZEPAM 1 MG/1
1 TABLET ORAL 4 TIMES DAILY
Qty: 120 TABLET | Refills: 5 | Status: SHIPPED | OUTPATIENT
Start: 2019-11-08 | End: 2020-04-27 | Stop reason: HOSPADM

## 2019-11-08 RX ORDER — GABAPENTIN 600 MG/1
600 TABLET ORAL 3 TIMES DAILY
Qty: 90 TABLET | Refills: 3 | Status: SHIPPED | OUTPATIENT
Start: 2019-11-08 | End: 2020-03-17 | Stop reason: SDUPTHER

## 2019-11-08 RX ORDER — MIRTAZAPINE 30 MG/1
30 TABLET, FILM COATED ORAL
Qty: 30 TABLET | Refills: 5 | Status: SHIPPED | OUTPATIENT
Start: 2019-11-08 | End: 2020-04-27 | Stop reason: HOSPADM

## 2019-11-08 NOTE — PROGRESS NOTES
[address]  [facility]        NAME: Chantel Michelle is a 26 y o  male  : 1990    MRN: [id number]  DATE: 2019  TIME: 1:29 PM    Assessment and Plan   Chronic hepatitis C without hepatic coma ([facility] ) [B18 2]  1  Chronic hepatitis C without hepatic coma ([facility] )     2  Severe episode of recurrent major depressive disorder, without psychotic features (HCC)  ARIPiprazole (ABILIFY) 5 mg tablet   3  Generalized anxiety disorder  clonazePAM (KlonoPIN) 1 mg tablet    mirtazapine (REMERON) 30 mg tablet    doxepin (SINEquan) 75 MG capsule    gabapentin (NEURONTIN) 600 MG tablet   4  Depression  mirtazapine (REMERON) 30 mg tablet   5  Insomnia  melatonin 10 MG TABS   6  Chronic, continuous use of opioids           Patient Instructions     Patient Instructions   See meds--pt will see Dr Tierra Villagomez for subox          Chief Complaint     Chief Complaint   Patient presents with    Transition of Care Management         History of Present Illness       F/u TCM for klonopin withdrawal----hosp records rev      Review of Systems   Review of Systems   Constitutional: Negative for fatigue, fever and unexpected weight change  HENT: Negative for congestion, sinus pain and sore throat  Eyes: Negative for visual disturbance  Respiratory: Negative for shortness of breath and wheezing  Cardiovascular: Negative for chest pain and palpitations  Gastrointestinal: Negative for abdominal pain, nausea and vomiting  Musculoskeletal: Negative  Negative for arthralgias and myalgias  Neurological: Negative for syncope, weakness and numbness  Psychiatric/Behavioral: Negative  Negative for confusion, dysphoric mood and suicidal ideas           Current Medications       Current Outpatient Medications:     ARIPiprazole (ABILIFY) 5 mg tablet, 1 HS, Disp: 30 tablet, Rfl: 5    carisoprodol (SOMA) 350 mg tablet, Take 1 tablet (350 mg total) by mouth every 8 (eight) hours as needed for muscle spasms OK TO REFILL--HOSPITAL DESTROYED LAST SCRIPT, Disp: 30 tablet, Rfl: 0    clonazePAM (KlonoPIN) 1 mg tablet, Take 1 tablet (1 mg total) by mouth 4 (four) times a day, Disp: 120 tablet, Rfl: 5    doxepin (SINEquan) 75 MG capsule, Take 1 capsule (75 mg total) by mouth daily at bedtime as needed for sleep, Disp: 30 capsule, Rfl: 5    gabapentin (NEURONTIN) 600 MG tablet, Take 1 tablet (600 mg total) by mouth 3 (three) times a day, Disp: 90 tablet, Rfl: 3    methadone (DOLOPHINE) 10 mg/mL oral concentrated solution, Take 16 5 mL (165 mg total) by mouth dailyMax Daily Amount: 165 mg (Patient taking differently: Take 150 mg by mouth daily ), Disp: , Rfl: 0    mirtazapine (REMERON) 30 mg tablet, Take 1 tablet (30 mg total) by mouth daily at bedtime, Disp: 30 tablet, Rfl: 5    naproxen (NAPROSYN) 250 mg tablet, Take 250 mg by mouth 2 (two) times a day with meals, Disp: , Rfl:     melatonin 10 MG TABS, 1 HS, Disp: 30 tablet, Rfl: 5    Current Allergies     Allergies as of 11/08/2019    (No Known Allergies)            The following portions of the patient's history were reviewed and updated as appropriate: allergies, current medications, past family history, past medical history, past social history, past surgical history and problem list      Past Medical History:   Diagnosis Date    Addiction [facility] drug ([facility] )     Allergic     Anxiety     Chronic pain disorder     Depression     Psychiatric disorder     Spinal stenosis     Substance abuse ([facility] )     [facility]        Past Surgical History:   Procedure Laterality Date    BACK SURGERY      FRACTURE SURGERY      left foot reconstruted     SPINE SURGERY      hernated disc        Family History   Problem Relation Age of Onset    Hypertension Mother     Hyperlipidemia Father     Heart disease Father     Depression Maternal Grandmother     Hypertension Maternal Grandmother     Squamous cell carcinoma Maternal Grandfather     Alcohol abuse Maternal Grandfather     Alzheimer's disease Paternal Grandmother     Dementia Paternal Grandmother     Alcohol abuse Paternal Uncle          Medications have been verified  Objective   /88   Pulse 97   Ht 5' 10" (1 778 m)   Wt 94 4 kg (208 lb 3 2 oz)   SpO2 99%   BMI 29 87 kg/m²        Physical Exam     Physical Exam   Constitutional: He is oriented to person, place, and time  Vital signs are normal  He appears well-developed and well-nourished  HENT:   Right Ear: Ear canal normal  Tympanic membrane is not injected  Left Ear: Ear canal normal  Tympanic membrane is not injected  Nose: Nose normal    Mouth/Throat: Oropharynx is clear and moist    Eyes: Pupils are equal, round, and reactive to light  Conjunctivae and EOM are normal  Right eye exhibits no discharge  Left eye exhibits no discharge  Neck: Normal range of motion  Neck supple  No thyromegaly present  Cardiovascular: Normal rate, regular rhythm and normal heart sounds  No murmur heard  Pulmonary/Chest: Effort normal and breath sounds normal  No respiratory distress  He has no wheezes  Abdominal: Soft  Bowel sounds are normal  He exhibits no distension  There is no tenderness  Musculoskeletal: Normal range of motion  Lymphadenopathy:     He has no cervical adenopathy  Neurological: He is alert and oriented to person, place, and time  He has normal strength and normal reflexes  He is not disoriented  No sensory deficit  Gait normal    Skin: Skin is warm and dry  Psychiatric: He has a normal mood and affect  His speech is normal and behavior is normal  Judgment and thought content normal  Cognition and memory are normal        BMI Counseling: Body mass index is 29 87 kg/m²  The BMI is above normal  Nutrition recommendations include reducing portion sizes

## 2019-11-11 ENCOUNTER — TELEPHONE (OUTPATIENT)
Dept: OTHER | Facility: OTHER | Age: 29
End: 2019-11-11

## 2019-11-12 ENCOUNTER — TELEPHONE (OUTPATIENT)
Dept: FAMILY MEDICINE CLINIC | Facility: CLINIC | Age: 29
End: 2019-11-12

## 2019-11-12 DIAGNOSIS — R05.9 COUGH: Primary | ICD-10-CM

## 2019-11-12 RX ORDER — DEXTROMETHORPHAN HYDROBROMIDE AND PROMETHAZINE HYDROCHLORIDE 15; 6.25 MG/5ML; MG/5ML
5 SOLUTION ORAL 4 TIMES DAILY PRN
Qty: 240 ML | Refills: 1 | Status: SHIPPED | OUTPATIENT
Start: 2019-11-12 | End: 2019-12-30 | Stop reason: ALTCHOICE

## 2019-11-12 NOTE — TELEPHONE ENCOUNTER
Pt requesting phenergan cough syrup to help sleep  Health Call  Patient Name: Elle Kapoor  Gender: Female  : 10/19/2016  Age: 2 Y 21 D  Return Phone  Number: [phone] (Current)  Address: [address] [place] [address]/[facility]/[place]: [address]  Practice Name: Marita Julio 3 :  Physician:  [facility] Name: Myesha Mendez  Relationship To  Patient: Mother  Return Phone Number: [phone] (Current)  Presenting Problem: " I think my daughter is constipated"  Service Type: Triage  Charged Service 1: N/A  Pharmacy Name and  Number:  Nurse Assessment  Nurse: Mary Antoine RN, Cara Beau Date/Time: 2019 8:52:19 PM  Type of assessment required:  ---General (Adult or Child)  Duration of Current S/S  ---2 days  Location/Radiation  ---GI  Temperature (F) and route:  ---Denies  Symptom Specific Meds (Dose/Time):  ---None  Other S/S  BM on Saturday  ---Crying and reluctant to pass BM  Abdomen is firm, non-tender  Denies N/V,  erythema  Symptom progression:  ---same  Anyone ill at home?  ---No  Activity level:  ---Normal  Intake (Oz/Cup):  ---Decreased fluids per mom  Output:  ---see s/s  Protocols  Protocol Title Nurse Date/Time  Constipation TimerMARCELINO Cara Beau 2019 8:57:09 PM  Question Caller Affirmed  Disp  Time Disposition Final User  2019 8:59:00 PM Home Care TimerMARCELINO Cara Beau  2019 8:59:08 PM RN Triaged Yes TimerMARCELINO, [facility] Advice Given Per Protocol  HOME CARE: You should be able to treat this at home  REASSURANCE AND EDUCATION: * It sounds like the kind of constipation  we can treat with diet changes  * Most constipation is from a recent change in the diet or postponing stools  NONCONSTIPATING  DIET FOR CHILDREN 1 YEAR AND OLDER: * Add fruits and vegetables high in fiber content 3 times per day (peas, beans, corn,  broccoli, bananas, apricots, peaches, pears, figs, prunes, dates)  Raw fruits are most helpful  * Increase fruit juice (apple, pear, cherry,  grape, prune)  (Exception: orange juice is not helpful ) * Increase whole grain foods  Examples are bran flakes, bran muffins, rosalina  crackers, oatmeal, brown rice, and whole wheat bread  Popcorn can be used if over 3years old  * Limit milk products (milk, ice cream,  cheese, yogurt) to 3 servings/day  FLEXED POSITION TO HELP STOOL RELEASE: * Help your baby or young child by holding the  knees against the chest to simulate squatting (the natural position for pushing out a stool)  It's difficult to have a stool while lying down  * Gently massaging or pumping the lower abdomen may also help  SITTING ON THE TOILET (IF TOILET TRAINED): * Establish  a regular bowel pattern by sitting on the toilet for 5 to 10 minutes after meals, especially breakfast  WARM WATER TO RELAX THE  ANUS: * Warmth helps many children relax the anal sphincter and release a stool  * For prolonged straining, have your child sit in warm  water  * If not successful, apply a warm wet cotton ball to the anus  * Vibrate it side-to-side to help relax the anus   CALL BACK IF: *  Constipation continues over 1 week after making dietary changes * Streaks of blood occur 3 or more times * Your child becomes worse  Caller Understands: Yes  Caller Disagree/Comply: Comply  PreDisposition: Unsure

## 2019-11-12 NOTE — TELEPHONE ENCOUNTER
Patient experiencing dry cough for a few days  Says nothing over the counter is working  He was just here on Friday 11/8 for an appointment with Dr Amandeep Palma  Wants to know if Doctor can call a cough syrup in to pharmacy

## 2019-12-11 NOTE — ED NOTES
Pt not interested in talking with anyone from [facility]        Maninder Harrison RN  10/04/19 2818 Adult

## 2019-12-22 DIAGNOSIS — M62.838 MUSCLE SPASM: ICD-10-CM

## 2019-12-24 DIAGNOSIS — R05.9 COUGH: ICD-10-CM

## 2019-12-24 RX ORDER — DEXTROMETHORPHAN HYDROBROMIDE AND PROMETHAZINE HYDROCHLORIDE 15; 6.25 MG/5ML; MG/5ML
5 SOLUTION ORAL 4 TIMES DAILY PRN
Qty: 240 ML | Refills: 0 | Status: CANCELLED | OUTPATIENT
Start: 2019-12-24

## 2019-12-24 RX ORDER — CARISOPRODOL 350 MG/1
350 TABLET ORAL EVERY 8 HOURS PRN
Qty: 90 TABLET | Refills: 0 | Status: SHIPPED | OUTPATIENT
Start: 2019-12-24 | End: 2020-04-18

## 2019-12-25 DIAGNOSIS — R05.9 COUGH: ICD-10-CM

## 2019-12-26 DIAGNOSIS — R05.9 COUGH: ICD-10-CM

## 2019-12-26 RX ORDER — DEXTROMETHORPHAN HYDROBROMIDE AND PROMETHAZINE HYDROCHLORIDE 15; 6.25 MG/5ML; MG/5ML
5 SOLUTION ORAL 4 TIMES DAILY PRN
Qty: 240 ML | Refills: 0 | Status: CANCELLED | OUTPATIENT
Start: 2019-12-26

## 2019-12-26 RX ORDER — DEXTROMETHORPHAN HYDROBROMIDE AND PROMETHAZINE HYDROCHLORIDE 15; 6.25 MG/5ML; MG/5ML
5 SOLUTION ORAL 4 TIMES DAILY PRN
Qty: 240 ML | Refills: 0 | OUTPATIENT
Start: 2019-12-26

## 2019-12-28 DIAGNOSIS — R05.9 COUGH: ICD-10-CM

## 2019-12-28 RX ORDER — DEXTROMETHORPHAN HYDROBROMIDE AND PROMETHAZINE HYDROCHLORIDE 15; 6.25 MG/5ML; MG/5ML
5 SOLUTION ORAL 4 TIMES DAILY PRN
Qty: 240 ML | Refills: 0 | Status: CANCELLED | OUTPATIENT
Start: 2019-12-28

## 2019-12-30 ENCOUNTER — OFFICE VISIT (OUTPATIENT)
Dept: FAMILY MEDICINE CLINIC | Facility: CLINIC | Age: 29
End: 2019-12-30
Payer: COMMERCIAL

## 2019-12-30 VITALS
HEIGHT: 70 IN | OXYGEN SATURATION: 98 % | HEART RATE: 103 BPM | WEIGHT: 210.6 LBS | BODY MASS INDEX: 30.15 KG/M2 | DIASTOLIC BLOOD PRESSURE: 90 MMHG | SYSTOLIC BLOOD PRESSURE: 136 MMHG

## 2019-12-30 DIAGNOSIS — F98.8 ATTENTION DEFICIT DISORDER (ADD) WITHOUT HYPERACTIVITY: ICD-10-CM

## 2019-12-30 DIAGNOSIS — B18.2 CHRONIC HEPATITIS C WITHOUT HEPATIC COMA (HCC): Primary | Chronic | ICD-10-CM

## 2019-12-30 DIAGNOSIS — F33.2 SEVERE EPISODE OF RECURRENT MAJOR DEPRESSIVE DISORDER, WITHOUT PSYCHOTIC FEATURES (HCC): ICD-10-CM

## 2019-12-30 DIAGNOSIS — M48.07 SPINAL STENOSIS OF LUMBOSACRAL REGION: Chronic | ICD-10-CM

## 2019-12-30 PROCEDURE — 99214 OFFICE O/P EST MOD 30 MIN: CPT | Performed by: FAMILY MEDICINE

## 2019-12-30 PROCEDURE — 3008F BODY MASS INDEX DOCD: CPT | Performed by: FAMILY MEDICINE

## 2019-12-30 PROCEDURE — 1036F TOBACCO NON-USER: CPT | Performed by: FAMILY MEDICINE

## 2019-12-30 RX ORDER — DEXTROAMPHETAMINE SACCHARATE, AMPHETAMINE ASPARTATE, DEXTROAMPHETAMINE SULFATE AND AMPHETAMINE SULFATE 5; 5; 5; 5 MG/1; MG/1; MG/1; MG/1
20 TABLET ORAL
Qty: 60 TABLET | Refills: 0 | Status: SHIPPED | OUTPATIENT
Start: 2019-12-30 | End: 2020-02-05 | Stop reason: SDUPTHER

## 2019-12-30 RX ORDER — DEXTROAMPHETAMINE SACCHARATE, AMPHETAMINE ASPARTATE, DEXTROAMPHETAMINE SULFATE AND AMPHETAMINE SULFATE 5; 5; 5; 5 MG/1; MG/1; MG/1; MG/1
20 TABLET ORAL
Qty: 60 TABLET | Refills: 0 | Status: SHIPPED | OUTPATIENT
Start: 2019-12-30 | End: 2019-12-30 | Stop reason: SDUPTHER

## 2019-12-30 NOTE — PROGRESS NOTES
[address]  [facility]        NAME: Nicki Caruso is a 26 y o  male  : 1990    MRN: [id number]  DATE: 2019  TIME: 10:57 AM    Assessment and Plan   Chronic hepatitis C without hepatic coma ([facility] [address] ) [B18 2]  1  Chronic hepatitis C without hepatic coma ([facility] [address] )     2  Severe episode of recurrent major depressive disorder, without psychotic features ([facility] [address] )     3  Spinal stenosis of lumbosacral region     4  Attention deficit disorder (ADD) without hyperactivity           Patient Instructions     Patient Instructions   Pt off methadone--on 16mg suboxone--Dr Pemberton---starting [facility] w/ restarting adderall          Chief Complaint     Chief Complaint   Patient presents with    Re-Start Medications     ADD med--going back to school         History of Present Illness       See instr--pt restarting school--good resp adderall in past      Review of Systems   Review of Systems   Constitutional: Negative for fatigue, fever and unexpected weight change  HENT: Negative for congestion, sinus pain and sore throat  Eyes: Negative for visual disturbance  Respiratory: Negative for shortness of breath and wheezing  Cardiovascular: Negative for chest pain and palpitations  Gastrointestinal: Negative for abdominal pain, nausea and vomiting  Musculoskeletal: Negative  Negative for arthralgias and myalgias  Neurological: Negative for syncope, weakness and numbness  Psychiatric/Behavioral: Positive for decreased concentration  Negative for confusion, dysphoric mood and suicidal ideas           Current Medications       Current Outpatient Medications:     ARIPiprazole (ABILIFY) 5 mg tablet, 1 HS, Disp: 30 tablet, Rfl: 5    carisoprodol (SOMA) 350 mg tablet, Take 1 tablet (350 mg total) by mouth every 8 (eight) hours as needed for muscle spasms, Disp: 90 tablet, Rfl: 0    clonazePAM (KlonoPIN) 1 mg tablet, Take 1 tablet (1 mg total) by mouth 4 (four) times a day, Disp: 120 tablet, Rfl: 5    doxepin (SINEquan) 75 MG capsule, Take 1 capsule (75 mg total) by mouth daily at bedtime as needed for sleep, Disp: 30 capsule, Rfl: 5    gabapentin (NEURONTIN) 600 MG tablet, Take 1 tablet (600 mg total) by mouth 3 (three) times a day, Disp: 90 tablet, Rfl: 3    melatonin 10 MG TABS, 1 HS, Disp: 30 tablet, Rfl: 5    mirtazapine (REMERON) 30 mg tablet, Take 1 tablet (30 mg total) by mouth daily at bedtime, Disp: 30 tablet, Rfl: 5    naproxen (NAPROSYN) 250 mg tablet, Take 250 mg by mouth 2 (two) times a day with meals, Disp: , Rfl:     Current Allergies     Allergies as of 12/30/2019    (No Known Allergies)            The following portions of the patient's history were reviewed and updated as appropriate: allergies, current medications, past family history, past medical history, past social history, past surgical history and problem list      Past Medical History:   Diagnosis Date    Addiction to drug ([facility] )     Allergic     Anxiety     Chronic pain disorder     Depression     Psychiatric disorder     Spinal stenosis     Substance abuse ([facility] )     Suicide attempt ([facility] )        Past Surgical History:   Procedure Laterality Date    BACK SURGERY      FRACTURE SURGERY      left foot reconstruted     SPINE SURGERY      hernated disc        Family History   Problem Relation Age of Onset    Hypertension Mother     Hyperlipidemia Father     Heart disease Father     Depression Maternal Grandmother     Hypertension Maternal Grandmother     Squamous cell carcinoma Maternal Grandfather     Alcohol abuse Maternal Grandfather     Alzheimer's disease Paternal Grandmother     Dementia Paternal Grandmother     Alcohol abuse Paternal Uncle          Medications have been verified  Objective   /90   Pulse 103   Ht 5' 10" (1 778 m)   Wt 95 5 kg (210 lb 9 6 oz)   SpO2 98%   BMI 30 22 kg/m²        Physical Exam     Physical Exam   Constitutional: He is oriented to person, place, and time  Vital signs are normal  He appears well-developed and well-nourished  HENT:   Right Ear: Ear canal normal  Tympanic membrane is not injected  Left Ear: Ear canal normal  Tympanic membrane is not injected  Nose: Nose normal    Mouth/Throat: Oropharynx is clear and moist    Eyes: Pupils are equal, round, and reactive to light  Conjunctivae and EOM are normal  Right eye exhibits no discharge  Left eye exhibits no discharge  Neck: Normal range of motion  Neck supple  No thyromegaly present  Cardiovascular: Normal rate, regular rhythm and normal heart sounds  No murmur heard  Pulmonary/Chest: Effort normal and breath sounds normal  No respiratory distress  He has no wheezes  Abdominal: Soft  Bowel sounds are normal  He exhibits no distension  There is no tenderness  Musculoskeletal: Normal range of motion  Lymphadenopathy:     He has no cervical adenopathy  Neurological: He is alert and oriented to person, place, and time  He has normal strength and normal reflexes  He is not disoriented  No sensory deficit  Gait normal    Skin: Skin is warm and dry  Psychiatric: He has a normal mood and affect  His speech is normal and behavior is normal  Judgment and thought content normal  Cognition and memory are normal        BMI Counseling: Body mass index is 30 22 kg/m²  The BMI is above normal  Nutrition recommendations include reducing portion sizes

## 2020-02-05 DIAGNOSIS — F98.8 ATTENTION DEFICIT DISORDER (ADD) WITHOUT HYPERACTIVITY: ICD-10-CM

## 2020-02-05 RX ORDER — DEXTROAMPHETAMINE SACCHARATE, AMPHETAMINE ASPARTATE, DEXTROAMPHETAMINE SULFATE AND AMPHETAMINE SULFATE 5; 5; 5; 5 MG/1; MG/1; MG/1; MG/1
20 TABLET ORAL
Qty: 60 TABLET | Refills: 0 | Status: SHIPPED | OUTPATIENT
Start: 2020-02-05 | End: 2020-03-04 | Stop reason: SDUPTHER

## 2020-02-05 NOTE — TELEPHONE ENCOUNTER
approve    Refill    adderral 20 mg   [facility] out of stock      Call pharmacy cancelled Lizette Pump

## 2020-03-04 DIAGNOSIS — F98.8 ATTENTION DEFICIT DISORDER (ADD) WITHOUT HYPERACTIVITY: ICD-10-CM

## 2020-03-04 RX ORDER — DEXTROAMPHETAMINE SACCHARATE, AMPHETAMINE ASPARTATE, DEXTROAMPHETAMINE SULFATE AND AMPHETAMINE SULFATE 5; 5; 5; 5 MG/1; MG/1; MG/1; MG/1
20 TABLET ORAL
Qty: 60 TABLET | Refills: 0 | Status: SHIPPED | OUTPATIENT
Start: 2020-03-05 | End: 2020-04-17 | Stop reason: SDUPTHER

## 2020-03-17 DIAGNOSIS — F41.1 GENERALIZED ANXIETY DISORDER: ICD-10-CM

## 2020-03-18 RX ORDER — GABAPENTIN 600 MG/1
600 TABLET ORAL 3 TIMES DAILY
Qty: 90 TABLET | Refills: 1 | Status: SHIPPED | OUTPATIENT
Start: 2020-03-18 | End: 2020-04-27 | Stop reason: HOSPADM

## 2020-04-10 ENCOUNTER — TELEMEDICINE (OUTPATIENT)
Dept: GASTROENTEROLOGY | Facility: CLINIC | Age: 30
End: 2020-04-10
Payer: COMMERCIAL

## 2020-04-10 VITALS — WEIGHT: 190 LBS | BODY MASS INDEX: 27.2 KG/M2 | HEIGHT: 70 IN

## 2020-04-10 DIAGNOSIS — B18.2 CHRONIC HEPATITIS C WITHOUT HEPATIC COMA (HCC): Primary | Chronic | ICD-10-CM

## 2020-04-10 DIAGNOSIS — F98.8 ATTENTION DEFICIT DISORDER (ADD) WITHOUT HYPERACTIVITY: ICD-10-CM

## 2020-04-10 PROCEDURE — 99242 OFF/OP CONSLTJ NEW/EST SF 20: CPT | Performed by: NURSE PRACTITIONER

## 2020-04-10 RX ORDER — DEXTROAMPHETAMINE SACCHARATE, AMPHETAMINE ASPARTATE, DEXTROAMPHETAMINE SULFATE AND AMPHETAMINE SULFATE 5; 5; 5; 5 MG/1; MG/1; MG/1; MG/1
20 TABLET ORAL
Qty: 60 TABLET | Refills: 0 | Status: CANCELLED | OUTPATIENT
Start: 2020-04-10

## 2020-04-16 ENCOUNTER — TELEPHONE (OUTPATIENT)
Dept: OTHER | Facility: OTHER | Age: 30
End: 2020-04-16

## 2020-04-17 DIAGNOSIS — F98.8 ATTENTION DEFICIT DISORDER (ADD) WITHOUT HYPERACTIVITY: ICD-10-CM

## 2020-04-17 RX ORDER — DEXTROAMPHETAMINE SACCHARATE, AMPHETAMINE ASPARTATE, DEXTROAMPHETAMINE SULFATE AND AMPHETAMINE SULFATE 5; 5; 5; 5 MG/1; MG/1; MG/1; MG/1
20 TABLET ORAL
Qty: 60 TABLET | Refills: 0 | Status: SHIPPED | OUTPATIENT
Start: 2020-04-17 | End: 2020-04-27 | Stop reason: HOSPADM

## 2020-04-18 ENCOUNTER — APPOINTMENT (EMERGENCY)
Dept: RADIOLOGY | Facility: HOSPITAL | Age: 30
End: 2020-04-18
Payer: COMMERCIAL

## 2020-04-18 ENCOUNTER — HOSPITAL ENCOUNTER (EMERGENCY)
Facility: HOSPITAL | Age: 30
End: 2020-04-19
Attending: EMERGENCY MEDICINE | Admitting: EMERGENCY MEDICINE
Payer: COMMERCIAL

## 2020-04-18 DIAGNOSIS — F32.A DEPRESSION: Primary | ICD-10-CM

## 2020-04-18 DIAGNOSIS — B18.2 CHRONIC HEPATITIS C WITHOUT HEPATIC COMA (HCC): Chronic | ICD-10-CM

## 2020-04-18 DIAGNOSIS — R45.851 SUICIDAL IDEATION: ICD-10-CM

## 2020-04-18 LAB
ALBUMIN SERPL BCP-MCNC: 4.6 G/DL (ref 3.5–5)
ALP SERPL-CCNC: 77 U/L (ref 46–116)
ALT SERPL W P-5'-P-CCNC: 73 U/L (ref 12–78)
AMPHETAMINES SERPL QL SCN: POSITIVE
ANION GAP SERPL CALCULATED.3IONS-SCNC: 9 MMOL/L (ref 4–13)
AST SERPL W P-5'-P-CCNC: 55 U/L (ref 5–45)
BARBITURATES UR QL: NEGATIVE
BASOPHILS # BLD AUTO: 0.04 THOUSANDS/ΜL (ref 0–0.1)
BASOPHILS NFR BLD AUTO: 0 % (ref 0–1)
BENZODIAZ UR QL: POSITIVE
BILIRUB SERPL-MCNC: 0.8 MG/DL (ref 0.2–1)
BUN SERPL-MCNC: 12 MG/DL (ref 5–25)
CALCIUM SERPL-MCNC: 9 MG/DL (ref 8.3–10.1)
CHLORIDE SERPL-SCNC: 101 MMOL/L (ref 100–108)
CO2 SERPL-SCNC: 29 MMOL/L (ref 21–32)
COCAINE UR QL: NEGATIVE
CREAT SERPL-MCNC: 0.93 MG/DL (ref 0.6–1.3)
EOSINOPHIL # BLD AUTO: 0.04 THOUSAND/ΜL (ref 0–0.61)
EOSINOPHIL NFR BLD AUTO: 0 % (ref 0–6)
ERYTHROCYTE [DISTWIDTH] IN BLOOD BY AUTOMATED COUNT: 13.4 % (ref 11.6–15.1)
ETHANOL SERPL-MCNC: <3 MG/DL (ref 0–3)
GFR SERPL CREATININE-BSD FRML MDRD: 110 ML/MIN/1.73SQ M
GLUCOSE SERPL-MCNC: 102 MG/DL (ref 65–140)
HCT VFR BLD AUTO: 39.6 % (ref 36.5–49.3)
HGB BLD-MCNC: 13.5 G/DL (ref 12–17)
IMM GRANULOCYTES # BLD AUTO: 0.02 THOUSAND/UL (ref 0–0.2)
IMM GRANULOCYTES NFR BLD AUTO: 0 % (ref 0–2)
LYMPHOCYTES # BLD AUTO: 1.72 THOUSANDS/ΜL (ref 0.6–4.47)
LYMPHOCYTES NFR BLD AUTO: 18 % (ref 14–44)
MCH RBC QN AUTO: 29.4 PG (ref 26.8–34.3)
MCHC RBC AUTO-ENTMCNC: 34.1 G/DL (ref 31.4–37.4)
MCV RBC AUTO: 86 FL (ref 82–98)
METHADONE UR QL: NEGATIVE
MONOCYTES # BLD AUTO: 0.72 THOUSAND/ΜL (ref 0.17–1.22)
MONOCYTES NFR BLD AUTO: 8 % (ref 4–12)
NEUTROPHILS # BLD AUTO: 6.98 THOUSANDS/ΜL (ref 1.85–7.62)
NEUTS SEG NFR BLD AUTO: 74 % (ref 43–75)
NRBC BLD AUTO-RTO: 0 /100 WBCS
OPIATES UR QL SCN: NEGATIVE
PCP UR QL: NEGATIVE
PLATELET # BLD AUTO: 262 THOUSANDS/UL (ref 149–390)
PMV BLD AUTO: 10.8 FL (ref 8.9–12.7)
POTASSIUM SERPL-SCNC: 4.2 MMOL/L (ref 3.5–5.3)
PROT SERPL-MCNC: 8.6 G/DL (ref 6.4–8.2)
RBC # BLD AUTO: 4.59 MILLION/UL (ref 3.88–5.62)
SARS-COV-2 RNA RESP QL NAA+PROBE: NEGATIVE
SODIUM SERPL-SCNC: 139 MMOL/L (ref 136–145)
THC UR QL: NEGATIVE
WBC # BLD AUTO: 9.52 THOUSAND/UL (ref 4.31–10.16)

## 2020-04-18 PROCEDURE — 36415 COLL VENOUS BLD VENIPUNCTURE: CPT | Performed by: PHYSICIAN ASSISTANT

## 2020-04-18 PROCEDURE — 99285 EMERGENCY DEPT VISIT HI MDM: CPT

## 2020-04-18 PROCEDURE — 71045 X-RAY EXAM CHEST 1 VIEW: CPT

## 2020-04-18 PROCEDURE — 80320 DRUG SCREEN QUANTALCOHOLS: CPT | Performed by: PHYSICIAN ASSISTANT

## 2020-04-18 PROCEDURE — 87635 SARS-COV-2 COVID-19 AMP PRB: CPT | Performed by: PHYSICIAN ASSISTANT

## 2020-04-18 PROCEDURE — 80307 DRUG TEST PRSMV CHEM ANLYZR: CPT | Performed by: PHYSICIAN ASSISTANT

## 2020-04-18 PROCEDURE — 99284 EMERGENCY DEPT VISIT MOD MDM: CPT | Performed by: PHYSICIAN ASSISTANT

## 2020-04-18 PROCEDURE — 85025 COMPLETE CBC W/AUTO DIFF WBC: CPT | Performed by: PHYSICIAN ASSISTANT

## 2020-04-18 PROCEDURE — 80053 COMPREHEN METABOLIC PANEL: CPT | Performed by: PHYSICIAN ASSISTANT

## 2020-04-18 RX ORDER — IBUPROFEN 400 MG/1
400 TABLET ORAL EVERY 8 HOURS PRN
Status: CANCELLED | OUTPATIENT
Start: 2020-04-18

## 2020-04-18 RX ORDER — ACETAMINOPHEN 325 MG/1
650 TABLET ORAL EVERY 8 HOURS PRN
Status: CANCELLED | OUTPATIENT
Start: 2020-04-18

## 2020-04-18 RX ORDER — BENZTROPINE MESYLATE 1 MG/ML
1 INJECTION INTRAMUSCULAR; INTRAVENOUS EVERY 6 HOURS PRN
Status: CANCELLED | OUTPATIENT
Start: 2020-04-18

## 2020-04-18 RX ORDER — ACETAMINOPHEN 325 MG/1
650 TABLET ORAL EVERY 6 HOURS PRN
Status: CANCELLED | OUTPATIENT
Start: 2020-04-18

## 2020-04-18 RX ORDER — RISPERIDONE 1 MG/1
1 TABLET, ORALLY DISINTEGRATING ORAL EVERY 8 HOURS PRN
Status: CANCELLED | OUTPATIENT
Start: 2020-04-18

## 2020-04-18 RX ORDER — HALOPERIDOL 5 MG
5 TABLET ORAL EVERY 6 HOURS PRN
Status: CANCELLED | OUTPATIENT
Start: 2020-04-18

## 2020-04-18 RX ORDER — CLONAZEPAM 0.5 MG/1
1 TABLET ORAL ONCE
Status: COMPLETED | OUTPATIENT
Start: 2020-04-18 | End: 2020-04-18

## 2020-04-18 RX ORDER — HALOPERIDOL 5 MG/ML
5 INJECTION INTRAMUSCULAR EVERY 6 HOURS PRN
Status: CANCELLED | OUTPATIENT
Start: 2020-04-18

## 2020-04-18 RX ORDER — MAGNESIUM HYDROXIDE/ALUMINUM HYDROXICE/SIMETHICONE 120; 1200; 1200 MG/30ML; MG/30ML; MG/30ML
15 SUSPENSION ORAL EVERY 4 HOURS PRN
Status: CANCELLED | OUTPATIENT
Start: 2020-04-18

## 2020-04-18 RX ORDER — GABAPENTIN 300 MG/1
600 CAPSULE ORAL ONCE
Status: COMPLETED | OUTPATIENT
Start: 2020-04-18 | End: 2020-04-18

## 2020-04-18 RX ORDER — BENZTROPINE MESYLATE 0.5 MG/1
1 TABLET ORAL EVERY 6 HOURS PRN
Status: CANCELLED | OUTPATIENT
Start: 2020-04-18

## 2020-04-18 RX ORDER — LORAZEPAM 1 MG/1
1 TABLET ORAL ONCE
Status: COMPLETED | OUTPATIENT
Start: 2020-04-18 | End: 2020-04-18

## 2020-04-18 RX ORDER — NICOTINE 21 MG/24HR
14 PATCH, TRANSDERMAL 24 HOURS TRANSDERMAL ONCE
Status: DISCONTINUED | OUTPATIENT
Start: 2020-04-18 | End: 2020-04-19 | Stop reason: HOSPADM

## 2020-04-18 RX ORDER — HYDROXYZINE HYDROCHLORIDE 25 MG/1
25 TABLET, FILM COATED ORAL EVERY 6 HOURS PRN
Status: CANCELLED | OUTPATIENT
Start: 2020-04-18

## 2020-04-18 RX ADMIN — NICOTINE 14 MG: 14 PATCH TRANSDERMAL at 16:57

## 2020-04-18 RX ADMIN — CLONAZEPAM 1 MG: 0.5 TABLET ORAL at 21:27

## 2020-04-18 RX ADMIN — GABAPENTIN 600 MG: 300 CAPSULE ORAL at 16:57

## 2020-04-18 RX ADMIN — LORAZEPAM 1 MG: 1 TABLET ORAL at 11:27

## 2020-04-19 ENCOUNTER — HOSPITAL ENCOUNTER (INPATIENT)
Facility: HOSPITAL | Age: 30
LOS: 8 days | Discharge: HOME | DRG: 751 | End: 2020-04-27
Attending: PSYCHIATRY & NEUROLOGY | Admitting: PSYCHIATRY & NEUROLOGY
Payer: COMMERCIAL

## 2020-04-19 VITALS
BODY MASS INDEX: 26.2 KG/M2 | WEIGHT: 183 LBS | TEMPERATURE: 98.1 F | OXYGEN SATURATION: 100 % | HEIGHT: 70 IN | HEART RATE: 56 BPM | DIASTOLIC BLOOD PRESSURE: 78 MMHG | RESPIRATION RATE: 16 BRPM | SYSTOLIC BLOOD PRESSURE: 122 MMHG

## 2020-04-19 DIAGNOSIS — M48.07 SPINAL STENOSIS OF LUMBOSACRAL REGION: Chronic | ICD-10-CM

## 2020-04-19 DIAGNOSIS — J34.89 RHINORRHEA: ICD-10-CM

## 2020-04-19 DIAGNOSIS — F33.2 SEVERE EPISODE OF RECURRENT MAJOR DEPRESSIVE DISORDER, WITHOUT PSYCHOTIC FEATURES (HCC): ICD-10-CM

## 2020-04-19 DIAGNOSIS — F41.9 ANXIETY: Primary | ICD-10-CM

## 2020-04-19 DIAGNOSIS — B18.2 CHRONIC HEPATITIS C WITHOUT HEPATIC COMA (HCC): Chronic | ICD-10-CM

## 2020-04-19 PROCEDURE — 99252 IP/OBS CONSLTJ NEW/EST SF 35: CPT | Performed by: PHYSICIAN ASSISTANT

## 2020-04-19 PROCEDURE — 99222 1ST HOSP IP/OBS MODERATE 55: CPT | Performed by: PSYCHIATRY & NEUROLOGY

## 2020-04-19 PROCEDURE — 80061 LIPID PANEL: CPT | Performed by: PSYCHIATRY & NEUROLOGY

## 2020-04-19 RX ORDER — HALOPERIDOL 5 MG/ML
5 INJECTION INTRAMUSCULAR EVERY 6 HOURS PRN
Status: DISCONTINUED | OUTPATIENT
Start: 2020-04-19 | End: 2020-04-20

## 2020-04-19 RX ORDER — MIRTAZAPINE 15 MG/1
7.5 TABLET, FILM COATED ORAL
Status: DISCONTINUED | OUTPATIENT
Start: 2020-04-19 | End: 2020-04-27 | Stop reason: HOSPADM

## 2020-04-19 RX ORDER — HYDROXYZINE HYDROCHLORIDE 25 MG/1
25 TABLET, FILM COATED ORAL EVERY 6 HOURS PRN
Status: DISCONTINUED | OUTPATIENT
Start: 2020-04-19 | End: 2020-04-20

## 2020-04-19 RX ORDER — MIRTAZAPINE 15 MG/1
15 TABLET, FILM COATED ORAL
Status: DISCONTINUED | OUTPATIENT
Start: 2020-04-19 | End: 2020-04-19

## 2020-04-19 RX ORDER — GABAPENTIN 300 MG/1
300 CAPSULE ORAL 3 TIMES DAILY
Status: DISCONTINUED | OUTPATIENT
Start: 2020-04-19 | End: 2020-04-22

## 2020-04-19 RX ORDER — ACETAMINOPHEN 325 MG/1
650 TABLET ORAL EVERY 6 HOURS PRN
Status: DISCONTINUED | OUTPATIENT
Start: 2020-04-19 | End: 2020-04-19 | Stop reason: SDUPTHER

## 2020-04-19 RX ORDER — BENZTROPINE MESYLATE 1 MG/ML
1 INJECTION INTRAMUSCULAR; INTRAVENOUS EVERY 6 HOURS PRN
Status: DISCONTINUED | OUTPATIENT
Start: 2020-04-19 | End: 2020-04-27 | Stop reason: HOSPADM

## 2020-04-19 RX ORDER — CLONIDINE HYDROCHLORIDE 0.1 MG/1
0.1 TABLET ORAL EVERY 8 HOURS SCHEDULED
COMMUNITY
End: 2020-04-27 | Stop reason: HOSPADM

## 2020-04-19 RX ORDER — MAGNESIUM HYDROXIDE/ALUMINUM HYDROXICE/SIMETHICONE 120; 1200; 1200 MG/30ML; MG/30ML; MG/30ML
15 SUSPENSION ORAL EVERY 4 HOURS PRN
Status: DISCONTINUED | OUTPATIENT
Start: 2020-04-19 | End: 2020-04-27 | Stop reason: HOSPADM

## 2020-04-19 RX ORDER — TRAZODONE HYDROCHLORIDE 50 MG/1
50 TABLET ORAL
Status: COMPLETED | OUTPATIENT
Start: 2020-04-19 | End: 2020-04-21

## 2020-04-19 RX ORDER — LORAZEPAM 1 MG/1
1 TABLET ORAL EVERY 8 HOURS PRN
Status: DISCONTINUED | OUTPATIENT
Start: 2020-04-19 | End: 2020-04-20

## 2020-04-19 RX ORDER — IBUPROFEN 400 MG/1
400 TABLET ORAL EVERY 8 HOURS PRN
Status: DISCONTINUED | OUTPATIENT
Start: 2020-04-19 | End: 2020-04-27 | Stop reason: HOSPADM

## 2020-04-19 RX ORDER — FLUTICASONE PROPIONATE 50 MCG
1 SPRAY, SUSPENSION (ML) NASAL DAILY
Status: ON HOLD | COMMUNITY
End: 2020-04-27 | Stop reason: SDUPTHER

## 2020-04-19 RX ORDER — RISPERIDONE 1 MG/1
1 TABLET, ORALLY DISINTEGRATING ORAL EVERY 8 HOURS PRN
Status: DISCONTINUED | OUTPATIENT
Start: 2020-04-19 | End: 2020-04-27 | Stop reason: HOSPADM

## 2020-04-19 RX ORDER — TRAZODONE HYDROCHLORIDE 50 MG/1
50 TABLET ORAL
Status: DISCONTINUED | OUTPATIENT
Start: 2020-04-19 | End: 2020-04-19

## 2020-04-19 RX ORDER — ALBUTEROL SULFATE 90 UG/1
2 AEROSOL, METERED RESPIRATORY (INHALATION) EVERY 6 HOURS PRN
COMMUNITY
End: 2022-03-31

## 2020-04-19 RX ORDER — ACETAMINOPHEN 325 MG/1
650 TABLET ORAL EVERY 6 HOURS PRN
Status: DISCONTINUED | OUTPATIENT
Start: 2020-04-19 | End: 2020-04-20

## 2020-04-19 RX ORDER — HALOPERIDOL 5 MG
5 TABLET ORAL EVERY 6 HOURS PRN
Status: DISCONTINUED | OUTPATIENT
Start: 2020-04-19 | End: 2020-04-27 | Stop reason: HOSPADM

## 2020-04-19 RX ORDER — NICOTINE 21 MG/24HR
1 PATCH, TRANSDERMAL 24 HOURS TRANSDERMAL DAILY
Status: DISCONTINUED | OUTPATIENT
Start: 2020-04-19 | End: 2020-04-27 | Stop reason: HOSPADM

## 2020-04-19 RX ORDER — CLONAZEPAM 1 MG/1
1 TABLET ORAL 2 TIMES DAILY
Status: DISCONTINUED | OUTPATIENT
Start: 2020-04-19 | End: 2020-04-19

## 2020-04-19 RX ORDER — BENZTROPINE MESYLATE 1 MG/1
1 TABLET ORAL EVERY 6 HOURS PRN
Status: DISCONTINUED | OUTPATIENT
Start: 2020-04-19 | End: 2020-04-27 | Stop reason: HOSPADM

## 2020-04-19 RX ORDER — ACETAMINOPHEN 325 MG/1
650 TABLET ORAL EVERY 8 HOURS PRN
Status: DISCONTINUED | OUTPATIENT
Start: 2020-04-19 | End: 2020-04-27 | Stop reason: HOSPADM

## 2020-04-19 RX ADMIN — NICOTINE 1 PATCH: 14 PATCH TRANSDERMAL at 12:14

## 2020-04-19 RX ADMIN — GABAPENTIN 300 MG: 300 CAPSULE ORAL at 16:29

## 2020-04-19 RX ADMIN — LORAZEPAM 1 MG: 1 TABLET ORAL at 21:08

## 2020-04-19 RX ADMIN — MIRTAZAPINE 7.5 MG: 15 TABLET, FILM COATED ORAL at 21:06

## 2020-04-19 RX ADMIN — TRAZODONE HYDROCHLORIDE 50 MG: 50 TABLET ORAL at 02:14

## 2020-04-19 RX ADMIN — GABAPENTIN 300 MG: 300 CAPSULE ORAL at 12:10

## 2020-04-19 RX ADMIN — NICOTINE POLACRILEX 2 MG: 2 GUM, CHEWING BUCCAL at 12:14

## 2020-04-19 RX ADMIN — NICOTINE POLACRILEX 2 MG: 2 GUM, CHEWING BUCCAL at 21:10

## 2020-04-19 RX ADMIN — LORAZEPAM 1 MG: 1 TABLET ORAL at 12:12

## 2020-04-19 RX ADMIN — CLONAZEPAM 1 MG: 1 TABLET ORAL at 08:40

## 2020-04-19 RX ADMIN — NICOTINE POLACRILEX 2 MG: 2 GUM, CHEWING BUCCAL at 16:42

## 2020-04-19 RX ADMIN — GABAPENTIN 300 MG: 300 CAPSULE ORAL at 21:06

## 2020-04-19 RX ADMIN — CLONAZEPAM 1.5 MG: 1 TABLET ORAL at 17:15

## 2020-04-20 LAB
CHOLEST SERPL-MCNC: 141 MG/DL (ref 50–200)
HDLC SERPL-MCNC: 79 MG/DL
LDLC SERPL CALC-MCNC: 55 MG/DL (ref 0–100)
NONHDLC SERPL-MCNC: 62 MG/DL
TRIGL SERPL-MCNC: 33 MG/DL

## 2020-04-20 PROCEDURE — 99232 SBSQ HOSP IP/OBS MODERATE 35: CPT | Performed by: PSYCHIATRY & NEUROLOGY

## 2020-04-20 RX ORDER — BUPROPION HYDROCHLORIDE 150 MG/1
150 TABLET ORAL DAILY
Status: DISCONTINUED | OUTPATIENT
Start: 2020-04-21 | End: 2020-04-27 | Stop reason: HOSPADM

## 2020-04-20 RX ORDER — ACETAMINOPHEN 325 MG/1
325 TABLET ORAL EVERY 6 HOURS PRN
Status: DISCONTINUED | OUTPATIENT
Start: 2020-04-20 | End: 2020-04-27 | Stop reason: HOSPADM

## 2020-04-20 RX ORDER — HALOPERIDOL 5 MG/ML
5 INJECTION INTRAMUSCULAR EVERY 6 HOURS PRN
Status: DISCONTINUED | OUTPATIENT
Start: 2020-04-20 | End: 2020-04-27 | Stop reason: HOSPADM

## 2020-04-20 RX ORDER — HYDROXYZINE 50 MG/1
50 TABLET, FILM COATED ORAL EVERY 6 HOURS PRN
Status: DISCONTINUED | OUTPATIENT
Start: 2020-04-20 | End: 2020-04-27 | Stop reason: HOSPADM

## 2020-04-20 RX ORDER — HYDROXYZINE HYDROCHLORIDE 25 MG/1
25 TABLET, FILM COATED ORAL EVERY 6 HOURS PRN
Status: DISCONTINUED | OUTPATIENT
Start: 2020-04-20 | End: 2020-04-27 | Stop reason: HOSPADM

## 2020-04-20 RX ADMIN — NICOTINE POLACRILEX 2 MG: 2 GUM, CHEWING BUCCAL at 21:20

## 2020-04-20 RX ADMIN — NICOTINE POLACRILEX 2 MG: 2 GUM, CHEWING BUCCAL at 11:34

## 2020-04-20 RX ADMIN — NICOTINE POLACRILEX 2 MG: 2 GUM, CHEWING BUCCAL at 08:18

## 2020-04-20 RX ADMIN — GABAPENTIN 300 MG: 300 CAPSULE ORAL at 08:16

## 2020-04-20 RX ADMIN — CLONAZEPAM 1.5 MG: 1 TABLET ORAL at 08:16

## 2020-04-20 RX ADMIN — GABAPENTIN 300 MG: 300 CAPSULE ORAL at 21:18

## 2020-04-20 RX ADMIN — MIRTAZAPINE 7.5 MG: 15 TABLET, FILM COATED ORAL at 21:18

## 2020-04-20 RX ADMIN — TRAZODONE HYDROCHLORIDE 50 MG: 50 TABLET ORAL at 21:19

## 2020-04-20 RX ADMIN — NICOTINE 1 PATCH: 14 PATCH TRANSDERMAL at 08:19

## 2020-04-20 RX ADMIN — GABAPENTIN 300 MG: 300 CAPSULE ORAL at 17:06

## 2020-04-20 RX ADMIN — NICOTINE POLACRILEX 2 MG: 2 GUM, CHEWING BUCCAL at 17:07

## 2020-04-20 RX ADMIN — LORAZEPAM 1 MG: 1 TABLET ORAL at 11:32

## 2020-04-20 RX ADMIN — CLONAZEPAM 1.5 MG: 1 TABLET ORAL at 17:07

## 2020-04-21 PROCEDURE — 99232 SBSQ HOSP IP/OBS MODERATE 35: CPT | Performed by: PSYCHIATRY & NEUROLOGY

## 2020-04-21 RX ADMIN — CLONAZEPAM 1.5 MG: 1 TABLET ORAL at 08:24

## 2020-04-21 RX ADMIN — GABAPENTIN 300 MG: 300 CAPSULE ORAL at 16:18

## 2020-04-21 RX ADMIN — NICOTINE 1 PATCH: 14 PATCH TRANSDERMAL at 08:27

## 2020-04-21 RX ADMIN — NICOTINE POLACRILEX 4 MG: 4 GUM, CHEWING BUCCAL at 16:19

## 2020-04-21 RX ADMIN — CLONAZEPAM 1.5 MG: 1 TABLET ORAL at 17:03

## 2020-04-21 RX ADMIN — MIRTAZAPINE 7.5 MG: 15 TABLET, FILM COATED ORAL at 21:28

## 2020-04-21 RX ADMIN — NICOTINE POLACRILEX 2 MG: 2 GUM, CHEWING BUCCAL at 08:26

## 2020-04-21 RX ADMIN — GABAPENTIN 300 MG: 300 CAPSULE ORAL at 08:24

## 2020-04-21 RX ADMIN — NICOTINE POLACRILEX 4 MG: 4 GUM, CHEWING BUCCAL at 21:29

## 2020-04-21 RX ADMIN — GABAPENTIN 300 MG: 300 CAPSULE ORAL at 21:29

## 2020-04-21 RX ADMIN — BUPROPION HYDROCHLORIDE 150 MG: 150 TABLET, FILM COATED, EXTENDED RELEASE ORAL at 08:24

## 2020-04-21 RX ADMIN — TRAZODONE HYDROCHLORIDE 50 MG: 50 TABLET ORAL at 21:29

## 2020-04-21 RX ADMIN — NICOTINE POLACRILEX 2 MG: 2 GUM, CHEWING BUCCAL at 11:53

## 2020-04-22 PROCEDURE — 99232 SBSQ HOSP IP/OBS MODERATE 35: CPT | Performed by: PHYSICIAN ASSISTANT

## 2020-04-22 RX ORDER — GABAPENTIN 300 MG/1
300 CAPSULE ORAL 3 TIMES DAILY
Status: COMPLETED | OUTPATIENT
Start: 2020-04-22 | End: 2020-04-22

## 2020-04-22 RX ORDER — GABAPENTIN 100 MG/1
100 CAPSULE ORAL 3 TIMES DAILY
Status: DISCONTINUED | OUTPATIENT
Start: 2020-04-23 | End: 2020-04-27

## 2020-04-22 RX ADMIN — GABAPENTIN 300 MG: 300 CAPSULE ORAL at 21:26

## 2020-04-22 RX ADMIN — NICOTINE 1 PATCH: 14 PATCH TRANSDERMAL at 08:07

## 2020-04-22 RX ADMIN — CLONAZEPAM 1.5 MG: 1 TABLET ORAL at 08:05

## 2020-04-22 RX ADMIN — NICOTINE POLACRILEX 4 MG: 4 GUM, CHEWING BUCCAL at 08:09

## 2020-04-22 RX ADMIN — BUPROPION HYDROCHLORIDE 150 MG: 150 TABLET, FILM COATED, EXTENDED RELEASE ORAL at 08:05

## 2020-04-22 RX ADMIN — CLONAZEPAM 1.5 MG: 1 TABLET ORAL at 17:24

## 2020-04-22 RX ADMIN — NICOTINE POLACRILEX 4 MG: 4 GUM, CHEWING BUCCAL at 16:30

## 2020-04-22 RX ADMIN — MIRTAZAPINE 7.5 MG: 15 TABLET, FILM COATED ORAL at 21:26

## 2020-04-22 RX ADMIN — GABAPENTIN 300 MG: 300 CAPSULE ORAL at 08:06

## 2020-04-22 RX ADMIN — GABAPENTIN 300 MG: 300 CAPSULE ORAL at 16:27

## 2020-04-22 RX ADMIN — IBUPROFEN 400 MG: 400 TABLET ORAL at 08:07

## 2020-04-22 RX ADMIN — NICOTINE POLACRILEX 4 MG: 4 GUM, CHEWING BUCCAL at 21:29

## 2020-04-22 RX ADMIN — NICOTINE POLACRILEX 4 MG: 4 GUM, CHEWING BUCCAL at 10:54

## 2020-04-23 PROBLEM — F13.239 BENZODIAZEPINE WITHDRAWAL (HCC): Status: RESOLVED | Noted: 2019-10-31 | Resolved: 2020-04-23

## 2020-04-23 PROBLEM — F13.939 BENZODIAZEPINE WITHDRAWAL (HCC): Status: RESOLVED | Noted: 2019-10-31 | Resolved: 2020-04-23

## 2020-04-23 PROCEDURE — 99232 SBSQ HOSP IP/OBS MODERATE 35: CPT | Performed by: PHYSICIAN ASSISTANT

## 2020-04-23 RX ORDER — XYLITOL/YERBA SANTA
5 AEROSOL, SPRAY WITH PUMP (ML) MUCOUS MEMBRANE 4 TIMES DAILY PRN
Status: DISCONTINUED | OUTPATIENT
Start: 2020-04-23 | End: 2020-04-27 | Stop reason: HOSPADM

## 2020-04-23 RX ADMIN — BUPROPION HYDROCHLORIDE 150 MG: 150 TABLET, FILM COATED, EXTENDED RELEASE ORAL at 08:41

## 2020-04-23 RX ADMIN — NICOTINE POLACRILEX 4 MG: 4 GUM, CHEWING BUCCAL at 10:49

## 2020-04-23 RX ADMIN — CLONAZEPAM 1.5 MG: 1 TABLET ORAL at 08:41

## 2020-04-23 RX ADMIN — IBUPROFEN 400 MG: 400 TABLET ORAL at 08:41

## 2020-04-23 RX ADMIN — GABAPENTIN 100 MG: 100 CAPSULE ORAL at 08:41

## 2020-04-23 RX ADMIN — NICOTINE POLACRILEX 4 MG: 4 GUM, CHEWING BUCCAL at 08:41

## 2020-04-23 RX ADMIN — NICOTINE 1 PATCH: 14 PATCH TRANSDERMAL at 08:42

## 2020-04-23 RX ADMIN — NICOTINE POLACRILEX 4 MG: 4 GUM, CHEWING BUCCAL at 17:03

## 2020-04-23 RX ADMIN — NICOTINE POLACRILEX 4 MG: 4 GUM, CHEWING BUCCAL at 21:59

## 2020-04-23 RX ADMIN — GABAPENTIN 100 MG: 100 CAPSULE ORAL at 21:57

## 2020-04-23 RX ADMIN — CLONAZEPAM 1.5 MG: 1 TABLET ORAL at 17:02

## 2020-04-23 RX ADMIN — MIRTAZAPINE 7.5 MG: 15 TABLET, FILM COATED ORAL at 21:57

## 2020-04-23 RX ADMIN — GABAPENTIN 100 MG: 100 CAPSULE ORAL at 16:58

## 2020-04-24 PROCEDURE — 99232 SBSQ HOSP IP/OBS MODERATE 35: CPT | Performed by: PHYSICIAN ASSISTANT

## 2020-04-24 RX ADMIN — GABAPENTIN 100 MG: 100 CAPSULE ORAL at 16:17

## 2020-04-24 RX ADMIN — GABAPENTIN 100 MG: 100 CAPSULE ORAL at 08:18

## 2020-04-24 RX ADMIN — GABAPENTIN 100 MG: 100 CAPSULE ORAL at 20:24

## 2020-04-24 RX ADMIN — CLONAZEPAM 1.5 MG: 1 TABLET ORAL at 08:18

## 2020-04-24 RX ADMIN — MIRTAZAPINE 7.5 MG: 15 TABLET, FILM COATED ORAL at 21:02

## 2020-04-24 RX ADMIN — NICOTINE 1 PATCH: 14 PATCH TRANSDERMAL at 08:19

## 2020-04-24 RX ADMIN — BUPROPION HYDROCHLORIDE 150 MG: 150 TABLET, FILM COATED, EXTENDED RELEASE ORAL at 08:18

## 2020-04-24 RX ADMIN — CLONAZEPAM 1.5 MG: 1 TABLET ORAL at 17:00

## 2020-04-24 RX ADMIN — NICOTINE POLACRILEX 4 MG: 4 GUM, CHEWING BUCCAL at 08:20

## 2020-04-24 RX ADMIN — NICOTINE POLACRILEX 4 MG: 4 GUM, CHEWING BUCCAL at 17:01

## 2020-04-24 RX ADMIN — NICOTINE POLACRILEX 4 MG: 4 GUM, CHEWING BUCCAL at 21:04

## 2020-04-25 PROCEDURE — 99232 SBSQ HOSP IP/OBS MODERATE 35: CPT | Performed by: PSYCHIATRY & NEUROLOGY

## 2020-04-25 RX ADMIN — BUPROPION HYDROCHLORIDE 150 MG: 150 TABLET, FILM COATED, EXTENDED RELEASE ORAL at 08:21

## 2020-04-25 RX ADMIN — NICOTINE POLACRILEX 4 MG: 4 GUM, CHEWING BUCCAL at 08:24

## 2020-04-25 RX ADMIN — NICOTINE POLACRILEX 4 MG: 4 GUM, CHEWING BUCCAL at 10:54

## 2020-04-25 RX ADMIN — NICOTINE 1 PATCH: 14 PATCH TRANSDERMAL at 08:25

## 2020-04-25 RX ADMIN — CLONAZEPAM 1.5 MG: 1 TABLET ORAL at 08:20

## 2020-04-25 RX ADMIN — GABAPENTIN 100 MG: 100 CAPSULE ORAL at 17:03

## 2020-04-25 RX ADMIN — NICOTINE POLACRILEX 4 MG: 4 GUM, CHEWING BUCCAL at 13:11

## 2020-04-25 RX ADMIN — NICOTINE POLACRILEX 4 MG: 4 GUM, CHEWING BUCCAL at 21:53

## 2020-04-25 RX ADMIN — NICOTINE POLACRILEX 4 MG: 4 GUM, CHEWING BUCCAL at 15:48

## 2020-04-25 RX ADMIN — IBUPROFEN 400 MG: 400 TABLET ORAL at 08:22

## 2020-04-25 RX ADMIN — NICOTINE POLACRILEX 4 MG: 4 GUM, CHEWING BUCCAL at 17:59

## 2020-04-25 RX ADMIN — GABAPENTIN 100 MG: 100 CAPSULE ORAL at 08:21

## 2020-04-25 RX ADMIN — GABAPENTIN 100 MG: 100 CAPSULE ORAL at 21:51

## 2020-04-25 RX ADMIN — CLONAZEPAM 1.5 MG: 1 TABLET ORAL at 17:03

## 2020-04-25 RX ADMIN — MIRTAZAPINE 7.5 MG: 15 TABLET, FILM COATED ORAL at 21:51

## 2020-04-26 PROCEDURE — 99231 SBSQ HOSP IP/OBS SF/LOW 25: CPT | Performed by: PSYCHIATRY & NEUROLOGY

## 2020-04-26 RX ADMIN — NICOTINE 1 PATCH: 14 PATCH TRANSDERMAL at 08:21

## 2020-04-26 RX ADMIN — GABAPENTIN 100 MG: 100 CAPSULE ORAL at 21:02

## 2020-04-26 RX ADMIN — GABAPENTIN 100 MG: 100 CAPSULE ORAL at 17:18

## 2020-04-26 RX ADMIN — BUPROPION HYDROCHLORIDE 150 MG: 150 TABLET, FILM COATED, EXTENDED RELEASE ORAL at 08:23

## 2020-04-26 RX ADMIN — NICOTINE POLACRILEX 4 MG: 4 GUM, CHEWING BUCCAL at 21:05

## 2020-04-26 RX ADMIN — CLONAZEPAM 1.5 MG: 1 TABLET ORAL at 08:22

## 2020-04-26 RX ADMIN — CLONAZEPAM 1.5 MG: 1 TABLET ORAL at 17:19

## 2020-04-26 RX ADMIN — NICOTINE POLACRILEX 4 MG: 4 GUM, CHEWING BUCCAL at 08:23

## 2020-04-26 RX ADMIN — NICOTINE POLACRILEX 4 MG: 4 GUM, CHEWING BUCCAL at 16:01

## 2020-04-26 RX ADMIN — MIRTAZAPINE 7.5 MG: 15 TABLET, FILM COATED ORAL at 21:02

## 2020-04-26 RX ADMIN — NICOTINE POLACRILEX 4 MG: 4 GUM, CHEWING BUCCAL at 11:47

## 2020-04-26 RX ADMIN — GABAPENTIN 100 MG: 100 CAPSULE ORAL at 08:22

## 2020-04-27 VITALS
WEIGHT: 195.2 LBS | TEMPERATURE: 98.5 F | HEART RATE: 89 BPM | OXYGEN SATURATION: 98 % | SYSTOLIC BLOOD PRESSURE: 118 MMHG | HEIGHT: 70 IN | DIASTOLIC BLOOD PRESSURE: 79 MMHG | BODY MASS INDEX: 27.94 KG/M2 | RESPIRATION RATE: 17 BRPM

## 2020-04-27 PROCEDURE — 99239 HOSP IP/OBS DSCHRG MGMT >30: CPT | Performed by: PHYSICIAN ASSISTANT

## 2020-04-27 RX ORDER — BUPROPION HYDROCHLORIDE 150 MG/1
150 TABLET ORAL DAILY
Qty: 30 TABLET | Refills: 2 | Status: SHIPPED | OUTPATIENT
Start: 2020-04-28 | End: 2020-06-05 | Stop reason: SDUPTHER

## 2020-04-27 RX ORDER — MIRTAZAPINE 7.5 MG/1
7.5 TABLET, FILM COATED ORAL
Qty: 30 TABLET | Refills: 2 | Status: SHIPPED | OUTPATIENT
Start: 2020-04-27 | End: 2020-06-05

## 2020-04-27 RX ORDER — CLONAZEPAM 0.5 MG/1
1.5 TABLET ORAL 2 TIMES DAILY
Refills: 0
Start: 2020-04-27 | End: 2020-05-29

## 2020-04-27 RX ORDER — FLUTICASONE PROPIONATE 50 MCG
1 SPRAY, SUSPENSION (ML) NASAL DAILY
Qty: 1 BOTTLE | Refills: 0 | Status: SHIPPED | OUTPATIENT
Start: 2020-04-27 | End: 2020-06-05 | Stop reason: SDUPTHER

## 2020-04-27 RX ORDER — NAPROXEN 500 MG/1
500 TABLET ORAL DAILY
Qty: 30 TABLET | Refills: 0 | Status: SHIPPED | OUTPATIENT
Start: 2020-04-27 | End: 2021-01-20 | Stop reason: ALTCHOICE

## 2020-04-27 RX ADMIN — NICOTINE 1 PATCH: 14 PATCH TRANSDERMAL at 08:27

## 2020-04-27 RX ADMIN — HYDROXYZINE HYDROCHLORIDE 50 MG: 50 TABLET, FILM COATED ORAL at 04:57

## 2020-04-27 RX ADMIN — GABAPENTIN 100 MG: 100 CAPSULE ORAL at 08:23

## 2020-04-27 RX ADMIN — BUPROPION HYDROCHLORIDE 150 MG: 150 TABLET, FILM COATED, EXTENDED RELEASE ORAL at 08:23

## 2020-04-27 RX ADMIN — NICOTINE POLACRILEX 4 MG: 4 GUM, CHEWING BUCCAL at 08:26

## 2020-04-27 RX ADMIN — CLONAZEPAM 1.5 MG: 1 TABLET ORAL at 08:23

## 2020-04-27 RX ADMIN — NICOTINE POLACRILEX 4 MG: 4 GUM, CHEWING BUCCAL at 04:56

## 2020-04-30 ENCOUNTER — HOSPITAL ENCOUNTER (EMERGENCY)
Facility: HOSPITAL | Age: 30
Discharge: HOME | End: 2020-04-30
Attending: EMERGENCY MEDICINE | Admitting: EMERGENCY MEDICINE
Payer: COMMERCIAL

## 2020-04-30 VITALS
TEMPERATURE: 97.6 F | OXYGEN SATURATION: 98 % | RESPIRATION RATE: 20 BRPM | SYSTOLIC BLOOD PRESSURE: 128 MMHG | DIASTOLIC BLOOD PRESSURE: 70 MMHG | HEART RATE: 75 BPM | WEIGHT: 193 LBS | BODY MASS INDEX: 27.69 KG/M2

## 2020-04-30 DIAGNOSIS — F30.10 MANIC BEHAVIOR (HCC): ICD-10-CM

## 2020-04-30 DIAGNOSIS — R44.1 VISUAL HALLUCINATIONS: Primary | ICD-10-CM

## 2020-04-30 LAB
ALBUMIN SERPL BCP-MCNC: 4.5 G/DL (ref 3.5–5)
ALP SERPL-CCNC: 77 U/L (ref 46–116)
ALT SERPL W P-5'-P-CCNC: 92 U/L (ref 12–78)
AMPHETAMINES SERPL QL SCN: POSITIVE
ANION GAP SERPL CALCULATED.3IONS-SCNC: 11 MMOL/L (ref 4–13)
AST SERPL W P-5'-P-CCNC: 92 U/L (ref 5–45)
BARBITURATES UR QL: NEGATIVE
BASOPHILS # BLD AUTO: 0.07 THOUSANDS/ΜL (ref 0–0.1)
BASOPHILS NFR BLD AUTO: 1 % (ref 0–1)
BENZODIAZ UR QL: POSITIVE
BILIRUB SERPL-MCNC: 1.1 MG/DL (ref 0.2–1)
BUN SERPL-MCNC: 30 MG/DL (ref 5–25)
CALCIUM SERPL-MCNC: 8.6 MG/DL (ref 8.3–10.1)
CHLORIDE SERPL-SCNC: 101 MMOL/L (ref 100–108)
CO2 SERPL-SCNC: 26 MMOL/L (ref 21–32)
COCAINE UR QL: NEGATIVE
CREAT SERPL-MCNC: 1.01 MG/DL (ref 0.6–1.3)
EOSINOPHIL # BLD AUTO: 0.15 THOUSAND/ΜL (ref 0–0.61)
EOSINOPHIL NFR BLD AUTO: 2 % (ref 0–6)
ERYTHROCYTE [DISTWIDTH] IN BLOOD BY AUTOMATED COUNT: 13.2 % (ref 11.6–15.1)
ETHANOL EXG-MCNC: 0 MG/DL
GFR SERPL CREATININE-BSD FRML MDRD: 99 ML/MIN/1.73SQ M
GLUCOSE SERPL-MCNC: 76 MG/DL (ref 65–140)
HCT VFR BLD AUTO: 36.2 % (ref 36.5–49.3)
HGB BLD-MCNC: 12.6 G/DL (ref 12–17)
IMM GRANULOCYTES # BLD AUTO: 0.03 THOUSAND/UL (ref 0–0.2)
IMM GRANULOCYTES NFR BLD AUTO: 0 % (ref 0–2)
LYMPHOCYTES # BLD AUTO: 2.96 THOUSANDS/ΜL (ref 0.6–4.47)
LYMPHOCYTES NFR BLD AUTO: 30 % (ref 14–44)
MCH RBC QN AUTO: 29.4 PG (ref 26.8–34.3)
MCHC RBC AUTO-ENTMCNC: 34.8 G/DL (ref 31.4–37.4)
MCV RBC AUTO: 85 FL (ref 82–98)
METHADONE UR QL: NEGATIVE
MONOCYTES # BLD AUTO: 1.02 THOUSAND/ΜL (ref 0.17–1.22)
MONOCYTES NFR BLD AUTO: 10 % (ref 4–12)
NEUTROPHILS # BLD AUTO: 5.6 THOUSANDS/ΜL (ref 1.85–7.62)
NEUTS SEG NFR BLD AUTO: 57 % (ref 43–75)
NRBC BLD AUTO-RTO: 0 /100 WBCS
OPIATES UR QL SCN: NEGATIVE
PCP UR QL: NEGATIVE
PLATELET # BLD AUTO: 301 THOUSANDS/UL (ref 149–390)
PMV BLD AUTO: 10.6 FL (ref 8.9–12.7)
POTASSIUM SERPL-SCNC: 3.7 MMOL/L (ref 3.5–5.3)
PROT SERPL-MCNC: 8.2 G/DL (ref 6.4–8.2)
RBC # BLD AUTO: 4.28 MILLION/UL (ref 3.88–5.62)
SARS-COV-2 RNA RESP QL NAA+PROBE: NEGATIVE
SODIUM SERPL-SCNC: 138 MMOL/L (ref 136–145)
THC UR QL: NEGATIVE
WBC # BLD AUTO: 9.83 THOUSAND/UL (ref 4.31–10.16)

## 2020-04-30 PROCEDURE — 82075 ASSAY OF BREATH ETHANOL: CPT | Performed by: EMERGENCY MEDICINE

## 2020-04-30 PROCEDURE — U0003 INFECTIOUS AGENT DETECTION BY NUCLEIC ACID (DNA OR RNA); SEVERE ACUTE RESPIRATORY SYNDROME CORONAVIRUS 2 (SARS-COV-2) (CORONAVIRUS DISEASE [COVID-19]), AMPLIFIED PROBE TECHNIQUE, MAKING USE OF HIGH THROUGHPUT TECHNOLOGIES AS DESCRIBED BY CMS-2020-01-R: HCPCS | Performed by: EMERGENCY MEDICINE

## 2020-04-30 PROCEDURE — 80053 COMPREHEN METABOLIC PANEL: CPT | Performed by: EMERGENCY MEDICINE

## 2020-04-30 PROCEDURE — 99285 EMERGENCY DEPT VISIT HI MDM: CPT

## 2020-04-30 PROCEDURE — 80307 DRUG TEST PRSMV CHEM ANLYZR: CPT | Performed by: EMERGENCY MEDICINE

## 2020-04-30 PROCEDURE — 36415 COLL VENOUS BLD VENIPUNCTURE: CPT | Performed by: EMERGENCY MEDICINE

## 2020-04-30 PROCEDURE — 99284 EMERGENCY DEPT VISIT MOD MDM: CPT | Performed by: EMERGENCY MEDICINE

## 2020-04-30 PROCEDURE — 85025 COMPLETE CBC W/AUTO DIFF WBC: CPT | Performed by: EMERGENCY MEDICINE

## 2020-04-30 RX ORDER — ADEFOVIR DIPIVOXIL 10 MG/1
10 TABLET ORAL DAILY
COMMUNITY
End: 2021-01-20 | Stop reason: ALTCHOICE

## 2020-05-18 ENCOUNTER — TELEMEDICINE (OUTPATIENT)
Dept: FAMILY MEDICINE CLINIC | Facility: CLINIC | Age: 30
End: 2020-05-18
Payer: COMMERCIAL

## 2020-05-18 DIAGNOSIS — S39.012A STRAIN OF LUMBAR REGION, INITIAL ENCOUNTER: Primary | ICD-10-CM

## 2020-05-18 PROCEDURE — 99213 OFFICE O/P EST LOW 20 MIN: CPT | Performed by: FAMILY MEDICINE

## 2020-05-29 ENCOUNTER — TELEPHONE (OUTPATIENT)
Dept: FAMILY MEDICINE CLINIC | Facility: CLINIC | Age: 30
End: 2020-05-29

## 2020-05-29 DIAGNOSIS — F41.9 ANXIETY: ICD-10-CM

## 2020-05-29 RX ORDER — CLONAZEPAM 0.5 MG/1
1.5 TABLET ORAL 2 TIMES DAILY
Refills: 0 | Status: CANCELLED
Start: 2020-05-29 | End: 2020-06-08

## 2020-05-29 RX ORDER — CLONAZEPAM 1 MG/1
1 TABLET ORAL 3 TIMES DAILY
Qty: 20 TABLET | Refills: 0 | Status: SHIPPED | OUTPATIENT
Start: 2020-05-29 | End: 2020-06-05 | Stop reason: SDUPTHER

## 2020-06-05 ENCOUNTER — TELEMEDICINE (OUTPATIENT)
Dept: FAMILY MEDICINE CLINIC | Facility: CLINIC | Age: 30
End: 2020-06-05
Payer: COMMERCIAL

## 2020-06-05 DIAGNOSIS — F41.9 ANXIETY: ICD-10-CM

## 2020-06-05 DIAGNOSIS — F41.1 GENERALIZED ANXIETY DISORDER: ICD-10-CM

## 2020-06-05 DIAGNOSIS — F33.2 SEVERE EPISODE OF RECURRENT MAJOR DEPRESSIVE DISORDER, WITHOUT PSYCHOTIC FEATURES (HCC): ICD-10-CM

## 2020-06-05 DIAGNOSIS — J34.89 RHINORRHEA: ICD-10-CM

## 2020-06-05 DIAGNOSIS — M48.07 SPINAL STENOSIS OF LUMBOSACRAL REGION: Primary | Chronic | ICD-10-CM

## 2020-06-05 PROCEDURE — 99214 OFFICE O/P EST MOD 30 MIN: CPT | Performed by: FAMILY MEDICINE

## 2020-06-05 RX ORDER — BUPROPION HYDROCHLORIDE 150 MG/1
150 TABLET ORAL DAILY
Qty: 30 TABLET | Refills: 5 | Status: SHIPPED | OUTPATIENT
Start: 2020-06-05 | End: 2021-04-10 | Stop reason: SDUPTHER

## 2020-06-05 RX ORDER — FLUTICASONE PROPIONATE 50 MCG
1 SPRAY, SUSPENSION (ML) NASAL DAILY
Qty: 1 BOTTLE | Refills: 5 | Status: SHIPPED | OUTPATIENT
Start: 2020-06-05 | End: 2021-04-10 | Stop reason: SDUPTHER

## 2020-06-05 RX ORDER — CLONAZEPAM 1 MG/1
1 TABLET ORAL 3 TIMES DAILY
Qty: 90 TABLET | Refills: 2 | Status: SHIPPED | OUTPATIENT
Start: 2020-06-05 | End: 2020-08-31 | Stop reason: SDUPTHER

## 2020-07-03 DIAGNOSIS — F98.8 ATTENTION DEFICIT DISORDER (ADD) WITHOUT HYPERACTIVITY: Primary | ICD-10-CM

## 2020-07-06 ENCOUNTER — TELEPHONE (OUTPATIENT)
Dept: GASTROENTEROLOGY | Facility: CLINIC | Age: 30
End: 2020-07-06

## 2020-07-06 ENCOUNTER — TELEPHONE (OUTPATIENT)
Dept: FAMILY MEDICINE CLINIC | Facility: CLINIC | Age: 30
End: 2020-07-06

## 2020-07-06 DIAGNOSIS — B18.2 CHRONIC HEPATITIS C WITHOUT HEPATIC COMA (HCC): Primary | ICD-10-CM

## 2020-07-06 RX ORDER — DEXTROAMPHETAMINE SACCHARATE, AMPHETAMINE ASPARTATE, DEXTROAMPHETAMINE SULFATE AND AMPHETAMINE SULFATE 5; 5; 5; 5 MG/1; MG/1; MG/1; MG/1
TABLET ORAL
Qty: 60 TABLET | Refills: 0 | Status: SHIPPED | OUTPATIENT
Start: 2020-07-06 | End: 2020-07-31 | Stop reason: SDUPTHER

## 2020-07-06 NOTE — TELEPHONE ENCOUNTER
----- Message from Awa Laura sent at 7/3/2020 12:03 PM EDT -----  Regarding: Prescription Question  Contact: [phone]  Hi Dr Chano Ritter,    I tried calling the office but it appears to be closed for the holiday weekend  I used to be able to request medication refills through the medication tab in the [employer] [facility] but I cannot anymore for some reason? Anyways I need a refill on my prescription for Adderall IR 20mg, 1 tab BID, quantity of 60  I use the [facility] in [place] at [address] [place] [address] avenue  If at all possible please let me know once this is sent to the pharmacy  Please call me with any questions [phone]  Thanks!  Have a nice holiday weekend!    -Letty Ferrell

## 2020-07-06 NOTE — TELEPHONE ENCOUNTER
Patient scheduled to see [facility] 7/17, work up for Hep C Treatment  Patient has [facility], uses [facility]  Orders placed, he will go this coming Friday for blood work

## 2020-07-08 ENCOUNTER — HOSPITAL ENCOUNTER (EMERGENCY)
Facility: HOSPITAL | Age: 30
Discharge: HOME | End: 2020-07-08
Attending: EMERGENCY MEDICINE | Admitting: EMERGENCY MEDICINE
Payer: COMMERCIAL

## 2020-07-08 VITALS
DIASTOLIC BLOOD PRESSURE: 72 MMHG | RESPIRATION RATE: 16 BRPM | OXYGEN SATURATION: 100 % | SYSTOLIC BLOOD PRESSURE: 135 MMHG | HEART RATE: 55 BPM | TEMPERATURE: 98 F

## 2020-07-08 DIAGNOSIS — F30.10 MANIC BEHAVIOR (HCC): Primary | ICD-10-CM

## 2020-07-08 DIAGNOSIS — R44.1 VISUAL HALLUCINATIONS: ICD-10-CM

## 2020-07-08 DIAGNOSIS — G47.00 INSOMNIA: ICD-10-CM

## 2020-07-08 LAB
ALBUMIN SERPL BCP-MCNC: 4.5 G/DL (ref 3.5–5)
ALP SERPL-CCNC: 75 U/L (ref 46–116)
ALT SERPL W P-5'-P-CCNC: 61 U/L (ref 12–78)
AMPHETAMINES SERPL QL SCN: POSITIVE
ANION GAP SERPL CALCULATED.3IONS-SCNC: 9 MMOL/L (ref 4–13)
AST SERPL W P-5'-P-CCNC: 61 U/L (ref 5–45)
ATRIAL RATE: 68 BPM
BARBITURATES UR QL: NEGATIVE
BASOPHILS # BLD AUTO: 0.05 THOUSANDS/ΜL (ref 0–0.1)
BASOPHILS NFR BLD AUTO: 1 % (ref 0–1)
BENZODIAZ UR QL: POSITIVE
BILIRUB SERPL-MCNC: 0.9 MG/DL (ref 0.2–1)
BUN SERPL-MCNC: 20 MG/DL (ref 5–25)
CALCIUM SERPL-MCNC: 9.3 MG/DL (ref 8.3–10.1)
CHLORIDE SERPL-SCNC: 101 MMOL/L (ref 100–108)
CO2 SERPL-SCNC: 28 MMOL/L (ref 21–32)
COCAINE UR QL: NEGATIVE
CREAT SERPL-MCNC: 1 MG/DL (ref 0.6–1.3)
EOSINOPHIL # BLD AUTO: 0.23 THOUSAND/ΜL (ref 0–0.61)
EOSINOPHIL NFR BLD AUTO: 3 % (ref 0–6)
ERYTHROCYTE [DISTWIDTH] IN BLOOD BY AUTOMATED COUNT: 13.2 % (ref 11.6–15.1)
ETHANOL EXG-MCNC: 0 MG/DL
GFR SERPL CREATININE-BSD FRML MDRD: 101 ML/MIN/1.73SQ M
GLUCOSE SERPL-MCNC: 97 MG/DL (ref 65–140)
HCT VFR BLD AUTO: 38.1 % (ref 36.5–49.3)
HGB BLD-MCNC: 13.4 G/DL (ref 12–17)
IMM GRANULOCYTES # BLD AUTO: 0.01 THOUSAND/UL (ref 0–0.2)
IMM GRANULOCYTES NFR BLD AUTO: 0 % (ref 0–2)
LYMPHOCYTES # BLD AUTO: 1.92 THOUSANDS/ΜL (ref 0.6–4.47)
LYMPHOCYTES NFR BLD AUTO: 23 % (ref 14–44)
MAGNESIUM SERPL-MCNC: 2 MG/DL (ref 1.6–2.6)
MCH RBC QN AUTO: 29.5 PG (ref 26.8–34.3)
MCHC RBC AUTO-ENTMCNC: 35.2 G/DL (ref 31.4–37.4)
MCV RBC AUTO: 84 FL (ref 82–98)
METHADONE UR QL: NEGATIVE
MONOCYTES # BLD AUTO: 1.29 THOUSAND/ΜL (ref 0.17–1.22)
MONOCYTES NFR BLD AUTO: 16 % (ref 4–12)
NEUTROPHILS # BLD AUTO: 4.7 THOUSANDS/ΜL (ref 1.85–7.62)
NEUTS SEG NFR BLD AUTO: 57 % (ref 43–75)
OPIATES UR QL SCN: NEGATIVE
OXYCODONE+OXYMORPHONE UR QL SCN: NEGATIVE
P AXIS: 74 DEGREES
PCP UR QL: NEGATIVE
PHOSPHATE SERPL-MCNC: 3.6 MG/DL (ref 2.7–4.5)
PLATELET # BLD AUTO: 251 THOUSANDS/UL (ref 149–390)
PMV BLD AUTO: 11 FL (ref 8.9–12.7)
POTASSIUM SERPL-SCNC: 4.1 MMOL/L (ref 3.5–5.3)
PR INTERVAL: 170 MS
PROT SERPL-MCNC: 8.5 G/DL (ref 6.4–8.2)
QRS AXIS: 92 DEGREES
QRSD INTERVAL: 104 MS
QT INTERVAL: 382 MS
QTC INTERVAL: 406 MS
RBC # BLD AUTO: 4.55 MILLION/UL (ref 3.88–5.62)
SARS-COV-2 RNA RESP QL NAA+PROBE: NEGATIVE
SODIUM SERPL-SCNC: 138 MMOL/L (ref 136–145)
T WAVE AXIS: 58 DEGREES
THC UR QL: POSITIVE
TSH SERPL DL<=0.05 MIU/L-ACNC: 0.64 UIU/ML (ref 0.36–3.74)
VENTRICULAR RATE: 68 BPM
WBC # BLD AUTO: 8.2 THOUSAND/UL (ref 4.31–10.16)

## 2020-07-08 PROCEDURE — 93010 ELECTROCARDIOGRAM REPORT: CPT | Performed by: INTERNAL MEDICINE

## 2020-07-08 PROCEDURE — 83735 ASSAY OF MAGNESIUM: CPT | Performed by: EMERGENCY MEDICINE

## 2020-07-08 PROCEDURE — 82075 ASSAY OF BREATH ETHANOL: CPT | Performed by: EMERGENCY MEDICINE

## 2020-07-08 PROCEDURE — 87635 SARS-COV-2 COVID-19 AMP PRB: CPT | Performed by: EMERGENCY MEDICINE

## 2020-07-08 PROCEDURE — 80307 DRUG TEST PRSMV CHEM ANLYZR: CPT | Performed by: EMERGENCY MEDICINE

## 2020-07-08 PROCEDURE — 84100 ASSAY OF PHOSPHORUS: CPT | Performed by: EMERGENCY MEDICINE

## 2020-07-08 PROCEDURE — 99284 EMERGENCY DEPT VISIT MOD MDM: CPT | Performed by: EMERGENCY MEDICINE

## 2020-07-08 PROCEDURE — 99284 EMERGENCY DEPT VISIT MOD MDM: CPT

## 2020-07-08 PROCEDURE — 80053 COMPREHEN METABOLIC PANEL: CPT | Performed by: EMERGENCY MEDICINE

## 2020-07-08 PROCEDURE — 85025 COMPLETE CBC W/AUTO DIFF WBC: CPT | Performed by: EMERGENCY MEDICINE

## 2020-07-08 PROCEDURE — 84443 ASSAY THYROID STIM HORMONE: CPT | Performed by: EMERGENCY MEDICINE

## 2020-07-08 PROCEDURE — 36415 COLL VENOUS BLD VENIPUNCTURE: CPT | Performed by: EMERGENCY MEDICINE

## 2020-07-08 PROCEDURE — 93005 ELECTROCARDIOGRAM TRACING: CPT

## 2020-07-08 NOTE — ED NOTES
Crisis paged via tiger connect      [facility]  07/08/20 0029

## 2020-07-08 NOTE — ED NOTES
Crisis spoke with Patient via phone and he stated he no longer wants to sign a 201  He contracted for safety several times  Patient stated he would never hurt himself but agreed if for some reason he becomes suicidal or cannot contract for safety he will go to the nearest ED for an evaluation  Patient denies any current suicidal/homicidal ideations and denies any visual/auditory hallucinations

## 2020-07-08 NOTE — DISCHARGE INSTRUCTIONS
Hallucinations   WHAT YOU NEED TO KNOW:   Hallucinations are things you see, hear, feel, taste, or smell that seem real but are not  Some hallucinations are temporary  Hallucinations that continue, interfere with daily activities, or worsen may be a sign of a serious medical or mental condition that needs treatment  DISCHARGE INSTRUCTIONS:   Call 911 if you or someone else notices any of the following:   · You want to harm yourself or someone else  · You hear voices telling you to harm yourself or someone else  · You have a seizure  · You are confused, do not know where you are, or are not making sense when you speak  Return to the emergency department if:   · Your hallucinations get worse  · You vomit several times in a row  · Your heartbeat or breathing is faster or slower than usual      · You have trouble breathing or shortness of breath  Contact your healthcare provider if:   · You have new hallucinations  · You have questions or concerns about your condition or care  Medicines:   · Medicines  may be given to stop the hallucinations, reduce anxiety, or relax your muscles  · Take your medicine as directed  Contact your healthcare provider if you think your medicine is not helping or if you have side effects  Tell him or her if you are allergic to any medicine  Keep a list of the medicines, vitamins, and herbs you take  Include the amounts, and when and why you take them  Bring the list or the pill bottles to follow-up visits  Carry your medicine list with you in case of an emergency  Follow up with your healthcare provider as directed:  Write down your questions so you remember to ask them during your visits  © 2017 [employer] Information is for End User's use only and may not be sold, redistributed or otherwise used for commercial purposes   All illustrations and images included in CareNotes® are the copyrighted property of A D A M , Inc  or [employer]  The above information is an  only  It is not intended as medical advice for individual conditions or treatments  Talk to your doctor, nurse or pharmacist before following any medical regimen to see if it is safe and effective for you  Insomnia   WHAT YOU NEED TO KNOW:   Insomnia is a condition that makes it hard to fall or stay asleep  Lack of sleep can lead to attention or memory problems during the day  You may also be todd, depressed, clumsy, or have headaches  DISCHARGE INSTRUCTIONS:   Contact your healthcare provider if:   · Your symptoms do not get better, or they get worse  · You begin to use drugs or alcohol to fall asleep  · You have questions or concerns about your condition or care  Medicines:   · Medicines  may help you sleep more regularly or help you feel less anxious  · Take your medicine as directed  Contact your healthcare provider if you think your medicine is not helping or if you have side effects  Tell him or her if you are allergic to any medicine  Keep a list of the medicines, vitamins, and herbs you take  Include the amounts, and when and why you take them  Bring the list or the pill bottles to follow-up visits  Carry your medicine list with you in case of an emergency  What you can do to improve your sleep:   · Create a sleep schedule  This will help you form a sleep routine  Keep a record of your sleep patterns, and any sleeping problems you have  Bring the record to follow-up visits with healthcare providers  · Do not take naps  Naps could make it hard for you to fall asleep at bedtime  · Keep your bedroom cool, quiet, and dark  Turn on white noise, such as a fan, to help you relax  Do not use your bed for any activity that will keep you awake  Do not read, exercise, eat, or watch TV in your bedroom  · Get up if you do not fall asleep within 20 minutes    Move to another room and do something relaxing until you become sleepy  · Limit caffeine, alcohol, and food to earlier in the day  Only drink caffeine in the morning  Do not drink alcohol within 6 hours of bedtime  Do not eat a heavy meal right before you go to bed  · Exercise regularly  Daily exercise may help you sleep better  Do not exercise within 4 hours of bedtime  Follow up with your healthcare provider as directed: Your healthcare provider may refer you for cognitive behavioral therapy  A behavioral therapist may help you find ways to relax, decrease stress, and improve sleep  Write down your questions so you remember to ask them during your visits  © 2017 [address] [facility] Information is for End User's use only and may not be sold, redistributed or otherwise used for commercial purposes  All illustrations and images included in CareNotes® are the copyrighted property of A D A M , Inc  or [employer]  The above information is an  only  It is not intended as medical advice for individual conditions or treatments  Talk to your doctor, nurse or pharmacist before following any medical regimen to see if it is safe and effective for you

## 2020-07-08 NOTE — ED NOTES
Spoke to Teagan Degroot- states need urine prior to 201 paperwork   Patient aware and is unable to urinate at this time      Madonna Han RN  07/08/20 1474

## 2020-07-08 NOTE — ED NOTES
Crisis to call department for over the phone assessment with patient      Janusz Villarreal RN  07/08/20 5115

## 2020-07-08 NOTE — ED PROVIDER NOTES
History  Chief Complaint   Patient presents with    Insomnia     pt states he hasnt slept for 3 days, also states "my mom thinks im having visual hallucinations from sleep deprivation"  pt also reports weight loss      26year old male presents for evaluation of insomnia and concern for visual hallucinations  Patient states he has not been able to sleep for the past 3 days with the exception of when he fell asleep in his hot car in the driveway yesterday afternoon around 3 pm  He stopped taking his Wellbutrin and Adderall 3 days ago as he felt they were contributing to his insomnia  He has been taking Klonopin TID with last dose this morning with no improvement  Patient states that he believed he saw multiple police officers across the street from his house; however, his mother told him he was hallucinating  He attempted to take a video through a crack in his curtain to prove what he saw; however, the video is quite blurry and shaky making identification of what he believes to have seen difficult  Patient denies suicidal or homicidal ideation  He states his appetite has been very poor with early satiety and nausea for the past 3 months with approximately 40 lbs weight loss  He has history of hepatitis and is scheduled to start antivirals soon with GI  He states he was unable to start the medication sooner due to substance abuse  Patient reports occasional marijuana use with the last use 2 weeks ago  He denies recent meth use and is currently in a buprenorphine program for which he receives monthly injections  Last injection 10 days ago  Patient reports family history of hyperthyroidism  He states he last had his TSH checked more than 2 years ago  Patient works in [employer] [address] [employer]  He denies any recent tick bites or rashes        History provided by:  Patient  Psychiatric Evaluation   Presenting symptoms: hallucinations    Degree of incapacity (severity):  Mild  Onset quality:  Gradual  Duration:  3 days  Timing: Constant  Progression:  Worsening  Chronicity:  New  Treatment compliance:  Some of the time  Time since last dose of psychoactive medication: klonopin this morning, wellbutrin and adderall 3 days ago  Relieved by:  Nothing  Worsened by:  Lack of sleep  Ineffective treatments:  Benzodiazepines  Associated symptoms: appetite change and insomnia    Associated symptoms: no abdominal pain, no chest pain, no fatigue and no headaches    Risk factors: hx of mental illness        Prior to Admission Medications   Prescriptions Last Dose Informant Patient Reported? Taking?    Amphetamine-Dextroamphetamine (ADDERALL PO)   Yes No   Sig: Take 20 mg by mouth 2 (two) times a day   adefovir (HEPSERA) 10 MG tablet   Yes No   Sig: Take 10 mg by mouth daily   albuterol (PROVENTIL HFA,VENTOLIN HFA) 90 mcg/act inhaler   Yes No   Sig: Inhale 2 puffs every 6 (six) hours as needed for wheezing   amphetamine-dextroamphetamine (ADDERALL) 20 mg tablet   No No   Sig: take 1 tablet by mouth twice a day   buPROPion (WELLBUTRIN XL) 150 mg 24 hr tablet   No No   Sig: Take 1 tablet (150 mg total) by mouth daily At 9am   clonazePAM (KlonoPIN) 1 mg tablet   No No   Sig: Take 1 tablet (1 mg total) by mouth 3 (three) times a day   fluticasone (FLONASE) 50 mcg/act nasal spray   No No   Si spray into each nostril daily   naproxen (NAPROSYN) 500 mg tablet   No No   Sig: Take 1 tablet (500 mg total) by mouth daily      Facility-Administered Medications: None       Past Medical History:   Diagnosis Date    Addiction to drug ([facility] )     Allergic     Anxiety     Benzodiazepine withdrawal (HCC) 10/31/2019    Chronic pain disorder     Depression     Elevated AST (SGOT) 8/3/2019    Hepatitis C 2019    Hepatitis C     [facility] 2019    Psychiatric disorder     Severe episode of recurrent major depressive disorder, without psychotic features ([facility] ) 2019    Spinal stenosis     Substance abuse ([facility] )     Suicide attempt [facility])     Urinary retention        Past Surgical History:   Procedure Laterality Date    BACK SURGERY      FRACTURE SURGERY      left foot reconstruted     SPINE SURGERY      hernated disc        Family History   Problem Relation Age of Onset    Hypertension Mother     Hyperlipidemia Father     Heart disease Father     Depression Maternal Grandmother     Hypertension Maternal Grandmother     Squamous cell carcinoma Maternal Grandfather     Alcohol abuse Maternal Grandfather     Alzheimer's disease Paternal Grandmother     Dementia Paternal Grandmother     Alcohol abuse Paternal Uncle     Colon cancer Neg Hx     Colon polyps Neg Hx      I have reviewed and agree with the history as documented  E-Cigarette/Vaping    E-Cigarette Use Current Every Day User      E-Cigarette/Vaping Substances    Nicotine Yes     THC No     CBD No     Flavoring No     Other No     Unknown No      Social History     Tobacco Use    Smoking status: Current Every Day Smoker     Packs/day: 1 00     Types: E-Cigarettes     Last attempt to quit: 2017     Years since quittin 8    Smokeless tobacco: Current User    Tobacco comment: Pt uses a "vape" pen - use is equivalent to approximately 1 pack per day   Substance Use Topics    Alcohol use: Not Currently     Frequency: Never     Drinks per session: 1 or 2     Binge frequency: Never    Drug use: Not Currently     Frequency: 3 0 times per week     Types: Methamphetamines     Comment: pt states last used 1 month ago       Review of Systems   Constitutional: Positive for appetite change  Negative for diaphoresis, fatigue and fever  HENT: Negative for congestion, rhinorrhea and sore throat  Respiratory: Negative for cough, chest tightness and shortness of breath  Cardiovascular: Negative for chest pain, palpitations and leg swelling  Gastrointestinal: Positive for nausea  Negative for abdominal pain, constipation, diarrhea and vomiting     Genitourinary: Negative for difficulty urinating, dysuria, frequency and hematuria  Musculoskeletal: Negative for myalgias, neck pain and neck stiffness  Skin: Negative for pallor  Neurological: Negative for syncope, weakness and headaches  Psychiatric/Behavioral: Positive for hallucinations and sleep disturbance  The patient has insomnia  All other systems reviewed and are negative  Physical Exam  Physical Exam   Constitutional: He appears well-developed and well-nourished  Non-toxic appearance  No distress  HENT:   Head: Normocephalic and atraumatic  Eyes: Pupils are equal, round, and reactive to light  EOM are normal    Neck: Normal range of motion  No tracheal deviation present  No thyromegaly present  Cardiovascular: Normal rate, regular rhythm, normal heart sounds and intact distal pulses  Pulmonary/Chest: Effort normal and breath sounds normal    Abdominal: Soft  Bowel sounds are normal  He exhibits no distension  There is no tenderness  Lymphadenopathy:     He has no cervical adenopathy  Skin: Skin is warm and dry  He is not diaphoretic  Nursing note and vitals reviewed        Vital Signs  ED Triage Vitals   Temperature Pulse Respirations Blood Pressure SpO2   07/08/20 0703 07/08/20 0702 07/08/20 0702 07/08/20 0703 07/08/20 0702   98 °F (36 7 °C) 88 19 149/94 97 %      Temp src Heart Rate Source Patient Position - Orthostatic VS BP Location FiO2 (%)   -- 07/08/20 0702 -- -- --    Monitor         Pain Score       --                  Vitals:    07/08/20 0702 07/08/20 0703 07/08/20 1223   BP:  149/94 135/72   Pulse: 88  55         Visual Acuity      ED Medications  Medications - No data to display    Diagnostic Studies  Results Reviewed     Procedure Component Value Units Date/Time    Rapid drug screen, urine [[id number]]  (Abnormal) Collected:  07/08/20 1222    Lab Status:  Final result Specimen:  Urine, Clean Catch Updated:  07/08/20 1303     Amph/Meth UR Positive     Barbiturate Ur Negative Benzodiazepine Urine Positive     Cocaine Urine Negative     Methadone Urine Negative     Opiate Urine Negative     PCP Ur Negative     THC Urine Positive     Oxycodone Urine Negative    Narrative:       Presumptive report  If requested, specimen will be sent to reference lab for confirmation  FOR MEDICAL PURPOSES ONLY  IF CONFIRMATION NEEDED PLEASE CONTACT THE LAB WITHIN 5 DAYS  Drug Screen Cutoff Levels:  AMPHETAMINE/METHAMPHETAMINES  1000 ng/mL  BARBITURATES     200 ng/mL  BENZODIAZEPINES     200 ng/mL  COCAINE      300 ng/mL  METHADONE      300 ng/mL  OPIATES      300 ng/mL  PHENCYCLIDINE     25 ng/mL  THC       50 ng/mL  OXYCODONE      100 ng/mL    Novel Coronavirus (Covid-19),PCR [facility] [[id number]]  (Normal) Collected:  07/08/20 0730    Lab Status:  Final result Specimen:  Nares from Nasopharyngeal Swab Updated:  07/08/20 0840     SARS-CoV-2 Negative    Narrative: The specimen collection materials, transport medium, and/or testing methodology utilized in the production of these test results have been proven to be reliable in a limited validation with an abbreviated program under the Emergency Utilization Authorization provided by the FDA  Testing reported as "Presumptive positive" will be confirmed with secondary testing with a reference laboratory to ensure result accuracy  Clinical caution and judgement should be used with the interpretation of these results with consideration of the clinical impression and other laboratory testing  Testing reported as "Positive" or "Negative" has been proven to be accurate according to standard laboratory validation requirements  All testing is performed with control materials showing appropriate reactivity at standard intervals        TSH, 3rd generation with Free T4 reflex [[id number]]  (Normal) Collected:  07/08/20 0730    Lab Status:  Final result Specimen:  Blood from Arm, Right Updated:  07/08/20 0813     TSH 3RD GENERATON 0 638 uIU/mL     Narrative: Patients undergoing fluorescein dye angiography may retain small amounts of fluorescein in the body for 48-72 hours post procedure  Samples containing fluorescein can produce falsely depressed TSH values  If the patient had this procedure,a specimen should be resubmitted post fluorescein clearance        Phosphorus [[id number]]  (Normal) Collected:  07/08/20 0730    Lab Status:  Final result Specimen:  Blood from Arm, Right Updated:  07/08/20 0813     Phosphorus 3 6 mg/dL     Magnesium [[id number]]  (Normal) Collected:  07/08/20 0730    Lab Status:  Final result Specimen:  Blood from Arm, Right Updated:  07/08/20 0813     Magnesium 2 0 mg/dL     Comprehensive metabolic panel [[id number]]  (Abnormal) Collected:  07/08/20 0730    Lab Status:  Final result Specimen:  Blood from Arm, Right Updated:  07/08/20 0807     Sodium 138 mmol/L      Potassium 4 1 mmol/L      Chloride 101 mmol/L      CO2 28 mmol/L      ANION GAP 9 mmol/L      BUN 20 mg/dL      Creatinine 1 00 mg/dL      Glucose 97 mg/dL      Calcium 9 3 mg/dL      AST 61 U/L      ALT 61 U/L      Alkaline Phosphatase 75 U/L      Total Protein 8 5 g/dL      Albumin 4 5 g/dL      Total Bilirubin 0 90 mg/dL      eGFR 101 ml/min/1 73sq m     Narrative:       [place] guidelines for Chronic Kidney Disease (CKD):     Stage 1 with normal or high GFR (GFR > 90 mL/min/1 73 square meters)    Stage 2 Mild CKD (GFR = 60-89 mL/min/1 73 square meters)    Stage 3A Moderate CKD (GFR = 45-59 mL/min/1 73 square meters)    Stage 3B Moderate CKD (GFR = 30-44 mL/min/1 73 square meters)    Stage 4 Severe CKD (GFR = 15-29 mL/min/1 73 square meters)    Stage 5 End Stage CKD (GFR <15 mL/min/1 73 square meters)  Note: GFR calculation is accurate only with a steady state creatinine    CBC and differential [[id number]]  (Abnormal) Collected:  07/08/20 0730    Lab Status:  Final result Specimen:  Blood from Arm, Right Updated:  07/08/20 0741     WBC 8 20 Thousand/uL      RBC 4 55 Million/uL      Hemoglobin 13 4 g/dL      Hematocrit 38 1 %      MCV 84 fL      MCH 29 5 pg      MCHC 35 2 g/dL      RDW 13 2 %      MPV 11 0 fL      Platelets 912 Thousands/uL      Neutrophils Relative 57 %      Immat GRANS % 0 %      Lymphocytes Relative 23 %      Monocytes Relative 16 %      Eosinophils Relative 3 %      Basophils Relative 1 %      Neutrophils Absolute 4 70 Thousands/µL      Immature Grans Absolute 0 01 Thousand/uL      Lymphocytes Absolute 1 92 Thousands/µL      Monocytes Absolute 1 29 Thousand/µL      Eosinophils Absolute 0 23 Thousand/µL      Basophils Absolute 0 05 Thousands/µL     POCT alcohol breath test [[id number]]  (Normal) Resulted:  07/08/20 0734    Lab Status:  Final result Updated:  07/08/20 0734     EXTBreath Alcohol 0 000                 No orders to display              Procedures  ECG 12 Lead Documentation Only  Date/Time: 7/8/2020 7:39 AM  Performed by: Karena Diaz MD  Authorized by: Karena Diaz MD     Indications / Diagnosis:  Insomnia, weight loss  ECG reviewed by me, the ED Provider: yes    Patient location:  ED  Previous ECG:     Previous ECG:  Compared to current    Comparison ECG info:  10/31/19 normal sinus rhythm with QTc 484    Similarity:  Changes noted  Interpretation:     Interpretation: normal    Rate:     ECG rate:  68    ECG rate assessment: normal    Rhythm:     Rhythm: sinus rhythm    Ectopy:     Ectopy: none    QRS:     QRS axis:  Normal    QRS intervals:  Normal  Conduction:     Conduction: normal    ST segments:     ST segments:  Normal  T waves:     T waves: normal               ED Course  ED Course as of Jul 08 1326   Wed Jul 08, 2020   1321 Patient now stating he does not want to sign 201 as he is afraid that he would lose his job if he missed anymore work  Patient states he would return if symptoms worsen, SI or HI and will follow up with his PCP   No criteria for involuntary psychiatric hold as patient does not have SI or HI           US AUDIT      Most Recent Value   Initial Alcohol Screen: US AUDIT-C    1  How often do you have a drink containing alcohol?  0 Filed at: 07/08/2020 0703   2  How many drinks containing alcohol do you have on a typical day you are drinking? 0 Filed at: 07/08/2020 0703   3a  Male UNDER 65: How often do you have five or more drinks on one occasion? 0 Filed at: 07/08/2020 0703   3b  FEMALE Any Age, or MALE 65+: How often do you have 4 or more drinks on one occassion? 0 Filed at: 07/08/2020 0703   Audit-C Score  0 Filed at: 07/08/2020 0703                  MARYAM/DAST-10      Most Recent Value   How many times in the past year have you    Used an illegal drug or used a prescription medication for non-medical reasons? Never Filed at: 07/08/2020 0703                                MDM  Number of Diagnoses or Management Options  Insomnia: new and requires workup  Manic behavior ([facility] ): new and requires workup  Visual hallucinations: new and requires workup  Diagnosis management comments: 26year old male presents for evaluation of insomnia and possible visual hallucinations  Poor appetite associated with weight loss, early satiety and nausea  Labs unremarkable  Patient medically cleared  Crisis consulted  Patient initially wanted to sign 201 for voluntary psychiatric hospitalization; however, changed his mind out of fear of losing his job  No criteria for 302  PCP follow up  Return precautions provided         Amount and/or Complexity of Data Reviewed  Clinical lab tests: ordered and reviewed    Patient Progress  Patient progress: stable        Disposition  Final diagnoses:   Insomnia   Visual hallucinations   Manic behavior ([facility] )     Time reflects when diagnosis was documented in both MDM as applicable and the Disposition within this note     Time User Action Codes Description Comment    7/8/2020 12:23 PM Liana Trejo Add [G47 00] Insomnia     7/8/2020 12:23 PM Liana Trejo Add [R44 1] Visual hallucinations     7/8/2020 12:23 PM Kirsten Chol Add [F30 10] Manic behavior ([address] )     7/8/2020 12:23 PM Charolett Begin J Modify [G47 00] Insomnia     7/8/2020 12:23 PM Kirsten Chol Modify [F30 10] Manic behavior [facility])       ED Disposition     ED Disposition Condition Date/Time Comment    Discharge Stable Wed Jul 8, 2020  1:24 PM Frankie Broussard discharge to home/self care  MD Documentation      Most Recent Value   Sending MD Bishop Nadja MD      Follow-up Information     Follow up With Specialties Details Why Contact Info Additional Information    Uzma Barrett MD Family Medicine Schedule an appointment as soon as possible for a visit in 1 day for re-evaluation [id number] [facility] [address]  [facility] Emergency Department Emergency Medicine Go to  If symptoms worsen, thoughts of hurting yourself or others [address] [place], [address]  [phone] [address] ED, [address] [place], [place] [address]Micheline Benjamin 10          Patient's Medications   Discharge Prescriptions    No medications on file     No discharge procedures on file      PDMP Review     None          ED Provider  Electronically Signed by           Bishop Nadja MD  07/08/20 4517

## 2020-07-09 ENCOUNTER — HOSPITAL ENCOUNTER (EMERGENCY)
Facility: HOSPITAL | Age: 30
Discharge: HOME | End: 2020-07-09
Attending: EMERGENCY MEDICINE | Admitting: EMERGENCY MEDICINE
Payer: COMMERCIAL

## 2020-07-09 VITALS
TEMPERATURE: 97.4 F | OXYGEN SATURATION: 97 % | RESPIRATION RATE: 18 BRPM | SYSTOLIC BLOOD PRESSURE: 122 MMHG | HEART RATE: 94 BPM | DIASTOLIC BLOOD PRESSURE: 79 MMHG

## 2020-07-09 DIAGNOSIS — F41.9 ANXIETY: Primary | ICD-10-CM

## 2020-07-09 DIAGNOSIS — G47.00 INSOMNIA: ICD-10-CM

## 2020-07-09 DIAGNOSIS — F19.20 DRUG ABUSE AND DEPENDENCE (HCC): ICD-10-CM

## 2020-07-09 PROCEDURE — 99284 EMERGENCY DEPT VISIT MOD MDM: CPT | Performed by: EMERGENCY MEDICINE

## 2020-07-09 PROCEDURE — 99284 EMERGENCY DEPT VISIT MOD MDM: CPT

## 2020-07-10 NOTE — ED NOTES
Attempted to gather information for crisis assessment, but patient was irritated and stated that he was angry because he had requested medication for "really bad tremors and blood pressure through the roof, and they gave me nothing!"  Patient was not observed to have tremors at all during the time crisis was in the room, nor did he have an elevated blood pressure  Patient also claimed to have lost 40 pounds in 4 days  When crisis worker pointed out that none of these things were accurate he said "Well, it feels like it"  He refused to answer most questions  His biggest complaint was that he had been having trouble sleeping for 3 nights, so crisis worker asked him how much of his prescribed adderall he was taking  His uds had returned as positive for amphetamine/methamphetamine and he had already denied using any drugs that were not prescribed to him  This was, therefore, the only prescribed drug we were aware of that would result in this positive on the drug screen  He denied taking it and said he hadn't taken adderall in months  When asked why he just contacted his PCP on 7/3/20 to have it refilled in that case he denied that as well, though his medical record shows a word-for-word transcript of his contact and request as well as that it was refilled on 7/6/20  He then said he had hallucinations, but when crisis worker asked if he would describe them he stated "No, I don't feel like talking about it  Don't you think someone who can't sleep and is seeing police in the next door neighbor's yard when they aren't really there should be hospitalized?"  Crisis worker responded that it depended on other things as well, which is why I was trying to get additional information  He became more angry and stood up saying he had been in the ED for 4 hours (he hadn't), and wasn't getting anything so he was leaving   Crisis worker attempted to get him to continue to talk and tried to explain the process, but he took off his scrub brandan and continued to state he was not staying any longer  As he had denied si,and hi consistently, crisis worker exited the room and reported his comments and actions to the physician

## 2020-07-11 NOTE — ED PROVIDER NOTES
History  Chief Complaint   Patient presents with    Medical Problem     Pt presents to ER for re-eval after visit yesterday with persistent complaints of feeling restless, no appetite and not sleeping x4 days  Pt reports being in recovery (poly/ mainly opioids) x6 months  Denies drug use and alcohol  Denies relief 1mg klonopin TID     34-year-old male presents for insomnia and hallucinations  Patient was here yesterday for similar complaints was discharged  Patient's mother is at the bedside and states that he needs to be inpatient because he is clearly psychotic  Patient is able to have a normal conversation with me  States he can not sleep for four days  He states that he lost 40 lb in three days  States that he thinks he seeing please coming after him  He denies any drug abuse but states that he had an issue in the past   States that he stopped taking his Adderall weeks to months ago  He is taking Klonopin 3 times a day for the past two years  No other associated symptoms or modifying factors he has no other complaints  I confront the patient about his allegedly stopping Adderall but having a prescription filled just a few days ago  He became irate  States that I dont know what I am Talking about and that I have to admit him  He specifically denies homicidal or suicidal ideation      Medical Problem   Associated symptoms: no chest pain, no cough, no diarrhea, no fatigue, no fever, no headaches, no nausea, no rash, no shortness of breath and no vomiting        Prior to Admission Medications   Prescriptions Last Dose Informant Patient Reported? Taking?    Amphetamine-Dextroamphetamine (ADDERALL PO)   Yes No   Sig: Take 20 mg by mouth 2 (two) times a day   adefovir (HEPSERA) 10 MG tablet   Yes No   Sig: Take 10 mg by mouth daily   albuterol (PROVENTIL HFA,VENTOLIN HFA) 90 mcg/act inhaler   Yes No   Sig: Inhale 2 puffs every 6 (six) hours as needed for wheezing   amphetamine-dextroamphetamine (ADDERALL) 20 mg tablet   No No   Sig: take 1 tablet by mouth twice a day   buPROPion (WELLBUTRIN XL) 150 mg 24 hr tablet   No No   Sig: Take 1 tablet (150 mg total) by mouth daily At 9am   clonazePAM (KlonoPIN) 1 mg tablet   No No   Sig: Take 1 tablet (1 mg total) by mouth 3 (three) times a day   fluticasone (FLONASE) 50 mcg/act nasal spray   No No   Si spray into each nostril daily   naproxen (NAPROSYN) 500 mg tablet   No No   Sig: Take 1 tablet (500 mg total) by mouth daily      Facility-Administered Medications: None       Past Medical History:   Diagnosis Date    [facility] drug ([facility] )     Allergic     Anxiety     Benzodiazepine withdrawal (HCC) 10/31/2019    Chronic pain disorder     Depression     Elevated AST (SGOT) 8/3/2019    Hepatitis C 2019    Hepatitis C     Methamphetamine abuse ([facility] ) 2019    Psychiatric disorder     Severe episode of recurrent major depressive disorder, without psychotic features ([facility] ) 2019    Spinal stenosis     Substance abuse ([place] [address] )     Suicide attempt ([place] [address] )     Urinary retention        Past Surgical History:   Procedure Laterality Date    BACK SURGERY      FRACTURE SURGERY      left foot reconstruted     SPINE SURGERY      hernated disc        Family History   Problem Relation Age of Onset    Hypertension Mother     Hyperlipidemia Father     Heart disease Father     Depression Maternal Grandmother     Hypertension Maternal Grandmother     Squamous cell carcinoma Maternal Grandfather     Alcohol abuse Maternal Grandfather     Alzheimer's disease Paternal Grandmother     Dementia Paternal Grandmother     Alcohol abuse Paternal Uncle     Colon cancer Neg Hx     Colon polyps Neg Hx      I have reviewed and agree with the history as documented      E-Cigarette/Vaping    E-Cigarette Use Current Every Day User      E-Cigarette/Vaping Substances    Nicotine Yes     THC No     CBD No     Flavoring No     Other No     Unknown No      Social History Tobacco Use    Smoking status: Current Every Day Smoker     Packs/day: 1 00     Types: E-Cigarettes     Last attempt to quit: 2017     Years since quittin 8    Smokeless tobacco: Current User    Tobacco comment: Pt uses a "vape" pen - use is equivalent to approximately 1 pack per day   Substance Use Topics    Alcohol use: Not Currently     Frequency: Never     Drinks per session: 1 or 2     Binge frequency: Never    Drug use: Not Currently     Frequency: 3 0 times per week     Types: Methamphetamines     Comment: pt states last used 1 month ago       Review of Systems   Constitutional: Negative for chills, fatigue and fever  Eyes: Negative for photophobia and visual disturbance  Respiratory: Negative for cough and shortness of breath  Cardiovascular: Negative for chest pain, palpitations and leg swelling  Gastrointestinal: Negative for diarrhea, nausea and vomiting  Endocrine: Negative for polydipsia and polyuria  Genitourinary: Negative for decreased urine volume, difficulty urinating, dysuria and frequency  Musculoskeletal: Negative for back pain, neck pain and neck stiffness  Skin: Negative for color change and rash  Allergic/Immunologic: Negative for environmental allergies and immunocompromised state  Neurological: Negative for dizziness and headaches  Hematological: Negative for adenopathy  Does not bruise/bleed easily  Psychiatric/Behavioral: Positive for dysphoric mood, hallucinations and sleep disturbance  The patient is not nervous/anxious  Physical Exam  Physical Exam   Constitutional: He is oriented to person, place, and time  He appears well-developed  HENT:   Head: Normocephalic and atraumatic  Right Ear: External ear normal    Left Ear: External ear normal    Mouth/Throat: Oropharynx is clear and moist    Eyes: Pupils are equal, round, and reactive to light  Conjunctivae and EOM are normal    Neck: Normal range of motion  Neck supple  No JVD present  No thyromegaly present  Cardiovascular: Normal rate, regular rhythm and normal heart sounds  Exam reveals no gallop and no friction rub  No murmur heard  Pulmonary/Chest: Effort normal and breath sounds normal  No respiratory distress  He has no wheezes  He has no rales  Abdominal: Soft  Bowel sounds are normal  He exhibits no distension  There is no rebound and no guarding  Musculoskeletal: Normal range of motion  He exhibits no edema  Lymphadenopathy:     He has no cervical adenopathy  Neurological: He is alert and oriented to person, place, and time  No cranial nerve deficit  Skin: Skin is warm  Psychiatric: He has a normal mood and affect  His behavior is normal    Nursing note and vitals reviewed  Vital Signs  ED Triage Vitals [07/09/20 1938]   Temperature Pulse Respirations Blood Pressure SpO2   (!) 97 4 °F (36 3 °C) 94 18 122/79 97 %      Temp Source Heart Rate Source Patient Position - Orthostatic VS BP Location FiO2 (%)   Tympanic Monitor -- Left arm --      Pain Score       No Pain           Vitals:    07/09/20 1938   BP: 122/79   Pulse: 94         Visual Acuity      ED Medications  Medications - No data to display    Diagnostic Studies  Results Reviewed     None                 No orders to display              Procedures  Procedures         ED Course       US AUDIT      Most Recent Value   Initial Alcohol Screen: US AUDIT-C    1  How often do you have a drink containing alcohol?  0 Filed at: 07/09/2020 1939   2  How many drinks containing alcohol do you have on a typical day you are drinking? 0 Filed at: 07/09/2020 1939   3a  Male UNDER 65: How often do you have five or more drinks on one occasion? 0 Filed at: 07/09/2020 1946   3b  FEMALE Any Age, or MALE 65+: How often do you have 4 or more drinks on one occassion?   0 Filed at: 07/09/2020 1939   Audit-C Score  0 Filed at: 07/09/2020 1946                  MARYAM/DAST-10      Most Recent Value   How many times in the past year have you  Used an illegal drug or used a prescription medication for non-medical reasons? Never Filed at: 07/09/2020 1939                                MDM  Number of Diagnoses or Management Options  Anxiety: new and requires workup        Disposition  Final diagnoses:   Anxiety   Insomnia   Drug abuse and dependence ([facility] )     Time reflects when diagnosis was documented in both MDM as applicable and the Disposition within this note     Time User Action Codes Description Comment    7/9/2020 10:26 PM Lawayne Arroyo [place]o Add [F41 9] Anxiety     7/11/2020  1:29 PM Lawayne Ar[place] Add [G47 00] Insomnia     7/11/2020  1:29 PM Lawayne Linda Add [F19 20] Drug abuse and dependence [facility])       ED Disposition     ED Disposition Condition Date/Time Comment    Discharge Stable u Jul 9, 2020 10:20 PM Jose العلي discharge to home/self care  Follow-up Information    None         Discharge Medication List as of 7/9/2020 10:36 PM      CONTINUE these medications which have NOT CHANGED    Details   adefovir (HEPSERA) 10 MG tablet Take 10 mg by mouth daily, Historical Med      albuterol (PROVENTIL HFA,VENTOLIN HFA) 90 mcg/act inhaler Inhale 2 puffs every 6 (six) hours as needed for wheezing, Historical Med      !!  Amphetamine-Dextroamphetamine (ADDERALL PO) Take 20 mg by mouth 2 (two) times a day, Historical Med      !! amphetamine-dextroamphetamine (ADDERALL) 20 mg tablet take 1 tablet by mouth twice a day, Normal      buPROPion (WELLBUTRIN XL) 150 mg 24 hr tablet Take 1 tablet (150 mg total) by mouth daily At 9am, Starting Fri 6/5/2020, Normal      clonazePAM (KlonoPIN) 1 mg tablet Take 1 tablet (1 mg total) by mouth 3 (three) times a day, Starting Fri 6/5/2020, Normal      fluticasone (FLONASE) 50 mcg/act nasal spray 1 spray into each nostril daily, Starting Fri 6/5/2020, Normal      naproxen (NAPROSYN) 500 mg tablet Take 1 tablet (500 mg total) by mouth daily, Starting Mon 4/27/2020, Normal       !! - Potential duplicate medications found  Please discuss with provider  No discharge procedures on file      PDMP Review     None          ED Provider  Electronically Signed by           Eliezer Finley DO  07/11/20 2746

## 2020-07-17 ENCOUNTER — TELEPHONE (OUTPATIENT)
Dept: GASTROENTEROLOGY | Facility: CLINIC | Age: 30
End: 2020-07-17

## 2020-07-21 NOTE — TELEPHONE ENCOUNTER
Patient rescheduled with Scott Lamas 7/24  I see he had recent trip to the ED, but do not see that he had Labs for HEP C (ordered 7/6/20) completed  CBC,CMP, HIV, HCV Fibrosure,Genotype, Hep C Quant, Pt-INR, Hepatitis Panel, Hep B surface Ab, Hep A Total  I reached patient, he will go for lab work today    [phone] Fax for [facility]

## 2020-07-24 ENCOUNTER — TELEPHONE (OUTPATIENT)
Dept: GASTROENTEROLOGY | Facility: CLINIC | Age: 30
End: 2020-07-24

## 2020-07-24 NOTE — TELEPHONE ENCOUNTER
I also attempted to call pt 2 times after TM had difficulty   I received the same message stating "The person you have dialed is unable to receive calls at this time "    Entered 6 month recall   Subhash Brady

## 2020-07-24 NOTE — TELEPHONE ENCOUNTER
Attempted a  virtual team visit without response from the patient  Called the patient twice and immediately received a message from his phone stating " the person you have dialed is unable to receive calls at this time"  Please put in recall and reschedule office visit but it is not necessary to be done immediately    Actually, I prefer to have an office visit with him in 6 months, please put in computer for six-month office visit, thank you

## 2020-07-27 LAB
A2 MACROGLOB SERPL-MCNC: 161 MG/DL (ref 110–276)
ALBUMIN SERPL-MCNC: 4.6 G/DL (ref 4.1–5.2)
ALBUMIN/GLOB SERPL: 1.4 {RATIO} (ref 1.2–2.2)
ALP SERPL-CCNC: 64 IU/L (ref 39–117)
ALT SERPL W P-5'-P-CCNC: 49 IU/L (ref 0–55)
ALT SERPL-CCNC: 44 IU/L (ref 0–44)
APO A-I SERPL-MCNC: 121 MG/DL (ref 101–178)
AST SERPL-CCNC: 39 IU/L (ref 0–40)
BASOPHILS # BLD AUTO: 0.1 X10E3/UL (ref 0–0.2)
BASOPHILS NFR BLD AUTO: 1 %
BILIRUB SERPL-MCNC: 0.4 MG/DL (ref 0–1.2)
BILIRUB SERPL-MCNC: 0.5 MG/DL (ref 0–1.2)
BUN SERPL-MCNC: 12 MG/DL (ref 6–20)
BUN/CREAT SERPL: 13 (ref 9–20)
CALCIUM SERPL-MCNC: 9.8 MG/DL (ref 8.7–10.2)
CHLORIDE SERPL-SCNC: 98 MMOL/L (ref 96–106)
CO2 SERPL-SCNC: 27 MMOL/L (ref 20–29)
COMMENT: ABNORMAL
CREAT SERPL-MCNC: 0.96 MG/DL (ref 0.76–1.27)
EOSINOPHIL # BLD AUTO: 0.2 X10E3/UL (ref 0–0.4)
EOSINOPHIL NFR BLD AUTO: 3 %
ERYTHROCYTE [DISTWIDTH] IN BLOOD BY AUTOMATED COUNT: 13.1 % (ref 11.6–15.4)
FIBROSIS SCORING:: ABNORMAL
FIBROSIS STAGE SERPL QL: ABNORMAL
GGT SERPL-CCNC: 17 IU/L (ref 0–65)
GLOBULIN SER-MCNC: 3.3 G/DL (ref 1.5–4.5)
GLUCOSE SERPL-MCNC: 85 MG/DL (ref 65–99)
HAPTOGLOB SERPL-MCNC: 144 MG/DL (ref 17–317)
HAV AB SER QL IA: POSITIVE
HAV IGM SERPL QL IA: NEGATIVE
HBV CORE IGM SERPL QL IA: NEGATIVE
HBV SURFACE AB SER-ACNC: 52.3 MIU/ML
HBV SURFACE AG SERPL QL IA: NEGATIVE
HCT VFR BLD AUTO: 43 % (ref 37.5–51)
HCV AB S/CO SERPL IA: >11 S/CO RATIO (ref 0–0.9)
HCV GENTYP SERPL NAA+PROBE: NORMAL
HCV PLEASE NOTE: NORMAL
HCV RNA SERPL NAA+PROBE-ACNC: NORMAL IU/ML
HCV RNA SERPL NAA+PROBE-LOG IU: 6.16 LOG10 IU/ML
HGB BLD-MCNC: 14.5 G/DL (ref 13–17.7)
HIV 1+2 AB+HIV1 P24 AG SERPL QL IA: NON REACTIVE
IMM GRANULOCYTES # BLD: 0 X10E3/UL (ref 0–0.1)
IMM GRANULOCYTES NFR BLD: 0 %
INR PPP: 1.2 (ref 0.8–1.2)
INTERPRETATIONS: ABNORMAL
LIVER FIBR SCORE SERPL CALC.FIBROSURE: 0.08 (ref 0–0.21)
LYMPHOCYTES # BLD AUTO: 2.3 X10E3/UL (ref 0.7–3.1)
LYMPHOCYTES NFR BLD AUTO: 37 %
MCH RBC QN AUTO: 29 PG (ref 26.6–33)
MCHC RBC AUTO-ENTMCNC: 33.7 G/DL (ref 31.5–35.7)
MCV RBC AUTO: 86 FL (ref 79–97)
MONOCYTES # BLD AUTO: 0.5 X10E3/UL (ref 0.1–0.9)
MONOCYTES NFR BLD AUTO: 8 %
NECROINFLAMM ACTIVITY SCORING:: ABNORMAL
NECROINFLAMMATORY ACT GRADE SERPL QL: ABNORMAL
NECROINFLAMMATORY ACT SCORE SERPL: 0.22 (ref 0–0.17)
NEUTROPHILS # BLD AUTO: 3.2 X10E3/UL (ref 1.4–7)
NEUTROPHILS NFR BLD AUTO: 51 %
PLATELET # BLD AUTO: 356 X10E3/UL (ref 150–450)
POTASSIUM SERPL-SCNC: 4.7 MMOL/L (ref 3.5–5.2)
PROT SERPL-MCNC: 7.9 G/DL (ref 6–8.5)
PROTHROMBIN TIME: 11.9 SEC (ref 9.1–12)
RBC # BLD AUTO: 5 X10E6/UL (ref 4.14–5.8)
SERVICE CMNT-IMP: ABNORMAL
SL AMB EGFR AFRICAN AMERICAN: 122 ML/MIN/1.73
SL AMB EGFR NON AFRICAN AMERICAN: 106 ML/MIN/1.73
SODIUM SERPL-SCNC: 140 MMOL/L (ref 134–144)
TEST INFORMATION: NORMAL
WBC # BLD AUTO: 6.2 X10E3/UL (ref 3.4–10.8)

## 2020-07-28 ENCOUNTER — TELEMEDICINE (OUTPATIENT)
Dept: GASTROENTEROLOGY | Facility: CLINIC | Age: 30
End: 2020-07-28
Payer: COMMERCIAL

## 2020-07-28 VITALS — WEIGHT: 165 LBS | BODY MASS INDEX: 23.62 KG/M2 | HEIGHT: 70 IN

## 2020-07-28 DIAGNOSIS — B18.2 CHRONIC HEPATITIS C WITHOUT HEPATIC COMA (HCC): Primary | Chronic | ICD-10-CM

## 2020-07-28 PROCEDURE — 99214 OFFICE O/P EST MOD 30 MIN: CPT | Performed by: NURSE PRACTITIONER

## 2020-07-28 PROCEDURE — 3008F BODY MASS INDEX DOCD: CPT | Performed by: NURSE PRACTITIONER

## 2020-07-28 NOTE — PROGRESS NOTES
Virtual Regular Visit      Assessment/Plan:    Problem List Items Addressed This Visit        Digestive    Hepatitis C - Primary (Chronic)          1  Chronic hepatitis C without hepatic coma (HCC)   Hepatitis C diagnosed while he was in drug rehabilitation in August of 2019  On the basis of IV drug use  Has been abstinent from IV drugs for least 5 months  Genotype 1A  A viral load over million  A0-A1 1 inflammation and F0 fibrosis noted on fibroscore  Received vaccinations for Hepatitis A and B as a child and recent blood work demonstrated positive antibodies to both  -  Begin antiviral therapy  -  Reinforced to the patient that he needs to be abstinent from all IV drugs indefinitely or he will not be eligible for Hepatitis C treatment in the future if he receives a course of antiviral therapy now          Reason for visit is  Follow-up  Chief Complaint   Patient presents with    Hep C    Virtual Regular Visit        Encounter provider DILLON He    Provider located at [address] [id number] [phone]      Recent Visits  Date Type Provider Dept   07/24/20 Telephone Deepak Galarza [address] [address] [place] [address] recent visits within past 7 days and meeting all other requirements     Today's Visits  Date Type Provider Dept   07/28/20 Telemedicine DILLON He [facility]   Showing today's visits and meeting all other requirements     Future Appointments  No visits were found meeting these conditions  Showing future appointments within next 150 days and meeting all other requirements        The patient was identified by name and date of birth  Charlotteluis f Bob was informed that this is a telemedicine visit and that the visit is being conducted through [employer] and patient was informed that this is not a secure, HIPAA-complaint platform  He agrees to proceed     My office door was closed  No one else was in the room  He acknowledged consent and understanding of privacy and security of the video platform  The patient has agreed to participate and understands they can discontinue the visit at any time  Patient is aware this is a billable service  Subjective  Michael Knight is a 26 y o  male, Seen via telemedicine visit for follow-up to Hepatitis-C  He has  Not used IV drugs for at least 5 months  He previously was in at intensive outpatient treatment program and tells me he has dedicated to staying clean and sober  He is on Sublocade to assist in sustaining his sobriety  He wishes to be treated for hepatitis-C  He tells me he has lost 20-30 lb since beginning this medication  Admits to occasional nausea without vomiting  Denies abdominal pain, change in bowel habits, fevers, chills, melena rectal bleeding  Denies any skin rashes or lesions, fatigue, myalgias, arthralgias, jaundice or pruritus        Past Medical History:   Diagnosis Date    [facility] )     Allergic     Anxiety     Benzodiazepine withdrawal (HCC) 10/31/2019    Chronic pain disorder     Depression     Elevated AST (SGOT) 8/3/2019    Hepatitis C 8/4/2019    Hepatitis C     [facility] ) 8/7/2019    Psychiatric disorder     Severe episode of recurrent major depressive disorder, without psychotic features ([facility] ) 7/17/2019    Spinal stenosis     Substance abuse ([facility] )     Suicide attempt ([facility] )     Urinary retention        Past Surgical History:   Procedure Laterality Date    BACK SURGERY      FRACTURE SURGERY      left foot reconstruted     SPINE SURGERY      hernated disc        Current Outpatient Medications   Medication Sig Dispense Refill    adefovir (HEPSERA) 10 MG tablet Take 10 mg by mouth daily      albuterol (PROVENTIL HFA,VENTOLIN HFA) 90 mcg/act inhaler Inhale 2 puffs every 6 (six) hours as needed for wheezing      Amphetamine-Dextroamphetamine (ADDERALL PO) Take 20 mg by mouth 2 (two) times a day      amphetamine-dextroamphetamine (ADDERALL) 20 mg tablet take 1 tablet by mouth twice a day 60 tablet 0    buPROPion (WELLBUTRIN XL) 150 mg 24 hr tablet Take 1 tablet (150 mg total) by mouth daily At 9am 30 tablet 5    clonazePAM (KlonoPIN) 1 mg tablet Take 1 tablet (1 mg total) by mouth 3 (three) times a day 90 tablet 2    fluticasone (FLONASE) 50 mcg/act nasal spray 1 spray into each nostril daily 1 Bottle 5    naproxen (NAPROSYN) 500 mg tablet Take 1 tablet (500 mg total) by mouth daily 30 tablet 0     No current facility-administered medications for this visit  Allergies   Allergen Reactions    Geodon [Ziprasidone]      Tardivdiskinesea       REVIEW OF SYSTEMS:    CONSTITUTIONAL: Denies any fever, chills, rigors  Positive for weight loss  CARDIOVASCULAR: No chest pain or palpitations  RESPIRATORY: Denies any cough, hemoptysis, shortness of breath or dyspnea on exertion  GASTROINTESTINAL: As noted in the History of Present Illness  GENITOURINARY: No problems with urination  NEUROLOGIC: No dizziness or vertigo, denies headaches  MUSCULOSKELETAL: Denies any muscle or joint pain  SKIN: Denies skin rashes or itching  ENDOCRINE: Denies excessive thirst  Denies intolerance to heat or cold  PSYCHOSOCIAL: Denies depression or anxiety  Video Exam    Vitals:    07/28/20 1155   Weight: 74 8 kg (165 lb)   Height: 5' 10" (1 778 m)       General: appears well, no acute distress   HENT: Normocephalic   Neck:   Appears normal  Lungs: Equal chest rise and unlabored breathing   Neuro: AAOx3, follow commands   Psych: cooperative, normal affect   Skin: No jaundice, rashes, or lesions on face          I spent 20 minutes directly with the patient during this visit      VIRTUAL VISIT DISCLAIMER    Dany Sands acknowledges that he has consented to an online visit or consultation   He understands that the online visit is based solely on information provided by him, and that, in the absence of a face-to-face physical evaluation by the physician, the diagnosis he receives is both limited and provisional in terms of accuracy and completeness  This is not intended to replace a full medical face-to-face evaluation by the physician  Priya Bustos understands and accepts these terms

## 2020-07-29 DIAGNOSIS — B18.2 CHRONIC HEPATITIS C WITHOUT HEPATIC COMA (HCC): Primary | ICD-10-CM

## 2020-07-29 NOTE — TELEPHONE ENCOUNTER
Patient to begin Antiviral Therapy  Referral submitted to [facility]  + Hepatitis C,Genotype 1A  A viral load over million  A0-A1 1 inflammation and F0 fibrosis noted on fibroscore  Received vaccinations for Hepatitis A and B as a child and recent blood work demonstrated positive antibodies to both  Will seek approval for Mavyret 8 weeks

## 2020-07-31 DIAGNOSIS — F98.8 ATTENTION DEFICIT DISORDER (ADD) WITHOUT HYPERACTIVITY: ICD-10-CM

## 2020-07-31 RX ORDER — DEXTROAMPHETAMINE SACCHARATE, AMPHETAMINE ASPARTATE, DEXTROAMPHETAMINE SULFATE AND AMPHETAMINE SULFATE 5; 5; 5; 5 MG/1; MG/1; MG/1; MG/1
1 TABLET ORAL 2 TIMES DAILY
Qty: 60 TABLET | Refills: 0 | Status: CANCELLED | OUTPATIENT
Start: 2020-07-31

## 2020-07-31 NOTE — TELEPHONE ENCOUNTER
Pt notes he had virtual visit earlier this wk; got blood work done; has Hep   C and is going to start treatment  He was in MyChart re: other meds and questioned Rx sent from this office; also noted nurse was working on getting approval for Sawyer Bautista  Pt has questions re: tx and meds; asks for  [phone]

## 2020-08-01 RX ORDER — DEXTROAMPHETAMINE SACCHARATE, AMPHETAMINE ASPARTATE, DEXTROAMPHETAMINE SULFATE AND AMPHETAMINE SULFATE 5; 5; 5; 5 MG/1; MG/1; MG/1; MG/1
1 TABLET ORAL 2 TIMES DAILY
Qty: 60 TABLET | Refills: 0 | Status: SHIPPED | OUTPATIENT
Start: 2020-08-01 | End: 2020-09-02 | Stop reason: SDUPTHER

## 2020-08-26 ENCOUNTER — TELEMEDICINE (OUTPATIENT)
Dept: FAMILY MEDICINE CLINIC | Facility: CLINIC | Age: 30
End: 2020-08-26
Payer: COMMERCIAL

## 2020-08-26 DIAGNOSIS — B34.9 ACUTE VIRAL SYNDROME: Primary | ICD-10-CM

## 2020-08-26 PROCEDURE — 99212 OFFICE O/P EST SF 10 MIN: CPT | Performed by: NURSE PRACTITIONER

## 2020-08-26 NOTE — PATIENT INSTRUCTIONS
Work note given  Schedule appointment for annual physical which is due now  Call with any problems or concerns

## 2020-08-26 NOTE — LETTER
August 26, 2020     Patient: Ruby Zapien   YOB: 1990   Date of Visit: 8/26/2020       To Whom it May Concern:    Ruby Zapien is under my professional care  He was seen in my office on 8/26/2020  He may return to work on 8/27/2020  If you have any questions or concerns, please don't hesitate to call           Sincerely,          DILLON Plunkett        CC: No Recipients

## 2020-08-26 NOTE — PROGRESS NOTES
Virtual Regular Visit      Assessment/Plan:    Problem List Items Addressed This Visit     None               Reason for visit is No chief complaint on file  Encounter provider DILLON Puente    Provider located at [address] [place] [id number]      Recent Visits  No visits were found meeting these conditions  Showing recent visits within past 7 days and meeting all other requirements     Today's Visits  Date Type Provider Dept   08/26/20 Telemedicine DILLON Coreas [facility]   Showing today's visits and meeting all other requirements     Future Appointments  No visits were found meeting these conditions  Showing future appointments within next 150 days and meeting all other requirements        The patient was identified by name and date of birth  Dany Sands was informed that this is a telemedicine visit and that the visit is being conducted through [facility] and patient was informed that this is not a secure, HIPAA-complaint platform  He agrees to proceed     My office door was closed  No one else was in the room  He acknowledged consent and understanding of privacy and security of the video platform  The patient has agreed to participate and understands they can discontinue the visit at any time  Patient is aware this is a billable service  Subjective  Dany Sands is a 26 y o  male    Patient was having cold symptoms  He is feeling better and is currently symptom free  He needs a note to return to work         Past Medical History:   Diagnosis Date    Addiction [facility] drug [facility]     Allergic     Anxiety     Benzodiazepine withdrawal ([facility] ) 10/31/2019    Chronic pain disorder     Depression     Elevated AST (SGOT) 8/3/2019    Hepatitis C 8/4/2019    Hepatitis C     Methamphetamine abuse ([facility] ) 8/7/2019    Psychiatric disorder     Severe episode of recurrent major depressive disorder, without psychotic features ([facility] ) 7/17/2019    Spinal stenosis     Substance abuse ([facility] )     Suicide attempt ([facility] )     Urinary retention        Past Surgical History:   Procedure Laterality Date    BACK SURGERY      FRACTURE SURGERY      left foot reconstruted     SPINE SURGERY      hernated disc        Current Outpatient Medications   Medication Sig Dispense Refill    adefovir (HEPSERA) 10 MG tablet Take 10 mg by mouth daily      albuterol (PROVENTIL HFA,VENTOLIN HFA) 90 mcg/act inhaler Inhale 2 puffs every 6 (six) hours as needed for wheezing      Amphetamine-Dextroamphetamine (ADDERALL PO) Take 20 mg by mouth 2 (two) times a day      amphetamine-dextroamphetamine (ADDERALL) 20 mg tablet Take 1 tablet (20 mg total) by mouth 2 (two) times a day 60 tablet 0    buPROPion (WELLBUTRIN XL) 150 mg 24 hr tablet Take 1 tablet (150 mg total) by mouth daily At 9am 30 tablet 5    clonazePAM (KlonoPIN) 1 mg tablet Take 1 tablet (1 mg total) by mouth 3 (three) times a day 90 tablet 2    fluticasone (FLONASE) 50 mcg/act nasal spray 1 spray into each nostril daily 1 Bottle 5    Glecaprevir-Pibrentasvir 100-40 MG TABS Take (3) tablets by mouth with food once daily 84 tablet 1    naproxen (NAPROSYN) 500 mg tablet Take 1 tablet (500 mg total) by mouth daily 30 tablet 0     No current facility-administered medications for this visit  Allergies   Allergen Reactions    Geodon [Ziprasidone]      Tardivdiskinesea       Review of Systems   Constitutional: Negative for activity change, chills, fatigue and fever  HENT: Negative for congestion, ear pain, postnasal drip, rhinorrhea, sinus pressure and sore throat  Eyes: Negative for pain, discharge and redness  Respiratory: Negative for cough and wheezing  Cardiovascular: Negative for chest pain  Gastrointestinal: Negative for constipation, diarrhea, nausea and vomiting  Musculoskeletal: Negative for myalgias  Skin: Negative for rash  Neurological: Negative for dizziness and headaches  Video Exam    There were no vitals filed for this visit  Physical Exam  Vitals signs and nursing note reviewed  Constitutional:       General: He is not in acute distress  Appearance: Normal appearance  He is well-developed  He is not ill-appearing or diaphoretic  HENT:      Head: Normocephalic  Eyes:      Extraocular Movements: Extraocular movements intact  Neck:      Musculoskeletal: Normal range of motion and neck supple  Thyroid: No thyromegaly  Trachea: No tracheal deviation  Pulmonary:      Effort: Pulmonary effort is normal  No respiratory distress  Musculoskeletal: Normal range of motion  Skin:     Coloration: Skin is not pale  Findings: No erythema or rash  Neurological:      Mental Status: He is alert and oriented to person, place, and time  Psychiatric:         Mood and Affect: Mood normal          Speech: Speech normal          Behavior: Behavior normal          Thought Content: Thought content normal          Judgment: Judgment normal           I spent 10 minutes directly with the patient during this visit      VIRTUAL VISIT DISCLAIMER    Aris Pettit acknowledges that he has consented to an online visit or consultation  He understands that the online visit is based solely on information provided by him, and that, in the absence of a face-to-face physical evaluation by the physician, the diagnosis he receives is both limited and provisional in terms of accuracy and completeness  This is not intended to replace a full medical face-to-face evaluation by the physician  Aris Pettit understands and accepts these terms

## 2020-08-31 DIAGNOSIS — F41.9 ANXIETY: ICD-10-CM

## 2020-09-01 RX ORDER — CLONAZEPAM 1 MG/1
1 TABLET ORAL 3 TIMES DAILY
Qty: 90 TABLET | Refills: 2 | Status: SHIPPED | OUTPATIENT
Start: 2020-09-01 | End: 2021-01-17

## 2020-09-02 DIAGNOSIS — F98.8 ATTENTION DEFICIT DISORDER (ADD) WITHOUT HYPERACTIVITY: ICD-10-CM

## 2020-09-02 RX ORDER — DEXTROAMPHETAMINE SACCHARATE, AMPHETAMINE ASPARTATE, DEXTROAMPHETAMINE SULFATE AND AMPHETAMINE SULFATE 5; 5; 5; 5 MG/1; MG/1; MG/1; MG/1
1 TABLET ORAL 2 TIMES DAILY
Qty: 60 TABLET | Refills: 0 | Status: SHIPPED | OUTPATIENT
Start: 2020-09-02 | End: 2020-09-28 | Stop reason: SDUPTHER

## 2020-09-07 ENCOUNTER — HOSPITAL ENCOUNTER (EMERGENCY)
Facility: HOSPITAL | Age: 30
Discharge: PSYCHIATRIC OR SUBSTANCE USE FACILITY | End: 2020-09-07
Attending: EMERGENCY MEDICINE | Admitting: EMERGENCY MEDICINE
Payer: COMMERCIAL

## 2020-09-07 VITALS
HEART RATE: 87 BPM | DIASTOLIC BLOOD PRESSURE: 68 MMHG | TEMPERATURE: 97.5 F | OXYGEN SATURATION: 99 % | SYSTOLIC BLOOD PRESSURE: 122 MMHG | RESPIRATION RATE: 16 BRPM

## 2020-09-07 DIAGNOSIS — F19.10 SUBSTANCE ABUSE (HCC): ICD-10-CM

## 2020-09-07 DIAGNOSIS — R45.851 DEPRESSION WITH SUICIDAL IDEATION: Primary | ICD-10-CM

## 2020-09-07 DIAGNOSIS — F32.A DEPRESSION WITH SUICIDAL IDEATION: Primary | ICD-10-CM

## 2020-09-07 LAB
AMPHETAMINES SERPL QL SCN: POSITIVE
BARBITURATES UR QL: NEGATIVE
BENZODIAZ UR QL: POSITIVE
COCAINE UR QL: POSITIVE
ETHANOL EXG-MCNC: 0 MG/DL
METHADONE UR QL: NEGATIVE
OPIATES UR QL SCN: NEGATIVE
OXYCODONE+OXYMORPHONE UR QL SCN: NEGATIVE
PCP UR QL: NEGATIVE
SARS-COV-2 RNA RESP QL NAA+PROBE: NEGATIVE
THC UR QL: POSITIVE

## 2020-09-07 PROCEDURE — 99285 EMERGENCY DEPT VISIT HI MDM: CPT | Performed by: EMERGENCY MEDICINE

## 2020-09-07 PROCEDURE — 82075 ASSAY OF BREATH ETHANOL: CPT | Performed by: EMERGENCY MEDICINE

## 2020-09-07 PROCEDURE — 87635 SARS-COV-2 COVID-19 AMP PRB: CPT | Performed by: EMERGENCY MEDICINE

## 2020-09-07 PROCEDURE — 80307 DRUG TEST PRSMV CHEM ANLYZR: CPT | Performed by: EMERGENCY MEDICINE

## 2020-09-07 PROCEDURE — 99285 EMERGENCY DEPT VISIT HI MDM: CPT

## 2020-09-07 RX ORDER — CLONAZEPAM 0.5 MG/1
1 TABLET ORAL ONCE
Status: COMPLETED | OUTPATIENT
Start: 2020-09-07 | End: 2020-09-07

## 2020-09-07 RX ORDER — LORAZEPAM 1 MG/1
1 TABLET ORAL ONCE
Status: DISCONTINUED | OUTPATIENT
Start: 2020-09-07 | End: 2020-09-07

## 2020-09-07 RX ORDER — LORAZEPAM 1 MG/1
1 TABLET ORAL ONCE
Status: COMPLETED | OUTPATIENT
Start: 2020-09-07 | End: 2020-09-07

## 2020-09-07 RX ORDER — KETOROLAC TROMETHAMINE 30 MG/ML
15 INJECTION, SOLUTION INTRAMUSCULAR; INTRAVENOUS ONCE
Status: DISCONTINUED | OUTPATIENT
Start: 2020-09-07 | End: 2020-09-07

## 2020-09-07 RX ADMIN — LORAZEPAM 1 MG: 1 TABLET ORAL at 03:23

## 2020-09-07 RX ADMIN — CLONAZEPAM 1 MG: 0.5 TABLET ORAL at 14:13

## 2020-09-07 NOTE — ED NOTES
Spoke with Matthew at [facility] to provide [employer]  Matthew expressed that they would like to make sure patient is able to void independently prior to transport, as they would not be able to straight cath on unit      LAYTON Hamm  09/07/20   1125

## 2020-09-07 NOTE — ED NOTES
Patient visibly upset and agitated   States that he is not going to go to [facility] that he has not received any of his medications and that they are not going to take proper care of him  Emotional support given  This nurse spoke with patient calmly  Encourage him to go to [facility]  MD involved  Medication given for anxiety  Patient agreeable to having security remove belongings from his car and will go with the constables to [facility]        Obed Jackson RN  09/07/20 0532

## 2020-09-07 NOTE — ED NOTES
Patient is accepted at [facility]  Patient is accepted by Dr Emigdio Bragg per Marine Nose in admissions  Transportation is arranged with [facility]  Transportation is scheduled for 1400  Patient may go to the floor at anytime  Nurse report is not needed  Petra contacted and made aware of LAYTON Aranda  09/07/20   9936

## 2020-09-07 NOTE — ED NOTES
Insurance Authorization for admission:   Phone call placed to [employer]  Phone number: [phone]  Spoke to [facility]ANNA[facility] [place] [employer]  5 days approved  Level of care: Acute Inpt Dual (201)  Review on 09/11/2020  Authorization # to be obtained by accepting facility upon arrival         EVS (Eligibility Verification System) called - [phone]  Automated system indicates: Active with [facility] for Transportation: To be obtained, if needed, once transportation necessary       LAYTON Ardon  09/07/20   1104

## 2020-09-07 NOTE — ED NOTES
Patient states he wants to "[place] [place] myself by over dosing on heroine"  He is willing to sign a 201       Jayda Garcia RN  09/07/20 1338

## 2020-09-07 NOTE — ED NOTES
Call placed to [facility], spoke with Vilma Shen who reports dual bed availability; chart faxed for review       LAYTON Casillas  09/07/20   5863

## 2020-09-07 NOTE — ED NOTES
Spoke to America Duffy in crisis Pt is accepted at [facility] she will let us know transport details when available        Milla Cha RN  09/07/20 6267

## 2020-09-07 NOTE — ED NOTES
Pt is a 31 y o  male who was brought to the ED with increased anxiety and suicidal ideations  Patient states that last night he relapsed on drugs after 6 months of being clean  He reports using IV fentanyl, crystal meth and cocaine, "not caring if he woke up"  Patient states that he didn't use enough that it would actually kill him, however, he was feeling helpless and had wished to be dead  Patient reports feeling this way suddenly yesterday because it was the second time since being sober that he experienced both visual and auditory hallucinations  Patient could not provide specifics on the content of the hallucinations, but denies anything command in nature  He reports they are just "normal, everyday things", but he has confirmed from others that it is not real  Patient states that he is frustrated that after finally being able to stay clean, he had experienced these hallucinations  Patient has a history of suicidal ideations, but denies any prior attempts  Patient denies any current MH or D&A outpatient; he is currently prescribed medications from his PCP  Patient does report that AA has been helpful in the past, but everything has been inconsistent since COVID  Patient has a history of inpatient mental health treatment and detox/rehab treatment  Patient reports not being able to sleep for the past 4 days  He relates decreased appetite since December 2019; he expressed a weight loss of 100 pounds, however, his records indicate it's been about 50 pounds  Patient denies homicidal ideations  Patient reports an increase in stress due to work and just life/finances, however, feels that the sudden hallucinations is what caused him to relapse  Patient identifies that if he doesn't get help now, things will continue to get worse and he won't be alive  Patient agreeable to inpatient treatment at this time      Chief Complaint   Patient presents with    Suicidal     Patient states he wasnt to OD on heroine      Intake Assessment completed, Safety risk Assessment completed    Juanita Anthony, [facility] [place]  09/07/20   7934

## 2020-09-07 NOTE — ED NOTES
Straight cath returned 1400mls of dark malodorous urine  Sample sent for analysis  Spoke to provider concenring pt's medications and the pt's concern he is going to have a seizure        Tiana Del Cid RN  09/07/20 1012

## 2020-09-07 NOTE — ED NOTES
Attempted to return pts belongings and pt insisting that he has more in his car and that he won't leave for [place] without his items from his car  Pt verbally abusive to this RN  Charge RN taking over pts care until discharge at this time        Amy Crenshaw RN  09/07/20 0791

## 2020-09-07 NOTE — ED NOTES
[facility] sleeping sitting up in bed  Provided warm blanket  Denies needing anything else at this time        Tiana Del Cid RN  09/07/20 0829

## 2020-09-07 NOTE — ED NOTES
Patient escorted by constables for transfer to facility  Refused vital signs on departure       Obed Jackson RN  09/07/20 2000 Felisha Gonzales RN  09/07/20 4999

## 2020-09-07 NOTE — ED PROVIDER NOTES
History  Chief Complaint   Patient presents with    Suicidal     Patient states he wasnt to OD on heroine      71-year-old male past medical history of methamphetamine abuse, anxiety, depression presents for evaluation of suicidal ideation, wanting to overdose, patient states that he has been clean from methamphetamine use for 6 months and today decided to use methamphetamine, fentanyl, states that he felt that he did not care if he lived or   He has been having visual hallucinations intermittently ever since using methamphetamine in the past   States that he sees lights that he feels are drawn, no command hallucinations  Currently no suicidal ideation but feels that he would not care if he  tonight   Denies any alcohol use  Also states that he feels very shaky as he forgot to take his Klonopin for last 4 days  No other medical complaints          Prior to Admission Medications   Prescriptions Last Dose Informant Patient Reported? Taking?    Amphetamine-Dextroamphetamine (ADDERALL PO)  Self Yes Yes   Sig: Take 20 mg by mouth 2 (two) times a day   adefovir (HEPSERA) 10 MG tablet  Self Yes Yes   Sig: Take 10 mg by mouth daily   albuterol (PROVENTIL HFA,VENTOLIN HFA) 90 mcg/act inhaler  Self Yes Yes   Sig: Inhale 2 puffs every 6 (six) hours as needed for wheezing   amphetamine-dextroamphetamine (ADDERALL) 20 mg tablet   No Yes   Sig: Take 1 tablet (20 mg total) by mouth 2 (two) times a day   buPROPion (WELLBUTRIN XL) 150 mg 24 hr tablet  Self No Yes   Sig: Take 1 tablet (150 mg total) by mouth daily At 9am   clonazePAM (KlonoPIN) 1 mg tablet   No Yes   Sig: Take 1 tablet (1 mg total) by mouth 3 (three) times a day   fluticasone (FLONASE) 50 mcg/act nasal spray  Self No Yes   Si spray into each nostril daily   naproxen (NAPROSYN) 500 mg tablet  Self No Yes   Sig: Take 1 tablet (500 mg total) by mouth daily      Facility-Administered Medications: None       Past Medical History:   Diagnosis Date    Addiction to drug ([place] [address] )     Allergic     Anxiety     Benzodiazepine withdrawal ([facility] ) 10/31/2019    Chronic pain disorder     Depression     Elevated AST (SGOT) 8/3/2019    Hepatitis C 8/4/2019    Hepatitis C     Methamphetamine abuse ([facility] ) 8/7/2019    Psychiatric disorder     Severe episode of recurrent major depressive disorder, without psychotic features ([facility] ) 7/17/2019    Spinal stenosis     Substance abuse ([place] [address] )     Suicide attempt ([place] [address] )     Urinary retention        Past Surgical History:   Procedure Laterality Date    BACK SURGERY      FRACTURE SURGERY      left foot reconstruted     SPINE SURGERY      hernated disc        Family History   Problem Relation Age of Onset    Hypertension Mother     Hyperlipidemia Father     Heart disease Father     Depression Maternal Grandmother     Hypertension Maternal Grandmother     Squamous cell carcinoma Maternal Grandfather     Alcohol abuse Maternal Grandfather     Alzheimer's disease Paternal Grandmother     Dementia Paternal Grandmother     Alcohol abuse Paternal Uncle     Colon cancer Neg Hx     Colon polyps Neg Hx      I have reviewed and agree with the history as documented      E-Cigarette/Vaping    E-Cigarette Use Current Every Day User      E-Cigarette/Vaping Substances    Nicotine Yes     THC No     CBD No     Flavoring No     Other No     Unknown No      Social History     Tobacco Use    Smoking status: Current Every Day Smoker     Packs/day: 1 00     Types: E-Cigarettes     Last attempt to quit: 09/2017     Years since quitting: 3 0    Smokeless tobacco: Current User    Tobacco comment: Pt uses a "vape" pen - use is equivalent to approximately 1 pack per day   Substance Use Topics    Alcohol use: Not Currently     Frequency: Never     Drinks per session: 1 or 2     Binge frequency: Never    Drug use: Not Currently     Frequency: 3 0 times per week     Types: Methamphetamines     Comment: pt states last used 1 month ago Review of Systems   Constitutional: Negative for appetite change, chills and fever  HENT: Negative for rhinorrhea and sore throat  Eyes: Negative for photophobia and visual disturbance  Respiratory: Negative for cough and shortness of breath  Cardiovascular: Negative for chest pain and palpitations  Gastrointestinal: Negative for abdominal pain and diarrhea  Genitourinary: Negative for dysuria, frequency and urgency  Skin: Negative for rash  Neurological: Negative for dizziness and weakness  Psychiatric/Behavioral: Positive for dysphoric mood, hallucinations and sleep disturbance  Negative for self-injury  The patient is nervous/anxious  All other systems reviewed and are negative  Physical Exam  Physical Exam  Vitals signs and nursing note reviewed  Constitutional:       Appearance: He is well-developed  HENT:      Head: Normocephalic and atraumatic  Right Ear: External ear normal       Left Ear: External ear normal    Eyes:      Conjunctiva/sclera: Conjunctivae normal       Pupils: Pupils are equal, round, and reactive to light  Neck:      Musculoskeletal: Normal range of motion and neck supple  Vascular: No JVD  Trachea: No tracheal deviation  Cardiovascular:      Rate and Rhythm: Normal rate and regular rhythm  Heart sounds: Normal heart sounds  No murmur  No friction rub  No gallop  Pulmonary:      Effort: Pulmonary effort is normal  No respiratory distress  Breath sounds: No stridor  No wheezing or rales  Abdominal:      General: There is no distension  Palpations: Abdomen is soft  There is no mass  Tenderness: There is no abdominal tenderness  There is no guarding or rebound  Musculoskeletal: Normal range of motion  Skin:     General: Skin is warm and dry  Coloration: Skin is not pale  Findings: No erythema or rash  Neurological:      Mental Status: He is alert and oriented to person, place, and time  Cranial Nerves: No cranial nerve deficit  Psychiatric:         Attention and Perception: He perceives visual hallucinations  Mood and Affect: Mood is anxious  Thought Content: Thought content does not include homicidal or suicidal ideation  Thought content does not include homicidal plan  Comments: Patient initially stated to the nurse that he felt suicidal however on questioning patient states that he felt like he did not care if he lived or  after he used methamphetamine and fentanyl today  Did not have a specific plan to kill himself when he used the drugs states that he used  because he continues to have intermittent hallucinations         Vital Signs  ED Triage Vitals [20 0236]   Temperature Pulse Respirations Blood Pressure SpO2   98 9 °F (37 2 °C) 70 20 129/84 95 %      Temp Source Heart Rate Source Patient Position - Orthostatic VS BP Location FiO2 (%)   Tympanic Monitor Sitting Left arm --      Pain Score       --           Vitals:    20 0236 20 0927 20 1252   BP: 129/84 123/71 122/68   Pulse: 70 94 87   Patient Position - Orthostatic VS: Sitting Standing Sitting         Visual Acuity      ED Medications  Medications   LORazepam (ATIVAN) tablet 1 mg (1 mg Oral Given 20 0323)   clonazePAM (KlonoPIN) tablet 1 mg (1 mg Oral Given 20 1413)       Diagnostic Studies  Results Reviewed     Procedure Component Value Units Date/Time    Rapid drug screen, urine [[id number]]  (Abnormal) Collected:  20 1001    Lab Status:  Final result Specimen:  Urine, Catheter Updated:  20 1014     Amph/Meth UR Positive     Barbiturate Ur Negative     Benzodiazepine Urine Positive     Cocaine Urine Positive     Methadone Urine Negative     Opiate Urine Negative     PCP Ur Negative     THC Urine Positive     Oxycodone Urine Negative    Narrative:       Presumptive report  If requested, specimen will be sent to reference lab for confirmation    FOR MEDICAL PURPOSES ONLY    IF CONFIRMATION NEEDED PLEASE CONTACT THE LAB WITHIN 5 DAYS  Drug Screen Cutoff Levels:  AMPHETAMINE/METHAMPHETAMINES  1000 ng/mL  BARBITURATES     200 ng/mL  BENZODIAZEPINES     200 ng/mL  COCAINE      300 ng/mL  METHADONE      300 ng/mL  OPIATES      300 ng/mL  PHENCYCLIDINE     25 ng/mL  THC       50 ng/mL  OXYCODONE      100 ng/mL    Novel Coronavirus (Covid-19),PCR [facility] [[id number]]  (Normal) Collected:  09/07/20 0249    Lab Status:  Final result Specimen:  Nares from Nose Updated:  09/07/20 0432     SARS-CoV-2 Negative    Narrative: The specimen collection materials, transport medium, and/or testing methodology utilized in the production of these test results have been proven to be reliable in a limited validation with an abbreviated program under the Emergency Utilization Authorization provided by the FDA  Testing reported as "Presumptive positive" will be confirmed with secondary testing with a reference laboratory to ensure result accuracy  Clinical caution and judgement should be used with the interpretation of these results with consideration of the clinical impression and other laboratory testing  Testing reported as "Positive" or "Negative" has been proven to be accurate according to standard laboratory validation requirements  All testing is performed with control materials showing appropriate reactivity at standard intervals  POCT alcohol breath test [[id number]]  (Normal) Resulted:  09/07/20 0244    Lab Status:  Final result Updated:  09/07/20 0244     EXTBreath Alcohol 0 00                 No orders to display              Procedures  Procedures         ED Course  ED Course as of Sep 07 2216   [facility] Sep 07, 2020   0873 Patient signed out to a   Physician, Dr Korey Khan for crisis evaluation          US AUDIT      Most Recent Value   Initial Alcohol Screen: US AUDIT-C    1  How often do you have a drink containing alcohol?  0 Filed at: 09/07/2020 0237   2   How many drinks containing alcohol do you have on a typical day you are drinking? 0 Filed at: 09/07/2020 0237   3a  Male UNDER 65: How often do you have five or more drinks on one occasion? 0 Filed at: 09/07/2020 0237   3b  FEMALE Any Age, or MALE 65+: How often do you have 4 or more drinks on one occassion? 0 Filed at: 09/07/2020 0237   Audit-C Score  0 Filed at: 09/07/2020 5858                  MARYAM/DAST-10      Most Recent Value   How many times in the past year have you    Used an illegal drug or used a prescription medication for non-medical reasons? Daily or Almost Daily Filed at: 09/07/2020 0237   In the past 12 months      1  Have you used drugs other than those required for medical reasons? 1 Filed at: 09/07/2020 0237   2  Do you use more than one drug at a time? 1 Filed at: 09/07/2020 0237   3  Have you had medical problems as a result of your drug use (e g , memory loss, hepatitis, convulsions, bleeding, etc )? 1 Filed at: 09/07/2020 0237   4  Have you had "blackouts" or "flashbacks" as a result of drug use? YesNo  1 Filed at: 09/07/2020 0237   5  Do you ever feel bad or guilty about your drug use? 1 Filed at: 09/07/2020 0237   6  Does your spouse (or parent) ever complain about your involvement with drugs? 1 Filed at: 09/07/2020 0237   7  Have you neglected your family because of your use of drugs? 1 Filed at: 09/07/2020 0237   8  Have you engaged in illegal activities in order to obtain drugs? 1 Filed at: 09/07/2020 0237   9  Have you ever experienced withdrawal symptoms (felt sick) when you stopped taking drugs? 1 Filed at: 09/07/2020 0237   10   Are you always able to stop using drugs when you want to?  0 Filed at: 09/07/2020 0237   DAST-10 Score  (!) 9 Filed at: 09/07/2020 1869                                MDM  Number of Diagnoses or Management Options  Diagnosis management comments: 79-year-old male presents for evaluation of what he initially stated was suicidal ideation however to me he described not caring if he lived or  after used drugs this evening and specifically stated that he did not use the drugs to hurt himself  Currently no medical complaints, will have crisis evaluate patient        Disposition  Final diagnoses:   Depression with suicidal ideation   Substance abuse ([facility] )     Time reflects when diagnosis was documented in both MDM as applicable and the Disposition within this note     Time User Action Codes Description Comment    2020  1:03 PM Yisel Castaneda Add [F32 9,  R45 851] Depression with suicidal ideation     2020  1:03 PM Yisel Castaneda Add [F19 10] Substance abuse [facility])       ED Disposition     ED Disposition Condition Date/Time Comment    Transfer to [facility] Sep 7, 2020  1:02 PM Vincent Palomares should be transferred out to inpatient psych and has been medically cleared          MD Documentation      Most Recent Value   Patient Condition  The patient has been stabilized such that within reasonable medical probability, no material deterioration of the patient condition or the condition of the unborn child(mali) is likely to result from the transfer   Reason for Transfer  Level of Care needed not available at this facility   Benefits of Transfer  Other benefits (Include comment)_______________________ [facility]]   Risks of Transfer  Potential for delay in receiving treatment   Accepting Physician  [facility]    (Name & Tel number)  LAYTON Gary   Transported by [facility] and Unit [facility])  Nick Peer   Sending MD Dr Karlo Byrne   Provider Certification  General risk, such as traffic hazards, adverse weather conditions, rough terrain or turbulence, possible failure of equipment (including vehicle or aircraft), or consequences of actions of persons outside the control of the transport personnel      RN Documentation      Most [address] [facility] [address] Name, [facility]    Alissa DELVALLE    (Name & Tel number)  Erin SpanglerLAYTON   Transported by [facility] and Unit #)  CTS      Follow-up Information    None         Discharge Medication List as of 9/7/2020  2:36 PM      CONTINUE these medications which have NOT CHANGED    Details   adefovir (HEPSERA) 10 MG tablet Take 10 mg by mouth daily, Historical Med      albuterol (PROVENTIL HFA,VENTOLIN HFA) 90 mcg/act inhaler Inhale 2 puffs every 6 (six) hours as needed for wheezing, Historical Med      !! Amphetamine-Dextroamphetamine (ADDERALL PO) Take 20 mg by mouth 2 (two) times a day, Historical Med      !! amphetamine-dextroamphetamine (ADDERALL) 20 mg tablet Take 1 tablet (20 mg total) by mouth 2 (two) times a day, Starting Wed 9/2/2020, Normal      buPROPion (WELLBUTRIN XL) 150 mg 24 hr tablet Take 1 tablet (150 mg total) by mouth daily At 9am, Starting Fri 6/5/2020, Normal      clonazePAM (KlonoPIN) 1 mg tablet Take 1 tablet (1 mg total) by mouth 3 (three) times a day, Starting Tue 9/1/2020, Normal      fluticasone (FLONASE) 50 mcg/act nasal spray 1 spray into each nostril daily, Starting Fri 6/5/2020, Normal      naproxen (NAPROSYN) 500 mg tablet Take 1 tablet (500 mg total) by mouth daily, Starting Mon 4/27/2020, Normal       !! - Potential duplicate medications found  Please discuss with provider  No discharge procedures on file      PDMP Review     None          ED Provider  Electronically Signed by           Patrick Frost MD  09/07/20 3252

## 2020-09-07 NOTE — ED NOTES
Pt drank entire bottle of water and was able to void 100ml of clear yellow urine         Radha Kowalski RN  09/07/20 9677

## 2020-09-08 NOTE — TELEPHONE ENCOUNTER
I reached out to [facility] to investigate when patient received his [address] delivery? I'm not sure of his start date  He received medication first fill of Mavyret on 8/5 and the second fill on 8/31 to [address]  He is currently admitted to [facility] as of 9/7/2020

## 2020-09-28 DIAGNOSIS — F98.8 ATTENTION DEFICIT DISORDER (ADD) WITHOUT HYPERACTIVITY: ICD-10-CM

## 2020-09-30 ENCOUNTER — TELEPHONE (OUTPATIENT)
Dept: GASTROENTEROLOGY | Facility: CLINIC | Age: 30
End: 2020-09-30

## 2020-09-30 DIAGNOSIS — B18.2 CHRONIC HEPATITIS C WITHOUT HEPATIC COMA (HCC): Primary | ICD-10-CM

## 2020-09-30 RX ORDER — DEXTROAMPHETAMINE SACCHARATE, AMPHETAMINE ASPARTATE, DEXTROAMPHETAMINE SULFATE AND AMPHETAMINE SULFATE 5; 5; 5; 5 MG/1; MG/1; MG/1; MG/1
1 TABLET ORAL 2 TIMES DAILY
Qty: 60 TABLET | Refills: 0 | Status: SHIPPED | OUTPATIENT
Start: 2020-10-01 | End: 2020-10-26 | Stop reason: SDUPTHER

## 2020-09-30 NOTE — TELEPHONE ENCOUNTER
FYI--Pt in ER again 9/7/2020--for SI--wanting to OD--relapsed using Meth & Fentanyl       PDMP--Adderall last filled  9/2/2020

## 2020-09-30 NOTE — TELEPHONE ENCOUNTER
Pt states he has 1 wk left of 8-wk Mavyret tx for Hep C; has ques about how he knows it's working, labs, appt? Call picked up by nurse

## 2020-09-30 NOTE — TELEPHONE ENCOUNTER
I spoke with patient he has 9 days left of Mavyret treatment  He has completed almost 7 weeks of treatment  He will go for lab work Friday at [facility]  Virtual Visit scheduled with Vita Peoples 10/9/2020  3 month Viral Load Lab orders placed to check SVR 1/1/2021 (mailed a copy to home address)

## 2020-10-05 LAB — EXT SARS-COV-2: NOT DETECTED

## 2020-10-09 ENCOUNTER — TELEMEDICINE (OUTPATIENT)
Dept: GASTROENTEROLOGY | Facility: CLINIC | Age: 30
End: 2020-10-09
Payer: COMMERCIAL

## 2020-10-09 VITALS — WEIGHT: 150 LBS | HEIGHT: 70 IN | BODY MASS INDEX: 21.47 KG/M2

## 2020-10-09 DIAGNOSIS — B18.2 CHRONIC HEPATITIS C WITHOUT HEPATIC COMA (HCC): Primary | ICD-10-CM

## 2020-10-09 PROCEDURE — 99213 OFFICE O/P EST LOW 20 MIN: CPT | Performed by: INTERNAL MEDICINE

## 2020-10-26 DIAGNOSIS — F98.8 ATTENTION DEFICIT DISORDER (ADD) WITHOUT HYPERACTIVITY: ICD-10-CM

## 2020-10-27 RX ORDER — DEXTROAMPHETAMINE SACCHARATE, AMPHETAMINE ASPARTATE, DEXTROAMPHETAMINE SULFATE AND AMPHETAMINE SULFATE 5; 5; 5; 5 MG/1; MG/1; MG/1; MG/1
1 TABLET ORAL 2 TIMES DAILY
Qty: 60 TABLET | Refills: 0 | Status: SHIPPED | OUTPATIENT
Start: 2020-10-30 | End: 2021-01-20 | Stop reason: SDUPTHER

## 2020-10-29 ENCOUNTER — NURSE TRIAGE (OUTPATIENT)
Dept: OTHER | Facility: OTHER | Age: 30
End: 2020-10-29

## 2020-11-04 LAB — EXT SARS-COV-2: NEGATIVE

## 2020-11-24 ENCOUNTER — TELEPHONE (OUTPATIENT)
Dept: FAMILY MEDICINE CLINIC | Facility: CLINIC | Age: 30
End: 2020-11-24

## 2021-01-15 DIAGNOSIS — F41.9 ANXIETY: ICD-10-CM

## 2021-01-15 DIAGNOSIS — F98.8 ATTENTION DEFICIT DISORDER (ADD) WITHOUT HYPERACTIVITY: ICD-10-CM

## 2021-01-15 RX ORDER — DEXTROAMPHETAMINE SACCHARATE, AMPHETAMINE ASPARTATE, DEXTROAMPHETAMINE SULFATE AND AMPHETAMINE SULFATE 5; 5; 5; 5 MG/1; MG/1; MG/1; MG/1
1 TABLET ORAL 2 TIMES DAILY
Qty: 60 TABLET | Refills: 0 | Status: CANCELLED | OUTPATIENT
Start: 2021-01-15

## 2021-01-15 RX ORDER — CLONAZEPAM 1 MG/1
1 TABLET ORAL 3 TIMES DAILY
Qty: 90 TABLET | Refills: 0 | Status: CANCELLED | OUTPATIENT
Start: 2021-01-15

## 2021-01-16 ENCOUNTER — DOCUMENTATION (OUTPATIENT)
Dept: FAMILY MEDICINE CLINIC | Facility: CLINIC | Age: 31
End: 2021-01-16

## 2021-01-16 ENCOUNTER — NURSE TRIAGE (OUTPATIENT)
Dept: OTHER | Facility: OTHER | Age: 31
End: 2021-01-16

## 2021-01-16 DIAGNOSIS — F41.9 ANXIETY: ICD-10-CM

## 2021-01-16 DIAGNOSIS — F98.8 ATTENTION DEFICIT DISORDER (ADD) WITHOUT HYPERACTIVITY: ICD-10-CM

## 2021-01-16 RX ORDER — DEXTROAMPHETAMINE SACCHARATE, AMPHETAMINE ASPARTATE, DEXTROAMPHETAMINE SULFATE AND AMPHETAMINE SULFATE 5; 5; 5; 5 MG/1; MG/1; MG/1; MG/1
1 TABLET ORAL 2 TIMES DAILY
Qty: 60 TABLET | Refills: 0 | Status: CANCELLED | OUTPATIENT
Start: 2021-01-16

## 2021-01-16 RX ORDER — CLONAZEPAM 1 MG/1
1 TABLET ORAL 3 TIMES DAILY
Qty: 90 TABLET | Refills: 0 | Status: CANCELLED | OUTPATIENT
Start: 2021-01-16

## 2021-01-16 NOTE — TELEPHONE ENCOUNTER
Regarding: Medication Refill   ----- Message from Alba Redman sent at 1/16/2021 10:15 AM EST -----  "I just got out the hospital and I need refills on my medication clonazePAM 1 mg   I cannot stop taking this medication because in the pass I had seizure"

## 2021-01-16 NOTE — TELEPHONE ENCOUNTER
TC to on call provider:   Pt is requesting a medication refill for clonazepam 1mg tablet 3 times daily  Pt was just released from being hospitalized with COVID  Pt states he is out of his medication and that the hospital would not prescribe him any upon discharge  Pt has no medication left  How should I advise? TC from on call provider:   I will review patients chart  Klonopin was refilled yesterday  1 week was sent to the pharmacy in [place]  Was filled by a dr Pamela Zhang  He can call office Monday to get refills  I saw it on the pmed  Advised by on call provider that refill was ordered and sent to pharmacy in [place]  I personally contacted the [place] [address] location for more information  Per pharmacy the prescription was refilled at location nearest to hospital and was picked up last night around 8:16pm at the [facility] location  Pt advised that medication was sent to the [facility] location and was picked up last night  Pt now stated that he did  his medication and only has a few day supply  Pt advised to contact office on Monday morning for normal refill  On call provider updated that pt has received current refill from hospital      Reason for Disposition   [1] Request for URGENT new prescription or refill of "essential" medication (i e , likelihood of harm to patient if not taken) AND [2] triager unable to fill per unit policy    Answer Assessment - Initial Assessment Questions  1  NAME of MEDICATION: "What medicine are you calling about?"      clonazepam 1mg 3 times a day   2  QUESTION: "What is your question?"      "I was just relieved from the hospital for [facility]  I need a refill  I do not have any pills left  They wouldn't give me a prescription at discharge, they told me to follow up with my family doctor "   3  PRESCRIBING HCP: "Who prescribed it?" Reason: if prescribed by specialist, call should be referred to that group  Dr Carol Dang   4   SYMPTOMS: "Do you have any symptoms?"      Denies   5   SEVERITY: If symptoms are present, ask "Are they mild, moderate or severe?"      N/A    Protocols used: MEDICATION QUESTION CALL-ADULT-

## 2021-01-17 RX ORDER — CLONAZEPAM 1 MG/1
TABLET ORAL
Qty: 90 TABLET | Refills: 5 | Status: SHIPPED | OUTPATIENT
Start: 2021-01-17 | End: 2021-06-29

## 2021-01-20 ENCOUNTER — TELEMEDICINE (OUTPATIENT)
Dept: FAMILY MEDICINE CLINIC | Facility: CLINIC | Age: 31
End: 2021-01-20
Payer: COMMERCIAL

## 2021-01-20 VITALS — HEIGHT: 70 IN | BODY MASS INDEX: 24.34 KG/M2 | WEIGHT: 170 LBS

## 2021-01-20 DIAGNOSIS — F98.8 ATTENTION DEFICIT DISORDER (ADD) WITHOUT HYPERACTIVITY: ICD-10-CM

## 2021-01-20 PROCEDURE — 99213 OFFICE O/P EST LOW 20 MIN: CPT | Performed by: FAMILY MEDICINE

## 2021-01-20 PROCEDURE — 3725F SCREEN DEPRESSION PERFORMED: CPT | Performed by: FAMILY MEDICINE

## 2021-01-20 PROCEDURE — 3008F BODY MASS INDEX DOCD: CPT | Performed by: FAMILY MEDICINE

## 2021-01-20 RX ORDER — DEXTROAMPHETAMINE SACCHARATE, AMPHETAMINE ASPARTATE, DEXTROAMPHETAMINE SULFATE AND AMPHETAMINE SULFATE 5; 5; 5; 5 MG/1; MG/1; MG/1; MG/1
1 TABLET ORAL 2 TIMES DAILY
Qty: 60 TABLET | Refills: 0 | Status: SHIPPED | OUTPATIENT
Start: 2021-01-20 | End: 2021-03-13 | Stop reason: SDUPTHER

## 2021-01-20 NOTE — PROGRESS NOTES
Virtual Regular Visit      Assessment/Plan:    Problem List Items Addressed This Visit     None      Visit Diagnoses     Attention deficit disorder (ADD) without hyperactivity        Relevant Medications    amphetamine-dextroamphetamine (ADDERALL) 20 mg tablet               Reason for visit is   Chief Complaint   Patient presents with    Follow-up     Derick Valencia Med-Check        Encounter provider Og King MD    Provider located at [address] [place] [id number]      Recent Visits  No visits were found meeting these conditions  Showing recent visits within past 7 days and meeting all other requirements     Today's Visits  Date Type Provider Dept   01/20/21 Telemedicine Og King MD [facility] [address] [facility] today's visits and meeting all other requirements     Future Appointments  No visits were found meeting these conditions  Showing future appointments within next 150 days and meeting all other requirements        The patient was identified by name and date of birth  Bernie Rowe was informed that this is a telemedicine visit and that the visit is being conducted through [address] [place] [address] and patient was informed that this is not a secure, HIPAA-compliant platform  He agrees to proceed     My office door was closed  No one else was in the room  He acknowledged consent and understanding of privacy and security of the video platform  The patient has agreed to participate and understands they can discontinue the visit at any time  Patient is aware this is a billable service  Subjective  Bernie Rowe is a 31 y o  male 6 mo f/u adderall-stable         HPI     Past Medical History:   Diagnosis Date    Addiction [facility] drug ([facility] )     Allergic     Anxiety     Benzodiazepine withdrawal ([facility] ) 10/31/2019    Chronic pain disorder     Depression     Elevated AST (SGOT) 8/3/2019    Hepatitis C 8/4/2019    Hepatitis C     Methamphetamine abuse ([facility] ) 8/7/2019    Psychiatric disorder     Severe episode of recurrent major depressive disorder, without psychotic features ([place] [address] ) 7/17/2019    Spinal stenosis     Substance abuse ([place] [address] )     Suicide attempt ([place] [address] )     Urinary retention        Past Surgical History:   Procedure Laterality Date    BACK SURGERY      FRACTURE SURGERY      left foot reconstruted     SPINE SURGERY      hernated disc        Current Outpatient Medications   Medication Sig Dispense Refill    albuterol (PROVENTIL HFA,VENTOLIN HFA) 90 mcg/act inhaler Inhale 2 puffs every 6 (six) hours as needed for wheezing      amphetamine-dextroamphetamine (ADDERALL) 20 mg tablet Take 1 tablet (20 mg total) by mouth 2 (two) times a day 60 tablet 0    buPROPion (WELLBUTRIN XL) 150 mg 24 hr tablet Take 1 tablet (150 mg total) by mouth daily At 9am 30 tablet 5    clonazePAM (KlonoPIN) 1 mg tablet take 1 tablet by mouth three times a day 90 tablet 5    fluticasone (FLONASE) 50 mcg/act nasal spray 1 spray into each nostril daily 1 Bottle 5     No current facility-administered medications for this visit  Allergies   Allergen Reactions    Geodon [Ziprasidone]      Tardivdiskinesea       Review of Systems   Constitutional: Negative for fatigue, fever and unexpected weight change  HENT: Negative for congestion, sinus pain and sore throat  Eyes: Negative for visual disturbance  Respiratory: Negative for shortness of breath and wheezing  Cardiovascular: Negative for chest pain and palpitations  Gastrointestinal: Negative for abdominal pain, nausea and vomiting  Musculoskeletal: Negative  Negative for arthralgias and myalgias  Neurological: Negative for syncope, weakness and numbness  Psychiatric/Behavioral: Negative  Negative for confusion, dysphoric mood and suicidal ideas         Video Exam    Vitals:    01/20/21 0810   Weight: 77 1 kg (170 lb)   Height: 5' 10" (1 778 m)       Physical Exam  Pulmonary: Effort: Pulmonary effort is normal           I spent 15 minutes directly with the patient during this visit      VIRTUAL VISIT DISCLAIMER    Ngavictor manuel Pruitt acknowledges that he has consented to an online visit or consultation  He understands that the online visit is based solely on information provided by him, and that, in the absence of a face-to-face physical evaluation by the physician, the diagnosis he receives is both limited and provisional in terms of accuracy and completeness  This is not intended to replace a full medical face-to-face evaluation by the physician  Nga Pruitt understands and accepts these terms

## 2021-03-13 DIAGNOSIS — F98.8 ATTENTION DEFICIT DISORDER (ADD) WITHOUT HYPERACTIVITY: ICD-10-CM

## 2021-03-15 RX ORDER — DEXTROAMPHETAMINE SACCHARATE, AMPHETAMINE ASPARTATE, DEXTROAMPHETAMINE SULFATE AND AMPHETAMINE SULFATE 5; 5; 5; 5 MG/1; MG/1; MG/1; MG/1
1 TABLET ORAL 2 TIMES DAILY
Qty: 60 TABLET | Refills: 0 | Status: SHIPPED | OUTPATIENT
Start: 2021-03-15 | End: 2021-04-10 | Stop reason: SDUPTHER

## 2021-04-10 DIAGNOSIS — J34.89 RHINORRHEA: ICD-10-CM

## 2021-04-10 DIAGNOSIS — F98.8 ATTENTION DEFICIT DISORDER (ADD) WITHOUT HYPERACTIVITY: ICD-10-CM

## 2021-04-10 DIAGNOSIS — F33.2 SEVERE EPISODE OF RECURRENT MAJOR DEPRESSIVE DISORDER, WITHOUT PSYCHOTIC FEATURES (HCC): ICD-10-CM

## 2021-04-12 ENCOUNTER — TELEPHONE (OUTPATIENT)
Dept: FAMILY MEDICINE CLINIC | Facility: CLINIC | Age: 31
End: 2021-04-12

## 2021-04-12 RX ORDER — BUPROPION HYDROCHLORIDE 150 MG/1
150 TABLET ORAL DAILY
Qty: 30 TABLET | Refills: 0 | Status: SHIPPED | OUTPATIENT
Start: 2021-04-12 | End: 2022-03-03 | Stop reason: SDUPTHER

## 2021-04-12 RX ORDER — FLUTICASONE PROPIONATE 50 MCG
1 SPRAY, SUSPENSION (ML) NASAL DAILY
Qty: 1 BOTTLE | Refills: 0 | Status: SHIPPED | OUTPATIENT
Start: 2021-04-12 | End: 2021-07-28 | Stop reason: SDUPTHER

## 2021-04-12 RX ORDER — DEXTROAMPHETAMINE SACCHARATE, AMPHETAMINE ASPARTATE, DEXTROAMPHETAMINE SULFATE AND AMPHETAMINE SULFATE 5; 5; 5; 5 MG/1; MG/1; MG/1; MG/1
1 TABLET ORAL 2 TIMES DAILY
Qty: 60 TABLET | Refills: 0 | Status: SHIPPED | OUTPATIENT
Start: 2021-04-13 | End: 2021-06-01 | Stop reason: SDUPTHER

## 2021-04-12 NOTE — TELEPHONE ENCOUNTER
Pt claims he will need a prior auth for next months adderall prescription refill     I think pt needs an ov because its been over a year since his last visit  But I don't know if Im right

## 2021-04-23 NOTE — PROGRESS NOTES
Progress Note - Behavioral Health   Gerry Bosworth 33 y o  male MRN: [id number]  Unit/Bed#: [facility], [place] 251-01 Encounter: [id number]    Assessment/Plan   Principal Problem:    Severe episode of recurrent major depressive disorder, without psychotic features ([address] )  Active Problems:    Generalized anxiety disorder    - Patient is currently on:  Escitalopram 10 mg daily  Mirtazapine 15 mg q h s  Clonazepam 0 5 mg t i d   Gabapentin 300 mg t i d   Methadone 165 mg q day  Melatonin 3 mg p o  Q h s  Darlene Guadarrama - Patient continues complaining of moderate anxiety  He verbalizes that gabapentin helps with symptoms of anxiety  - Will consider increasing the dose of gabapentin after assessing with the attending, Dr Vivian Soni  - Patient remains on vitals Q 4  Will discuss with attending regarding possibility of changing his vital sign checks  - will continue to monitor closely and assessed for any signs of withdrawal off and for any signs of medication adverse effects  Subjective:  No overnight events as per nursing records  Patient compliant with medication and displaying no side effects or adverse reactions  Patient complains of mild anxiety for which he reports gabapentin helped with  His labs p r n  Lorazepam of 1 mg was given on 07/18  Patient states is sleeping well, and states hat has a good appetite  Patient rates his depression as a 5/10 and his anxiety as a 6/10  Denies any symptoms or signs of panic attacks  He denies any visual or auditory hallucinations  He denies any suicidal or homicidal ideation  He is participating in therapy groups and milieu as much as possible  He takes care of his activities of daily living          Current Medications:    Current Facility-Administered Medications:  aluminum-magnesium hydroxide-simethicone 30 mL Oral Q4H PRN Lalito Vieyra MD   clonazePAM 0 5 mg Oral TID Jesus Edmonds   escitalopram 10 mg Oral Daily Jesus Edmonds   gabapentin 300 mg Oral TID Verita Labor III, Pt with c/o fever, weak, loss of appetite, diarrhea, chills, generalized body aches x 1 week. Pt is no longer having abd pain, denies vomiting. Pt a/o x4. Last took ibuprofen 1900. Pt a/o x4.    DO   haloperidol lactate 5 mg Intramuscular Q6H PRN Cindy Yeboah MD   ibuprofen 400 mg Oral Q6H PRN Ottie Hawkins III, DO   ibuprofen 600 mg Oral Q6H PRN Ottie Hawkins III, DO   ibuprofen 800 mg Oral Q8H PRN Cindy Yeboah MD   LORazepam 2 mg Intramuscular Q4H PRN Cindy Yeboah MD   LORazepam 1 mg Oral Q6H PRN [facility]   magnesium hydroxide 30 mL Oral Daily PRN Cindy Yeboah MD   melatonin 3 mg Oral HS [facility]   methadone 165 mg Oral Daily [facility]   mirtazapine 15 mg Oral HS [facility]   nicotine 1 patch Transdermal Daily Lalito Vieyra MD   nicotine polacrilex 4 mg Oral Q2H PRN [facility]   OLANZapine 5 mg Intramuscular Q3H PRN Cindy Yeboah MD   risperiDONE 2 mg Oral Q3H PRN Cindy Yeboah MD   traZODone 50 mg Oral HS PRN Cindy Yeboah MD       Behavioral Health Medications:   all current active meds have been reviewed, continue current psychiatric medications and current meds:   Current Facility-Administered Medications   Medication Dose Route Frequency    aluminum-magnesium hydroxide-simethicone (MYLANTA) 200-200-20 mg/5 mL oral suspension 30 mL  30 mL Oral Q4H PRN    clonazePAM (KlonoPIN) tablet 0 5 mg  0 5 mg Oral TID    escitalopram (LEXAPRO) tablet 10 mg  10 mg Oral Daily    gabapentin (NEURONTIN) capsule 300 mg  300 mg Oral TID    haloperidol lactate (HALDOL) injection 5 mg  5 mg Intramuscular Q6H PRN    ibuprofen (MOTRIN) tablet 400 mg  400 mg Oral Q6H PRN    ibuprofen (MOTRIN) tablet 600 mg  600 mg Oral Q6H PRN    ibuprofen (MOTRIN) tablet 800 mg  800 mg Oral Q8H PRN    LORazepam (ATIVAN) 2 mg/mL injection 2 mg  2 mg Intramuscular Q4H PRN    LORazepam (ATIVAN) tablet 1 mg  1 mg Oral Q6H PRN    magnesium hydroxide (MILK OF MAGNESIA) 400 mg/5 mL oral suspension 30 mL  30 mL Oral Daily PRN    melatonin tablet 3 mg  3 mg Oral HS    methadone (DOLOPHINE) 10 mg/mL oral concentrated solution 165 mg  165 mg Oral Daily    mirtazapine (REMERON) tablet 15 mg  15 mg Oral HS    nicotine (NICODERM CQ) 21 mg/24 hr TD 24 hr patch 1 patch  1 patch Transdermal Daily    nicotine polacrilex (NICORETTE) gum 4 mg  4 mg Oral Q2H PRN    OLANZapine (ZyPREXA) IM injection 5 mg  5 mg Intramuscular Q3H PRN    risperiDONE (RisperDAL M-TABS) dispersible tablet 2 mg  2 mg Oral Q3H PRN    traZODone (DESYREL) tablet 50 mg  50 mg Oral HS PRN     Vital signs in last 24 hours:  Temp:  [98 7 °F (37 1 °C)-99 7 °F (37 6 °C)] 98 8 °F (37 1 °C)  HR:  [58-85] 58  Resp:  [16-18] 18  BP: (101-132)/(55-65) 109/55    Laboratory results:    I have personally reviewed all pertinent laboratory/tests results    Most Recent Labs:   Lab Results   Component Value Date    WBC 7 39 07/16/2019    RBC 3 75 (L) 07/16/2019    HGB 11 0 (L) 07/16/2019    HCT 32 7 (L) 07/16/2019     07/16/2019    RDW 13 6 07/16/2019    NEUTROABS 2 72 07/16/2019    SODIUM 140 07/16/2019    K 3 8 07/16/2019     07/16/2019    CO2 30 07/16/2019    BUN 15 07/16/2019    CREATININE 1 29 07/16/2019    GLUC 119 07/16/2019    CALCIUM 8 9 07/16/2019    AST 78 (H) 07/16/2019    ALT 58 07/16/2019    ALKPHOS 69 07/16/2019    TP 8 0 07/16/2019    ALB 3 8 07/16/2019    TBILI 0 60 07/16/2019       Psychiatric Review of Systems:  Behavior over the last 24 hours:  improved  Sleep: normal  Appetite: normal  Medication side effects: No  ROS: no complaints    Mental Status Evaluation:  Appearance:  age appropriate and casually dressed   Behavior:  normal   Speech:  normal pitch and normal volume   Mood:  anxious   Affect:  constricted and mood-congruent   Language naming objects and repeating phrases   Thought Process:  goal directed   Thought Content:  normal   Perceptual Disturbances: None   Risk Potential: Suicidal Ideations without plan and Potential for Aggression No   Sensorium:  person, place and time/date   Cognition:  grossly intact   Consciousness:  alert and awake    Attention: attention span and concentration were age appropriate   Insight:  fair   Judgment: fair       Gait/Station: normal gait/station   Motor Activity: no abnormal movements     Memory: Short and long term memory  intact     Progress Toward Goals:   Patient continues to express feeling depression and anxiety however he is not displaying any signs of panic attacks at the moment or signs of PTSD  Patient denies feeling sedated with his current medication profile  Recommended Treatment:     Continue with group therapy, milieu therapy and occupational therapy  Continue following current medications:   Current Facility-Administered Medications:  aluminum-magnesium hydroxide-simethicone 30 mL Oral Q4H PRN Lalito Vieyra MD   clonazePAM 0 5 mg Oral TID [facility]   escitalopram 10 mg Oral Daily [facility]   gabapentin 300 mg Oral TID Corean Fuelling III, DO   haloperidol lactate 5 mg Intramuscular Q6H PRN Tiffanie Rizzo MD   ibuprofen 400 mg Oral Q6H PRN Corean Fuelling III, DO   ibuprofen 600 mg Oral Q6H PRN Corean Fuelling III, DO   ibuprofen 800 mg Oral Q8H PRN Lalito Vieyra MD   LORazepam 2 mg Intramuscular Q4H PRN Lalito Vieyra MD   LORazepam 1 mg Oral Q6H PRN [facility]   magnesium hydroxide 30 mL Oral Daily PRN Tiffanie Rizzo MD   melatonin 3 mg Oral HS [facility]   methadone 165 mg Oral Daily [facility]   mirtazapine 15 mg Oral HS [facility]   nicotine 1 patch Transdermal Daily Lalito Vieyra MD   nicotine polacrilex 4 mg Oral Q2H PRN [facility]   OLANZapine 5 mg Intramuscular Q3H PRN Lalito Vieyra MD   risperiDONE 2 mg Oral Q3H PRN Tiffanie Rizzo MD   traZODone 50 mg Oral HS PRN Tiffanie Rizzo MD       Risks, benefits and possible side effects of Medications:   Risks, benefits, and possible side effects of medications explained to patient and patient verbalizes understanding  This note has been constructed using a voice recognition system      There may be translation, syntax,  or grammatical errors  If you have any questions, please contact the dictating provider

## 2021-06-01 DIAGNOSIS — F98.8 ATTENTION DEFICIT DISORDER (ADD) WITHOUT HYPERACTIVITY: ICD-10-CM

## 2021-06-01 RX ORDER — DEXTROAMPHETAMINE SACCHARATE, AMPHETAMINE ASPARTATE, DEXTROAMPHETAMINE SULFATE AND AMPHETAMINE SULFATE 5; 5; 5; 5 MG/1; MG/1; MG/1; MG/1
1 TABLET ORAL 2 TIMES DAILY
Qty: 60 TABLET | Refills: 0 | Status: CANCELLED | OUTPATIENT
Start: 2021-06-01

## 2021-06-02 RX ORDER — DEXTROAMPHETAMINE SACCHARATE, AMPHETAMINE ASPARTATE, DEXTROAMPHETAMINE SULFATE AND AMPHETAMINE SULFATE 5; 5; 5; 5 MG/1; MG/1; MG/1; MG/1
1 TABLET ORAL 2 TIMES DAILY
Qty: 60 TABLET | Refills: 0 | Status: SHIPPED | OUTPATIENT
Start: 2021-06-02 | End: 2021-07-28 | Stop reason: SDUPTHER

## 2021-06-14 NOTE — ED NOTES
Left message for warm handoff line, Jaime Lopez @ [phone], however spoke with other staff that verified [facility] does not come out on the weekend but does have 24 hour staff at another local hospital  Bed search to continue       Maritza Sears, [place]  08/17/19   1349 Spoke with pt and relayed Dr. Jack's message.  Pt understanding.  Pt would still like to see Dr. Jack.  Appt scheduled.

## 2021-06-17 NOTE — PROGRESS NOTES
Present in milieu earlier in AM   Multiple requests at nurses station  Cooperative with medications and meals  Sleeping this afternoon following lunch  Denies SI/HI  Provided education on abilify  posterior neck swelling uncont dm Cellulitis of neck/back

## 2021-06-29 DIAGNOSIS — F41.9 ANXIETY: ICD-10-CM

## 2021-06-29 RX ORDER — CLONAZEPAM 1 MG/1
TABLET ORAL
Qty: 90 TABLET | Refills: 2 | Status: SHIPPED | OUTPATIENT
Start: 2021-06-29 | End: 2021-07-28 | Stop reason: SDUPTHER

## 2021-07-28 DIAGNOSIS — F98.8 ATTENTION DEFICIT DISORDER (ADD) WITHOUT HYPERACTIVITY: ICD-10-CM

## 2021-07-28 DIAGNOSIS — J34.89 RHINORRHEA: ICD-10-CM

## 2021-07-28 DIAGNOSIS — F41.9 ANXIETY: ICD-10-CM

## 2021-07-28 RX ORDER — FLUTICASONE PROPIONATE 50 MCG
1 SPRAY, SUSPENSION (ML) NASAL DAILY
Qty: 18 G | Refills: 10 | Status: SHIPPED | OUTPATIENT
Start: 2021-07-28 | End: 2021-09-22 | Stop reason: SDUPTHER

## 2021-07-28 RX ORDER — DEXTROAMPHETAMINE SACCHARATE, AMPHETAMINE ASPARTATE, DEXTROAMPHETAMINE SULFATE AND AMPHETAMINE SULFATE 5; 5; 5; 5 MG/1; MG/1; MG/1; MG/1
1 TABLET ORAL 2 TIMES DAILY
Qty: 60 TABLET | Refills: 0 | Status: CANCELLED | OUTPATIENT
Start: 2021-07-28

## 2021-07-28 RX ORDER — DEXTROAMPHETAMINE SACCHARATE, AMPHETAMINE ASPARTATE, DEXTROAMPHETAMINE SULFATE AND AMPHETAMINE SULFATE 5; 5; 5; 5 MG/1; MG/1; MG/1; MG/1
1 TABLET ORAL 2 TIMES DAILY
Qty: 60 TABLET | Refills: 0 | Status: SHIPPED | OUTPATIENT
Start: 2021-07-28 | End: 2021-08-25 | Stop reason: SDUPTHER

## 2021-07-28 RX ORDER — CLONAZEPAM 1 MG/1
1 TABLET ORAL 3 TIMES DAILY
Qty: 90 TABLET | Refills: 0 | Status: SHIPPED | OUTPATIENT
Start: 2021-07-28 | End: 2021-12-07 | Stop reason: SDUPTHER

## 2021-07-28 NOTE — TELEPHONE ENCOUNTER
Last refill per on 06/02  Last vv on 01/20    6MM--7/2021  Pt due for PE    Phone num not in service

## 2021-08-25 DIAGNOSIS — F98.8 ATTENTION DEFICIT DISORDER (ADD) WITHOUT HYPERACTIVITY: ICD-10-CM

## 2021-08-26 RX ORDER — DEXTROAMPHETAMINE SACCHARATE, AMPHETAMINE ASPARTATE, DEXTROAMPHETAMINE SULFATE AND AMPHETAMINE SULFATE 5; 5; 5; 5 MG/1; MG/1; MG/1; MG/1
1 TABLET ORAL 2 TIMES DAILY
Qty: 60 TABLET | Refills: 0 | Status: SHIPPED | OUTPATIENT
Start: 2021-08-26 | End: 2021-09-22 | Stop reason: SDUPTHER

## 2021-09-22 DIAGNOSIS — J34.89 RHINORRHEA: ICD-10-CM

## 2021-09-22 DIAGNOSIS — F98.8 ATTENTION DEFICIT DISORDER (ADD) WITHOUT HYPERACTIVITY: ICD-10-CM

## 2021-09-23 RX ORDER — FLUTICASONE PROPIONATE 50 MCG
1 SPRAY, SUSPENSION (ML) NASAL DAILY
Qty: 18 G | Refills: 0 | Status: SHIPPED | OUTPATIENT
Start: 2021-09-23 | End: 2022-03-31

## 2021-09-23 RX ORDER — DEXTROAMPHETAMINE SACCHARATE, AMPHETAMINE ASPARTATE, DEXTROAMPHETAMINE SULFATE AND AMPHETAMINE SULFATE 5; 5; 5; 5 MG/1; MG/1; MG/1; MG/1
1 TABLET ORAL 2 TIMES DAILY
Qty: 60 TABLET | Refills: 0 | Status: SHIPPED | OUTPATIENT
Start: 2021-09-23 | End: 2021-12-10 | Stop reason: SDUPTHER

## 2021-12-10 DIAGNOSIS — F98.8 ATTENTION DEFICIT DISORDER (ADD) WITHOUT HYPERACTIVITY: ICD-10-CM

## 2021-12-10 RX ORDER — DEXTROAMPHETAMINE SACCHARATE, AMPHETAMINE ASPARTATE, DEXTROAMPHETAMINE SULFATE AND AMPHETAMINE SULFATE 5; 5; 5; 5 MG/1; MG/1; MG/1; MG/1
1 TABLET ORAL 2 TIMES DAILY
Qty: 60 TABLET | Refills: 0 | Status: SHIPPED | OUTPATIENT
Start: 2021-12-10 | End: 2022-01-10

## 2022-01-06 DIAGNOSIS — F98.8 ATTENTION DEFICIT DISORDER (ADD) WITHOUT HYPERACTIVITY: ICD-10-CM

## 2022-01-10 ENCOUNTER — OFFICE VISIT (OUTPATIENT)
Dept: FAMILY MEDICINE CLINIC | Facility: CLINIC | Age: 32
End: 2022-01-10
Payer: COMMERCIAL

## 2022-01-10 VITALS — SYSTOLIC BLOOD PRESSURE: 120 MMHG | DIASTOLIC BLOOD PRESSURE: 78 MMHG

## 2022-01-10 DIAGNOSIS — Z00.00 WELLNESS EXAMINATION: Primary | ICD-10-CM

## 2022-01-10 DIAGNOSIS — F90.2 ATTENTION DEFICIT HYPERACTIVITY DISORDER (ADHD), COMBINED TYPE: ICD-10-CM

## 2022-01-10 DIAGNOSIS — F98.8 ATTENTION DEFICIT DISORDER (ADD) WITHOUT HYPERACTIVITY: ICD-10-CM

## 2022-01-10 PROCEDURE — 99214 OFFICE O/P EST MOD 30 MIN: CPT | Performed by: FAMILY MEDICINE

## 2022-01-10 PROCEDURE — 99395 PREV VISIT EST AGE 18-39: CPT | Performed by: FAMILY MEDICINE

## 2022-01-10 RX ORDER — SERTRALINE HYDROCHLORIDE 100 MG/1
100 TABLET, FILM COATED ORAL DAILY
Qty: 90 TABLET | Refills: 3 | Status: SHIPPED | OUTPATIENT
Start: 2022-01-10 | End: 2022-03-31

## 2022-01-10 RX ORDER — DEXTROAMPHETAMINE SULFATE 10 MG/1
10 TABLET ORAL DAILY
Qty: 60 TABLET | Refills: 0 | Status: SHIPPED | OUTPATIENT
Start: 2022-01-10 | End: 2022-01-10

## 2022-01-10 RX ORDER — DEXTROAMPHETAMINE SACCHARATE, AMPHETAMINE ASPARTATE, DEXTROAMPHETAMINE SULFATE AND AMPHETAMINE SULFATE 5; 5; 5; 5 MG/1; MG/1; MG/1; MG/1
1 TABLET ORAL 2 TIMES DAILY
Qty: 60 TABLET | Refills: 0 | Status: SHIPPED | OUTPATIENT
Start: 2022-01-10 | End: 2022-02-09 | Stop reason: SDUPTHER

## 2022-01-10 NOTE — PROGRESS NOTES
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     Past Medical History:   Diagnosis Date    Addiction to drug ([place] [address] )     Allergic     Anxiety     Benzodiazepine withdrawal ([facility] ) 10/31/2019    Chronic pain disorder     Depression     Elevated AST (SGOT) 8/3/2019    Hepatitis C 8/4/2019    Hepatitis C     Methamphetamine abuse ([facility] ) 8/7/2019    Psychiatric disorder     Severe episode of recurrent major depressive disorder, without psychotic features ([place] [address] ) 7/17/2019    Spinal stenosis     Substance abuse ([facility] )     Suicide attempt ([place] [address] )     Urinary retention        1  Advanced Directive: n     2  Durable Power of  for Healthcare: n     3  Social History:           Drug and alcohol History: y                  4  Immunizations up to date: y                 Lifestyle:                           Healthy Diet:y                          Alcohol Use:n                          Tobacco Use:n                          Regular exercise:y                          Weight concerns:n                               5   Over the past 2 weeks, how often have you been bothered by the following:              Little interest or pleasure in doing things:n              Felling down, depressed or hopeless:n       Current Outpatient Medications   Medication Sig Dispense Refill    albuterol (PROVENTIL HFA,VENTOLIN HFA) 90 mcg/act inhaler Inhale 2 puffs every 6 (six) hours as needed for wheezing      buPROPion (WELLBUTRIN XL) 150 mg 24 hr tablet Take 1 tablet (150 mg total) by mouth daily At 9am 30 tablet 0    clonazePAM (KlonoPIN) 1 mg tablet Take 1 tablet (1 mg total) by mouth 3 (three) times a day 90 tablet 5    dextroamphetamine (DEXTROSTAT) 10 MG tablet Take 1 tablet (10 mg total) by mouth daily Max Daily Amount: 10 mg 60 tablet 0    fluticasone (FLONASE) 50 mcg/act nasal spray 1 spray into each nostril daily 18 g 0    sertraline (ZOLOFT) 100 mg tablet Take 1 tablet (100 mg total) by mouth daily 90 tablet 3     No current facility-administered medications for this visit  Allergies   Allergen Reactions    Geodon [Ziprasidone]      Tardivdiskinesea     Immunization History   Administered Date(s) Administered    Hep A, adult 11/20/2019    INFLUENZA 11/09/2008, 01/27/2012, 07/29/2019    Influenza, injectable, quadrivalent, preservative free 0 5 mL 10/01/2019    Tdap 02/13/2013, 10/18/2020       Patient Care Team:  Smith Mckenzie MD as PCP - General (Family Medicine)  Smith Mckenzie MD as PCP - [address] [place] [address] (RTE)    Review of Systems   Constitutional: Negative for fatigue, fever and unexpected weight change  HENT: Negative for congestion, sinus pressure and sore throat  Eyes: Negative for visual disturbance  Respiratory: Negative for shortness of breath and wheezing  Cardiovascular: Negative for chest pain and palpitations  Gastrointestinal: Negative for abdominal pain, diarrhea, nausea and vomiting  Physical Exam :  Physical Exam  Constitutional:       General: He is not in acute distress  Appearance: He is well-developed  He is not diaphoretic  HENT:      Right Ear: Tympanic membrane, ear canal and external ear normal  Tympanic membrane is not injected  Left Ear: Tympanic membrane, ear canal and external ear normal  Tympanic membrane is not injected  Nose: Nose normal       Mouth/Throat:      Pharynx: Uvula midline  Eyes:      Conjunctiva/sclera: Conjunctivae normal       Pupils: Pupils are equal, round, and reactive to light     Neck: Thyroid: No thyromegaly  Cardiovascular:      Rate and Rhythm: Normal rate and regular rhythm  Heart sounds: Normal heart sounds  No murmur heard  Pulmonary:      Effort: Pulmonary effort is normal  No respiratory distress  Breath sounds: Normal breath sounds  No wheezing  Musculoskeletal:      Cervical back: Normal range of motion and neck supple  Lymphadenopathy:      Cervical: No cervical adenopathy  Assessment and Plan:  1  Wellness examination     2  Attention deficit disorder (ADD) without hyperactivity  sertraline (ZOLOFT) 100 mg tablet    dextroamphetamine (DEXTROSTAT) 10 MG tablet   3   Attention deficit hyperactivity disorder (ADHD), combined type         Health Maintenance Due   Topic Date Due    COVID-19 Vaccine (1) Never done    Pneumococcal Vaccine: Pediatrics (0 to 5 Years) and At-Risk Patients (6 to 59 Years) (1 of 2 - PPSV23) Never done    Hepatitis B Vaccine (1 of 3 - Risk 3-dose series) Never done    Annual Physical  06/13/2020    Influenza Vaccine (1) 09/01/2021    BMI: Adult  01/20/2022    Depression Remission PHQ  01/20/2022

## 2022-01-10 NOTE — PROGRESS NOTES
annualAssessment/Plan:    Attention deficit disorder (ADD) without hyperactivity  D/c adderall--did well on dexedrine in past--will monitor resp       Diagnoses and all orders for this visit:    Wellness examination    Attention deficit disorder (ADD) without hyperactivity  -     sertraline (ZOLOFT) 100 mg tablet; Take 1 tablet (100 mg total) by mouth daily  -     dextroamphetamine (DEXTROSTAT) 10 MG tablet; Take 1 tablet (10 mg total) by mouth daily Max Daily Amount: 10 mg    Attention deficit hyperactivity disorder (ADHD), combined type          Subjective:   Chief Complaint   Patient presents with    Anxiety        Patient ID: Kyle Chiu is a 27 y o  male  annual    Anxiety  Patient reports no chest pain, confusion, nausea, palpitations, shortness of breath or suicidal ideas  The following portions of the patient's history were reviewed and updated as appropriate: allergies, current medications, past family history, past medical history, past social history, past surgical history and problem list     Review of Systems   Constitutional: Negative for fatigue, fever and unexpected weight change  HENT: Negative for congestion, sinus pain and sore throat  Eyes: Negative for visual disturbance  Respiratory: Negative for shortness of breath and wheezing  Cardiovascular: Negative for chest pain and palpitations  Gastrointestinal: Negative for abdominal pain, nausea and vomiting  Musculoskeletal: Negative  Negative for arthralgias and myalgias  Neurological: Negative for syncope, weakness and numbness  Psychiatric/Behavioral: Negative  Negative for confusion, dysphoric mood and suicidal ideas  Objective:  Vitals:    01/10/22 1449   BP: 120/78      Physical Exam  Constitutional:       Appearance: He is well-developed  HENT:      Right Ear: Ear canal normal  Tympanic membrane is not injected  Left Ear: Ear canal normal  Tympanic membrane is not injected        Nose: Nose normal    Eyes:      General:         Right eye: No discharge  Left eye: No discharge  Conjunctiva/sclera: Conjunctivae normal       Pupils: Pupils are equal, round, and reactive to light  Neck:      Thyroid: No thyromegaly  Cardiovascular:      Rate and Rhythm: Normal rate and regular rhythm  Heart sounds: Normal heart sounds  No murmur heard  Pulmonary:      Effort: Pulmonary effort is normal  No respiratory distress  Breath sounds: Normal breath sounds  No wheezing  Abdominal:      General: Bowel sounds are normal  There is no distension  Palpations: Abdomen is soft  Tenderness: There is no abdominal tenderness  Musculoskeletal:         General: Normal range of motion  Cervical back: Normal range of motion and neck supple  Lymphadenopathy:      Cervical: No cervical adenopathy  Skin:     General: Skin is warm and dry  Neurological:      Mental Status: He is alert and oriented to person, place, and time  He is not disoriented  Sensory: No sensory deficit  Gait: Gait normal       Deep Tendon Reflexes: Reflexes are normal and symmetric  Psychiatric:         Speech: Speech normal          Behavior: Behavior normal          Thought Content:  Thought content normal          Judgment: Judgment normal

## 2022-02-07 ENCOUNTER — TELEPHONE (OUTPATIENT)
Dept: FAMILY MEDICINE CLINIC | Facility: CLINIC | Age: 32
End: 2022-02-07

## 2022-02-07 ENCOUNTER — HOSPITAL ENCOUNTER (EMERGENCY)
Facility: HOSPITAL | Age: 32
Discharge: HOME | End: 2022-02-07
Attending: EMERGENCY MEDICINE | Admitting: EMERGENCY MEDICINE
Payer: COMMERCIAL

## 2022-02-07 VITALS
OXYGEN SATURATION: 99 % | RESPIRATION RATE: 16 BRPM | HEIGHT: 70 IN | BODY MASS INDEX: 24.33 KG/M2 | TEMPERATURE: 97.8 F | HEART RATE: 89 BPM | WEIGHT: 169.97 LBS | SYSTOLIC BLOOD PRESSURE: 161 MMHG | DIASTOLIC BLOOD PRESSURE: 97 MMHG

## 2022-02-07 DIAGNOSIS — F98.8 ATTENTION DEFICIT DISORDER (ADD) WITHOUT HYPERACTIVITY: ICD-10-CM

## 2022-02-07 DIAGNOSIS — F41.9 ANXIETY: Primary | ICD-10-CM

## 2022-02-07 PROCEDURE — 99283 EMERGENCY DEPT VISIT LOW MDM: CPT

## 2022-02-07 PROCEDURE — 99282 EMERGENCY DEPT VISIT SF MDM: CPT | Performed by: EMERGENCY MEDICINE

## 2022-02-07 RX ORDER — DEXTROAMPHETAMINE SULFATE 10 MG/1
20 TABLET ORAL DAILY
COMMUNITY
End: 2022-02-09

## 2022-02-07 RX ORDER — BUPRENORPHINE HYDROCHLORIDE AND NALOXONE HYDROCHLORIDE DIHYDRATE 8; 2 MG/1; MG/1
1 TABLET SUBLINGUAL DAILY
COMMUNITY
End: 2022-08-03

## 2022-02-07 NOTE — TELEPHONE ENCOUNTER
Patient calling requesting refill on adderall but is requesting if this new medication you mention at last office visit can be send instead , he want to try it   dexedrine dextroamphetamine is the name he gave me       Refill  Adderall last fill per Alex [facility] 01/10/2022  [facility] [phone]

## 2022-02-08 NOTE — ED PROVIDER NOTES
History  Chief Complaint   Patient presents with    Medication Problem     patient presents to ED with complaints of increased anxiety  Patient reports having stopped taking his klonopin three weeks ago, and states last tablet he took 10 days ago  25-year-old male presents for evaluation concern for benzodiazepine withdrawal   Patient reports he took his last dose clonazepam 3 weeks ago  Patient denies any recent seizures states he feels very anxious and jittery  Patient states he has been taking his other prescribed medications as scheduled  Denies chest pain, fever, vomiting  Per PDMP, patient had 90 tablets 1 mg clonazepam filled 5 days ago  I personally called the pharmacy and pharmacist confirmed that prescription was picked up at 9:20 a m  on 22  Pharmacist is very familiar with Lisa Solomon and stated he personally handed the prescription to him  Prior to Admission Medications   Prescriptions Last Dose Informant Patient Reported? Taking?    albuterol (PROVENTIL HFA,VENTOLIN HFA) 90 mcg/act inhaler  Self Yes No   Sig: Inhale 2 puffs every 6 (six) hours as needed for wheezing   amphetamine-dextroamphetamine (ADDERALL) 20 mg tablet Not Taking at Unknown time  No No   Sig: Take 1 tablet (20 mg total) by mouth 2 (two) times a day   Patient not taking: Reported on 2022    buPROPion (WELLBUTRIN XL) 150 mg 24 hr tablet 2022 at Unknown time  No Yes   Sig: Take 1 tablet (150 mg total) by mouth daily At 9am   buprenorphine-naloxone (SUBOXONE) 8-2 mg per SL tablet   Yes No   Sig: Place 1 tablet under the tongue daily   clonazePAM (KlonoPIN) 1 mg tablet Past Month at Unknown time  No Yes   Sig: Take 1 tablet (1 mg total) by mouth 3 (three) times a day   dextroamphetamine (DEXTROSTAT) 10 MG tablet   Yes Yes   Sig: Take 20 mg by mouth daily   fluticasone (FLONASE) 50 mcg/act nasal spray 2022 at Unknown time  No Yes   Si spray into each nostril daily   sertraline (ZOLOFT) 100 mg tablet 2022 at Unknown time  No Yes   Sig: Take 1 tablet (100 mg total) by mouth daily   Patient taking differently: Take 50 mg by mouth daily        Facility-Administered Medications: None       Past Medical History:   Diagnosis Date    Addiction to drug ([place] [address] )     Allergic     Anxiety     [facility]odiazepine withdrawal ([facility] ) 10/31/2019    Chronic pain disorder     Depression     Elevated AST (SGOT) 8/3/2019    Hepatitis C 2019    Hepatitis C     Methamphetamine abuse ([facility] ) 2019    Psychiatric disorder     Severe episode of recurrent major depressive disorder, without psychotic features ([place] [address] ) 2019    Spinal stenosis     Substance abuse ([place] [address] )     Suicide attempt ([place] [address] )     Urinary retention        Past Surgical History:   Procedure Laterality Date    BACK SURGERY      FRACTURE SURGERY      left foot reconstruted     SPINE SURGERY      hernated disc        Family History   Problem Relation Age of Onset    Hypertension Mother     Hyperlipidemia Father     Heart disease Father     Depression Maternal Grandmother     Hypertension Maternal Grandmother     Squamous cell carcinoma Maternal Grandfather     Alcohol abuse Maternal Grandfather     Alzheimer's disease Paternal Grandmother     Dementia Paternal Grandmother     Alcohol abuse Paternal Uncle     Colon cancer Neg Hx     Colon polyps Neg Hx      I have reviewed and agree with the history as documented      E-Cigarette/Vaping    E-Cigarette Use Current Every Day User      E-Cigarette/Vaping Substances    Nicotine Yes     THC No     CBD No     Flavoring No     Other No     Unknown No      Social History     Tobacco Use    Smoking status: Current Every Day Smoker     Packs/day: 1 00     Types: E-Cigarettes     Last attempt to quit: 2017     Years since quittin 4    Smokeless tobacco: Current User    Tobacco comment: Pt uses a "vape" pen - use is equivalent to approximately 1 pack per day   Vaping Use    Vaping Use: Every day    Substances: Nicotine   Substance Use Topics    Alcohol use: Not Currently    Drug use: Not Currently     Frequency: 3 0 times per week     Types: Methamphetamines     Comment: pt states last used 1 month ago       Review of Systems   Constitutional: Negative for fever  Psychiatric/Behavioral: Negative for suicidal ideas  The patient is nervous/anxious  All other systems reviewed and are negative  Physical Exam  Physical Exam  Vitals and nursing note reviewed  Constitutional:       General: He is not in acute distress  Appearance: He is well-developed  HENT:      Head: Normocephalic and atraumatic  Right Ear: External ear normal       Left Ear: External ear normal       Nose: Nose normal    Eyes:      General: No scleral icterus  Pulmonary:      Effort: Pulmonary effort is normal  No respiratory distress  Abdominal:      General: There is no distension  Palpations: Abdomen is soft  Musculoskeletal:         General: No deformity  Normal range of motion  Cervical back: Normal range of motion and neck supple  Skin:     General: Skin is warm  Findings: No rash  Neurological:      General: No focal deficit present  Mental Status: He is alert        Gait: Gait normal    Psychiatric:         Mood and Affect: Mood normal          Vital Signs  ED Triage Vitals [02/07/22 1900]   Temperature Pulse Respirations Blood Pressure SpO2   97 8 °F (36 6 °C) 89 16 161/97 99 %      Temp Source Heart Rate Source Patient Position - Orthostatic VS BP Location FiO2 (%)   Temporal Monitor Lying Right arm --      Pain Score       --           Vitals:    02/07/22 1900   BP: 161/97   Pulse: 89   Patient Position - Orthostatic VS: Lying         Visual Acuity      ED Medications  Medications - No data to display    Diagnostic Studies  Results Reviewed     None                 No orders to display              Procedures  Procedures         ED Course SBIRT 20yo+      Most Recent Value   SBIRT (22 yo +)    In order to provide better care to our patients, we are screening all of our patients for alcohol and drug use  Would it be okay to ask you these screening questions? Yes Filed at: 02/07/2022 1913   Initial Alcohol Screen: US AUDIT-C     1  How often do you have a drink containing alcohol? 0 Filed at: 02/07/2022 1913   2  How many drinks containing alcohol do you have on a typical day you are drinking? 0 Filed at: 02/07/2022 1913   3a  Male UNDER 65: How often do you have five or more drinks on one occasion? 0 Filed at: 02/07/2022 1913   Audit-C Score 0 Filed at: 02/07/2022 1913   MARYAM: How many times in the past year have you    Used an illegal drug or used a prescription medication for non-medical reasons? Never Filed at: 02/07/2022 1913                    MDM  Number of Diagnoses or Management Options  Anxiety: new and requires workup  Diagnosis management comments: 27 yom p/w anxiety and concern for benzo withdrawal  Patient has his prescription and was advised to continue taking as prescribed or taper according to the schedule provided by his prescriber  No active signs of withdrawal at this time  Patient is aware that sudden discontinuation can be fatal and was advised to not to that  Return precautions discussed          Amount and/or Complexity of Data Reviewed  Clinical lab tests: reviewed  Tests in the radiology section of CPT®: reviewed  Tests in the medicine section of CPT®: reviewed  Review and summarize past medical records: yes  Discuss the patient with other providers: yes        Disposition  Final diagnoses:   Anxiety     Time reflects when diagnosis was documented in both MDM as applicable and the Disposition within this note     Time User Action Codes Description Comment    2/7/2022  7:12  4Th , [address] [place] [address] Drive [F41 9] Anxiety       ED Disposition     ED Disposition Condition Date/Time Comment    Discharge Stable Mon Feb 7, 2022  7:12 PM [place] discharge to home/self care  Follow-up Information     Follow up With Specialties Details Why Contact Info Additional Information    Raúl Cardenas MD [facility] Medicine   [address]  [address] [facility] [address] 2  [facility]  Emergency Department Emergency Medicine  If symptoms worsen [address] [facility] [address] [address]  [address] [facility] Emergency Department, [address] [facility], [facility], Luige Benjamin 10          Discharge Medication List as of 2/7/2022  7:13 PM      CONTINUE these medications which have NOT CHANGED    Details   buPROPion (WELLBUTRIN XL) 150 mg 24 hr tablet Take 1 tablet (150 mg total) by mouth daily At 9am, Starting Mon 4/12/2021, Normal      clonazePAM (KlonoPIN) 1 mg tablet Take 1 tablet (1 mg total) by mouth 3 (three) times a day, Starting Wed 12/8/2021, Normal      dextroamphetamine (DEXTROSTAT) 10 MG tablet Take 20 mg by mouth daily, Historical Med      fluticasone (FLONASE) 50 mcg/act nasal spray 1 spray into each nostril daily, Starting Thu 9/23/2021, Normal      sertraline (ZOLOFT) 100 mg tablet Take 1 tablet (100 mg total) by mouth daily, Starting Mon 1/10/2022, Print      albuterol (PROVENTIL HFA,VENTOLIN HFA) 90 mcg/act inhaler Inhale 2 puffs every 6 (six) hours as needed for wheezing, Historical Med      amphetamine-dextroamphetamine (ADDERALL) 20 mg tablet Take 1 tablet (20 mg total) by mouth 2 (two) times a day, Starting Mon 1/10/2022, Normal      buprenorphine-naloxone (SUBOXONE) 8-2 mg per SL tablet Place 1 tablet under the tongue daily, Historical Med             No discharge procedures on file      PDMP Review       Value Time User    PDMP Reviewed  Yes 12/10/2021  4:27 PM Sylwia Hagan MD          ED Provider  Electronically Signed by           Eligio Holly DO  02/07/22 1492

## 2022-02-09 ENCOUNTER — TELEMEDICINE (OUTPATIENT)
Dept: FAMILY MEDICINE CLINIC | Facility: CLINIC | Age: 32
End: 2022-02-09
Payer: COMMERCIAL

## 2022-02-09 DIAGNOSIS — F98.8 ATTENTION DEFICIT DISORDER (ADD) WITHOUT HYPERACTIVITY: ICD-10-CM

## 2022-02-09 PROCEDURE — 99213 OFFICE O/P EST LOW 20 MIN: CPT | Performed by: FAMILY MEDICINE

## 2022-02-09 RX ORDER — DEXTROAMPHETAMINE SACCHARATE, AMPHETAMINE ASPARTATE, DEXTROAMPHETAMINE SULFATE AND AMPHETAMINE SULFATE 5; 5; 5; 5 MG/1; MG/1; MG/1; MG/1
1 TABLET ORAL 2 TIMES DAILY
Qty: 60 TABLET | Refills: 0 | Status: SHIPPED | OUTPATIENT
Start: 2022-02-09 | End: 2022-03-03 | Stop reason: SDUPTHER

## 2022-02-09 NOTE — PROGRESS NOTES
Virtual Regular Visit    Verification of patient location:    Patient is located in the following state in which I hold an active license [place]      Assessment/Plan:    Problem List Items Addressed This Visit        Other    Attention deficit disorder (ADD) without hyperactivity    Relevant Medications    amphetamine-dextroamphetamine (ADDERALL) 20 mg tablet               Reason for visit is No chief complaint on file  Encounter provider Adriana Henry MD    Provider located at [address] [place] [id number]      Recent Visits  Date Type Provider Dept   02/07/22 Telephone Daysiris Roxanne Marrufo MA [facility]   Showing recent visits within past 7 days and meeting all other requirements  Today's Visits  Date Type Provider Dept   02/09/22 Telemedicine Adriana Henry MD [facility] [address] [facility] today's visits and meeting all other requirements  Future Appointments  No visits were found meeting these conditions  Showing future appointments within next 150 days and meeting all other requirements       The patient was identified by name and date of birth  Ruby Zapien was informed that this is a telemedicine visit and that the visit is being conducted through [address] [place] [address] Now and patient was informed that this is a secure, HIPAA-compliant platform  He agrees to proceed     My office door was closed  No one else was in the room  He acknowledged consent and understanding of privacy and security of the video platform  The patient has agreed to participate and understands they can discontinue the visit at any time  Patient is aware this is a billable service  Subjective  Ruby Zapien is a 27 y o  male f/u ADD--stable         HPI     Past Medical History:   Diagnosis Date    Addiction [facility] drug ([facility] )     Allergic     Anxiety     Benzodiazepine withdrawal ([facility] ) 10/31/2019    Chronic pain disorder     Depression  Elevated AST (SGOT) 8/3/2019    Hepatitis C 8/4/2019    Hepatitis C     Methamphetamine abuse ([facility] ) 8/7/2019    Psychiatric disorder     Severe episode of recurrent major depressive disorder, without psychotic features ([place] [address] ) 7/17/2019    Spinal stenosis     Substance abuse ([place] [address] )     Suicide attempt ([place] [address] )     Urinary retention        Past Surgical History:   Procedure Laterality Date    BACK SURGERY      FRACTURE SURGERY      left foot reconstruted     SPINE SURGERY      hernated disc        Current Outpatient Medications   Medication Sig Dispense Refill    albuterol (PROVENTIL HFA,VENTOLIN HFA) 90 mcg/act inhaler Inhale 2 puffs every 6 (six) hours as needed for wheezing      amphetamine-dextroamphetamine (ADDERALL) 20 mg tablet Take 1 tablet (20 mg total) by mouth 2 (two) times a day 60 tablet 0    buprenorphine-naloxone (SUBOXONE) 8-2 mg per SL tablet Place 1 tablet under the tongue daily      buPROPion (WELLBUTRIN XL) 150 mg 24 hr tablet Take 1 tablet (150 mg total) by mouth daily At 9am 30 tablet 0    clonazePAM (KlonoPIN) 1 mg tablet Take 1 tablet (1 mg total) by mouth 3 (three) times a day 90 tablet 5    fluticasone (FLONASE) 50 mcg/act nasal spray 1 spray into each nostril daily 18 g 0    sertraline (ZOLOFT) 100 mg tablet Take 1 tablet (100 mg total) by mouth daily (Patient taking differently: Take 50 mg by mouth daily  ) 90 tablet 3     No current facility-administered medications for this visit  Allergies   Allergen Reactions    Geodon [Ziprasidone]      Tardivdiskinesea       Review of Systems   Constitutional: Negative for fatigue, fever and unexpected weight change  HENT: Negative for congestion, sinus pain and sore throat  Eyes: Negative for visual disturbance  Respiratory: Negative for shortness of breath and wheezing  Cardiovascular: Negative for chest pain and palpitations  Gastrointestinal: Negative for abdominal pain, nausea and vomiting     Musculoskeletal: Negative  Negative for arthralgias and myalgias  Neurological: Negative for syncope, weakness and numbness  Psychiatric/Behavioral: Negative  Negative for confusion, dysphoric mood and suicidal ideas  Video Exam    There were no vitals filed for this visit  Physical Exam  Constitutional:       Appearance: He is well-developed  HENT:      Right Ear: Ear canal normal  Tympanic membrane is not injected  Left Ear: Ear canal normal  Tympanic membrane is not injected  Nose: Nose normal    Eyes:      General:         Right eye: No discharge  Left eye: No discharge  Conjunctiva/sclera: Conjunctivae normal       Pupils: Pupils are equal, round, and reactive to light  Neck:      Thyroid: No thyromegaly  Cardiovascular:      Rate and Rhythm: Normal rate and regular rhythm  Heart sounds: Normal heart sounds  No murmur heard  Pulmonary:      Effort: Pulmonary effort is normal  No respiratory distress  Breath sounds: Normal breath sounds  No wheezing  Abdominal:      General: Bowel sounds are normal  There is no distension  Palpations: Abdomen is soft  Tenderness: There is no abdominal tenderness  Musculoskeletal:         General: Normal range of motion  Cervical back: Normal range of motion and neck supple  Lymphadenopathy:      Cervical: No cervical adenopathy  Skin:     General: Skin is warm and dry  Neurological:      Mental Status: He is alert and oriented to person, place, and time  He is not disoriented  Sensory: No sensory deficit  Gait: Gait normal       Deep Tendon Reflexes: Reflexes are normal and symmetric  Psychiatric:         Speech: Speech normal          Behavior: Behavior normal          Thought Content:  Thought content normal          Judgment: Judgment normal           I spent 15 minutes directly with the patient during this visit    VIRTUAL VISIT DISCLAIMER      Haydeejuwan Jelani verbally agrees to participate in Virtual Care Services  Pt is aware that [employer] could be limited without vital signs or the ability to perform a full hands-on physical exam  Gus Villanueva understands he or the provider may request at any time to terminate the video visit and request the patient to seek care or treatment in person

## 2022-03-03 DIAGNOSIS — F98.8 ATTENTION DEFICIT DISORDER (ADD) WITHOUT HYPERACTIVITY: ICD-10-CM

## 2022-03-03 DIAGNOSIS — F33.2 SEVERE EPISODE OF RECURRENT MAJOR DEPRESSIVE DISORDER, WITHOUT PSYCHOTIC FEATURES (HCC): ICD-10-CM

## 2022-03-03 RX ORDER — BUPROPION HYDROCHLORIDE 150 MG/1
150 TABLET ORAL DAILY
Qty: 30 TABLET | Refills: 0 | Status: SHIPPED | OUTPATIENT
Start: 2022-03-03 | End: 2022-04-27

## 2022-03-03 RX ORDER — DEXTROAMPHETAMINE SACCHARATE, AMPHETAMINE ASPARTATE, DEXTROAMPHETAMINE SULFATE AND AMPHETAMINE SULFATE 5; 5; 5; 5 MG/1; MG/1; MG/1; MG/1
1 TABLET ORAL 2 TIMES DAILY
Qty: 60 TABLET | Refills: 0 | Status: SHIPPED | OUTPATIENT
Start: 2022-03-03 | End: 2022-04-06

## 2022-03-06 ENCOUNTER — NURSE TRIAGE (OUTPATIENT)
Dept: OTHER | Facility: OTHER | Age: 32
End: 2022-03-06

## 2022-03-06 DIAGNOSIS — F98.8 ATTENTION DEFICIT DISORDER (ADD) WITHOUT HYPERACTIVITY: ICD-10-CM

## 2022-03-06 NOTE — TELEPHONE ENCOUNTER
Regarding: Complaining of pain from strep throat and cough    ----- Message from Vic Tabor sent at 3/5/2022 12:35 PM EST -----  "I have strep throat and a cough "

## 2022-03-07 RX ORDER — DEXTROAMPHETAMINE SACCHARATE, AMPHETAMINE ASPARTATE, DEXTROAMPHETAMINE SULFATE AND AMPHETAMINE SULFATE 5; 5; 5; 5 MG/1; MG/1; MG/1; MG/1
1 TABLET ORAL 2 TIMES DAILY
Qty: 60 TABLET | Refills: 0 | Status: CANCELLED | OUTPATIENT
Start: 2022-03-07

## 2022-03-07 NOTE — TELEPHONE ENCOUNTER
From: Frankie Broussard  To: Office of Uzma Barrett MD  Sent: 3/6/2022 2:01 PM EST  Subject: Medication Renewal Request    Refills have been requested for the following medications:    Other - Dexedrine 10mg IR TABS    [facility] [address] [place] [address]

## 2022-03-29 ENCOUNTER — TELEPHONE (OUTPATIENT)
Dept: UROLOGY | Facility: AMBULATORY SURGERY CENTER | Age: 32
End: 2022-03-29

## 2022-03-29 NOTE — TELEPHONE ENCOUNTER
Returned call to patient  Patient states that he is not going frequently  He is not feeling the urge to void  Patient states the stream is continually decreased  Patient states this has been going on for several years  Patient states that he has never had any imaging done of his bladder  Advised patient to contact pcp for imaging studies  Appt given for April 25th      Advised patient to contact our office is a sooner appt is recommended by pcp

## 2022-03-29 NOTE — TELEPHONE ENCOUNTER
Please Triage  New Patient    What is the reason for the patients appointment? Hospital stay at [facility] D/C 03/10/22    27year-old with unexplained recurrent urinary retention - risk factors however are that several of his medications have anticholinergic and adrenergic pharmacologic affects which could cause urinary difficulties  Additionally he has a history of 2 lumbar spine surgeries  It is interesting that he has had multiple episodes of retention and has been straight cathed during those episodes but after the catheter is removed, he has no difficulties  Patient states that he is still not feeling the urgency to go  And when he does go it is not a powerful stream and also some burning  Imaging/Lab Results: in epic    Do we accept the patient's insurance or is the patient Self-Pay? Provider & Plan: Anna Solis first   Member ID#: Has the patient had any previous Urologist(s)? No     Have patient records been requested? In epic     Has the patient had any outside testing done?  [facility]     Does the patient have a personal history of cancer? no    Patient can be reached at : [phone]

## 2022-03-31 ENCOUNTER — OFFICE VISIT (OUTPATIENT)
Dept: FAMILY MEDICINE CLINIC | Facility: CLINIC | Age: 32
End: 2022-03-31
Payer: COMMERCIAL

## 2022-03-31 VITALS
WEIGHT: 153 LBS | DIASTOLIC BLOOD PRESSURE: 70 MMHG | OXYGEN SATURATION: 100 % | BODY MASS INDEX: 21.9 KG/M2 | HEIGHT: 70 IN | SYSTOLIC BLOOD PRESSURE: 120 MMHG | TEMPERATURE: 99.3 F | HEART RATE: 120 BPM

## 2022-03-31 DIAGNOSIS — E87.6 HYPOKALEMIA: ICD-10-CM

## 2022-03-31 DIAGNOSIS — M48.062 SPINAL STENOSIS OF LUMBAR REGION WITH NEUROGENIC CLAUDICATION: ICD-10-CM

## 2022-03-31 DIAGNOSIS — R33.9 URINARY RETENTION: Primary | ICD-10-CM

## 2022-03-31 DIAGNOSIS — K59.00 CONSTIPATION, UNSPECIFIED CONSTIPATION TYPE: ICD-10-CM

## 2022-03-31 PROCEDURE — 3008F BODY MASS INDEX DOCD: CPT | Performed by: FAMILY MEDICINE

## 2022-03-31 PROCEDURE — 99214 OFFICE O/P EST MOD 30 MIN: CPT | Performed by: FAMILY MEDICINE

## 2022-03-31 PROCEDURE — 3725F SCREEN DEPRESSION PERFORMED: CPT | Performed by: FAMILY MEDICINE

## 2022-03-31 RX ORDER — POLYETHYLENE GLYCOL 3350 17 G/17G
POWDER, FOR SOLUTION ORAL
COMMUNITY
Start: 2022-02-14 | End: 2022-08-03

## 2022-03-31 RX ORDER — POLYETHYLENE GLYCOL 3350 17 G/17G
17 POWDER, FOR SOLUTION ORAL DAILY
Qty: 30 EACH | Refills: 10 | Status: SHIPPED | OUTPATIENT
Start: 2022-03-31 | End: 2022-08-03

## 2022-03-31 RX ORDER — NALOXONE HYDROCHLORIDE 4 MG/.1ML
SPRAY NASAL
COMMUNITY
Start: 2022-02-11 | End: 2022-08-03

## 2022-03-31 RX ORDER — CETIRIZINE HYDROCHLORIDE 10 MG/1
TABLET ORAL
COMMUNITY
Start: 2022-02-28 | End: 2022-03-31

## 2022-03-31 RX ORDER — NICOTINE 21 MG/24HR
PATCH, TRANSDERMAL 24 HOURS TRANSDERMAL
COMMUNITY
Start: 2022-03-02 | End: 2022-03-31

## 2022-03-31 RX ORDER — BUPRENORPHINE 300 MG/1
SOLUTION SUBCUTANEOUS
COMMUNITY
Start: 2022-01-18 | End: 2022-03-31

## 2022-03-31 RX ORDER — CYCLOBENZAPRINE HCL 10 MG
TABLET ORAL
COMMUNITY
Start: 2022-02-14 | End: 2022-03-31

## 2022-03-31 RX ORDER — POTASSIUM CHLORIDE 20 MEQ/1
20 TABLET, EXTENDED RELEASE ORAL DAILY
Qty: 90 TABLET | Refills: 3 | Status: SHIPPED | OUTPATIENT
Start: 2022-03-31 | End: 2022-08-03

## 2022-03-31 RX ORDER — BUPROPION HYDROCHLORIDE 300 MG/1
300 TABLET ORAL DAILY
COMMUNITY
Start: 2022-01-30 | End: 2022-03-31 | Stop reason: DRUGHIGH

## 2022-03-31 RX ORDER — ONDANSETRON 4 MG/1
TABLET, ORALLY DISINTEGRATING ORAL
COMMUNITY
Start: 2022-02-06 | End: 2022-03-31

## 2022-03-31 NOTE — ASSESSMENT & PLAN NOTE
Urinary retention--1 5 L urinary retention--admitted [facility]--Hx spinal stenosis--needs MRI prior to urol visit

## 2022-04-06 ENCOUNTER — TELEPHONE (OUTPATIENT)
Dept: FAMILY MEDICINE CLINIC | Facility: CLINIC | Age: 32
End: 2022-04-06

## 2022-04-06 ENCOUNTER — PATIENT MESSAGE (OUTPATIENT)
Dept: FAMILY MEDICINE CLINIC | Facility: CLINIC | Age: 32
End: 2022-04-06

## 2022-04-06 DIAGNOSIS — F90.2 ATTENTION DEFICIT HYPERACTIVITY DISORDER (ADHD), COMBINED TYPE: Primary | ICD-10-CM

## 2022-04-06 RX ORDER — DEXTROAMPHETAMINE SULFATE 10 MG/1
10 TABLET ORAL 2 TIMES DAILY
Qty: 60 TABLET | Refills: 0 | Status: SHIPPED | OUTPATIENT
Start: 2022-04-06 | End: 2022-04-06

## 2022-04-06 RX ORDER — METHYLPHENIDATE HYDROCHLORIDE 20 MG/1
20 TABLET ORAL 2 TIMES DAILY
Qty: 60 TABLET | Refills: 0 | Status: SHIPPED | OUTPATIENT
Start: 2022-04-06 | End: 2022-05-05

## 2022-04-06 NOTE — TELEPHONE ENCOUNTER
----- Message from Meeta Mujica sent at 4/6/2022 12:20 PM EDT -----  Regarding: Dextrostat 10 MG 1 tab BID  I called multiple different pharmacies and they do not stock this and said it may be difficult to get in addition to the prior authorization  Instead can we do instead:     Methylphenidate 20mg 1 tab BID    I can afford to pay out of pocket while I wait for the prior authorization for this med  I apologize for the confusion, that you for understanding  The dextrostat can bs cancelled

## 2022-04-06 NOTE — TELEPHONE ENCOUNTER
TW, please review, as Pt requests another RX change       PT would like to switch from   Dextrostat 10 mg 1 tab BID to   Methylphenidate 20 mg 1 tab BID

## 2022-04-08 ENCOUNTER — TELEPHONE (OUTPATIENT)
Dept: FAMILY MEDICINE CLINIC | Facility: CLINIC | Age: 32
End: 2022-04-08

## 2022-04-27 DIAGNOSIS — F33.2 SEVERE EPISODE OF RECURRENT MAJOR DEPRESSIVE DISORDER, WITHOUT PSYCHOTIC FEATURES (HCC): ICD-10-CM

## 2022-04-27 RX ORDER — BUPROPION HYDROCHLORIDE 150 MG/1
TABLET ORAL
Qty: 30 TABLET | Refills: 0 | Status: SHIPPED | OUTPATIENT
Start: 2022-04-27 | End: 2022-08-03

## 2022-05-03 ENCOUNTER — HOSPITAL ENCOUNTER (OUTPATIENT)
Dept: MRI IMAGING | Facility: HOSPITAL | Age: 32
Discharge: HOME | End: 2022-05-03
Payer: COMMERCIAL

## 2022-05-03 DIAGNOSIS — R33.9 URINARY RETENTION: ICD-10-CM

## 2022-05-03 DIAGNOSIS — M48.062 SPINAL STENOSIS OF LUMBAR REGION WITH NEUROGENIC CLAUDICATION: ICD-10-CM

## 2022-05-03 PROCEDURE — G1004 CDSM NDSC: HCPCS

## 2022-05-03 PROCEDURE — 72148 MRI LUMBAR SPINE W/O DYE: CPT

## 2022-05-05 DIAGNOSIS — F90.2 ATTENTION DEFICIT HYPERACTIVITY DISORDER (ADHD), COMBINED TYPE: Primary | ICD-10-CM

## 2022-05-05 RX ORDER — DEXTROAMPHETAMINE SACCHARATE, AMPHETAMINE ASPARTATE, DEXTROAMPHETAMINE SULFATE AND AMPHETAMINE SULFATE 5; 5; 5; 5 MG/1; MG/1; MG/1; MG/1
20 TABLET ORAL
Qty: 60 TABLET | Refills: 0 | Status: SHIPPED | OUTPATIENT
Start: 2022-05-05 | End: 2022-05-06 | Stop reason: SDUPTHER

## 2022-05-05 NOTE — TELEPHONE ENCOUNTER
Pt call and said that his insurance wont pay for the ritalin 20mg he [facility] and say it cheaper for his insurance and would like to go back to the [facility] to his     [facility]

## 2022-05-05 NOTE — TELEPHONE ENCOUNTER
----- Message from Akira Sullivan MD sent at 5/5/2022  6:31 AM EDT -----  MRI results--f/u Pt/chiro or pain MD

## 2022-05-06 DIAGNOSIS — F90.2 ATTENTION DEFICIT HYPERACTIVITY DISORDER (ADHD), COMBINED TYPE: ICD-10-CM

## 2022-05-06 RX ORDER — DEXTROAMPHETAMINE SACCHARATE, AMPHETAMINE ASPARTATE, DEXTROAMPHETAMINE SULFATE AND AMPHETAMINE SULFATE 5; 5; 5; 5 MG/1; MG/1; MG/1; MG/1
20 TABLET ORAL
Qty: 60 TABLET | Refills: 0 | Status: CANCELLED | OUTPATIENT
Start: 2022-05-06

## 2022-05-06 RX ORDER — DEXTROAMPHETAMINE SACCHARATE, AMPHETAMINE ASPARTATE, DEXTROAMPHETAMINE SULFATE AND AMPHETAMINE SULFATE 5; 5; 5; 5 MG/1; MG/1; MG/1; MG/1
20 TABLET ORAL
Qty: 60 TABLET | Refills: 0 | Status: SHIPPED | OUTPATIENT
Start: 2022-05-06 | End: 2022-05-13 | Stop reason: SDUPTHER

## 2022-05-13 ENCOUNTER — TELEPHONE (OUTPATIENT)
Dept: FAMILY MEDICINE CLINIC | Facility: CLINIC | Age: 32
End: 2022-05-13

## 2022-05-13 DIAGNOSIS — F90.2 ATTENTION DEFICIT HYPERACTIVITY DISORDER (ADHD), COMBINED TYPE: ICD-10-CM

## 2022-05-13 RX ORDER — DEXTROAMPHETAMINE SACCHARATE, AMPHETAMINE ASPARTATE, DEXTROAMPHETAMINE SULFATE AND AMPHETAMINE SULFATE 5; 5; 5; 5 MG/1; MG/1; MG/1; MG/1
20 TABLET ORAL
Qty: 60 TABLET | Refills: 0 | Status: SHIPPED | OUTPATIENT
Start: 2022-05-13 | End: 2022-06-09 | Stop reason: SDUPTHER

## 2022-05-13 NOTE — TELEPHONE ENCOUNTER
----- Message from Africa Telloon sent at 2022  2:43 AM EDT -----  Regardin22 Adderall   Hi, so drake was able to give me a 5 day supply while the prior authorization is in process  They said the rest of the script would be void now and tbe physician will have to send over another one  This is [facility] [place]  ###      [phone] [facility] [place] is where it should be sent  Can the remainder of the script (Adderall IR 20MG BID) be sent to the [facility] in [place]  ?

## 2022-05-16 ENCOUNTER — TELEMEDICINE (OUTPATIENT)
Dept: UROLOGY | Facility: HOSPITAL | Age: 32
End: 2022-05-16
Payer: COMMERCIAL

## 2022-05-16 ENCOUNTER — TELEPHONE (OUTPATIENT)
Dept: UROLOGY | Facility: HOSPITAL | Age: 32
End: 2022-05-16

## 2022-05-16 DIAGNOSIS — R33.9 URINARY RETENTION: Primary | ICD-10-CM

## 2022-05-16 PROCEDURE — G2012 BRIEF CHECK IN BY MD/QHP: HCPCS | Performed by: NURSE PRACTITIONER

## 2022-05-16 RX ORDER — TAMSULOSIN HYDROCHLORIDE 0.4 MG/1
0.4 CAPSULE ORAL
Qty: 30 CAPSULE | Refills: 2 | Status: ON HOLD | OUTPATIENT
Start: 2022-05-16 | End: 2022-08-03 | Stop reason: SDUPTHER

## 2022-05-16 NOTE — TELEPHONE ENCOUNTER
Called [facility] and verified Cysto for 5/226 @ 10:00 with Dr Jhon Olson  Also that he should have u/s completed this week  Also scheduled him for urodynamics mailed appointment time and information to him

## 2022-05-16 NOTE — PROGRESS NOTES
05/16/22    Alphonse Chun   1990   [id number]     [facility] [place] Brief Visit    Assessment  1 Urinary hesitancy   2 Urinary retention     Discussion/Plan  1 Urinary hesitancy   2 Urinary retention    Renal US with PVR ordered    Urodynamics recommended   Cystoscopy recommended    Begin Tamsulosin 0 4 mg daily - side effects reviewed    Urine culture in place if symptomatic    ER precautions reviewed   Consider side effects of medications that could be contributing to this issue: Wellbutrin, Adderall, Klonopin, Suboxone     Patient is agreeable to the plan and will schedule imaging and procedures  He will follow up in the office to review  All questions answered at his time  Subjective  HPI   Rafael Cunningham is a 27year old male who presents in consultation for evaluation of dysuria and difficult urination  He has a history of urinary retention requiring straight catheterization "at least ten times" for great than 1 liter of urine  Most recently, documentation states he was admitted to [facility] with 1 5 L in his bladder on bladder scan  Urine studies were negative  He states he has pain with every urination and he fears every time he has to urinate  He denies history of pyleonephritis or relevant surgery  He denies UTI  He has history of spinal stenosis as well and possible neurogenic bladder  MRI of lumbar spine shows mild, noncompressive degenerative changes of the low lumbar spine  Slight canal stenosis L4-L5 and L5-S1  He denies family history of  malignancies  Denies gross hematuria and fevers  He hydrates adequately with water and denies constipation  He works night shift  He is maintained on Wellbutrin, Klonopin, Suboxone and Adderall        Review of Systems - History obtained from chart review and the patient  General ROS: negative  Psychological ROS: negative  Endocrine ROS: negative  Respiratory ROS: no cough, shortness of breath, or wheezing  Cardiovascular ROS: no chest pain or dyspnea on exertion  Gastrointestinal ROS: negative  Genito-Urinary ROS: positive for - change in urinary stream and dysuria  Musculoskeletal ROS: negative  Neurological ROS: no TIA or stroke symptoms  Dermatological ROS: negative       DILLON Maza     I have spent 15 minutes with Patient  today in which greater than 50% of this time was spent in counseling/coordination of care regarding Risks and benefits of tx options, Intructions for management, Patient and family education, Importance of tx compliance, Risk factor reductions and Impressions  Patient is located in the following state in which I hold an active license [place]      Assessment/Plan:    Problem List Items Addressed This Visit        Genitourinary    Urinary retention - Primary    Relevant Medications    tamsulosin (FLOMAX) 0 4 mg    Other Relevant Orders    US kidney and bladder with pvr    Urine culture          Recent Visits  No visits were found meeting these conditions  Showing recent visits within past 7 days and meeting all other requirements  Today's Visits  Date Type Provider Dept   05/16/22 Telephone [facility] [place]   05/16/22 [facility]   Showing today's visits and meeting all other requirements  Future Appointments  No visits were found meeting these conditions    Showing future appointments within next 150 days and meeting all other requirements

## 2022-05-16 NOTE — TELEPHONE ENCOUNTER
Pt had a virtual visit w/bart today he needs cysto and ultrasound and urodynamics     1st attempt to contact ptandrew cysto is sched for 05/26/22 @ 10a w/Dr Lawrence Barrios at [address] - [facility], left number for central scheduling for him to sched his ultrasound sometime this week    pt needs to have urodynamics sched

## 2022-05-20 DIAGNOSIS — F41.9 ANXIETY: Primary | ICD-10-CM

## 2022-05-20 RX ORDER — ALPRAZOLAM 1 MG/1
1 TABLET ORAL 3 TIMES DAILY PRN
Qty: 90 TABLET | Refills: 5 | Status: SHIPPED | OUTPATIENT
Start: 2022-05-20 | End: 2022-07-13

## 2022-06-09 DIAGNOSIS — F90.2 ATTENTION DEFICIT HYPERACTIVITY DISORDER (ADHD), COMBINED TYPE: ICD-10-CM

## 2022-06-09 RX ORDER — DEXTROAMPHETAMINE SACCHARATE, AMPHETAMINE ASPARTATE, DEXTROAMPHETAMINE SULFATE AND AMPHETAMINE SULFATE 5; 5; 5; 5 MG/1; MG/1; MG/1; MG/1
20 TABLET ORAL
Qty: 60 TABLET | Refills: 0 | Status: SHIPPED | OUTPATIENT
Start: 2022-06-09 | End: 2022-07-07 | Stop reason: SDUPTHER

## 2022-07-07 DIAGNOSIS — F90.2 ATTENTION DEFICIT HYPERACTIVITY DISORDER (ADHD), COMBINED TYPE: ICD-10-CM

## 2022-07-07 RX ORDER — DEXTROAMPHETAMINE SACCHARATE, AMPHETAMINE ASPARTATE, DEXTROAMPHETAMINE SULFATE AND AMPHETAMINE SULFATE 5; 5; 5; 5 MG/1; MG/1; MG/1; MG/1
20 TABLET ORAL
Qty: 60 TABLET | Refills: 0 | Status: CANCELLED | OUTPATIENT
Start: 2022-07-07

## 2022-07-07 NOTE — TELEPHONE ENCOUNTER
Assessment/Plan:    1  Strep pharyngitis    - amoxicillin (AMOXIL) 500 MG tablet; Take 1 tablet (500 mg total) by mouth 3 (three) times a day with meals for 10 days  Dispense: 30 tablet; Refill: 0    2  Sore throat    - POCT rapid strepA - positive for strep    3  Left lower quadrant pain    - POCT urine dip - +1 protein and moderate blood  - u/s kidney and bladder normal  - Ambulatory referral to Gastroenterology;    4  Hematuria, unspecified type    - see above #3  - Ambulatory referral to Urology; Future    5  Polyp of colon, unspecified part of colon, unspecified type    - for your LLQ and history of polyps please call to schedule colonoscopy with Carrolxtarsha sooner then 6/2018  - Ambulatory referral to Gastroenterology; Future    F/u as needed          Subjective:   Chief Complaint   Patient presents with    Abdominal Pain     LLQ-    Difficulty Urinating    Nasal Congestion    Headache    Sore Throat      Patient ID: Sommer Harman is a 63 y o  female  Patient here c/o nasal congestion and cough starting three weeks ago  Got better then returned 3 days ago with a very sore throat  Denies fever  Achy tired  Taking Motrin with some relief  Also for the past 6 months has pain that comes and goes  Urinating normally, moving bowels normally  Just a fleeting pain  Had a total hysterectomy with ovaries  Denies weight loss  Denies nausea, vomiting, diarrhea, constipation  Had polyps on colonoscopy in June 2013 but told not to come back for 5 years and "its not time yet: Marzena Starr The following portions of the patient's history were reviewed and updated as appropriate: allergies, current medications, past family history, past medical history, past social history, past surgical history and problem list     No past medical history on file  No past surgical history on file  No family history on file    Social History     Social History    Marital status: /Civil Union     Spouse name: N/A    Number of Last fill adderall per [facility] 06/09/2022 children: N/A    Years of education: N/A     Occupational History    Not on file  Social History Main Topics    Smoking status: Never Smoker    Smokeless tobacco: Never Used    Alcohol use No    Drug use: No    Sexual activity: Not on file     Other Topics Concern    Not on file     Social History Narrative    No narrative on file       Current Outpatient Prescriptions:     amoxicillin (AMOXIL) 500 MG tablet, Take 1 tablet (500 mg total) by mouth 3 (three) times a day with meals for 10 days, Disp: 30 tablet, Rfl: 0    Review of Systems          Objective:    Vitals:    02/15/18 1254   BP: 146/94   BP Location: Left arm   Patient Position: Sitting   Cuff Size: Standard   Pulse: 92   Resp: 14   Temp: 99 8 °F (37 7 °C)   TempSrc: Oral   Weight: 70 6 kg (155 lb 9 6 oz)   Height: 5' 5 5" (1 664 m)        Physical Exam   Constitutional: She is oriented to person, place, and time  She appears well-developed and well-nourished  HENT:   Head: Normocephalic and atraumatic  Right Ear: External ear normal    Left Ear: External ear normal    Pharynx with erythema, turbinates congested   Neck: Neck supple  Cardiovascular: Normal rate, regular rhythm and normal heart sounds  Pulmonary/Chest: Effort normal and breath sounds normal    Abdominal: Soft  Bowel sounds are normal  There is tenderness  Tenderness LLQ no rebound or refer pain, no guarding   Lymphadenopathy:     She has no cervical adenopathy  Neurological: She is alert and oriented to person, place, and time  Skin: Skin is warm and dry  Psychiatric: She has a normal mood and affect   Judgment normal

## 2022-07-08 RX ORDER — DEXTROAMPHETAMINE SACCHARATE, AMPHETAMINE ASPARTATE, DEXTROAMPHETAMINE SULFATE AND AMPHETAMINE SULFATE 5; 5; 5; 5 MG/1; MG/1; MG/1; MG/1
20 TABLET ORAL
Qty: 60 TABLET | Refills: 0 | Status: SHIPPED | OUTPATIENT
Start: 2022-07-08 | End: 2022-08-03

## 2022-07-12 DIAGNOSIS — F41.9 ANXIETY: ICD-10-CM

## 2022-07-13 RX ORDER — ALPRAZOLAM 1 MG/1
TABLET ORAL
Qty: 90 TABLET | Refills: 0 | Status: SHIPPED | OUTPATIENT
Start: 2022-07-14 | End: 2022-08-03

## 2022-07-13 NOTE — TELEPHONE ENCOUNTER
Patient called back in saying the pharmacy is still giving him an issue and that he wants his prescription sent to [facility] [phone]

## 2022-07-13 NOTE — TELEPHONE ENCOUNTER
approve    Last fill xanax per PDMP--06/16/2022  patient is aware pharmacy can not fill prescription until 07/14/2022    Spoke to [facility] per [facility] was transfer to [facility] and patient was going to fill there on the due day  Per my conversation with the pharmacy they can also call [facility] and cancelled the prescription   If patient want to tranfers now to [facility] again another Rx need to be send   Per patient and I conversation he want to go to [facility] and no [facility]   I called [facility] on [phone] ([facility]) [phone] per pharmacy patient do not go there but will call the other [employer] [employer] store and cancelled the rx      Called another [facility] [phone]    All pharmacy where remove from patient chart

## 2022-07-26 ENCOUNTER — HOSPITAL ENCOUNTER (EMERGENCY)
Facility: HOSPITAL | Age: 32
Discharge: HOME | End: 2022-07-26
Attending: EMERGENCY MEDICINE | Admitting: EMERGENCY MEDICINE
Payer: COMMERCIAL

## 2022-07-26 VITALS
BODY MASS INDEX: 21.9 KG/M2 | HEIGHT: 70 IN | RESPIRATION RATE: 16 BRPM | DIASTOLIC BLOOD PRESSURE: 74 MMHG | WEIGHT: 153 LBS | TEMPERATURE: 98.4 F | HEART RATE: 105 BPM | SYSTOLIC BLOOD PRESSURE: 130 MMHG | OXYGEN SATURATION: 99 %

## 2022-07-26 DIAGNOSIS — L03.116 CELLULITIS OF LEFT FOOT: Primary | ICD-10-CM

## 2022-07-26 PROCEDURE — 99284 EMERGENCY DEPT VISIT MOD MDM: CPT | Performed by: EMERGENCY MEDICINE

## 2022-07-26 PROCEDURE — 99283 EMERGENCY DEPT VISIT LOW MDM: CPT

## 2022-07-26 RX ORDER — CLINDAMYCIN HYDROCHLORIDE 150 MG/1
450 CAPSULE ORAL 3 TIMES DAILY
Qty: 63 CAPSULE | Refills: 0 | Status: SHIPPED | OUTPATIENT
Start: 2022-07-26 | End: 2022-08-03

## 2022-07-26 RX ORDER — CLINDAMYCIN HYDROCHLORIDE 150 MG/1
450 CAPSULE ORAL ONCE
Status: COMPLETED | OUTPATIENT
Start: 2022-07-26 | End: 2022-07-26

## 2022-07-26 RX ADMIN — CLINDAMYCIN HYDROCHLORIDE 450 MG: 150 CAPSULE ORAL at 19:42

## 2022-07-26 NOTE — ED PROVIDER NOTES
History  Chief Complaint   Patient presents with    Rash     Pt presents with c/o left foot swelling and redness, states he feels right foot is starting to feel like the left  States he has a hx of cellulitis and was hospitalized for it  No abx currently  30-year-old male presents for evaluation of redness and swelling to lateral aspect of his left foot for the past 2 days  The patient has a history of cellulitis in the foot after having foot trauma requiring reconstruction foot  Patient denies any associated fevers or chills  He has not taken anything for this episode of skin redness  Rash  Associated symptoms: no fever        Prior to Admission Medications   Prescriptions Last Dose Informant Patient Reported? Taking? ALPRAZolam (XANAX) 1 mg tablet   No No   Sig: TAKE 1 TABLET BY MOUTH AS NEEDED IN THE MORNING, AT NOON AND IN THE EVENING FOR ANXIETY     GaviLAX 17 GM/SCOOP powder   Yes No   Narcan 4 MG/0 1ML nasal spray   Yes No   amphetamine-dextroamphetamine (ADDERALL) 20 mg tablet   No No   Sig: Take 1 tablet (20 mg total) by mouth 2 (two) times a day Max Daily Amount: 40 mg   buPROPion (WELLBUTRIN XL) 150 mg 24 hr tablet   No No   Sig: take 1 tablet by mouth every morning   buprenorphine-naloxone (SUBOXONE) 8-2 mg per SL tablet   Yes No   Sig: Place 1 tablet under the tongue daily   polyethylene glycol (MIRALAX) 17 g packet   No No   Sig: Take 17 g by mouth daily   potassium chloride (K-DUR,KLOR-CON) 20 mEq tablet   No No   Sig: Take 1 tablet (20 mEq total) by mouth daily   tamsulosin (FLOMAX) 0 4 mg   No No   Sig: Take 1 capsule (0 4 mg total) by mouth daily with dinner      Facility-Administered Medications: None       Past Medical History:   Diagnosis Date    Addiction to drug ([facility] [address] )     Allergic     Anxiety     Benzodiazepine withdrawal (HCC) 10/31/2019    Chronic pain disorder     Depression     Elevated AST (SGOT) 8/3/2019    Hepatitis C 8/4/2019    Hepatitis C     Methamphetamine abuse ([facility] ) 2019    Psychiatric disorder     Severe episode of recurrent major depressive disorder, without psychotic features ([facility] ) 2019    Spinal stenosis     Substance abuse ([place] [address] )     Suicide attempt ([place] [address] )     Urinary retention        Past Surgical History:   Procedure Laterality Date    BACK SURGERY      FRACTURE SURGERY      left foot reconstruted     SPINE SURGERY      hernated disc        Family History   Problem Relation Age of Onset    Hypertension Mother     Hyperlipidemia Father     Heart disease Father     Depression Maternal Grandmother     Hypertension Maternal Grandmother     Squamous cell carcinoma Maternal Grandfather     Alcohol abuse Maternal Grandfather     Alzheimer's disease Paternal Grandmother     Dementia Paternal Grandmother     Alcohol abuse Paternal Uncle     Colon cancer Neg Hx     Colon polyps Neg Hx      I have reviewed and agree with the history as documented  E-Cigarette/Vaping    E-Cigarette Use Current Every Day User      E-Cigarette/Vaping Substances    Nicotine Yes     THC No     CBD No     Flavoring No     Other No     Unknown No      Social History     Tobacco Use    Smoking status: Current Every Day Smoker     Packs/day: 1 00     Types: E-Cigarettes     Last attempt to quit: 2017     Years since quittin 9    Smokeless tobacco: Current User    Tobacco comment: Pt uses a "vape" pen - use is equivalent to approximately 1 pack per day   Vaping Use    Vaping Use: Every day    Substances: Nicotine   Substance Use Topics    Alcohol use: Not Currently    Drug use: Not Currently     Frequency: 3 0 times per week     Types: Methamphetamines     Comment: pt states last used 4 month ago       Review of Systems   Constitutional: Positive for unexpected weight change  Negative for chills and fever  Skin: Positive for color change and rash  Negative for wound  All other systems reviewed and are negative        Physical Exam  Physical Exam  Vitals and nursing note reviewed  Constitutional:       General: He is not in acute distress  Appearance: He is well-developed  HENT:      Head: Normocephalic and atraumatic  Right Ear: External ear normal       Left Ear: External ear normal    Eyes:      General: No scleral icterus  Pulmonary:      Effort: Pulmonary effort is normal  No respiratory distress  Abdominal:      General: There is no distension  Musculoskeletal:         General: Swelling ( lateral aspect of left foot) present  Normal range of motion  Cervical back: Normal range of motion  Skin:     Findings: Erythema ( latera aspect of left foot) present  Neurological:      Mental Status: He is alert  Mental status is at baseline  Psychiatric:         Mood and Affect: Mood normal              Vital Signs  ED Triage Vitals [07/26/22 1930]   Temperature Pulse Respirations Blood Pressure SpO2   98 4 °F (36 9 °C) 105 16 130/74 99 %      Temp Source Heart Rate Source Patient Position - Orthostatic VS BP Location FiO2 (%)   Oral Monitor -- Left arm --      Pain Score       4           Vitals:    07/26/22 1930   BP: 130/74   Pulse: 105         Visual Acuity      ED Medications  Medications   clindamycin (CLEOCIN) capsule 450 mg (450 mg Oral Given 7/26/22 1942)       Diagnostic Studies  Results Reviewed     None                 No orders to display              Procedures  Procedures         ED Course                                             MDM  Number of Diagnoses or Management Options  Cellulitis of left foot  Diagnosis management comments: Patient with 2 days of redness and swelling lateral aspect of left foot without systemic signs of infection  The patient does not have any proximal spreading lymphangitic spread  Discussed plan for oral antibiotics  The patient has previously been on Keflex and Bactrim  We discussed the use of taking single clindamycin for treatment of cellulitis    The patient does not have a known history of MRSA  The patient has dry callused feet as history for trauma advised patient to follow podiatry for regular foot care  The patient (and any family present) verbalized understanding of the discharge instructions and warnings that would necessitate return to the Emergency Department  All questions were answered prior to discharge  Disposition  Final diagnoses:   Cellulitis of left foot     Time reflects when diagnosis was documented in both MDM as applicable and the Disposition within this note     Time User Action Codes Description Comment    7/26/2022  7:39 PM Velora Shows Add [L03 116] Cellulitis of left foot       ED Disposition     ED Disposition   Discharge    Condition   Stable    Date/Time   Tue Jul 26, 2022  7:39 PM    Comment   Oddis Sake discharge to home/self care  Follow-up Information     Follow up With Specialties Details Why Contact Info Additional Information    [facility] Schedule an appointment as soon as possible for a visit  For further evaluation [phone] [facility] [id number]  [address] [place], [place] [id number] [phone]          Patient's Medications   Discharge Prescriptions    CLINDAMYCIN (CLEOCIN) 150 MG CAPSULE    Take 3 capsules (450 mg total) by mouth 3 (three) times a day for 7 days       Start Date: 7/26/2022 End Date: 8/2/2022       Order Dose: 450 mg       Quantity: 63 capsule    Refills: 0       No discharge procedures on file      PDMP Review       Value Time User    PDMP Reviewed  Yes 6/9/2022  4:03 PM Emily Rodriguez MD          ED Provider  Electronically Signed by           Gina Bustamante DO  07/26/22 1947

## 2022-07-26 NOTE — ED NOTES
Pt using restroom when called for triage  Triage will begin when pt available         [address], MARCELINO  07/26/22 1921

## 2022-07-31 ENCOUNTER — HOSPITAL ENCOUNTER (INPATIENT)
Facility: HOSPITAL | Age: 32
LOS: 3 days | Discharge: OTHER INSTITUTION | DRG: 773 | End: 2022-08-03
Attending: EMERGENCY MEDICINE | Admitting: EMERGENCY MEDICINE
Payer: COMMERCIAL

## 2022-07-31 ENCOUNTER — HOSPITAL ENCOUNTER (EMERGENCY)
Facility: HOSPITAL | Age: 32
End: 2022-07-31
Attending: EMERGENCY MEDICINE
Payer: COMMERCIAL

## 2022-07-31 VITALS
OXYGEN SATURATION: 99 % | RESPIRATION RATE: 16 BRPM | TEMPERATURE: 98.1 F | DIASTOLIC BLOOD PRESSURE: 75 MMHG | SYSTOLIC BLOOD PRESSURE: 111 MMHG | HEART RATE: 60 BPM

## 2022-07-31 DIAGNOSIS — F11.21 OPIOID USE DISORDER, SEVERE, IN SUSTAINED REMISSION, ON MAINTENANCE THERAPY (HCC): ICD-10-CM

## 2022-07-31 DIAGNOSIS — F13.20 BENZODIAZEPINE DEPENDENCE (HCC): Primary | ICD-10-CM

## 2022-07-31 DIAGNOSIS — G47.00 INSOMNIA: ICD-10-CM

## 2022-07-31 DIAGNOSIS — R33.9 URINARY RETENTION: ICD-10-CM

## 2022-07-31 DIAGNOSIS — F13.20 BENZODIAZEPINE DEPENDENCE (HCC): ICD-10-CM

## 2022-07-31 DIAGNOSIS — F11.20 OPIOID DEPENDENCE (HCC): ICD-10-CM

## 2022-07-31 DIAGNOSIS — F98.8 ATTENTION DEFICIT DISORDER (ADD) WITHOUT HYPERACTIVITY: Primary | ICD-10-CM

## 2022-07-31 DIAGNOSIS — F33.2 SEVERE EPISODE OF RECURRENT MAJOR DEPRESSIVE DISORDER, WITHOUT PSYCHOTIC FEATURES (HCC): ICD-10-CM

## 2022-07-31 DIAGNOSIS — L03.116 CELLULITIS OF LEFT FOOT: ICD-10-CM

## 2022-07-31 PROBLEM — L03.90 CELLULITIS: Status: ACTIVE | Noted: 2022-07-31

## 2022-07-31 LAB
ALBUMIN SERPL BCP-MCNC: 3.7 G/DL (ref 3.5–5)
ALP SERPL-CCNC: 62 U/L (ref 46–116)
ALT SERPL W P-5'-P-CCNC: 28 U/L (ref 12–78)
ANION GAP SERPL CALCULATED.3IONS-SCNC: 7 MMOL/L (ref 4–13)
APAP SERPL-MCNC: <2 UG/ML (ref 10–20)
APTT PPP: 33 SECONDS (ref 23–37)
AST SERPL W P-5'-P-CCNC: 28 U/L (ref 5–45)
BASOPHILS # BLD AUTO: 0.05 THOUSANDS/ΜL (ref 0–0.1)
BASOPHILS NFR BLD AUTO: 1 % (ref 0–1)
BILIRUB SERPL-MCNC: 0.4 MG/DL (ref 0.2–1)
BUN SERPL-MCNC: 12 MG/DL (ref 5–25)
CALCIUM SERPL-MCNC: 8.8 MG/DL (ref 8.3–10.1)
CARDIAC TROPONIN I PNL SERPL HS: 2 NG/L
CHLORIDE SERPL-SCNC: 104 MMOL/L (ref 96–108)
CO2 SERPL-SCNC: 29 MMOL/L (ref 21–32)
CREAT SERPL-MCNC: 0.8 MG/DL (ref 0.6–1.3)
EOSINOPHIL # BLD AUTO: 0.09 THOUSAND/ΜL (ref 0–0.61)
EOSINOPHIL NFR BLD AUTO: 1 % (ref 0–6)
ERYTHROCYTE [DISTWIDTH] IN BLOOD BY AUTOMATED COUNT: 12.1 % (ref 11.6–15.1)
ETHANOL SERPL-MCNC: <3 MG/DL (ref 0–3)
GFR SERPL CREATININE-BSD FRML MDRD: 118 ML/MIN/1.73SQ M
GLUCOSE SERPL-MCNC: 110 MG/DL (ref 65–140)
HCT VFR BLD AUTO: 35 % (ref 36.5–49.3)
HGB BLD-MCNC: 12.3 G/DL (ref 12–17)
IMM GRANULOCYTES # BLD AUTO: 0.02 THOUSAND/UL (ref 0–0.2)
IMM GRANULOCYTES NFR BLD AUTO: 0 % (ref 0–2)
INR PPP: 1.14 (ref 0.84–1.19)
LACTATE SERPL-SCNC: 0.5 MMOL/L (ref 0.5–2)
LYMPHOCYTES # BLD AUTO: 1.26 THOUSANDS/ΜL (ref 0.6–4.47)
LYMPHOCYTES NFR BLD AUTO: 18 % (ref 14–44)
MCH RBC QN AUTO: 29.8 PG (ref 26.8–34.3)
MCHC RBC AUTO-ENTMCNC: 35.1 G/DL (ref 31.4–37.4)
MCV RBC AUTO: 85 FL (ref 82–98)
MONOCYTES # BLD AUTO: 0.57 THOUSAND/ΜL (ref 0.17–1.22)
MONOCYTES NFR BLD AUTO: 8 % (ref 4–12)
NEUTROPHILS # BLD AUTO: 4.97 THOUSANDS/ΜL (ref 1.85–7.62)
NEUTS SEG NFR BLD AUTO: 72 % (ref 43–75)
NRBC BLD AUTO-RTO: 0 /100 WBCS
PLATELET # BLD AUTO: 308 THOUSANDS/UL (ref 149–390)
PMV BLD AUTO: 10.5 FL (ref 8.9–12.7)
POTASSIUM SERPL-SCNC: 4 MMOL/L (ref 3.5–5.3)
PROCALCITONIN SERPL-MCNC: 0.06 NG/ML
PROT SERPL-MCNC: 7.7 G/DL (ref 6.4–8.4)
PROTHROMBIN TIME: 15.4 SECONDS (ref 11.6–14.5)
RBC # BLD AUTO: 4.13 MILLION/UL (ref 3.88–5.62)
SALICYLATES SERPL-MCNC: <3 MG/DL (ref 3–20)
SARS-COV-2 RNA RESP QL NAA+PROBE: NEGATIVE
SODIUM SERPL-SCNC: 140 MMOL/L (ref 135–147)
TSH SERPL DL<=0.05 MIU/L-ACNC: 1.58 UIU/ML (ref 0.45–4.5)
WBC # BLD AUTO: 6.96 THOUSAND/UL (ref 4.31–10.16)

## 2022-07-31 PROCEDURE — 85610 PROTHROMBIN TIME: CPT | Performed by: EMERGENCY MEDICINE

## 2022-07-31 PROCEDURE — HZ2ZZZZ DETOXIFICATION SERVICES FOR SUBSTANCE ABUSE TREATMENT: ICD-10-PCS | Performed by: EMERGENCY MEDICINE

## 2022-07-31 PROCEDURE — 80179 DRUG ASSAY SALICYLATE: CPT | Performed by: EMERGENCY MEDICINE

## 2022-07-31 PROCEDURE — U0005 INFEC AGEN DETEC AMPLI PROBE: HCPCS | Performed by: EMERGENCY MEDICINE

## 2022-07-31 PROCEDURE — 36415 COLL VENOUS BLD VENIPUNCTURE: CPT | Performed by: EMERGENCY MEDICINE

## 2022-07-31 PROCEDURE — U0003 INFECTIOUS AGENT DETECTION BY NUCLEIC ACID (DNA OR RNA); SEVERE ACUTE RESPIRATORY SYNDROME CORONAVIRUS 2 (SARS-COV-2) (CORONAVIRUS DISEASE [COVID-19]), AMPLIFIED PROBE TECHNIQUE, MAKING USE OF HIGH THROUGHPUT TECHNOLOGIES AS DESCRIBED BY CMS-2020-01-R: HCPCS | Performed by: EMERGENCY MEDICINE

## 2022-07-31 PROCEDURE — 85025 COMPLETE CBC W/AUTO DIFF WBC: CPT | Performed by: EMERGENCY MEDICINE

## 2022-07-31 PROCEDURE — 93005 ELECTROCARDIOGRAM TRACING: CPT

## 2022-07-31 PROCEDURE — 99284 EMERGENCY DEPT VISIT MOD MDM: CPT

## 2022-07-31 PROCEDURE — 85730 THROMBOPLASTIN TIME PARTIAL: CPT | Performed by: EMERGENCY MEDICINE

## 2022-07-31 PROCEDURE — 80053 COMPREHEN METABOLIC PANEL: CPT | Performed by: EMERGENCY MEDICINE

## 2022-07-31 PROCEDURE — 84145 PROCALCITONIN (PCT): CPT | Performed by: EMERGENCY MEDICINE

## 2022-07-31 PROCEDURE — 84443 ASSAY THYROID STIM HORMONE: CPT | Performed by: EMERGENCY MEDICINE

## 2022-07-31 PROCEDURE — 84484 ASSAY OF TROPONIN QUANT: CPT | Performed by: EMERGENCY MEDICINE

## 2022-07-31 PROCEDURE — 99291 CRITICAL CARE FIRST HOUR: CPT | Performed by: NURSE PRACTITIONER

## 2022-07-31 PROCEDURE — 99285 EMERGENCY DEPT VISIT HI MDM: CPT | Performed by: EMERGENCY MEDICINE

## 2022-07-31 PROCEDURE — 80143 DRUG ASSAY ACETAMINOPHEN: CPT | Performed by: EMERGENCY MEDICINE

## 2022-07-31 PROCEDURE — 83605 ASSAY OF LACTIC ACID: CPT | Performed by: EMERGENCY MEDICINE

## 2022-07-31 PROCEDURE — 87040 BLOOD CULTURE FOR BACTERIA: CPT | Performed by: EMERGENCY MEDICINE

## 2022-07-31 PROCEDURE — 82077 ASSAY SPEC XCP UR&BREATH IA: CPT | Performed by: EMERGENCY MEDICINE

## 2022-07-31 RX ORDER — PHENOBARBITAL SODIUM 65 MG/ML
65 INJECTION INTRAMUSCULAR ONCE
Status: COMPLETED | OUTPATIENT
Start: 2022-07-31 | End: 2022-07-31

## 2022-07-31 RX ORDER — FOLIC ACID 1 MG/1
1 TABLET ORAL DAILY
Status: DISCONTINUED | OUTPATIENT
Start: 2022-07-31 | End: 2022-08-03 | Stop reason: HOSPADM

## 2022-07-31 RX ORDER — TAMSULOSIN HYDROCHLORIDE 0.4 MG/1
0.4 CAPSULE ORAL
Status: DISCONTINUED | OUTPATIENT
Start: 2022-07-31 | End: 2022-08-03 | Stop reason: HOSPADM

## 2022-07-31 RX ORDER — ACETAMINOPHEN 325 MG/1
650 TABLET ORAL EVERY 6 HOURS PRN
Status: DISCONTINUED | OUTPATIENT
Start: 2022-07-31 | End: 2022-08-03 | Stop reason: HOSPADM

## 2022-07-31 RX ORDER — CLINDAMYCIN HYDROCHLORIDE 150 MG/1
450 CAPSULE ORAL EVERY 8 HOURS SCHEDULED
Status: DISCONTINUED | OUTPATIENT
Start: 2022-07-31 | End: 2022-07-31 | Stop reason: HOSPADM

## 2022-07-31 RX ORDER — BUPRENORPHINE AND NALOXONE 8; 2 MG/1; MG/1
8 FILM, SOLUBLE BUCCAL; SUBLINGUAL 2 TIMES DAILY
Status: DISCONTINUED | OUTPATIENT
Start: 2022-07-31 | End: 2022-08-03 | Stop reason: HOSPADM

## 2022-07-31 RX ORDER — SODIUM CHLORIDE 9 MG/ML
100 INJECTION, SOLUTION INTRAVENOUS CONTINUOUS
Status: DISCONTINUED | OUTPATIENT
Start: 2022-07-31 | End: 2022-08-01

## 2022-07-31 RX ORDER — ONDANSETRON 2 MG/ML
4 INJECTION INTRAMUSCULAR; INTRAVENOUS EVERY 6 HOURS PRN
Status: DISCONTINUED | OUTPATIENT
Start: 2022-07-31 | End: 2022-08-03 | Stop reason: HOSPADM

## 2022-07-31 RX ORDER — BUPRENORPHINE AND NALOXONE 8; 2 MG/1; MG/1
8 FILM, SOLUBLE BUCCAL; SUBLINGUAL 2 TIMES DAILY
Status: DISCONTINUED | OUTPATIENT
Start: 2022-07-31 | End: 2022-07-31

## 2022-07-31 RX ORDER — ENOXAPARIN SODIUM 100 MG/ML
40 INJECTION SUBCUTANEOUS DAILY
Status: DISCONTINUED | OUTPATIENT
Start: 2022-07-31 | End: 2022-08-03 | Stop reason: HOSPADM

## 2022-07-31 RX ORDER — LANOLIN ALCOHOL/MO/W.PET/CERES
100 CREAM (GRAM) TOPICAL DAILY
Status: DISCONTINUED | OUTPATIENT
Start: 2022-07-31 | End: 2022-08-03 | Stop reason: HOSPADM

## 2022-07-31 RX ORDER — BUPRENORPHINE AND NALOXONE 2; .5 MG/1; MG/1
2 FILM, SOLUBLE BUCCAL; SUBLINGUAL ONCE
Status: COMPLETED | OUTPATIENT
Start: 2022-07-31 | End: 2022-07-31

## 2022-07-31 RX ORDER — CLINDAMYCIN HYDROCHLORIDE 150 MG/1
450 CAPSULE ORAL EVERY 8 HOURS SCHEDULED
Status: DISCONTINUED | OUTPATIENT
Start: 2022-07-31 | End: 2022-08-03 | Stop reason: HOSPADM

## 2022-07-31 RX ADMIN — CLINDAMYCIN HYDROCHLORIDE 450 MG: 150 CAPSULE ORAL at 21:01

## 2022-07-31 RX ADMIN — BUPRENORPHINE AND NALOXONE 2 MG: 2; .5 FILM BUCCAL; SUBLINGUAL at 17:29

## 2022-07-31 RX ADMIN — MULTIPLE VITAMINS W/ MINERALS TAB 1 TABLET: TAB ORAL at 16:15

## 2022-07-31 RX ADMIN — PHENOBARBITAL SODIUM 65 MG: 65 INJECTION INTRAMUSCULAR; INTRAVENOUS at 20:00

## 2022-07-31 RX ADMIN — THIAMINE HCL TAB 100 MG 100 MG: 100 TAB at 16:15

## 2022-07-31 RX ADMIN — BUPRENORPHINE AND NALOXONE 8 MG: 8; 2 FILM BUCCAL; SUBLINGUAL at 20:00

## 2022-07-31 RX ADMIN — ENOXAPARIN SODIUM 40 MG: 100 INJECTION SUBCUTANEOUS at 16:15

## 2022-07-31 RX ADMIN — SODIUM CHLORIDE 100 ML/HR: 0.9 INJECTION, SOLUTION INTRAVENOUS at 16:10

## 2022-07-31 RX ADMIN — CLINDAMYCIN HYDROCHLORIDE 450 MG: 150 CAPSULE ORAL at 13:43

## 2022-07-31 RX ADMIN — FOLIC ACID 1 MG: 1 TABLET ORAL at 16:16

## 2022-07-31 RX ADMIN — TAMSULOSIN HYDROCHLORIDE 0.4 MG: 0.4 CAPSULE ORAL at 18:06

## 2022-07-31 RX ADMIN — PHENOBARBITAL SODIUM 65 MG: 65 INJECTION INTRAMUSCULAR; INTRAVENOUS at 21:16

## 2022-07-31 NOTE — ASSESSMENT & PLAN NOTE
· Longstanding history of opioid use disorder in remission and on maintenance treatment   · Methadone treatment [id number], transitioned to suboxone in 2020  · Currently on 8/2mg buprenorphine/naloxone BID, last dose 7/29    Prescribed by [facility]   · Will continue buprenorphine/naloxone 8/2mg BID

## 2022-07-31 NOTE — ED NOTES
[facility] [address] Carlos Machado  [facility]  Room [address] Tyrone Yip, RN  07/31/22 5554

## 2022-07-31 NOTE — ASSESSMENT & PLAN NOTE
· Reports last use of prescribed benzodiazepine 7/29    · At risk for complicated withdrawal due to seizure history  · Discontinue SEWS protocol with phenobarbital for treatment of benzodiazepine withdrawal in place    Patient has received a total of 780 mg of phenobarbital   · Symptomatic and supportive care   · Consult psychiatry

## 2022-07-31 NOTE — ASSESSMENT & PLAN NOTE
· Endorses hx of recurrent cellulitis, no hx of of MRSA   · Recent ED visit 7/26/2022 treated with clindamycin x 7 days, reports missing 2 days   · Blood cultures- no growth at 24 hours   · Improved in comparison to 7/26 ED images  LLE well perfused, non erythematous, mild swelling lateral aspect  · Will continue clindamycin for treatment completion

## 2022-07-31 NOTE — PLAN OF CARE
Problem: Potential for Falls  Goal: Patient will remain free of falls  Description: INTERVENTIONS:  - Educate patient/family on patient safety including physical limitations  - Instruct patient to call for assistance with activity   - Consult OT/PT to assist with strengthening/mobility   - Keep Call bell within reach  - Keep bed low and locked with side rails adjusted as appropriate  - Keep care items and personal belongings within reach  - Initiate and maintain comfort rounds  - Make Fall Risk Sign visible to staff  - Apply yellow socks and bracelet for high fall risk patients  - Consider moving patient to room near nurses station  Outcome: Progressing     Problem: SUBSTANCE USE/ABUSE  Goal: By discharge, will develop insight into their chemical dependency and sustain motivation to continue in recovery  Description: INTERVENTIONS:  - Attends all daily group sessions and scheduled AA groups  - Actively practices coping skills through participation in the therapeutic community and adherence to program rules  - Reviews and completes assignments from individual treatment plan  - Assist patient development of understanding of their personal cycle of addiction and relapse triggers  Outcome: Progressing  Goal: By discharge, patient will have ongoing treatment plan addressing chemical dependency  Description: INTERVENTIONS:  - Assist patient with resources and/or appointments for ongoing recovery based living  Outcome: Progressing     Problem: Nutrition/Hydration-ADULT  Goal: Nutrient/Hydration intake appropriate for improving, restoring or maintaining nutritional needs  Description: Monitor and assess patient's nutrition/hydration status for malnutrition  Collaborate with interdisciplinary team and initiate plan and interventions as ordered  Monitor patient's weight and dietary intake as ordered or per policy  Utilize nutrition screening tool and intervene as necessary   Determine patient's food preferences and provide high-protein, high-caloric foods as appropriate       INTERVENTIONS:  - Monitor oral intake, urinary output, labs, and treatment plans  - Assess nutrition and hydration status and recommend course of action  - Evaluate amount of meals eaten  - Assist patient with eating if necessary   - Allow adequate time for meals  - Recommend/ encourage appropriate diets, oral nutritional supplements, and vitamin/mineral supplements  - Order, calculate, and assess calorie counts as needed  - Recommend, monitor, and adjust tube feedings and TPN/PPN based on assessed needs  - Assess need for intravenous fluids  - Provide specific nutrition/hydration education as appropriate  - Include patient/family/caregiver in decisions related to nutrition  Outcome: Progressing     Problem: PAIN - ADULT  Goal: Verbalizes/displays adequate comfort level or baseline comfort level  Description: Interventions:  - Encourage patient to monitor pain and request assistance  - Assess pain using appropriate pain scale  - Administer analgesics based on type and severity of pain and evaluate response  - Implement non-pharmacological measures as appropriate and evaluate response  - Consider cultural and social influences on pain and pain management  - Notify physician/advanced practitioner if interventions unsuccessful or patient reports new pain  Outcome: Progressing     Problem: INFECTION - ADULT  Goal: Absence or prevention of progression during hospitalization  Description: INTERVENTIONS:  - Assess and monitor for signs and symptoms of infection  - Monitor lab/diagnostic results  - Monitor all insertion sites, i e  indwelling lines, tubes, and drains  - Monitor endotracheal if appropriate and nasal secretions for changes in amount and color  - [place] appropriate cooling/warming therapies per order  - Administer medications as ordered  - Instruct and encourage patient and family to use good hand hygiene technique  - Identify and instruct in appropriate isolation precautions for identified infection/condition  Outcome: Progressing  Goal: Absence of fever/infection during neutropenic period  Description: INTERVENTIONS:  - Monitor WBC    Outcome: Progressing     Problem: SAFETY ADULT  Goal: Patient will remain free of falls  Description: INTERVENTIONS:  - Educate patient/family on patient safety including physical limitations  - Instruct patient to call for assistance with activity   - Consult OT/PT to assist with strengthening/mobility   - Keep Call bell within reach  - Keep bed low and locked with side rails adjusted as appropriate  - Keep care items and personal belongings within reach  - Initiate and maintain comfort rounds  - Make Fall Risk Sign visible to staff  - Apply yellow socks and bracelet for high fall risk patients  - Consider moving patient to room near nurses station  Outcome: Progressing  Goal: Maintain or return to baseline ADL function  Description: INTERVENTIONS:  -  Assess patient's ability to carry out ADLs; assess patient's baseline for ADL function and identify physical deficits which impact ability to perform ADLs (bathing, care of mouth/teeth, toileting, grooming, dressing, etc )  - Assess/evaluate cause of self-care deficits   - Assess range of motion  - Assess patient's mobility; develop plan if impaired  - Assess patient's need for assistive devices and provide as appropriate  - Encourage maximum independence but intervene and supervise when necessary  - Involve family in performance of ADLs  - Assess for home care needs following discharge   - Consider OT consult to assist with ADL evaluation and planning for discharge  - Provide patient education as appropriate  Outcome: Progressing  Goal: Maintains/Returns to pre admission functional level  Description: INTERVENTIONS:  - Perform BMAT or MOVE assessment daily    - Set and communicate daily mobility goal to care team and patient/family/caregiver     - Collaborate with rehabilitation services on mobility goals if consulted  - Out of bed for toileting  - Record patient progress and toleration of activity level   Outcome: Progressing     Problem: DISCHARGE PLANNING  Goal: Discharge to home or other facility with appropriate resources  Description: INTERVENTIONS:  - Identify barriers to discharge w/patient and caregiver  - Arrange for needed discharge resources and transportation as appropriate  - Identify discharge learning needs (meds, wound care, etc )  - Arrange for interpretive services to assist at discharge as needed  - Refer to Case Management Department for coordinating discharge planning if the patient needs post-hospital services based on physician/advanced practitioner order or complex needs related to functional status, cognitive ability, or social support system  Outcome: Progressing     Problem: Knowledge Deficit  Goal: Patient/family/caregiver demonstrates understanding of disease process, treatment plan, medications, and discharge instructions  Description: Complete learning assessment and assess knowledge base    Interventions:  - Provide teaching at level of understanding  - Provide teaching via preferred learning methods  Outcome: Progressing
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
## 2022-07-31 NOTE — ASSESSMENT & PLAN NOTE
· Longstanding history of benzodiazepine dependence for management of JOAO   · Initially Clonazepam 1mg TID, and most recently Alprazolam 1mg TID prescribed by PCP  · PDMP reviewed, as expected  · Withdrawal management as above   · Case management consulted for dispo planning - endorses interest in inpatient placement

## 2022-07-31 NOTE — ASSESSMENT & PLAN NOTE
· Treated with Adderall 20mg BID, prescribed by PCP   · PDMP reviewed, as expected   · Will hold temporarily during stabilization of benzodiazepine withdrawal

## 2022-07-31 NOTE — H&P
HISTORY & PHYSICAL EXAM  DEPARTMENT OF MEDICAL TOXICOLOGY  LEVEL 4 MEDICAL DETOX UNIT  Quirino Chiang 27 y o  male MRN: [id number]  Unit/Bed#:  DETOX 511-01 Encounter: [id number]      Reason for Admission/Principal Problem: Ethanol withdrawal, Ethanol use disorder  Admitting Provider: DILLON Quintana  Attending Provider: Isela Ledezma MD   7/31/2022  3:08 PM      * Benzodiazepine withdrawal with complication [facility])  Assessment & Plan  · Reports last use of prescribed benzodiazepine 7/29    · At risk for complicated withdrawal due to seizure history  · Initiate SEWS protocol with phenobarbital for treatment of benzodiazepine withdrawal  · Symptomatic and supportive care   · Telemetry and continuous pulse oximetry     Cellulitis of left lower extremity  Assessment & Plan  · Endorses hx of recurrent cellulitis, no hx of of MRSA   · Recent ED visit 7/26/2022 treated with clindamycin x 7 days, reports missing 2 days   · Blood cultures pending   · Improved in comparison to 7/26 ED images  LLE well perfused, non erythematous, mild swelling lateral aspect  · Will continue clindamycin for treatment completion  Opioid use disorder, severe, in sustained remission, on maintenance therapy [facility])  Assessment & Plan  · Longstanding history of opioid use disorder in remission and on maintenance treatment   · Methadone treatment [id number], transitioned to suboxone in 2020  · Currently on 8/2mg buprenorphine/naloxone BID, last dose 7/29  Prescribed by Saw Jordan   · Will continue buprenorphine/naloxone 8/2mg BID   · Consider risk factors of prescribed polypharmacy of benzodiazepine, stimulant, and suboxone  Attempted to discuss with patient today       Benzodiazepine dependence ([facility] [address] )  Assessment & Plan  · Longstanding history of benzodiazepine dependence for management of JOAO   · Initially Clonazepam 1mg TID, and most recently Alprazolam 1mg TID prescribed by PCP  · PDMP reviewed, as expected  · Withdrawal management as above   · Case management consulted for dispo planning - endorses interest in inpatient placement     Attention deficit disorder (ADD) without hyperactivity  Assessment & Plan  · Treated with Adderall 20mg BID, prescribed by PCP   · PDMP reviewed, as expected   · Will hold temporarily during stabilization of benzodiazepine withdrawal      Methamphetamine use ([address] )  Assessment & Plan  · Endorses history of intermittent methamphetamine use  Several years of sobriety with recent recurrence over the past week  · Encourage cessation    Hepatitis C  Assessment & Plan  · Partial treatment [place] 2020   · Will obtain HCV RNA   · Encourage outpatient GI follow up     Urinary retention  Assessment & Plan  · Flomax daily for management of urinary retention  Is scheduled to follow with urology  · Will continue  Tobacco abuse  Assessment & Plan  · Daily tobacco use   · Offered NRT   · Encourage cessation          VTE Prophylaxis: Enoxaparin (Lovenox)  / sequential compression device   Code Status:  Level 1 discussed with patient      Anticipated Length of Stay:  Patient will be admitted on an Inpatient basis with an anticipated length of stay of  2  midnights  Justification for Hospital Stay:  Medical management of benzodiazepine withdrawal    For any questions or concerns, please Tiger Text the advanced practitioner in the role of [facility]-DETOX-AP On Call      This patient qualifies for Level IV medically managed intensive inpatient services under the criteria set by the American Society of Addiction Medicine, including dimensions 1-3   The patient is in withdrawal (or is intoxicated with high risk of withdrawal), with severe and unstable medical and/or psychiatric (dual diagnosis) problems, requiring requires 24-hour medical and nursing care and the full resources of a [place] [address] [place] patient to medical detox unit and continue supportive care and stabilization of acute ethanol withdrawal per medical toxicology/detox treatment pathway  Monitor ethanol withdrawal severity via the Severity of Ethanol Withdrawal Scale (SEWS) Q4 hours and then hourly if/when SEWS > 6  Treat withdrawal per pathway and reassess Q30-60 minutes  Mild SEWS Score 1-6  Administer medications* (IV or PO; PO preferred):   If initial SEWS score: diazepam 10mg PO/IV x 1 AND phenobarbital 65 mg PO/IV x 1   If repeat SEWS score 1-6: phenobarbital 65 mg PO/IV q1 hour x 5 doses maximum   Reassessment:    SEWS q1 hour after each dose until SEWS 0 x 2 hours   VS q1 hours (until SEWS 0, then q4 hours)   Notify provider for bedside evaluation if 5-dose maximum is reached, RASS of -3 to -5, or SEWS score escalates to moderate or severe  Moderate SEWS Score 7-12  Administer medications* (IV):   If initial SEWS score: diazepam 10mg IV x 1 AND phenobarbital 260 mg IV x 1   If repeat SEWS score 7-12 or score escalated from mild: phenobarbital 130 mg IV q30 minutes x 5 doses maximum   Reassessment:   SEWS q30 minutes after each dose until SEWS < 7 (then hourly until SEWS 0 x 2 hours)   VS q30 minutes until SEWS < 7 (then hourly until SEWS 0, then q4 hours)   Notify provider for bedside evaluation if 5-dose maximum is reached, RASS of -3 to -5, or SEWS score escalates to severe  Severe SEWS Score ? 13  Administer medications* (IV):   If initial SEWS score: Diazepam 10 mg IV x 1 AND phenobarbital 650 mg IV piggyback x 1 over 15-30 minutes   If repeat SEWS score ?  13 or score escalated from mild or moderate: phenobarbital 130 mg IV q30 minutes x 5 doses maximum   Reassessment:   SEWS q30 minutes after each dose until SEWS < 7 (then hourly until SEWS 0 x 2 hours)    VS q30 minutes until SEWS < 7 (then hourly until SEWS 0, then q4 hours)   Notify provider for bedside evaluation if 5-dose maximum is reached or RASS of -3 to -5   *Hold medications and notify provider if CNS depression, respirations < 10/min, or RASS of -3 to -5  Medications to be administered adjunctively if more than 2 grams of phenobarbital is needed for stabilization of withdrawal; require attending approval     Dexmedetomidine infusion 0 1-1mcg/kg/hr IV infusion, titratable to reduced agitation (Goal: RASS -2)   Ketamine   o Acute agitated delirium: 1-2 mg/kg IV or 4-5 mg/kg IM  o Refractory withdrawal: 0 1-1mg/kg/hr IV infusion, titratable to reduced agitation (Goal: RASS -2)    Further evaluation, screening and treatment:  Evaluate complete metabolic panel, transaminases, INR, and lipase  Assess hepatic ultrasound for any sign of alcoholic liver disease or cirrhosis, and ultimately refer for further hepatic evaluation and care as/if indicated  Additional medications for ethanol associated malnutrition: Thiamine 100 mg IV daily, increase to 500 mg TID for signs/symptoms of Wernicke's Encephalopathy or Wernicke Korsakoff Syndrome   Folic acid 1 mg IV daily   Multivitamin PO daily      Will offer first monthly injection of Naltrexone 380 mg IM, once patient is stabilized, as it has been shown to assist in decreasing cravings for ethanol  Evaluate and treat for coexisting substance use, such as opioids and nicotine  Discuss risk factors for infectious disease, such as history of intravenous drug abuse, and offer hepatitis and HIV screening if indicated  Case management consultation to assist with coordination of subsequent treatment after discharge  Hx and PE limited by: partially limited due to patient sleeping     HPI: Kaylyn Najjar is a 27y o  year old male who presented to emergency department requesting assistance with benzodiazepine detoxification    He reports on 07/29/2022 he [facility] to [facility]  [facility] for re-evaluation and concern of recurrent cellulitis and after being discharged from the hospital his clonazepam, Adderall, and Suboxone had been stolen  Since then he has not taken any of his medications and therefore is experiencing withdrawal from benzodiazepines and buprenorphine  He denies any opioid use for several years and being on consistent dosing of b i d  Suboxone daily for 7 years that has been prescribed by [facility]  He reports his Aderall and benzodiazepines are prescribed regularly by his PCP  He desires to detox from benzodiazepines and remain on Suboxone  Will has high ED utilization for anxiety with regard to weight loss and various other complaints  Today he reports a history on >100 pound weight loss in the last year  However upon review of records he has remained within a 20 pound range within the last 2 years  He also has had multiple admissions for polysubstance, methamphetamine use, during this time        Quantity and frequency of benzodiazepine intake: alprazolam 1 mg TID  Date/Time of last benzodiazepine:    Current signs and symptoms of benzodiazepine withdrawal: anxiety, tremor, diaphoresis and nausea    SEWS Total Score: 4 (2022  7:20 PM)      Benzodiazepine Withdrawal History  Previous benzodiazepine withdrawal? yes  Prior inpatient treatment for benzodiazepine withdrawal? no  Prior outpatient treatment for benzodiazepine withdrawal? yes  History of seizures with prior benzodiazepine withdrawal? yes  Co-existing substance use? yes    Review of PDMP: yes     Social History     Substance and Sexual Activity   Alcohol Use Not Currently     Social History     Substance and Sexual Activity   Drug Use Yes    Frequency: 7 0 times per week    Types: Methamphetamines, Benzodiazepines    Comment: 22 uses daily     Social History     Tobacco Use   Smoking Status Current Every Day Smoker    Packs/day: 1 00    Types: E-Cigarettes    Last attempt to quit: 2017    Years since quittin 9   Smokeless Tobacco Current User   Tobacco Comment    Pt uses a "vape" pen - use is equivalent to approximately 1 pack per day       Review of Systems   Constitutional: Positive for fatigue  Negative for chills, diaphoresis and fever  Eyes: Negative for visual disturbance  Respiratory: Negative for cough, chest tightness, shortness of breath and wheezing  Cardiovascular: Positive for leg swelling (LLE)  Negative for chest pain and palpitations  Gastrointestinal: Positive for nausea  Negative for abdominal distention, abdominal pain, constipation, diarrhea and vomiting  Musculoskeletal: Positive for myalgias  Neurological: Positive for tremors and headaches  Negative for weakness and light-headedness  Psychiatric/Behavioral: Positive for sleep disturbance  Negative for hallucinations  The patient is nervous/anxious           Historical Information   Past Medical History:   Diagnosis Date    Addiction to drug ([facility] )     Allergic     Anxiety     Benzodiazepine withdrawal ([facility] ) 10/31/2019    Chronic pain disorder     Depression     Elevated AST (SGOT) 8/3/2019    Hepatitis C 8/4/2019    Hepatitis C     [facility] ) 8/7/2019    Psychiatric disorder     Severe episode of recurrent major depressive disorder, without psychotic features ([facility] ) 7/17/2019    Spinal stenosis     Substance abuse ([facility] )     Suicide attempt ([facility] )     Urinary retention      Past Surgical History:   Procedure Laterality Date    BACK SURGERY      FRACTURE SURGERY      left foot reconstruted     SPINE SURGERY      hernated disc      Family History   Problem Relation Age of Onset    Hypertension Mother     Hyperlipidemia Father     Heart disease Father     Depression Maternal Grandmother     Hypertension Maternal Grandmother     Squamous cell carcinoma Maternal Grandfather     Alcohol abuse Maternal Grandfather     Alzheimer's disease Paternal Grandmother     Dementia Paternal Grandmother     Alcohol abuse Paternal Uncle     Colon cancer Neg Hx     Colon polyps Neg Hx      Social History   Marital Status: Single   Occupation: [employer]   Patient Pre-hospital Living Situation: lives with mother in [facility] and in [place]   Patient Pre-hospital Level of Mobility: independent   Patient Pre-hospital Diet Restrictions: none    Allergies   Allergen Reactions    Geodon [Ziprasidone]      Tardivdiskinesea       Prior to Admission medications    Medication Sig Start Date End Date Taking? Authorizing Provider   ALPRAZolam (XANAX) 1 mg tablet TAKE 1 TABLET BY MOUTH AS NEEDED IN THE MORNING, AT NOON AND IN THE EVENING FOR ANXIETY   7/14/22  Yes Tammy Meliton Homans, CRNP   amphetamine-dextroamphetamine (ADDERALL) 20 mg tablet Take 1 tablet (20 mg total) by mouth 2 (two) times a day Max Daily Amount: 40 mg 7/8/22  Yes Echo Valdez MD   buprenorphine-naloxone (SUBOXONE) 8-2 mg per SL tablet Place 1 tablet under the tongue daily   Yes Historical Provider, MD   clindamycin (CLEOCIN) 150 mg capsule Take 3 capsules (450 mg total) by mouth 3 (three) times a day for 7 days 7/26/22 8/2/22 Yes Jackie Cohen DO   tamsulosin (FLOMAX) 0 4 mg Take 1 capsule (0 4 mg total) by mouth daily with dinner 5/16/22  Yes DILLON Ortiz   buPROPion (WELLBUTRIN XL) 150 mg 24 hr tablet take 1 tablet by mouth every morning 4/27/22   Sahil Mendosa MD   GaviLAX 17 GM/SCOOP powder  2/14/22   Historical Provider, MD   Narcan 4 MG/0 1ML nasal spray  2/11/22   Historical Provider, MD   polyethylene glycol (MIRALAX) 17 g packet Take 17 g by mouth daily  Patient not taking: Reported on 7/31/2022 3/31/22   Echo Valdez MD   potassium chloride (K-DUR,KLOR-CON) 20 mEq tablet Take 1 tablet (20 mEq total) by mouth daily 3/31/22   Echo Valdez MD       Current Facility-Administered Medications   Medication Dose Route Frequency    acetaminophen (TYLENOL) tablet 650 mg  650 mg Oral Q6H PRN    buprenorphine-naloxone (Suboxone) film 8 mg  8 mg Sublingual BID    clindamycin (CLEOCIN) capsule 450 mg  450 mg Oral Q8H [facility]    enoxaparin (LOVENOX) subcutaneous injection 40 mg  40 mg Subcutaneous Daily    folic acid (FOLVITE) tablet 1 mg  1 mg Oral Daily    multivitamin-minerals (CENTRUM) tablet 1 tablet  1 tablet Oral Daily    nicotine polacrilex (NICORETTE) gum 2 mg  2 mg Oral Q2H PRN    ondansetron (ZOFRAN) injection 4 mg  4 mg Intravenous Q6H PRN    sodium chloride 0 9 % infusion  100 mL/hr Intravenous Continuous    tamsulosin (FLOMAX) capsule 0 4 mg  0 4 mg Oral Daily With Dinner    thiamine tablet 100 mg  100 mg Oral Daily       Continuous Infusions:sodium chloride, 100 mL/hr, Last Rate: 100 mL/hr (07/31/22 1610)             Objective     No intake or output data in the 24 hours ending 07/31/22 2109    Invasive Devices:   Peripheral IV 07/31/22 Right (Active)   Site Assessment WDL 07/31/22 1108       Vitals   Vitals:    07/31/22 1530 07/31/22 1531 07/31/22 1908   BP: 117/52 133/85 119/81   TempSrc:  Temporal Temporal   Pulse: (!) 50 82 60   Resp: 14 18 18   Patient Position - Orthostatic VS: Lying Sitting Lying   Temp: 97 6 °F (36 4 °C) 98 °F (36 7 °C) 98 8 °F (37 1 °C)       Physical Exam  Vitals reviewed  Constitutional:       Appearance: He is ill-appearing and diaphoretic  HENT:      Head: Normocephalic and atraumatic  Nose: Nose normal       Mouth/Throat:      Mouth: Mucous membranes are moist       Pharynx: Oropharynx is clear  Eyes:      Conjunctiva/sclera: Conjunctivae normal       Pupils: Pupils are equal, round, and reactive to light  Cardiovascular:      Rate and Rhythm: Normal rate and regular rhythm  Pulses: Normal pulses  Heart sounds: Normal heart sounds  Pulmonary:      Effort: Pulmonary effort is normal  No respiratory distress  Breath sounds: Normal breath sounds  Abdominal:      General: Abdomen is flat  Bowel sounds are normal  There is no distension  Palpations: Abdomen is soft  Musculoskeletal:         General: Swelling (lateral aspect of LLE) present  Normal range of motion        Cervical back: Normal range of motion  Right lower leg: No edema  Left lower leg: No edema  Skin:     General: Skin is warm  Neurological:      Mental Status: He is oriented to person, place, and time  He is lethargic  Motor: Tremor (BL upper extremities) present  Coordination: Coordination is intact  Finger-Nose-Finger Test and Heel to Farrah Penta Test normal    Psychiatric:         Mood and Affect: Mood normal          Behavior: Behavior is cooperative  Data:    EKG, Pathology, and Other Studies: I have personally reviewed pertinent reports  EK/31: 52 bpm Sinus bradycardia, rightward axis    Borderline ECG    Lab Results:  CBC ETOH     Lab Results   Component Value Date    WBC 6 96 2022    RBC 4 13 2022    HGB 12 3 2022    HCT 35 0 (L) 2022    MCV 85 2022    MCH 29 8 2022    MCHC 35 1 2022    RDW 12 1 2022     2022    MPV 10 5 2022      Lab Results   Component Value Date    LACTICACID 0 5 2022      CMP UA         Component Value Date/Time    K 4 0 2022 1232    K 4 7 2020 1027     2022 1232    CL 98 2020 1027    CO2 29 2022 1232    CO2 27 2020 1027    BUN 12 2022 1232    BUN 12 2020 1027    CREATININE 0 80 2022 1232         Component Value Date/Time    CALCIUM 8 8 2022 1232    ALKPHOS 62 2022 1232    AST 28 2022 1232    AST 39 2020 1027    ALT 28 2022 1232    ALT 44 2020 1027      Lab Results   Component Value Date    CLARITYU Clear 10/04/2019    COLORU Straw 10/04/2019    SPECGRAV 1 010 10/04/2019    PHUR 6 0 10/04/2019    GLUCOSEU Negative 10/04/2019    KETONESU Negative 10/04/2019    BLOODU Negative 10/04/2019    PROTEIN UA Negative 10/04/2019    NITRITE Negative 10/04/2019    BILIRUBINUR Negative 10/04/2019    UROBILINOGEN 0 2 10/04/2019    LEUKOCYTESUR Negative 10/04/2019    WBCUA 4-10 (A) 2019    RBCUA 2-4 (A) 08/03/2019    HYALINE 0-1 (A) 08/03/2019    BACTERIA Occasional 08/03/2019    EPIS Occasional 08/03/2019        Liver Function Test: ASA     Lab Results   Component Value Date    TBILI 0 40 07/31/2022    TBILI 0 5 07/22/2020    BILIDIR 0 12 07/25/2019    ALKPHOS 62 07/31/2022    AST 28 07/31/2022    AST 39 07/22/2020    ALT 28 07/31/2022    ALT 44 07/22/2020    TP 7 7 07/31/2022    TP 7 9 07/22/2020    ALB 3 7 07/31/2022      Lab Results   Component Value Date    SALICYLATE <3 (L) 58/05/0745      Troponin APAP     Lab Results   Component Value Date    TROPONINI <0 02 10/03/2019      Lab Results   Component Value Date    ACTMNPHEN <2 0 (L) 07/31/2022      VBG HCG     No results found for: PHVEN, OEX8GEL, PO2VEN, STB0WWB, BEVEN, B7HGAPMRP, Y5GIJJF   No results found for: HCGQUANT   ABG Urine Drug Screen     No results found for: PHART, HJX1LXE, PO2ART, IKM2KBF, BEART, J6JCJILFF, O2HGB, SOURC, NIRAJ, VTAC, ACRATE, INSPIREDAIR, PEEP   Lab Results   Component Value Date    AMPMETHUR Positive (A) 09/07/2020    BARBTUR Negative 09/07/2020    BDZUR Positive (A) 09/07/2020    COCAINEUR Positive (A) 09/07/2020    METHADONEUR Negative 09/07/2020    OPIATEUR Negative 09/07/2020    PCPUR Negative 09/07/2020    THCUR Positive (A) 09/07/2020    OXYCODONEUR Negative 09/07/2020      Lactate INR     Lab Results   Component Value Date    LACTICACID 0 5 07/31/2022      Lab Results   Component Value Date    INR 1 14 07/31/2022      PTT Protime     Lab Results   Component Value Date/Time    PTT 33 07/31/2022 12:32 PM        Lab Results   Component Value Date/Time    PROTIME 15 4 (H) 07/31/2022 12:32 PM              Imaging Studies: I have personally reviewed pertinent reports  Counseling / Coordination of Care  Total floor / unit time spent today 40 minutes  Greater than 50% of total time was spent with the patient and / or family counseling and / or coordination of care         Minutes of critical care time 28  -Critical care time was exclusive of separately billable procedures and teaching time    -Critical care was necessary to treat or prevent imminent or life-threatening deterioration of the following condition: CNS failure/compromise, toxidrome (ethanol withdrawal),  withdrawal  -Critical care time was spent personally by me on the following activities as well as the above as per the course and rest of chart: obtaining history from patient/surrogate, development of a treatment plan, discussions with referring provider(s), evaluation of patient's response to the treatment, examination of the patient, performing treatments and interventions, re-evaluation of the patient's condition, review of old charts, ordering/interpreting laboratory studies, ordering/interpreting of radiographic studies  ** Please Note: This note has been constructed using a voice recognition system   **

## 2022-07-31 NOTE — NURSING NOTE
Patient received with hospital gown and his own pants  Within pockets was found pocket knife, crack pipe, nicotine pouches, pen, and lip balm  While completing admission questioner, patient is unable to be awake to answer questions  Not responding to verbal stimuli  Patient requires loud calling of name and gentle nudge of the shoulder to have them respond  After responding patient found to be lethargic  Unable to hold attention for more than 10 seconds  Skin assessments was unremarkable  Provider updated

## 2022-07-31 NOTE — ASSESSMENT & PLAN NOTE
· Endorses history of intermittent methamphetamine use  Several years of sobriety with recent recurrence over the past week      · Encourage cessation

## 2022-07-31 NOTE — EMTALA/ACUTE CARE TRANSFER
[facility] EMERGENCY DEPARTMENT  [address]  [facility] [address]  Dept: [phone]      MSGGOG TRANSFER CONSENT    NAME Asuncion Serrato                                         1990                              MRN [id number]    I have been informed of my rights regarding examination, treatment, and transfer   by Dr Yasmeen Walker*    Benefits: Specialized equipment and/or services available at the receiving facility (Include comment)________________________ (detox unit, medical toxicology)    Risks: Potential for delay in receiving treatment, Potential deterioration of medical condition, Loss of IV, Possible worsening of condition or death during transfer, Increased discomfort during transfer      Consent for Transfer:  I acknowledge that my medical condition has been evaluated and explained to me by the emergency department physician or other qualified medical person and/or my attending physician, who has recommended that I be transferred to the service of  Accepting Physician: Dr Elpidio Aguilar at [address] [place] [address] Name, [address] : [address], [place]  The above potential benefits of such transfer, the potential risks associated with such transfer, and the probable risks of not being transferred have been explained to me, and I fully understand them  The doctor has explained that, in my case, the benefits of transfer outweigh the risks  I agree to be transferred  I authorize the performance of emergency medical procedures and treatments upon me in both transit and upon arrival at the receiving facility  Additionally, I authorize the release of any and all medical records to the receiving facility and request they be transported with me, if possible  I understand that the safest mode of transportation during a medical emergency is an ambulance and that the Hospital advocates the use of this mode of transport   Risks of traveling to the receiving facility by car, including absence of medical control, life sustaining equipment, such as oxygen, and medical personnel has been explained to me and I fully understand them  (CLARISSA CORRECT BOX BELOW)  [  ]  I consent to the stated transfer and to be transported by ambulance/helicopter  [  ]  I consent to the stated transfer, but refuse transportation by ambulance and accept full responsibility for my transportation by car  I understand the risks of non-ambulance transfers and I exonerate the Hospital and its staff from any deterioration in my condition that results from this refusal     X___________________________________________    DATE  22  TIME________  Signature of patient or legally responsible individual signing on patient behalf           RELATIONSHIP TO PATIENT_________________________          Provider Certification    NAME Michael Knight                                         1990                              MRN [id number]    A medical screening exam was performed on the above named patient  Based on the examination:    Condition Necessitating Transfer The primary encounter diagnosis was Benzodiazepine dependence ([address] )  Diagnoses of Cellulitis of left foot and Opioid dependence ([address] ) were also pertinent to this visit      Patient Condition: The patient has been stabilized such that within reasonable medical probability, no material deterioration of the patient condition or the condition of the unborn child(mali) is likely to result from the transfer    Reason for Transfer:      Transfer Requirements: [address] [place] [address]   · Space available and qualified personnel available for treatment as acknowledged by    · Agreed to accept transfer and to provide appropriate medical treatment as acknowledged by       Dr Katelyn Weiss  · Appropriate medical records of the examination and treatment of the patient are provided at the time of transfer   [address] [place] [address] COMPLETED _______  · Transfer will be performed by qualified personnel from    and appropriate transfer equipment as required, including the use of necessary and appropriate life support measures  Provider Certification: I have examined the patient and explained the following risks and benefits of being transferred/refusing transfer to the patient/family:  General risk, such as traffic hazards, adverse weather conditions, rough terrain or turbulence, possible failure of equipment (including vehicle or aircraft), or consequences of actions of persons outside the control of the transport personnel, Unanticipated needs of medical equipment and personnel during transport, Risk of worsening condition, The possibility of a transport vehicle being unavailable      Based on these reasonable risks and benefits to the patient and/or the unborn child(mali), and based upon the information available at the time of the patients examination, I certify that the medical benefits reasonably to be expected from the provision of appropriate medical treatments at another medical facility outweigh the increasing risks, if any, to the individuals medical condition, and in the case of labor to the unborn child, from effecting the transfer      X____________________________________________ DATE 07/31/22        TIME_______      ORIGINAL - SEND TO MEDICAL RECORDS   COPY - SEND WITH PATIENT DURING TRANSFER

## 2022-08-01 LAB
ALBUMIN SERPL BCP-MCNC: 3.2 G/DL (ref 3.5–5)
ALP SERPL-CCNC: 52 U/L (ref 43–122)
ALT SERPL W P-5'-P-CCNC: 22 U/L
ANION GAP SERPL CALCULATED.3IONS-SCNC: 6 MMOL/L (ref 5–14)
AST SERPL W P-5'-P-CCNC: 28 U/L (ref 17–59)
ATRIAL RATE: 52 BPM
BILIRUB SERPL-MCNC: 0.26 MG/DL (ref 0.2–1)
BUN SERPL-MCNC: 9 MG/DL (ref 5–25)
CALCIUM ALBUM COR SERPL-MCNC: 8.6 MG/DL (ref 8.3–10.1)
CALCIUM SERPL-MCNC: 8 MG/DL (ref 8.4–10.2)
CHLORIDE SERPL-SCNC: 107 MMOL/L (ref 96–108)
CO2 SERPL-SCNC: 25 MMOL/L (ref 21–32)
CREAT SERPL-MCNC: 0.72 MG/DL (ref 0.7–1.5)
ERYTHROCYTE [DISTWIDTH] IN BLOOD BY AUTOMATED COUNT: 12.4 % (ref 11.6–15.1)
GFR SERPL CREATININE-BSD FRML MDRD: 123 ML/MIN/1.73SQ M
GLUCOSE SERPL-MCNC: 120 MG/DL (ref 70–99)
HAV IGM SER QL: ABNORMAL
HBV CORE IGM SER QL: ABNORMAL
HBV SURFACE AG SER QL: ABNORMAL
HCT VFR BLD AUTO: 33.1 % (ref 36.5–49.3)
HCV AB SER QL: ABNORMAL
HGB BLD-MCNC: 11.2 G/DL (ref 12–17)
HIV 1+2 AB+HIV1 P24 AG SERPL QL IA: NORMAL
HIV1 P24 AG SER QL: NORMAL
MAGNESIUM SERPL-MCNC: 1.8 MG/DL (ref 1.6–2.3)
MCH RBC QN AUTO: 29.2 PG (ref 26.8–34.3)
MCHC RBC AUTO-ENTMCNC: 33.8 G/DL (ref 31.4–37.4)
MCV RBC AUTO: 86 FL (ref 82–98)
P AXIS: 79 DEGREES
PLATELET # BLD AUTO: 277 THOUSANDS/UL (ref 149–390)
PMV BLD AUTO: 10.4 FL (ref 8.9–12.7)
POTASSIUM SERPL-SCNC: 3.5 MMOL/L (ref 3.5–5.3)
PR INTERVAL: 156 MS
PROT SERPL-MCNC: 6.1 G/DL (ref 6.4–8.4)
QRS AXIS: 93 DEGREES
QRSD INTERVAL: 120 MS
QT INTERVAL: 418 MS
QTC INTERVAL: 388 MS
RBC # BLD AUTO: 3.83 MILLION/UL (ref 3.88–5.62)
SODIUM SERPL-SCNC: 138 MMOL/L (ref 135–147)
T WAVE AXIS: 80 DEGREES
VENTRICULAR RATE: 52 BPM
WBC # BLD AUTO: 5.84 THOUSAND/UL (ref 4.31–10.16)

## 2022-08-01 PROCEDURE — 87522 HEPATITIS C REVRS TRNSCRPJ: CPT | Performed by: NURSE PRACTITIONER

## 2022-08-01 PROCEDURE — 87806 HIV AG W/HIV1&2 ANTB W/OPTIC: CPT | Performed by: NURSE PRACTITIONER

## 2022-08-01 PROCEDURE — 85027 COMPLETE CBC AUTOMATED: CPT | Performed by: NURSE PRACTITIONER

## 2022-08-01 PROCEDURE — 83735 ASSAY OF MAGNESIUM: CPT | Performed by: NURSE PRACTITIONER

## 2022-08-01 PROCEDURE — 99232 SBSQ HOSP IP/OBS MODERATE 35: CPT | Performed by: EMERGENCY MEDICINE

## 2022-08-01 PROCEDURE — 80074 ACUTE HEPATITIS PANEL: CPT | Performed by: NURSE PRACTITIONER

## 2022-08-01 PROCEDURE — 80053 COMPREHEN METABOLIC PANEL: CPT | Performed by: NURSE PRACTITIONER

## 2022-08-01 PROCEDURE — 93010 ELECTROCARDIOGRAM REPORT: CPT | Performed by: INTERNAL MEDICINE

## 2022-08-01 RX ORDER — PHENOBARBITAL SODIUM 130 MG/ML
130 INJECTION INTRAMUSCULAR ONCE
Status: COMPLETED | OUTPATIENT
Start: 2022-08-01 | End: 2022-08-01

## 2022-08-01 RX ORDER — CALCIUM CARBONATE 500(1250)
1 TABLET ORAL
Status: DISCONTINUED | OUTPATIENT
Start: 2022-08-02 | End: 2022-08-03 | Stop reason: HOSPADM

## 2022-08-01 RX ORDER — PHENOBARBITAL SODIUM 130 MG/ML
260 INJECTION INTRAMUSCULAR ONCE
Status: COMPLETED | OUTPATIENT
Start: 2022-08-01 | End: 2022-08-01

## 2022-08-01 RX ADMIN — PHENOBARBITAL SODIUM 130 MG: 130 INJECTION INTRAMUSCULAR; INTRAVENOUS at 09:17

## 2022-08-01 RX ADMIN — NICOTINE POLACRILEX 2 MG: 2 GUM, CHEWING ORAL at 09:17

## 2022-08-01 RX ADMIN — PHENOBARBITAL SODIUM 130 MG: 130 INJECTION INTRAMUSCULAR; INTRAVENOUS at 02:56

## 2022-08-01 RX ADMIN — NICOTINE POLACRILEX 2 MG: 2 GUM, CHEWING ORAL at 13:10

## 2022-08-01 RX ADMIN — ONDANSETRON 4 MG: 2 INJECTION INTRAMUSCULAR; INTRAVENOUS at 13:10

## 2022-08-01 RX ADMIN — NICOTINE POLACRILEX 2 MG: 2 GUM, CHEWING ORAL at 05:13

## 2022-08-01 RX ADMIN — TAMSULOSIN HYDROCHLORIDE 0.4 MG: 0.4 CAPSULE ORAL at 16:31

## 2022-08-01 RX ADMIN — SODIUM CHLORIDE 100 ML/HR: 0.9 INJECTION, SOLUTION INTRAVENOUS at 13:02

## 2022-08-01 RX ADMIN — CLINDAMYCIN HYDROCHLORIDE 450 MG: 150 CAPSULE ORAL at 14:00

## 2022-08-01 RX ADMIN — BUPRENORPHINE AND NALOXONE 8 MG: 8; 2 FILM BUCCAL; SUBLINGUAL at 21:10

## 2022-08-01 RX ADMIN — PHENOBARBITAL SODIUM 260 MG: 130 INJECTION INTRAMUSCULAR; INTRAVENOUS at 13:11

## 2022-08-01 RX ADMIN — NICOTINE POLACRILEX 2 MG: 2 GUM, CHEWING ORAL at 16:35

## 2022-08-01 RX ADMIN — BUPRENORPHINE AND NALOXONE 8 MG: 8; 2 FILM BUCCAL; SUBLINGUAL at 08:31

## 2022-08-01 RX ADMIN — MULTIPLE VITAMINS W/ MINERALS TAB 1 TABLET: TAB ORAL at 08:31

## 2022-08-01 RX ADMIN — THIAMINE HCL TAB 100 MG 100 MG: 100 TAB at 08:31

## 2022-08-01 RX ADMIN — CLINDAMYCIN HYDROCHLORIDE 450 MG: 150 CAPSULE ORAL at 05:01

## 2022-08-01 RX ADMIN — FOLIC ACID 1 MG: 1 TABLET ORAL at 08:31

## 2022-08-01 RX ADMIN — PHENOBARBITAL SODIUM 130 MG: 130 INJECTION INTRAMUSCULAR; INTRAVENOUS at 05:13

## 2022-08-01 RX ADMIN — SODIUM CHLORIDE 100 ML/HR: 0.9 INJECTION, SOLUTION INTRAVENOUS at 02:29

## 2022-08-01 RX ADMIN — NICOTINE POLACRILEX 2 MG: 2 GUM, CHEWING ORAL at 02:55

## 2022-08-01 RX ADMIN — NICOTINE POLACRILEX 2 MG: 2 GUM, CHEWING ORAL at 21:16

## 2022-08-01 RX ADMIN — CLINDAMYCIN HYDROCHLORIDE 450 MG: 150 CAPSULE ORAL at 21:11

## 2022-08-01 NOTE — CASE MANAGEMENT
Worker attempted to complete assessment with pt, pt sleepy and difficult to keep awake during assessment  Pt did inform worker that he currently goes to [facility] for outpatient MAT and drug and alcohol services  Pt expressed interest in inpatient rehab at [facility], gave worker verbal permission to speak with them regarding inpatient rehab

## 2022-08-01 NOTE — UTILIZATION REVIEW
Initial Clinical Review    Pt initially presented to [address] [place] [address] ED  Pt was transferred by EMS to [address] [place] [address] for its Level IV medically managed intensive inpatient detox unit, not available at [address] [place] [address]  Admission: Date/Time/Statement:   Admission Orders (From admission, onward)     Ordered        07/31/22 1521  Inpatient Admission  Once                      Orders Placed This Encounter   Procedures    Inpatient Admission     Standing Status:   Standing     Number of Occurrences:   1     Order Specific Question:   Level of Care     Answer:   Med Surg [16]     Order Specific Question:   Estimated length of stay     Answer:   More than 2 Midnights     Order Specific Question:   Certification     Answer:   I certify that inpatient services are medically necessary for this patient for a duration of greater than two midnights  See H&P and MD Progress Notes for additional information about the patient's course of treatment  Initial Presentation: 27 y o  male who initially presented to [address] [place] [address], was then transferred by EMS to [address] [place] [address] as a direct admission to medical detox  Inpatient admission for evaluation and treatment of benzodiazepine withdrawal syndrome  PMHx: chronic pain, opioid use disorder in remission on MAT, Hep C, Psychiatric disorder, substance use  Presented w/ request for detox from benzos  Reports 1 mg alprazolam TID daily, last use on 7/29  Has prior inpatient & outpatient treatment for withdrawal for substance use  On exam, anxious, LLE swelling, lethargic, tremor, diaphoretic  Endorses nausea  SEWS 4  Plan: SEWS monitoring w/ phenobarbital management, thiamine/folic acid supplement, IVF, telemetry, continuous pulse ox, continue home meds including home Suboxone MAT dosing, trend labs, replete electrolytes as needed  Date: 08/01/22       Day 2: Pt reports feeling well, but tired  On exam, LLE erythema  SEWS 0   Plan: continue SEWS monitoring w/ phenobarbital management, thiamine/folic acid supplement, telemetry, continuous pulse ox, continue home meds, trend labs, replete electrolytes as needed  Wt Readings from Last 1 Encounters:   07/31/22 64 9 kg (143 lb)     Vital Signs:   Date/Time Temp Pulse Resp BP SpO2 O2 Device   08/01/22 0716 98 4 °F (36 9 °C) 65 16 113/66 98 % None (Room air)   08/01/22 0545 -- 65 -- -- 99 % None (Room air)   08/01/22 0458 98 6 °F (37 °C) 61 16 113/67 100 % None (Room air)   08/01/22 0400 -- 75 -- -- 99 % None (Room air)   08/01/22 0242 98 1 °F (36 7 °C) 51 Abnormal  16 141/71 98 % None (Room air)   07/31/22 2320 -- 62 -- -- 99 % None (Room air)   07/31/22 2218 97 8 °F (36 6 °C) 55 16 122/80 98 % None (Room air)   07/31/22 2108 98 7 °F (37 1 °C) 51 Abnormal  16 102/55 99 % None (Room air)   07/31/22 1908 98 8 °F (37 1 °C) 60 18 119/81 100 % None (Room air)   07/31/22 1531 98 °F (36 7 °C) 82 18 133/85 100 % None (Room air)   07/31/22 1530 97 6 °F (36 4 °C) 50 Abnormal  14 117/52 100 % None (Room air)      08/01/22 1045 08/01/22 0945 08/01/22 0900 08/01/22 0839 08/01/22 0545   Severity of Ethanol Withdrawal Scale (SEWS)   ANXIETY: Do you feel that something bad is about to happen to you right now? 0  -JS 0  -JS 3  -JS 0  -TD 0  -KP   NAUSEA and DRY HEAVES or VOMITING? 0  -JS 0  -JS 0  -JS 0  -TD 0  -KP   SWEATING: (includes moist palms, sweating now)? Score 0 or 2 0  -JS 0  -JS 2  -JS 2  -TD 0  -KP   TREMOR: with arms extended eyes closed? 0  -JS 0  -JS 2  -JS 2  -TD 0  -KP   AGITATION: Fidgety, restless, pacing?  0  -JS 0  -JS 3  -JS 0  -TD 0  -KP   DISORIENTATION: 0  -JS 0  -JS 0  -JS 0  -TD 0  -KP   HALLUCINATIONS: 0  -JS 0  -JS 0  -JS 0  -TD 0  -KP   VITAL SIGNS: ANY (Pulse >089, Diastolic BP >17, Temp >98 7) 0  -JS 0  -JS 0  -JS 0  -TD 0  -KP   SEWS Total Score 0  -JS 0  -JS 10  -JS 4  -TD 0  -KP    08/01/22 0459 08/01/22 0400 08/01/22 0242 07/31/22 2320 07/31/22 2219   Severity of Ethanol Withdrawal Scale (SEWS)   ANXIETY: Do you feel that something bad is about to happen to you right now? 3  -KP 0  -KP 3  -KP 0  -KP 0  -KP   NAUSEA and DRY HEAVES or VOMITING? 0  -KP 0  -KP 0  -KP 0  -KP 0  -KP   SWEATING: (includes moist palms, sweating now)? Score 0 or 2 2  -KP 0  -KP 2  -KP 0  -KP 0  -KP   TREMOR: with arms extended eyes closed? 2  -KP 0  -KP 2  -KP 0  -KP 0  -KP   AGITATION: Fidgety, restless, pacing? 0  -KP 0  -KP 0  -KP 0  -KP 0  -KP   DISORIENTATION: 0  -KP 0  -KP 0  -KP 0  -KP 0  -KP   HALLUCINATIONS: 0  -KP 0  -KP 0  -KP 0  -KP 0  -KP   VITAL SIGNS: ANY (Pulse >706, Diastolic BP >67, Temp >02 4) 0  -KP 0  -KP 0  -KP 0  -KP 0 -KP   SEWS Total Score 7  -KP 0  -KP 7  -KP 0  -KP 0  -KP    07/31/22 2109 07/31/22 1920 07/31/22 1528     Severity of Ethanol Withdrawal Scale (SEWS)   ANXIETY: Do you feel that something bad is about to happen to you right now? 0  -KP 0  -KP 0  -LB     NAUSEA and DRY HEAVES or VOMITING? 0  -KP 0  -KP 0  -LB     SWEATING: (includes moist palms, sweating now)? Score 0 or 2 2  -KP 2  -KP 0  -LB     TREMOR: with arms extended eyes closed? 2  -KP 2  -KP 0  -LB     AGITATION: Fidgety, restless, pacing?  0  -KP 0  -KP 0  -LB     DISORIENTATION: 0  -KP 0  -KP 0  -LB     HALLUCINATIONS: 0  -KP 0  -KP 0  -LB     VITAL SIGNS: ANY (Pulse >566, Diastolic BP >55, Temp >11 9) 0  -KP 0  -KP 0  -LB     SEWS Total Score 4  -KP 4  -KP 0  -LB           Pertinent Labs/Diagnostic Test Results:   Results from last 7 days   Lab Units 07/31/22  1235   SARS-COV-2  Negative     Results from last 7 days   Lab Units 08/01/22  0507 07/31/22  1232   WBC Thousand/uL 5 84 6 96   HEMOGLOBIN g/dL 11 2* 12 3   HEMATOCRIT % 33 1* 35 0*   PLATELETS Thousands/uL 277 308   NEUTROS ABS Thousands/µL  --  4 97         Results from last 7 days   Lab Units 08/01/22  0507 07/31/22  1232   SODIUM mmol/L 138 140   POTASSIUM mmol/L 3 5 4 0   CHLORIDE mmol/L 107 104   CO2 mmol/L 25 29   ANION GAP mmol/L 6 7 BUN mg/dL 9 12   CREATININE mg/dL 0 72 0 80   EGFR ml/min/1 73sq m 123 118   CALCIUM mg/dL 8 0* 8 8   MAGNESIUM mg/dL 1 8  --      Results from last 7 days   Lab Units 08/01/22  0507 07/31/22  1232   AST U/L 28 28   ALT U/L 22 28   ALK PHOS U/L 52 62   TOTAL PROTEIN g/dL 6 1* 7 7   ALBUMIN g/dL 3 2* 3 7   TOTAL BILIRUBIN mg/dL 0 26 0 40         Results from last 7 days   Lab Units 08/01/22  0507 07/31/22  1232   GLUCOSE RANDOM mg/dL 120* 110     Results from last 7 days   Lab Units 07/31/22  1232   HS TNI 0HR ng/L 2         Results from last 7 days   Lab Units 07/31/22  1232   PROTIME seconds 15 4*   INR  1 14   PTT seconds 33     Results from last 7 days   Lab Units 07/31/22  1232   TSH 3RD GENERATON uIU/mL 1 583     Results from last 7 days   Lab Units 07/31/22  1232   PROCALCITONIN ng/ml 0 06     Results from last 7 days   Lab Units 07/31/22  1232   LACTIC ACID mmol/L 0 5     Results from last 7 days   Lab Units 07/31/22  1232   ETHANOL LVL mg/dL <3   ACETAMINOPHEN LVL ug/mL <8 7*   SALICYLATE LVL mg/dL <3*     Results from last 7 days   Lab Units 07/31/22  1232   BLOOD CULTURE  Received in Microbiology Lab  Culture in Progress  Received in Microbiology Lab  Culture in Progress         Past Medical History:   Diagnosis Date    Addiction to drug ([facility] )     Allergic     Anxiety     Benzodiazepine withdrawal (HCC) 10/31/2019    Chronic pain disorder     Depression     Elevated AST (SGOT) 8/3/2019    Hepatitis C 8/4/2019    Hepatitis C     Methamphetamine abuse ([facility] ) 8/7/2019    Psychiatric disorder     Severe episode of recurrent major depressive disorder, without psychotic features ([facility] ) 7/17/2019    Spinal stenosis     Substance abuse ([facility] )     Suicide attempt ([facility] )     Urinary retention      Present on Admission:   Attention deficit disorder (ADD) without hyperactivity   Hepatitis C   Tobacco abuse   Benzodiazepine dependence ([facility] )   Opioid use disorder, severe, in sustained remission, on maintenance therapy ([facility] )   Cellulitis of left lower extremity   Urinary retention      Admitting Diagnosis: Cellulitis of left foot  Age/Sex: 27 y o  male  Admission Orders:  Regular Diet  SCDs  Fall & Seizure Precautions  SEWS monitoring  Telemetry & Continuous Pulse Ox  Scheduled Medications:  buprenorphine-naloxone, 8 mg, Sublingual, BID  clindamycin, 450 mg, Oral, Q8H LAMONTE  enoxaparin, 40 mg, Subcutaneous, Daily  folic acid, 1 mg, Oral, Daily  multivitamin-minerals, 1 tablet, Oral, Daily  tamsulosin, 0 4 mg, Oral, Daily With Dinner  thiamine, 100 mg, Oral, Daily    Continuous IV Infusions:  sodium chloride, 100 mL/hr, Intravenous, Continuous    PRN Meds:  acetaminophen, 650 mg, Oral, Q6H PRN  buprenorphine-naloxone, 2 mg, Sublingual, 7/31 x1  nicotine polacrilex, 2 mg, Oral, Q2H PRN; 8/1 x3  ondansetron, 4 mg, Intravenous, Q6H PRN  phenobarbital, 65 mg, Intravenous; 7/31 x2  phenobarbital, 130 mg, Intravenous; 8/1 x3        IP CONSULT TO CASE MANAGEMENT    Network Utilization Review Department  ATTENTION: Please call with any questions or concerns to [phone] and carefully listen to the prompts so that you are directed to the right person  All voicemails are confidential   [facility] all requests for admission clinical reviews, approved or denied determinations and any other requests to dedicated fax number below belonging to the campus where the patient is receiving treatment   List of dedicated fax numbers for the Facilities:  [address] DENIALS (Administrative/Medical Necessity) [phone]   [address] (Maternity/NICU/Pediatrics) [phone]   [address] [place] [address] 40 125 [facility]  [address] [facility] [place] [address]   [address] [facility]   [address] [place] [address] [address] [phone]

## 2022-08-01 NOTE — ASSESSMENT & PLAN NOTE
· Flomax daily for management of urinary retention  Is scheduled to follow with urology  · Will continue

## 2022-08-01 NOTE — PLAN OF CARE
Problem: Potential for Falls  Goal: Patient will remain free of falls  Description: INTERVENTIONS:  - Educate patient/family on patient safety including physical limitations  - Instruct patient to call for assistance with activity   - Consult OT/PT to assist with strengthening/mobility   - Keep Call bell within reach  - Keep bed low and locked with side rails adjusted as appropriate  - Keep care items and personal belongings within reach  - Initiate and maintain comfort rounds  - Make Fall Risk Sign visible to staff  - Offer Toileting every 2 Hours, in advance of need  - Apply yellow socks and bracelet for high fall risk patients  - Consider moving patient to room near nurses station  Outcome: Progressing     Problem: SUBSTANCE USE/ABUSE  Goal: By discharge, will develop insight into their chemical dependency and sustain motivation to continue in recovery  Description: INTERVENTIONS:  - Attends all daily group sessions and scheduled AA groups  - Actively practices coping skills through participation in the therapeutic community and adherence to program rules  - Reviews and completes assignments from individual treatment plan  - Assist patient development of understanding of their personal cycle of addiction and relapse triggers  Outcome: Progressing  Goal: By discharge, patient will have ongoing treatment plan addressing chemical dependency  Description: INTERVENTIONS:  - Assist patient with resources and/or appointments for ongoing recovery based living  Outcome: Progressing     Problem: Nutrition/Hydration-ADULT  Goal: Nutrient/Hydration intake appropriate for improving, restoring or maintaining nutritional needs  Description: Monitor and assess patient's nutrition/hydration status for malnutrition  Collaborate with interdisciplinary team and initiate plan and interventions as ordered  Monitor patient's weight and dietary intake as ordered or per policy   Utilize nutrition screening tool and intervene as necessary  Determine patient's food preferences and provide high-protein, high-caloric foods as appropriate       INTERVENTIONS:  - Monitor oral intake, urinary output, labs, and treatment plans  - Assess nutrition and hydration status and recommend course of action  - Evaluate amount of meals eaten  - Assist patient with eating if necessary   - Allow adequate time for meals  - Recommend/ encourage appropriate diets, oral nutritional supplements, and vitamin/mineral supplements  - Order, calculate, and assess calorie counts as needed  - Recommend, monitor, and adjust tube feedings and TPN/PPN based on assessed needs  - Assess need for intravenous fluids  - Provide specific nutrition/hydration education as appropriate  - Include patient/family/caregiver in decisions related to nutrition  Outcome: Progressing     Problem: PAIN - ADULT  Goal: Verbalizes/displays adequate comfort level or baseline comfort level  Description: Interventions:  - Encourage patient to monitor pain and request assistance  - Assess pain using appropriate pain scale  - Administer analgesics based on type and severity of pain and evaluate response  - Implement non-pharmacological measures as appropriate and evaluate response  - Consider cultural and social influences on pain and pain management  - Notify physician/advanced practitioner if interventions unsuccessful or patient reports new pain  Outcome: Progressing     Problem: INFECTION - ADULT  Goal: Absence or prevention of progression during hospitalization  Description: INTERVENTIONS:  - Assess and monitor for signs and symptoms of infection  - Monitor lab/diagnostic results  - Monitor all insertion sites, i e  indwelling lines, tubes, and drains  - Monitor endotracheal if appropriate and nasal secretions for changes in amount and color  - [place] appropriate cooling/warming therapies per order  - Administer medications as ordered  - Instruct and encourage patient and family to use good hand hygiene technique  - Identify and instruct in appropriate isolation precautions for identified infection/condition  Outcome: Progressing  Goal: Absence of fever/infection during neutropenic period  Description: INTERVENTIONS:  - Monitor WBC    Outcome: Progressing     Problem: SAFETY ADULT  Goal: Patient will remain free of falls  Description: INTERVENTIONS:  - Educate patient/family on patient safety including physical limitations  - Instruct patient to call for assistance with activity   - Consult OT/PT to assist with strengthening/mobility   - Keep Call bell within reach  - Keep bed low and locked with side rails adjusted as appropriate  - Keep care items and personal belongings within reach  - Initiate and maintain comfort rounds  - Make Fall Risk Sign visible to staff  - Offer Toileting every 2 Hours, in advance of need  - Apply yellow socks and bracelet for high fall risk patients  - Consider moving patient to room near nurses station  Outcome: Progressing  Goal: Maintain or return to baseline ADL function  Description: INTERVENTIONS:  -  Assess patient's ability to carry out ADLs; assess patient's baseline for ADL function and identify physical deficits which impact ability to perform ADLs (bathing, care of mouth/teeth, toileting, grooming, dressing, etc )  - Assess/evaluate cause of self-care deficits   - Assess range of motion  - Assess patient's mobility; develop plan if impaired  - Assess patient's need for assistive devices and provide as appropriate  - Encourage maximum independence but intervene and supervise when necessary  - Involve family in performance of ADLs  - Assess for home care needs following discharge   - Consider OT consult to assist with ADL evaluation and planning for discharge  - Provide patient education as appropriate  Outcome: Progressing  Goal: Maintains/Returns to pre admission functional level  Description: INTERVENTIONS:  - Perform BMAT or MOVE assessment daily    - Set and communicate daily mobility goal to care team and patient/family/caregiver  - Collaborate with rehabilitation services on mobility goals if consulted  - Perform Range of Motion 10 times a day  - Reposition patient every 2 hours  - Dangle patient 5 times a day  - Stand patient 5 times a day  - Ambulate patient 5 times a day  - Out of bed to chair 3 times a day   - Out of bed for meals 3 times a day  - Out of bed for toileting  - Record patient progress and toleration of activity level   Outcome: Progressing     Problem: DISCHARGE PLANNING  Goal: Discharge to home or other facility with appropriate resources  Description: INTERVENTIONS:  - Identify barriers to discharge w/patient and caregiver  - Arrange for needed discharge resources and transportation as appropriate  - Identify discharge learning needs (meds, wound care, etc )  - Arrange for interpretive services to assist at discharge as needed  - Refer to Case Management Department for coordinating discharge planning if the patient needs post-hospital services based on physician/advanced practitioner order or complex needs related to functional status, cognitive ability, or social support system  Outcome: Progressing     Problem: Knowledge Deficit  Goal: Patient/family/caregiver demonstrates understanding of disease process, treatment plan, medications, and discharge instructions  Description: Complete learning assessment and assess knowledge base    Interventions:  - Provide teaching at level of understanding  - Provide teaching via preferred learning methods  Outcome: Progressing     Problem: Prexisting or High Potential for Compromised Skin Integrity  Goal: Skin integrity is maintained or improved  Description: INTERVENTIONS:  - Identify patients at risk for skin breakdown  - Assess and monitor skin integrity  - Assess and monitor nutrition and hydration status  - Monitor labs   - Assess for incontinence   - Turn and reposition patient  - Assist with mobility/ambulation  - Relieve pressure over bony prominences  - Avoid friction and shearing  - Provide appropriate hygiene as needed including keeping skin clean and dry  - Evaluate need for skin moisturizer/barrier cream  - Collaborate with interdisciplinary team   - Patient/family teaching  - Consider wound care consult   Outcome: Progressing

## 2022-08-01 NOTE — PROGRESS NOTES
Progress Note - Medical Toxicology    Frankie Broussard 27 y o  male MRN: [id number]  Unit/Bed#:  DETOX 511-01 Encounter: [id number]  MEDICAL DETOX UNIT, LEVEL 4  Department of Medical Toxicology  Reason for Admission/Principal Problem: Benzodiazepine withdrawal  Rounding Provider: Teo Bazan MD, Radha Buck,          Cellulitis of left lower extremity  Assessment & Plan  · Endorses hx of recurrent cellulitis, no hx of of MRSA   · Recent ED visit 7/26/2022 treated with clindamycin x 7 days, reports missing 2 days   · Blood cultures pending   · Improved in comparison to 7/26 ED images  LLE well perfused, non erythematous, mild swelling lateral aspect  · Will continue clindamycin for treatment completion  Opioid use disorder, severe, in sustained remission, on maintenance therapy [facility])  Assessment & Plan  · Longstanding history of opioid use disorder in remission and on maintenance treatment   · Methadone treatment [id number], transitioned to suboxone in 2020  · Currently on 8/2mg buprenorphine/naloxone BID, last dose 7/29  Prescribed by [facility]   · Will continue buprenorphine/naloxone 8/2mg BID   · Consider risk factors of prescribed polypharmacy of benzodiazepine, stimulant, and suboxone  Attempted to discuss with patient today       Benzodiazepine dependence ([facility] [address] )  Assessment & Plan  · Longstanding history of benzodiazepine dependence for management of JOAO   · Initially Clonazepam 1mg TID, and most recently Alprazolam 1mg TID prescribed by PCP  · PDMP reviewed, as expected  · Withdrawal management as above   · Case management consulted for dispo planning - endorses interest in inpatient placement     Attention deficit disorder (ADD) without hyperactivity  Assessment & Plan  · Treated with Adderall 20mg BID, prescribed by PCP   · PDMP reviewed, as expected   · Will hold temporarily during stabilization of benzodiazepine withdrawal      Methamphetamine use ([facility] [address] )  Assessment & Plan  · Endorses history of intermittent methamphetamine use  Several years of sobriety with recent recurrence over the past week  · Encourage cessation    Hepatitis C  Assessment & Plan  · Partial treatment [place] 2020   · Will obtain HCV RNA   · Encourage outpatient GI follow up     Urinary retention  Assessment & Plan  · Flomax daily for management of urinary retention  Is scheduled to follow with urology  · Will continue  Tobacco abuse  Assessment & Plan  · Daily tobacco use   · Offered NRT   · Encourage cessation     * Benzodiazepine withdrawal with complication ([address] )  Assessment & Plan  · Reports last use of prescribed benzodiazepine 7/29    · At risk for complicated withdrawal due to seizure history  · SEWS protocol with phenobarbital for treatment of benzodiazepine withdrawal in place  Patient has received 130mg phenobarbital (7/31) and 390 mg phenobarbital (8/1) Total: 520 mg   · Symptomatic and supportive care   · Telemetry and continuous pulse oximetry           VTE Pharmacologic Prophylaxis:   Pharmacologic: Enoxaparin (Lovenox)  Mechanical VTE Prophylaxis in Place: yes    Code Status: Level 1 - Full Code    Patient Centered Rounds: I have performed bedside rounds with nursing staff today  Discussions with Specialists or Other Care Team Provider: Dr Bernie Hurley   Education and Discussions with Family / Patient: Discussed potential of in-patient treatment which patient seemed amenable at this time  Discussed current medication regime and explained reasoning for medication use  Patient expressed understanding and agreed with plan of care at this time  Time Spent for Care: 20 minutes  More than 50% of total time spent on counseling and coordination of care as described above      Current Length of Stay: 1 day(s)    Current Patient Status: Inpatient     Certification Statement: The patient will continue to require additional inpatient hospital stay due to management of benzodiazepine withdrawal Discharge Plan: Patient will require further evaluation and management of his benzodiazapine withdrawal   Patient expresses desire at this time to cease his benzodiazepine use and requests in-patient treatment for cessation  Provided patient has no complications will anticipate likely discharge tomorrow and will assist in in-patient treatment set up/cessation management  Total time spent today 30 minutes  Greater than 50% of total time was spent with the patient and / or family counseling and / or coordination of care  A description of the counseling / coordination of care: Discussed with patient concerning his desires to cease use of benzodiazipines as well as other substances such as previous methamphetamine use  Discussed potential of in-patient treatment which patient seemed amenable at this time  Discussed current medication regime and explained reasoning for medication use  Patient expressed understanding and agreed with plan of care at this time  Subjective:   28 yo male with Pmhx of ADHD and benzodiazepine withdrawal w/ seizure presented to the ED at [facility] for c/o of left foot cellulitis started initially on clindamycin on 7/26  Patient then traveled to [place] to visit his girlfriend and had his Xanax, Suboxone, and Klonopin stolen which prompted him to be evaluated at [facility] for withdrawal   Patient at that time was noted not to have withdrawal symptoms, was accepted at [facility], but denied and traveled back to [facility] and was admitted for management of benzodiazepine withdrawal   Patient currently feels well, is tired, notes he feels sweaty, does not feel anxious, denies nausea, nor hallucinations currently      Objective:     Clinical Opiate Withdrawal Scale  Pulse: 65    SEWS Total Score: 0 (8/1/2022  9:45 AM)        Last 24 Hours Medication List:   Current Facility-Administered Medications   Medication Dose Route Frequency Provider Last Rate    acetaminophen  650 mg Oral Q6H PRN Fayne Tylor, CRNP      buprenorphine-naloxone  8 mg Sublingual BID Fayne Tylor, CRNP      clindamycin  450 mg Oral Q8H [address] Minnie Sierra, CRNP      enoxaparin  40 mg Subcutaneous Daily Fayne Tylor, CRNP      folic acid  1 mg Oral Daily Fayne Tylor, CRNP      multivitamin-minerals  1 tablet Oral Daily Fayne Tylor, [address]      nicotine polacrilex  2 mg Oral Q2H PRN Obioma Jay Henson PA-C      ondansetron  4 mg Intravenous Q6H PRN Fayne Tylor, CRNP      sodium chloride  100 mL/hr Intravenous Continuous Fayne Tylor, CRNP 100 mL/hr (22 0229)    tamsulosin  0 4 mg Oral Daily With [facility], CRNP      thiamine  100 mg Oral Daily Minnie Sierra, CRNP           Vitals:   Temp (24hrs), Av 2 °F (36 8 °C), Min:97 6 °F (36 4 °C), Max:98 8 °F (37 1 °C)    Temp:  [97 6 °F (36 4 °C)-98 8 °F (37 1 °C)] 98 4 °F (36 9 °C)  HR:  [50-86] 65  Resp:  [14-21] 16  BP: (102-141)/(52-89) 113/66  SpO2:  [97 %-100 %] 98 %  Body mass index is 20 52 kg/m²  Input and Output Summary (last 24 hours): Intake/Output Summary (Last 24 hours) at 2022 1043  Last data filed at 2022 0901  Gross per 24 hour   Intake 1685 ml   Output --   Net 1685 ml       Physical Exam:   Physical Exam  Vitals and nursing note reviewed  Constitutional:       General: He is not in acute distress  Appearance: He is well-developed  He is not ill-appearing  HENT:      Head: Normocephalic and atraumatic  Right Ear: External ear normal       Left Ear: External ear normal       Nose: Nose normal       Mouth/Throat:      Mouth: Mucous membranes are moist       Pharynx: Oropharynx is clear  Eyes:      Conjunctiva/sclera: Conjunctivae normal    Cardiovascular:      Rate and Rhythm: Normal rate and regular rhythm  Heart sounds: No murmur heard  Pulmonary:      Effort: Pulmonary effort is normal  No respiratory distress        Breath sounds: Normal breath sounds  Abdominal:      Palpations: Abdomen is soft  Tenderness: There is no abdominal tenderness  Musculoskeletal:         General: Normal range of motion  Cervical back: Normal range of motion  Skin:     General: Skin is warm and dry  Findings: Erythema (mild, of the left foot  Improved as compared to previous images ) present  Neurological:      Mental Status: He is alert and oriented to person, place, and time  Psychiatric:         Mood and Affect: Mood normal          Behavior: Behavior normal          Additional Data:     Labs: keep all most recent labs as listed on admission templates   Results from last 7 days   Lab Units 08/01/22  0507 07/31/22  1232   WBC Thousand/uL 5 84 6 96   HEMOGLOBIN g/dL 11 2* 12 3   HEMATOCRIT % 33 1* 35 0*   PLATELETS Thousands/uL 277 308   NEUTROS PCT %  --  72   LYMPHS PCT %  --  18   MONOS PCT %  --  8   EOS PCT %  --  1      Results from last 7 days   Lab Units 08/01/22  0507   SODIUM mmol/L 138   POTASSIUM mmol/L 3 5   CHLORIDE mmol/L 107   CO2 mmol/L 25   BUN mg/dL 9   CREATININE mg/dL 0 72   ANION GAP mmol/L 6   CALCIUM mg/dL 8 0*   ALBUMIN g/dL 3 2*   TOTAL BILIRUBIN mg/dL 0 26   ALK PHOS U/L 52   ALT U/L 22   AST U/L 28   GLUCOSE RANDOM mg/dL 120*      Results from last 7 days   Lab Units 07/31/22  1232   INR  1 14                Results from last 7 days   Lab Units 07/31/22  1232   LACTIC ACID mmol/L 0 5   PROCALCITONIN ng/ml 0 06          * I Have Reviewed All Lab Data Listed Above  * Additional Pertinent Lab Tests Reviewed: [address] Admission Reviewed      Imaging Studies: I have personally reviewed pertinent reports  Recent Cultures (last 7 days):  Results from last 7 days   Lab Units 07/31/22  1232   BLOOD CULTURE  Received in Microbiology Lab  Culture in Progress  Received in Microbiology Lab  Culture in Progress           Today, Patient Was Seen By: Driss Kaye MD    ** Please Note: Dictation voice to text software may have been used in the creation of this document   **

## 2022-08-01 NOTE — PROGRESS NOTES
08/01/22 1319   Referral Data   Referral Source Patient   Referral Reason Drug/Alcohol [place]   Readmission Root Cause   30 Day Readmission No   Patient Information   Mental Status Alert   Primary Caregiver Self   Support System Immediate family;Friends   Legal Information   Legal Issues Denies   Activities of Daily Living Prior to Admission   Functional Status Independent   Assistive Device No device needed   Living Arrangement Lives alone  (Pt reports he lives alone in an apartment but utilizes his mother's address for mailing purposes)   Ambulation Independent   Access to Firearms   Access to Firearms No   Income [address] Employed  (Pt reports he works at [employer], part time)   [employer] of Transport to Appts: Drives Self       Worker completed assessment  Worker explained role of case management  Worker confirmed name, address and phone number  Pt sleepy during assessment and at times difficult to arouse  Pt reports that he came to seek help due to his benzodiazepine use  Pt reports his PCP is currently prescribing him Xanax  Pt reports being on Klonopin and then switched to Xanax 1mg TID  Pt reports he has been on benzos for several years, now wanting to stop  Pt reports a hx of opiate use, last used in 2016  Pt was on Methadone from 0613-7053  Pt is currently on Suboxone since 2020 through [facility]  Pt reports he was prescribed oxycodone and fentanyl patches at age 11 due to a back injury, pt did not go into specifics  Pt also sleepy and unable to report when he started using heroin, pt did admit to a hx of IV drug use  Pt also report using methamphetamines sporadically, specifics unknown as pt was sleepy during assessment  Pt at this time is considering inpatient rehab upon discharge  Pt is currently single, never  with no children    Pt currently lives alone in an apartment, reports he uses his mother's address [address], [address] for mailing purposes  Pt reports being employed at [employer]  Pt reports he completed 3 years of college  Pt denies any legal issues  Pt reports previous inpatient psychiatric hospitalizations, last was March 2022 at [facility]  Pt has no current outpatient psychiatric provider, PCP prescribes Xanax and Adderall  Pt denies any suicide attempts, does admit while using heroin he would have passive suicidal ideations, denies any at this time  Pt reports a family hx of depression, anxiety and alcoholism, specifics unknown  Pt reports medical issues of cellulitis and Hep C, PCP is [address], unable to sign REYNA due to pt falling asleep  Pt has medical insurance of [facility], unable to sign REYNA's due to falling asleep  Pt did not sign for any family or friends at this time, will reassess when pt is more alert  AUDIT: no score  UDS: none done  Alcohol: <3    Relapse prevention plan unable to be completed due to pt falling asleep and difficulty keeping him awake, will reassess tomorrow  Pt did give verbal for [facility], pt is interested in inpatient rehab, information was sent to [facility]  Pt is in the contemplation stage of change

## 2022-08-01 NOTE — DISCHARGE INSTR - OTHER ORDERS
For assistance in obtaining Substance Use treatment:    [facility]: [phone]  [place]: [address]: [address] [facility]: [phone]  [place]: [phone]  [place]: [facility] [address]: [facility]: [address] [place] [address] Street: [phone]    Alcoholics Anonymous: [phone]  Narcotics Anonymous: [phone]

## 2022-08-02 LAB
ALBUMIN SERPL BCP-MCNC: 3.2 G/DL (ref 3.5–5)
ALP SERPL-CCNC: 39 U/L (ref 43–122)
ALT SERPL W P-5'-P-CCNC: 22 U/L
ANION GAP SERPL CALCULATED.3IONS-SCNC: 6 MMOL/L (ref 5–14)
AST SERPL W P-5'-P-CCNC: 29 U/L (ref 17–59)
BASOPHILS # BLD AUTO: 0.07 THOUSANDS/ΜL (ref 0–0.1)
BASOPHILS NFR BLD AUTO: 1 % (ref 0–1)
BILIRUB SERPL-MCNC: 0.29 MG/DL (ref 0.2–1)
BUN SERPL-MCNC: 9 MG/DL (ref 5–25)
CALCIUM ALBUM COR SERPL-MCNC: 8.7 MG/DL (ref 8.3–10.1)
CALCIUM SERPL-MCNC: 8.1 MG/DL (ref 8.4–10.2)
CHLORIDE SERPL-SCNC: 104 MMOL/L (ref 96–108)
CO2 SERPL-SCNC: 26 MMOL/L (ref 21–32)
CREAT SERPL-MCNC: 0.67 MG/DL (ref 0.7–1.5)
EOSINOPHIL # BLD AUTO: 0.26 THOUSAND/ΜL (ref 0–0.61)
EOSINOPHIL NFR BLD AUTO: 5 % (ref 0–6)
ERYTHROCYTE [DISTWIDTH] IN BLOOD BY AUTOMATED COUNT: 12.1 % (ref 11.6–15.1)
GFR SERPL CREATININE-BSD FRML MDRD: 127 ML/MIN/1.73SQ M
GLUCOSE SERPL-MCNC: 92 MG/DL (ref 70–99)
HCT VFR BLD AUTO: 34.4 % (ref 36.5–49.3)
HGB BLD-MCNC: 11.8 G/DL (ref 12–17)
IMM GRANULOCYTES # BLD AUTO: 0.01 THOUSAND/UL (ref 0–0.2)
IMM GRANULOCYTES NFR BLD AUTO: 0 % (ref 0–2)
LYMPHOCYTES # BLD AUTO: 2.14 THOUSANDS/ΜL (ref 0.6–4.47)
LYMPHOCYTES NFR BLD AUTO: 39 % (ref 14–44)
MAGNESIUM SERPL-MCNC: 1.8 MG/DL (ref 1.6–2.3)
MCH RBC QN AUTO: 29.4 PG (ref 26.8–34.3)
MCHC RBC AUTO-ENTMCNC: 34.3 G/DL (ref 31.4–37.4)
MCV RBC AUTO: 86 FL (ref 82–98)
MONOCYTES # BLD AUTO: 0.45 THOUSAND/ΜL (ref 0.17–1.22)
MONOCYTES NFR BLD AUTO: 8 % (ref 4–12)
NEUTROPHILS # BLD AUTO: 2.58 THOUSANDS/ΜL (ref 1.85–7.62)
NEUTS SEG NFR BLD AUTO: 47 % (ref 43–75)
NRBC BLD AUTO-RTO: 0 /100 WBCS
PLATELET # BLD AUTO: 270 THOUSANDS/UL (ref 149–390)
PMV BLD AUTO: 11 FL (ref 8.9–12.7)
POTASSIUM SERPL-SCNC: 4.1 MMOL/L (ref 3.5–5.3)
PROT SERPL-MCNC: 6.3 G/DL (ref 6.4–8.4)
RBC # BLD AUTO: 4.02 MILLION/UL (ref 3.88–5.62)
SODIUM SERPL-SCNC: 136 MMOL/L (ref 135–147)
WBC # BLD AUTO: 5.51 THOUSAND/UL (ref 4.31–10.16)

## 2022-08-02 PROCEDURE — 99232 SBSQ HOSP IP/OBS MODERATE 35: CPT

## 2022-08-02 PROCEDURE — 99221 1ST HOSP IP/OBS SF/LOW 40: CPT | Performed by: PSYCHIATRY & NEUROLOGY

## 2022-08-02 PROCEDURE — 85025 COMPLETE CBC W/AUTO DIFF WBC: CPT

## 2022-08-02 PROCEDURE — 83735 ASSAY OF MAGNESIUM: CPT

## 2022-08-02 PROCEDURE — 80053 COMPREHEN METABOLIC PANEL: CPT

## 2022-08-02 RX ORDER — LANOLIN ALCOHOL/MO/W.PET/CERES
6 CREAM (GRAM) TOPICAL
Status: DISCONTINUED | OUTPATIENT
Start: 2022-08-02 | End: 2022-08-02

## 2022-08-02 RX ORDER — LANOLIN ALCOHOL/MO/W.PET/CERES
6 CREAM (GRAM) TOPICAL
Status: DISCONTINUED | OUTPATIENT
Start: 2022-08-02 | End: 2022-08-03 | Stop reason: HOSPADM

## 2022-08-02 RX ORDER — HYDROXYZINE 50 MG/1
50 TABLET, FILM COATED ORAL EVERY 6 HOURS PRN
Status: DISCONTINUED | OUTPATIENT
Start: 2022-08-02 | End: 2022-08-03 | Stop reason: HOSPADM

## 2022-08-02 RX ORDER — MIRTAZAPINE 7.5 MG/1
7.5 TABLET, FILM COATED ORAL
Status: DISCONTINUED | OUTPATIENT
Start: 2022-08-02 | End: 2022-08-03 | Stop reason: HOSPADM

## 2022-08-02 RX ORDER — MIRTAZAPINE 15 MG/1
15 TABLET, FILM COATED ORAL
Status: DISCONTINUED | OUTPATIENT
Start: 2022-08-02 | End: 2022-08-02

## 2022-08-02 RX ADMIN — HYDROXYZINE HYDROCHLORIDE 50 MG: 50 TABLET, FILM COATED ORAL at 09:08

## 2022-08-02 RX ADMIN — NICOTINE POLACRILEX 2 MG: 2 GUM, CHEWING ORAL at 08:55

## 2022-08-02 RX ADMIN — TAMSULOSIN HYDROCHLORIDE 0.4 MG: 0.4 CAPSULE ORAL at 16:43

## 2022-08-02 RX ADMIN — THIAMINE HCL TAB 100 MG 100 MG: 100 TAB at 08:55

## 2022-08-02 RX ADMIN — CLINDAMYCIN HYDROCHLORIDE 450 MG: 150 CAPSULE ORAL at 21:18

## 2022-08-02 RX ADMIN — MELATONIN TAB 3 MG 6 MG: 3 TAB at 21:17

## 2022-08-02 RX ADMIN — FOLIC ACID 1 MG: 1 TABLET ORAL at 08:55

## 2022-08-02 RX ADMIN — CALCIUM 1 TABLET: 500 TABLET ORAL at 09:00

## 2022-08-02 RX ADMIN — BUPRENORPHINE AND NALOXONE 8 MG: 8; 2 FILM BUCCAL; SUBLINGUAL at 21:17

## 2022-08-02 RX ADMIN — CLINDAMYCIN HYDROCHLORIDE 450 MG: 150 CAPSULE ORAL at 08:59

## 2022-08-02 RX ADMIN — BUPRENORPHINE AND NALOXONE 8 MG: 8; 2 FILM BUCCAL; SUBLINGUAL at 08:55

## 2022-08-02 RX ADMIN — MULTIPLE VITAMINS W/ MINERALS TAB 1 TABLET: TAB ORAL at 08:55

## 2022-08-02 RX ADMIN — MIRTAZAPINE 7.5 MG: 7.5 TABLET, FILM COATED ORAL at 21:18

## 2022-08-02 RX ADMIN — NICOTINE POLACRILEX 2 MG: 2 GUM, CHEWING ORAL at 12:19

## 2022-08-02 RX ADMIN — MELATONIN TAB 3 MG 6 MG: 3 TAB at 02:52

## 2022-08-02 RX ADMIN — NICOTINE POLACRILEX 2 MG: 2 GUM, CHEWING ORAL at 16:42

## 2022-08-02 RX ADMIN — SODIUM CHLORIDE 1500 ML: 0.9 INJECTION, SOLUTION INTRAVENOUS at 20:28

## 2022-08-02 RX ADMIN — CLINDAMYCIN HYDROCHLORIDE 450 MG: 150 CAPSULE ORAL at 16:42

## 2022-08-02 RX ADMIN — NICOTINE POLACRILEX 2 MG: 2 GUM, CHEWING ORAL at 19:14

## 2022-08-02 RX ADMIN — NICOTINE POLACRILEX 2 MG: 2 GUM, CHEWING ORAL at 21:30

## 2022-08-02 NOTE — CASE MANAGEMENT
Pt was accepted to [facility] for inpatient rehab, pending transfer to rehab tomorrow  Worker called pt's mother Marimar Nelson at [phone] regarding pt  Worker provided her with update and plan for pt to go to inpatient rehab, pt's mother is in agreement with plan

## 2022-08-02 NOTE — CONSULTS
Psychiatry Consultation Note   Win Schaeffer 27 y o  male MRN: [id number]  Unit/Bed#: 5T DETOX 511-01 Encounter: [id number]      Assessment and Plan     Assessment   Principal Problem:    Benzodiazepine withdrawal with complication ([place] [address] )  Active Problems:    Tobacco abuse    Urinary retention    Hepatitis C    Methamphetamine use ([place] [address] )    Attention deficit disorder (ADD) without hyperactivity    Benzodiazepine dependence ([place] [address] )    Opioid use disorder, severe, in sustained remission, on maintenance therapy ([place] [address] )    Cellulitis of left lower extremity      Assessment:    Win Schaeffer is a 27 y o  male, single, no children, domiciled independently, employed, with a PPHx of ADHD, JOAO, benzodiazepine use disorder with history of withdrawal seizures, opiate use disorder on suboxone through [facility], methamphetamine use disorder who was admitted to [facility] due to benzodiazepine withdrawal   Psychiatric consultation was requested due to depression  Patient reports significant anxiety and depression worsening in the last few weeks prior to admission, with recent relapse a few days PTA  He denies SI, HI, AVH at this time, and is reporting his sleep is significantly affected  He is agreeable to starting Remeron and following up for outpatient treatment with [facility] following inpatient rehab  There is no indication for inpatient psychiatry at this time  Principal Psychiatric Problem:  1  Unspecified mood disorder ([place] [address] )  a  Differential: generalized anxiety disorder, MDD, substance induced mood disorder    Active Problems:  1   Polysubstance use disorder    Plan     Plan:   Discussed with primary team, with the following recommendations:     No indication for inpatient psychiatry at this time   Recommend the following medication changes   o Remeron 7 5mg QHS for insomnia and mood   No new labs indicated at this time  [facility] management as per primary team     Encourage patient to follow up with PCP for further workup of weight loss   Psychiatry will sign off at this time  Please reach out to our service via TigerText for re-evaluation as needed  If contacting after hours, please call or TigerText the on-call team (SETH: [phone]) with any questions or concerns  Risks, benefits and possible side effects of Medications:   Risks, benefits, and possible side effects of medications explained to patient and patient verbalizes understanding  HPI     Chief Complaint: "I'm tired"    History of Present Illness   Physician Requesting Consult: Donny Talamantes DO  Reason for Consult / Principal Problem: "benzodiazepine dependence, 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 "    Michael Knight is a 27 y o  male, single, no children, domiciled independently, employed, with a PPHx of ADHD, JOAO, benzodiazepine use disorder with history of withdrawal seizures, opiate use disorder on suboxone through [facility], methamphetamine use disorder who was admitted to [facility] due to benzodiazepine withdrawal   Psychiatric consultation was requested due to depression  Michael Knight has been experiencing depression and anxiety which has been gradually worsening for the last few weeks  Per chart review, Lisa Solomon initially presented to [facility] ED on 7/26 for recurrent L foot cellulitis and was started on antibiotics, then traveled to [place] to visit his girlfriend; during this trip his Xanax, suboxone, klonopin was stolen and was seen at [facility] for withdrawal  He then traveled back to [facility] and was admitted, presently, for benzodiazepine withdrawal      On initial psychiatric evaluation, Kirsten Ratliff is calm and cooperative with evaluation  He endorses feeling of depressd mood, anhedonia, hopelessness, poor energy, and weight loss over the past several weeks   He also reports panic attacks 1-2 times daily which have gotten so severe he changed to the night shift at work to avoid social interactions  He reports he was sober for 4 months prior to this past Tuesday when he relapsed on methamphetamines  He states he was "feeling hopeless" and "happened to meet someone who sold drugs", which led him to relapse  He states he recently moved up to [place] to move away from where he has connections and access to [place] where he can easily obtain drugs  He has been obtaining his suboxone and outpatient treatment through [facility] reports history of 5 total inpatient hospitalization in the last 10 years  he reports all of these were in the setting of substance use  Per chart review, he has had several ED visits for suicidal ideation and he reports 2 suicide attempts via overdose  Hhe reports past medications trials of zoloft for 2-3 years but stopped it a year ago as he "didn't like how he felt on it " When asked to clarify he stated he felt "dull " He was also taking Wellbutrin which stopped four months ago when he started adderall  He does not wish to continue taking adderall, but would be interested in restarting Wellbutrin as he felt benefit while taking it  He plans to move back in with his parents to be in a supportive environment in [address] [place] [address] and continue treatment with his therapist at [facility] following discharge from inpatient rehab  Patient reports his family and his dog "Tulio Mckenzie" are important supports in his life  Of note, patient reports 100lb weight loss over the last year, progressively, even in setting of no substance use  Patient reports he has not had a significant medical workup for this      Psychiatric ROS and PMHx     Psychiatric Review Of Systems:  Sleep changes: decreased  Appetite changes: no   Weight changes: weight loss 100 lb over 1 yr, unintentional  Energy/anergy: decreased  Concentration: no  Interest/pleasure/anhedonia: decreased  Somatic symptoms: no  Anxiety/panic: panic attacks  Yakelin: no  Guilty/hopeless: yes  Self injurious behavior/risky behavior: no    Suicidal ideation: no  Homicidal ideation: no  Auditory hallucinations: no  Visual hallucinations: no  Other hallucinations: no  Delusional thinking: no    Historical Information     Past Psychiatric History:   Past Inpatient Psychiatric Treatment:   5 admissions since age of [age], usually SI   Most recent March 2022 at [facility] for [place] [facility]  Past Outpatient Psychiatric Treatment:    Follows at [facility] for substance use  Past Suicide Attempts: yes 2 overdose attempts  Past Violent Behavior: none per chart  Past Psychiatric Medication Trials: Abilify, risperdal, geodon, gabapentin, risperdal, wellbutrin, zoloft, xanax    Substance Abuse History:  Social History     Tobacco History     Smoking Status  Former Smoker Quit date  9/1/2017 Smoking Frequency  1 pack/day Smoking Tobacco Type  Cigarettes    Smokeless Tobacco Use  Current User    Tobacco Comment  Pt uses a "vape" pen - use is equivalent to approximately 1 pack per day          Alcohol History     Alcohol Use Status  Not Currently          Drug Use     Drug Use Status  Yes Types  Benzodiazepines, Methamphetamines Frequency   7 times/week Comment  7/30/22 uses daily          Sexual Activity     Sexually Active  Not Currently          Activities of Daily Living    Not Asked               Additional Substance Use Detail     Questions Responses    Substance Use Assessment Substance use within the past 12 months    Alcohol Use Frequency Denies use in past 12 months    Cannabis frequency Past occasional use    Comment: Never used on 7/17/2019 Never used ->Past occasional use on 8/6/2019     Heroin Frequency Past abuse    Heroin Last Use & Amount last use December 2016    Cocaine frequency Past occasional use    Comment: Past occasional use on 7/17/2019     Crack Cocaine Frequency Denies use in past 12 months    Methamphetamine Frequency Past abuse    Cocaine last use 5/6/19    Comment: 5/6/2019 on 8/6/2019 Methamphetamine Last Use & Amount last use 8/2/2019    Narcotic Frequency Denies use in past 12 months    Benzodiazepine Frequency Daily    Amphetamine frequency Denies use in past 12 months    Benzodiazepine Method Pill    Benzodiazepine 1st Use prescribed Benzo    Benzodiazepine Last Use & Amount 7/29, 3mg    Barbituate Frequency Denies use use in past 12 months    Inhalant frequency Never used    Comment: Never used on 7/17/2019     Hallucinogen frequency Never used    Comment: Never used on 7/17/2019     Ecstasy frequency Never used    Comment: Never used on 7/17/2019     Other drug frequency Never used    Comment: Never used on 7/17/2019     Opiate frequency Past abuse    Last reviewed by Morris Archer RN on 7/31/2022        I have assessed this patient for substance use within the past 12 months  Methamphetamine use, relapsed a few days prior to admission, previously was sober for 4 months    Family Psychiatric History:   Psychiatric Illness:  no family history of psychiatric illness  Substance Abuse:  no family history of substance abuse  Suicide Attempts:  no family history of suicide attempts    Social History:  Education: 3 y college  Learning Disabilities: none  Marital History: single  Children: none  Living Arrangement: lives alone in an apartment  Occupational History: works at [employer] in the [address] [place] [address]  [employer]  [address]: good support system  Legal History: none   History: None  Access to Firearms: no    Traumatic History:   Abuse: none  Other Traumatic Events: none     Past Medical History:  History of Seizures: yes  History of Head injury with loss of consciousness: unknown    Past Medical History:   Diagnosis Date    Addiction to drug ([facility] )     Allergic     Anxiety     Benzodiazepine withdrawal ([facility] ) 10/31/2019    Chronic pain disorder     Depression     Elevated AST (SGOT) 8/3/2019    Hepatitis C 8/4/2019    Hepatitis C     Methamphetamine abuse ([facility] ) 8/7/2019    Psychiatric disorder     Severe episode of recurrent major depressive disorder, without psychotic features ([facility] ) 7/17/2019    Spinal stenosis     [facility]     Suicide attempt ([facility] )     Urinary retention      Past Surgical History:   Procedure Laterality Date    BACK SURGERY      FRACTURE SURGERY      left foot reconstruted     SPINE SURGERY      hernated disc        Mental Status Evaluation and Medical ROS     Medical Review Of Systems:  Pertinent items are noted in HPI  Meds/Allergies   All current active medications have been reviewed  Current medications:   Current Facility-Administered Medications   Medication Dose Route Frequency    acetaminophen (TYLENOL) tablet 650 mg  650 mg Oral Q6H PRN    buprenorphine-naloxone (Suboxone) film 8 mg  8 mg Sublingual BID    calcium carbonate (OYSTER SHELL,OSCAL) 500 mg tablet 1 tablet  1 tablet Oral Daily With Breakfast    clindamycin (CLEOCIN) capsule 450 mg  450 mg Oral Q8H [address]    enoxaparin (LOVENOX) subcutaneous injection 40 mg  40 mg Subcutaneous Daily    folic acid (FOLVITE) tablet 1 mg  1 mg Oral Daily    hydrOXYzine HCL (ATARAX) tablet 50 mg  50 mg Oral Q6H PRN    melatonin tablet 6 mg  6 mg Oral HS PRN    mirtazapine (REMERON) tablet 15 mg  15 mg Oral HS    multivitamin-minerals (CENTRUM) tablet 1 tablet  1 tablet Oral Daily    nicotine polacrilex (NICORETTE) gum 2 mg  2 mg Oral Q2H PRN    ondansetron (ZOFRAN) injection 4 mg  4 mg Intravenous Q6H PRN    tamsulosin (FLOMAX) capsule 0 4 mg  0 4 mg Oral Daily With Dinner    thiamine tablet 100 mg  100 mg Oral Daily     Medication prior to admission:   Prior to Admission Medications   Prescriptions Last Dose Informant Patient Reported? Taking? ALPRAZolam (XANAX) 1 mg tablet Past Week at Unknown time  No Yes   Sig: TAKE 1 TABLET BY MOUTH AS NEEDED IN THE MORNING, AT NOON AND IN THE EVENING FOR ANXIETY     GaviLAX 17 GM/SCOOP powder   Yes No   Patient not taking: Reported on 7/31/2022   Narcan 4 MG/0 1ML nasal spray   Yes No   Patient not taking: Reported on 7/31/2022   amphetamine-dextroamphetamine (ADDERALL) 20 mg tablet Past Week at Unknown time  No Yes   Sig: Take 1 tablet (20 mg total) by mouth 2 (two) times a day Max Daily Amount: 40 mg   buPROPion (WELLBUTRIN XL) 150 mg 24 hr tablet Unknown at Unknown time  No No   Sig: take 1 tablet by mouth every morning   buprenorphine-naloxone (SUBOXONE) 8-2 mg per SL tablet Past Week at Unknown time  Yes Yes   Sig: Place 1 tablet under the tongue daily   clindamycin (CLEOCIN) 150 mg capsule 7/31/2022 at Unknown time  No Yes   Sig: Take 3 capsules (450 mg total) by mouth 3 (three) times a day for 7 days   polyethylene glycol (MIRALAX) 17 g packet   No No   Sig: Take 17 g by mouth daily   Patient not taking: Reported on 7/31/2022   potassium chloride (K-DUR,KLOR-CON) 20 mEq tablet Unknown at Unknown time  No No   Sig: Take 1 tablet (20 mEq total) by mouth daily   tamsulosin (FLOMAX) 0 4 mg 7/31/2022 at Unknown time  No Yes   Sig: Take 1 capsule (0 4 mg total) by mouth daily with dinner      Facility-Administered Medications: None     Allergies   Allergen Reactions    Geodon [Ziprasidone]      Tardivdiskinesea       Objective   Vital signs in last 24 hours:  Temp:  [97 9 °F (36 6 °C)-99 2 °F (37 3 °C)] 98 1 °F (36 7 °C)  HR:  [49-72] 65  Resp:  [14-16] 16  BP: (100-108)/(54-62) 100/60      Intake/Output Summary (Last 24 hours) at 8/2/2022 0931  Last data filed at 8/1/2022 1700  Gross per 24 hour   Intake 1808 33 ml   Output --   Net 1808 33 ml       Mental Status Evaluation:  Appearance:  age appropriate, adequate grooming, wearing hospital clothes, limited eye contact, laying in bed in no acute distress   Behavior:  cooperative, calm   Speech:  normal rate, normal volume, normal pitch   Mood:  "irritable"   Affect:  constricted   Language: in tact   Thought Process:  organized, logical, goal directed   Associations: intact associations   Thought Content:  no overt delusions   Perceptual Disturbances: no auditory hallucinations, no visual hallucinations, does not appear responding to internal stimuli   Risk Potential: Suicidal ideation - None at present  Homicidal ideation - None at present  Potential for aggression - No   Sensorium:  oriented to person, place and time/date   Memory:  recent and remote memory grossly intact   Consciousness:  alert and awake   Attention/Concentration: attention span and concentration are age appropriate   Intellect: not examined   Fund of Knowledge: appropriate   Insight:  fair   Judgment: fair   Muscle Strength Muscle Tone: Moving all extremities spontaneously   Gait/Station: unable to assess   Motor Activity: no abnormal movements     Laboratory Results: I have personally reviewed all pertinent laboratory/tests results    Results from the past 24 hours:   Recent Results (from the past 24 hour(s))   CBC and differential    Collection Time: 08/02/22  5:03 AM   Result Value Ref Range    WBC 5 51 4 31 - 10 16 Thousand/uL    RBC 4 02 3 88 - 5 62 Million/uL    Hemoglobin 11 8 (L) 12 0 - 17 0 g/dL    Hematocrit 34 4 (L) 36 5 - 49 3 %    MCV 86 82 - 98 fL    MCH 29 4 26 8 - 34 3 pg    MCHC 34 3 31 4 - 37 4 g/dL    RDW 12 1 11 6 - 15 1 %    MPV 11 0 8 9 - 12 7 fL    Platelets 312 464 - 099 Thousands/uL    nRBC 0 /100 WBCs    Neutrophils Relative 47 43 - 75 %    Immat GRANS % 0 0 - 2 %    Lymphocytes Relative 39 14 - 44 %    Monocytes Relative 8 4 - 12 %    Eosinophils Relative 5 0 - 6 %    Basophils Relative 1 0 - 1 %    Neutrophils Absolute 2 58 1 85 - 7 62 Thousands/µL    Immature Grans Absolute 0 01 0 00 - 0 20 Thousand/uL    Lymphocytes Absolute 2 14 0 60 - 4 47 Thousands/µL    Monocytes Absolute 0 45 0 17 - 1 22 Thousand/µL    Eosinophils Absolute 0 26 0 00 - 0 61 Thousand/µL    Basophils Absolute 0 07 0 00 - 0 10 Thousands/µL   Magnesium    Collection Time: 08/02/22  5:03 AM   Result Value Ref Range    Magnesium 1 8 1 6 - 2 3 mg/dL Comprehensive metabolic panel    Collection Time: 08/02/22  5:03 AM   Result Value Ref Range    Sodium 136 135 - 147 mmol/L    Potassium 4 1 3 5 - 5 3 mmol/L    Chloride 104 96 - 108 mmol/L    CO2 26 21 - 32 mmol/L    ANION GAP 6 5 - 14 mmol/L    BUN 9 5 - 25 mg/dL    Creatinine 0 67 (L) 0 70 - 1 50 mg/dL    Glucose 92 70 - 99 mg/dL    Calcium 8 1 (L) 8 4 - 10 2 mg/dL    Corrected Calcium 8 7 8 3 - 10 1 mg/dL    AST 29 17 - 59 U/L    ALT 22 <50 U/L    Alkaline Phosphatase 39 (L) 43 - 122 U/L    Total Protein 6 3 (L) 6 4 - 8 4 g/dL    Albumin 3 2 (L) 3 5 - 5 0 g/dL    Total Bilirubin 0 29 0 20 - 1 00 mg/dL    eGFR 127 ml/min/1 73sq m     Labs in last 72 hours:   Recent Labs     07/31/22  1232 08/01/22  0507 08/02/22  0503   WBC 6 96   < > 5 51   RBC 4 13   < > 4 02   HGB 12 3   < > 11 8*   HCT 35 0*   < > 34 4*      < > 270   RDW 12 1   < > 12 1   NEUTROABS 4 97  --  2 58   SODIUM 140   < > 136   K 4 0   < > 4 1      < > 104   CO2 29   < > 26   BUN 12   < > 9   CREATININE 0 80   < > 0 67*   GLUC 110   < > 92   CALCIUM 8 8   < > 8 1*   AST 28   < > 29   ALT 28   < > 22   ALKPHOS 62   < > 39*   TP 7 7   < > 6 3*   ALB 3 7   < > 3 2*   TBILI 0 40   < > 0 29   SZX6QRVSCBSY 1 583  --   --     < > = values in this interval not displayed  Imaging Studies: No results found    EKG: Qtc 388 on 7/31    Code Status: Level 1 - Full Code  Advance Directive and Living Will:       Power of :      Suicide/Homicide Risk Assessment:  Risk of Harm to Self:   The following ratings are based on assessment at the time of the interview   Demographic risk factors include: , never , male   Historical Risk Factors include: chronic psychiatric problems, chronic anxiety symptoms, history of suicide attempts, drug use   Recent Specific Risk Factors include: diagnosis of depression, current anxiety symptoms, significant anxiety symptoms, hopelessness   Protective Factors: no current suicidal ideation, able to manage anger well, access to mental health treatment, compliant with medications, having a desire to be alive, having a desire to live, resiliency, responsibilities and duties to others, stable living environment, strong relationships   Weapons: none  The following steps have been taken to ensure weapons are properly secured: not applicable   Based on today's assessment, Danilomayra Montoya presents the following risk of harm to self: minimal    Risk of Harm to Others:   The following ratings are based on assessment at the time of the interview   Demographic Risk Factors include: male, under age 36   Historical Risk Factors include: drug abuse   Recent Specific Risk Factors include: abusing substances   Protective Factors: no current homicidal ideation, access to mental health treatment, compliant with medications, effective coping skills, resilience, responsibilities and duties to others, strong relationships, support system   Weapons: none  The following steps have been taken to ensure weapons are properly secured: not applicable   Based on today's assessment, Ben Montoya presents the following risk of harm to others: minimal    The following interventions are recommended: no intervention changes needed      Lorena Macias MD 08/02/22  Psychiatry Resident, PGY-II    This note was completed in part utilizing [employer] Direct Software  Grammatical, translation, syntax errors, random word insertions, spelling mistakes, and incomplete sentences may be an occasional consequence of this system secondary to software limitations with voice recognition, ambient noise, and hardware issues  If you have any questions or concerns about the content, text, or information contained within the body of this dictation, please contact the provider for clarification

## 2022-08-02 NOTE — PLAN OF CARE
Problem: Potential for Falls  Goal: Patient will remain free of falls  Description: INTERVENTIONS:  - Educate patient/family on patient safety including physical limitations  - Instruct patient to call for assistance with activity   - Consult OT/PT to assist with strengthening/mobility   - Keep Call bell within reach  - Keep bed low and locked with side rails adjusted as appropriate  - Keep care items and personal belongings within reach  - Initiate and maintain comfort rounds  - Make Fall Risk Sign visible to staff  - Offer Toileting every 2 Hours, in advance of need  - Apply yellow socks and bracelet for high fall risk patients  - Consider moving patient to room near nurses station  Outcome: Progressing     Problem: SUBSTANCE USE/ABUSE  Goal: By discharge, will develop insight into their chemical dependency and sustain motivation to continue in recovery  Description: INTERVENTIONS:  - Attends all daily group sessions and scheduled AA groups  - Actively practices coping skills through participation in the therapeutic community and adherence to program rules  - Reviews and completes assignments from individual treatment plan  - Assist patient development of understanding of their personal cycle of addiction and relapse triggers  Outcome: Progressing  Goal: By discharge, patient will have ongoing treatment plan addressing chemical dependency  Description: INTERVENTIONS:  - Assist patient with resources and/or appointments for ongoing recovery based living  Outcome: Progressing     Problem: Nutrition/Hydration-ADULT  Goal: Nutrient/Hydration intake appropriate for improving, restoring or maintaining nutritional needs  Description: Monitor and assess patient's nutrition/hydration status for malnutrition  Collaborate with interdisciplinary team and initiate plan and interventions as ordered  Monitor patient's weight and dietary intake as ordered or per policy   Utilize nutrition screening tool and intervene as necessary  Determine patient's food preferences and provide high-protein, high-caloric foods as appropriate       INTERVENTIONS:  - Monitor oral intake, urinary output, labs, and treatment plans  - Assess nutrition and hydration status and recommend course of action  - Evaluate amount of meals eaten  - Assist patient with eating if necessary   - Allow adequate time for meals  - Recommend/ encourage appropriate diets, oral nutritional supplements, and vitamin/mineral supplements  - Order, calculate, and assess calorie counts as needed  - Recommend, monitor, and adjust tube feedings and TPN/PPN based on assessed needs  - Assess need for intravenous fluids  - Provide specific nutrition/hydration education as appropriate  - Include patient/family/caregiver in decisions related to nutrition  Outcome: Progressing     Problem: PAIN - ADULT  Goal: Verbalizes/displays adequate comfort level or baseline comfort level  Description: Interventions:  - Encourage patient to monitor pain and request assistance  - Assess pain using appropriate pain scale  - Administer analgesics based on type and severity of pain and evaluate response  - Implement non-pharmacological measures as appropriate and evaluate response  - Consider cultural and social influences on pain and pain management  - Notify physician/advanced practitioner if interventions unsuccessful or patient reports new pain  Outcome: Progressing     Problem: INFECTION - ADULT  Goal: Absence or prevention of progression during hospitalization  Description: INTERVENTIONS:  - Assess and monitor for signs and symptoms of infection  - Monitor lab/diagnostic results  - Monitor all insertion sites, i e  indwelling lines, tubes, and drains  - Monitor endotracheal if appropriate and nasal secretions for changes in amount and color  - [place] appropriate cooling/warming therapies per order  - Administer medications as ordered  - Instruct and encourage patient and family to use good hand hygiene technique  - Identify and instruct in appropriate isolation precautions for identified infection/condition  Outcome: Progressing  Goal: Absence of fever/infection during neutropenic period  Description: INTERVENTIONS:  - Monitor WBC    Outcome: Progressing     Problem: SAFETY ADULT  Goal: Patient will remain free of falls  Description: INTERVENTIONS:  - Educate patient/family on patient safety including physical limitations  - Instruct patient to call for assistance with activity   - Consult OT/PT to assist with strengthening/mobility   - Keep Call bell within reach  - Keep bed low and locked with side rails adjusted as appropriate  - Keep care items and personal belongings within reach  - Initiate and maintain comfort rounds  - Make Fall Risk Sign visible to staff  - Offer Toileting every 2 Hours, in advance of need  - Apply yellow socks and bracelet for high fall risk patients  - Consider moving patient to room near nurses station  Outcome: Progressing  Goal: Maintain or return to baseline ADL function  Description: INTERVENTIONS:  -  Assess patient's ability to carry out ADLs; assess patient's baseline for ADL function and identify physical deficits which impact ability to perform ADLs (bathing, care of mouth/teeth, toileting, grooming, dressing, etc )  - Assess/evaluate cause of self-care deficits   - Assess range of motion  - Assess patient's mobility; develop plan if impaired  - Assess patient's need for assistive devices and provide as appropriate  - Encourage maximum independence but intervene and supervise when necessary  - Involve family in performance of ADLs  - Assess for home care needs following discharge   - Consider OT consult to assist with ADL evaluation and planning for discharge  - Provide patient education as appropriate  Outcome: Progressing  Goal: Maintains/Returns to pre admission functional level  Description: INTERVENTIONS:  - Perform BMAT or MOVE assessment daily    - Set and communicate daily mobility goal to care team and patient/family/caregiver  - Collaborate with rehabilitation services on mobility goals if consulted  - Perform Range of Motion 10 times a day  - Reposition patient every 2 hours  - Dangle patient 5 times a day  - Stand patient 5 times a day  - Ambulate patient 5 times a day  - Out of bed to chair 3 times a day   - Out of bed for meals 3 times a day  - Out of bed for toileting  - Record patient progress and toleration of activity level   Outcome: Progressing     Problem: DISCHARGE PLANNING  Goal: Discharge to home or other facility with appropriate resources  Description: INTERVENTIONS:  - Identify barriers to discharge w/patient and caregiver  - Arrange for needed discharge resources and transportation as appropriate  - Identify discharge learning needs (meds, wound care, etc )  - Arrange for interpretive services to assist at discharge as needed  - Refer to Case Management Department for coordinating discharge planning if the patient needs post-hospital services based on physician/advanced practitioner order or complex needs related to functional status, cognitive ability, or social support system  Outcome: Progressing     Problem: Knowledge Deficit  Goal: Patient/family/caregiver demonstrates understanding of disease process, treatment plan, medications, and discharge instructions  Description: Complete learning assessment and assess knowledge base    Interventions:  - Provide teaching at level of understanding  - Provide teaching via preferred learning methods  Outcome: Progressing     Problem: Prexisting or High Potential for Compromised Skin Integrity  Goal: Skin integrity is maintained or improved  Description: INTERVENTIONS:  - Identify patients at risk for skin breakdown  - Assess and monitor skin integrity  - Assess and monitor nutrition and hydration status  - Monitor labs   - Assess for incontinence   - Turn and reposition patient  - Assist with mobility/ambulation  - Relieve pressure over bony prominences  - Avoid friction and shearing  - Provide appropriate hygiene as needed including keeping skin clean and dry  - Evaluate need for skin moisturizer/barrier cream  - Collaborate with interdisciplinary team   - Patient/family teaching  - Consider wound care consult   Outcome: Progressing

## 2022-08-02 NOTE — CASE MANAGEMENT
Worker spoke with pt to complete relapse prevention plan and get more information regarding his substance abuse  Pt reports he wants to go inpatient rehab for benzo and methamphetamine use  Pt reports he is prescribed benzos but no longer wants to continue with them  Pt reports he has been using meth 1 gram daily for the last few weeks  Pt did have irritable edge throughout discussion as pt reports he was experiencing some shakiness and accidentally flipped his breakfast tray  Pt gave verbal permission for Edith Summers, [facility] and mother Jaleesa Bautista  Pt did complete relapse prevention plan, was able to identify warning signs as well as coping skills  Pt did admit to not utilizing his coping skills prior to admission  Clinical impression, does appear to be guarded and vague regarding his use  Pt does appear to be minimizing his use  Pt at this time still considering inpatient rehab

## 2022-08-02 NOTE — PROGRESS NOTES
Progress Note - Medical Toxicology    Kaylyn Najjar 27 y o  male MRN: [id number]  Unit/Bed#: 5T DETOX 511-01 Encounter: [id number]  MEDICAL DETOX UNIT, LEVEL 4  Department of Medical Toxicology  Reason for Admission/Principal Problem: benzodiazepine withdrawal   Rounding Provider: Clinton James PA-C, Tonia Bauer,          Cellulitis of left lower extremity  Assessment & Plan  · Endorses hx of recurrent cellulitis, no hx of of MRSA   · Recent ED visit 7/26/2022 treated with clindamycin x 7 days, reports missing 2 days   · Blood cultures- no growth at 24 hours   · Improved in comparison to 7/26 ED images  LLE well perfused, non erythematous, mild swelling lateral aspect  · Will continue clindamycin for treatment completion  Opioid use disorder, severe, in sustained remission, on maintenance therapy [facility])  Assessment & Plan  · Longstanding history of opioid use disorder in remission and on maintenance treatment   · Methadone treatment 7130-1712, transitioned to suboxone in 2020  · Currently on 8/2mg buprenorphine/naloxone BID, last dose 7/29  Prescribed by [facility]   · Will continue buprenorphine/naloxone 8/2mg BID       Benzodiazepine dependence ([facility] )  Assessment & Plan  · Longstanding history of benzodiazepine dependence for management of JOAO   · Initially Clonazepam 1mg TID, and most recently Alprazolam 1mg TID prescribed by PCP  · PDMP reviewed, as expected  · Withdrawal management as above   · Case management consulted for dispo planning - endorses interest in inpatient placement     Attention deficit disorder (ADD) without hyperactivity  Assessment & Plan  · Treated with Adderall 20mg BID, prescribed by PCP   · PDMP reviewed, as expected   · Will hold temporarily during stabilization of benzodiazepine withdrawal      Methamphetamine use ([facility] )  Assessment & Plan  · Endorses history of intermittent methamphetamine use   Several years of sobriety with recent recurrence over the past week     · Encourage cessation    Hepatitis C  Assessment & Plan  · Partial treatment [place] 2020   · Will obtain HCV RNA   · Encourage outpatient GI follow up     Urinary retention  Assessment & Plan  · Flomax daily for management of urinary retention  Is scheduled to follow with urology  · Will continue  Tobacco abuse  Assessment & Plan  · Daily tobacco use   · Offered NRT   · Encourage cessation     * Benzodiazepine withdrawal with complication ([address] )  Assessment & Plan  · Reports last use of prescribed benzodiazepine 7/29    · At risk for complicated withdrawal due to seizure history  · Discontinue SEWS protocol with phenobarbital for treatment of benzodiazepine withdrawal in place  Patient has received a total of 780 mg of phenobarbital   · Symptomatic and supportive care   · Telemetry and continuous pulse oximetry             VTE Pharmacologic Prophylaxis:   Pharmacologic: Enoxaparin (Lovenox)  Mechanical VTE Prophylaxis in Place: no    Code Status: Level 1 - Full Code    Patient Centered Rounds: I have performed bedside rounds with nursing staff today  Discussions with Specialists or Other Care Team Provider: Case Management    Education and Discussions with Family / Patient: I personally did not discuss patient with family    Time Spent for Care: 20 minutes  More than 50% of total time spent on counseling and coordination of care as described above  Current Length of Stay: 2 day(s)    Current Patient Status: Inpatient     Certification Statement: The patient will continue to require additional inpatient hospital stay due to pending inpatient D&A placement Discharge Plan: Anticipated Discharge within 24 hours       Subjective:   Patient seen and examined at bedside  Reports to increased anxiety and agitation  Reports feeling extremely irritable       Objective:     Clinical Opiate Withdrawal Scale  Pulse: (!) 48    SEWS Total Score: 0 (8/1/2022 11:00 PM)        Last 24 Hours Medication List: Current Facility-Administered Medications   Medication Dose Route Frequency Provider Last Rate    acetaminophen  650 mg Oral Q6H PRN DILLON Le      buprenorphine-naloxone  8 mg Sublingual BID DILLON Le      calcium carbonate  1 tablet Oral Daily With Breakfast Hipolito Boykin PA-C      clindamycin  450 mg Oral [facility] DILLON Fischer      enoxaparin  40 mg Subcutaneous Daily DILLON Le      folic acid  1 mg Oral Daily DILLON Fischer      hydrOXYzine HCL  50 mg Oral Q6H PRN Hipolito Boykin PA-C      melatonin  6 mg Oral HS PRN Obioma Jay Henson PA-C      mirtazapine  15 mg Oral HS Hipolito Boykin PA-C      multivitamin-minerals  1 tablet Oral Daily Jemma Le      nicotine polacrilex  2 mg Oral Q2H PRN Obioma Jay Henson PA-C      ondansetron  4 mg Intravenous Q6H PRN DILLON Le      tamsulosin  0 4 mg Oral Daily With [place], DILLON      thiamine  100 mg Oral Daily DILLON Fischer           Vitals:   Temp (24hrs), Av 3 °F (36 8 °C), Min:97 9 °F (36 6 °C), Max:99 2 °F (37 3 °C)    Temp:  [97 9 °F (36 6 °C)-99 2 °F (37 3 °C)] 98 °F (36 7 °C)  HR:  [48-72] 48  Resp:  [14-18] 18  BP: (100-108)/(54-62) 103/56  SpO2:  [97 %-100 %] 98 %  Body mass index is 20 52 kg/m²  Input and Output Summary (last 24 hours): Intake/Output Summary (Last 24 hours) at 2022 1406  Last data filed at 2022 1300  Gross per 24 hour   Intake 1548 33 ml   Output --   Net 1548 33 ml       Physical Exam:   Physical Exam  Eyes:      General: No scleral icterus  Pupils: Pupils are equal, round, and reactive to light  Cardiovascular:      Rate and Rhythm: Normal rate and regular rhythm  Heart sounds: No murmur heard  Pulmonary:      Effort: No respiratory distress  Breath sounds: Normal breath sounds  No wheezing     Abdominal:      General: Bowel sounds are normal  There is no distension  Palpations: Abdomen is soft  Tenderness: There is no abdominal tenderness  Skin:     General: Skin is warm  Neurological:      Mental Status: He is oriented to person, place, and time  Motor: No tremor  Coordination: Coordination is intact  Gait: Gait is intact  Psychiatric:         Mood and Affect: Mood is anxious  Behavior: Behavior is agitated  Additional Data:     Labs: keep all most recent labs as listed on admission templates   Results from last 7 days   Lab Units 08/02/22  0503   WBC Thousand/uL 5 51   HEMOGLOBIN g/dL 11 8*   HEMATOCRIT % 34 4*   PLATELETS Thousands/uL 270   NEUTROS PCT % 47   LYMPHS PCT % 39   MONOS PCT % 8   EOS PCT % 5      Results from last 7 days   Lab Units 08/02/22  0503   SODIUM mmol/L 136   POTASSIUM mmol/L 4 1   CHLORIDE mmol/L 104   CO2 mmol/L 26   BUN mg/dL 9   CREATININE mg/dL 0 67*   ANION GAP mmol/L 6   CALCIUM mg/dL 8 1*   ALBUMIN g/dL 3 2*   TOTAL BILIRUBIN mg/dL 0 29   ALK PHOS U/L 39*   ALT U/L 22   AST U/L 29   GLUCOSE RANDOM mg/dL 92      Results from last 7 days   Lab Units 07/31/22  1232   INR  1 14                Results from last 7 days   Lab Units 07/31/22  1232   LACTIC ACID mmol/L 0 5   PROCALCITONIN ng/ml 0 06          * I Have Reviewed All Lab Data Listed Above  * Additional Pertinent Lab Tests Reviewed: [facility] Admission Reviewed      Imaging Studies: I have personally reviewed pertinent reports  Recent Cultures (last 7 days):  Results from last 7 days   Lab Units 07/31/22  1232   BLOOD CULTURE  No Growth at 24 hrs  No Growth at 24 hrs  Today, Patient Was Seen By: Everardo Cardenas PA-C    ** Please Note: Dictation voice to text software may have been used in the creation of this document   **

## 2022-08-03 ENCOUNTER — TRANSITIONAL CARE MANAGEMENT (OUTPATIENT)
Dept: FAMILY MEDICINE CLINIC | Facility: CLINIC | Age: 32
End: 2022-08-03

## 2022-08-03 VITALS
BODY MASS INDEX: 20.47 KG/M2 | HEIGHT: 70 IN | SYSTOLIC BLOOD PRESSURE: 97 MMHG | OXYGEN SATURATION: 100 % | WEIGHT: 143 LBS | DIASTOLIC BLOOD PRESSURE: 67 MMHG | HEART RATE: 43 BPM | TEMPERATURE: 98.1 F | RESPIRATION RATE: 16 BRPM

## 2022-08-03 LAB
HCV RNA SERPL NAA+PROBE-ACNC: NORMAL IU/ML
TEST INFORMATION: NORMAL

## 2022-08-03 PROCEDURE — 99239 HOSP IP/OBS DSCHRG MGMT >30: CPT

## 2022-08-03 RX ORDER — TAMSULOSIN HYDROCHLORIDE 0.4 MG/1
0.4 CAPSULE ORAL
Qty: 30 CAPSULE | Refills: 0 | Status: SHIPPED | OUTPATIENT
Start: 2022-08-03 | End: 2022-08-17

## 2022-08-03 RX ORDER — MIRTAZAPINE 7.5 MG/1
7.5 TABLET, FILM COATED ORAL
Qty: 30 TABLET | Refills: 0 | Status: SHIPPED | OUTPATIENT
Start: 2022-08-03 | End: 2022-09-02

## 2022-08-03 RX ORDER — BUPRENORPHINE AND NALOXONE 8; 2 MG/1; MG/1
8 FILM, SOLUBLE BUCCAL; SUBLINGUAL 2 TIMES DAILY
Qty: 28 FILM | Refills: 0 | Status: SHIPPED | OUTPATIENT
Start: 2022-08-03 | End: 2022-08-17

## 2022-08-03 RX ORDER — LANOLIN ALCOHOL/MO/W.PET/CERES
6 CREAM (GRAM) TOPICAL
Qty: 14 TABLET | Refills: 0 | Status: SHIPPED | OUTPATIENT
Start: 2022-08-03 | End: 2022-08-17

## 2022-08-03 RX ADMIN — NICOTINE POLACRILEX 2 MG: 2 GUM, CHEWING ORAL at 10:48

## 2022-08-03 RX ADMIN — THIAMINE HCL TAB 100 MG 100 MG: 100 TAB at 08:00

## 2022-08-03 RX ADMIN — NICOTINE POLACRILEX 2 MG: 2 GUM, CHEWING ORAL at 12:16

## 2022-08-03 RX ADMIN — CALCIUM 1 TABLET: 500 TABLET ORAL at 07:59

## 2022-08-03 RX ADMIN — MULTIPLE VITAMINS W/ MINERALS TAB 1 TABLET: TAB ORAL at 08:00

## 2022-08-03 RX ADMIN — BUPRENORPHINE AND NALOXONE 8 MG: 8; 2 FILM BUCCAL; SUBLINGUAL at 08:00

## 2022-08-03 RX ADMIN — FOLIC ACID 1 MG: 1 TABLET ORAL at 08:00

## 2022-08-03 RX ADMIN — CLINDAMYCIN HYDROCHLORIDE 450 MG: 150 CAPSULE ORAL at 06:05

## 2022-08-03 RX ADMIN — NICOTINE POLACRILEX 2 MG: 2 GUM, CHEWING ORAL at 08:04

## 2022-08-03 NOTE — ASSESSMENT & PLAN NOTE
· Longstanding history of opioid use disorder in remission and on maintenance treatment   · Methadone treatment [id number], transitioned to suboxone in 2020  · Currently on 8/2mg buprenorphine/naloxone BID, last dose 7/29    Prescribed by [facility]   · Will continue buprenorphine/naloxone 8/2mg BID upon discharge

## 2022-08-03 NOTE — ASSESSMENT & PLAN NOTE
· Endorses hx of recurrent cellulitis, no hx of of MRSA   · Recent ED visit 7/26/2022 treated with clindamycin x 7 days, reports missing 2 days   · Blood cultures- no growth at 24 hours   · Improved in comparison to 7/26 ED images  LLE well perfused, non erythematous, mild swelling lateral aspect  · Will continue clindamycin for treatment completion, completed antibiotic treatment   8/3/22

## 2022-08-03 NOTE — PLAN OF CARE
Problem: Potential for Falls  Goal: Patient will remain free of falls  Description: INTERVENTIONS:  - Educate patient/family on patient safety including physical limitations  - Instruct patient to call for assistance with activity   - Consult OT/PT to assist with strengthening/mobility   - Keep Call bell within reach  - Keep bed low and locked with side rails adjusted as appropriate  - Keep care items and personal belongings within reach  - Initiate and maintain comfort rounds  - Make Fall Risk Sign visible to staff  - Offer Toileting every 2 Hours, in advance of need  - Apply yellow socks and bracelet for high fall risk patients  - Consider moving patient to room near nurses station  8/2/2022 2127 by Adolph Alonzo RN  Outcome: Progressing  8/2/2022 0730 by Adolph Alonzo RN  Outcome: Progressing     Problem: SUBSTANCE USE/ABUSE  Goal: By discharge, will develop insight into their chemical dependency and sustain motivation to continue in recovery  Description: INTERVENTIONS:  - Attends all daily group sessions and scheduled AA groups  - Actively practices coping skills through participation in the therapeutic community and adherence to program rules  - Reviews and completes assignments from individual treatment plan  - Assist patient development of understanding of their personal cycle of addiction and relapse triggers  8/2/2022 2127 by Adolph Alonzo RN  Outcome: Progressing  8/2/2022 0730 by Adolph Alonzo RN  Outcome: Progressing  Goal: By discharge, patient will have ongoing treatment plan addressing chemical dependency  Description: INTERVENTIONS:  - Assist patient with resources and/or appointments for ongoing recovery based living  8/2/2022 2127 by Adolph Alonzo RN  Outcome: Progressing  8/2/2022 0730 by Adolph Alonzo RN  Outcome: Progressing     Problem: Nutrition/Hydration-ADULT  Goal: Nutrient/Hydration intake appropriate for improving, restoring or maintaining nutritional needs  Description: Monitor and assess patient's nutrition/hydration status for malnutrition  Collaborate with interdisciplinary team and initiate plan and interventions as ordered  Monitor patient's weight and dietary intake as ordered or per policy  Utilize nutrition screening tool and intervene as necessary  Determine patient's food preferences and provide high-protein, high-caloric foods as appropriate       INTERVENTIONS:  - Monitor oral intake, urinary output, labs, and treatment plans  - Assess nutrition and hydration status and recommend course of action  - Evaluate amount of meals eaten  - Assist patient with eating if necessary   - Allow adequate time for meals  - Recommend/ encourage appropriate diets, oral nutritional supplements, and vitamin/mineral supplements  - Order, calculate, and assess calorie counts as needed  - Recommend, monitor, and adjust tube feedings and TPN/PPN based on assessed needs  - Assess need for intravenous fluids  - Provide specific nutrition/hydration education as appropriate  - Include patient/family/caregiver in decisions related to nutrition  8/2/2022 2127 by Christina Manzanares RN  Outcome: Progressing  8/2/2022 0730 by Christina Manzanares RN  Outcome: Progressing     Problem: PAIN - ADULT  Goal: Verbalizes/displays adequate comfort level or baseline comfort level  Description: Interventions:  - Encourage patient to monitor pain and request assistance  - Assess pain using appropriate pain scale  - Administer analgesics based on type and severity of pain and evaluate response  - Implement non-pharmacological measures as appropriate and evaluate response  - Consider cultural and social influences on pain and pain management  - Notify physician/advanced practitioner if interventions unsuccessful or patient reports new pain  8/2/2022 2127 by Christina Manzanares RN  Outcome: Progressing  8/2/2022 0730 by Christina Manzanares RN  Outcome: Progressing     Problem: INFECTION - ADULT  Goal: Absence or prevention of progression during hospitalization  Description: INTERVENTIONS:  - Assess and monitor for signs and symptoms of infection  - Monitor lab/diagnostic results  - Monitor all insertion sites, i e  indwelling lines, tubes, and drains  - Monitor endotracheal if appropriate and nasal secretions for changes in amount and color  - [place] appropriate cooling/warming therapies per order  - Administer medications as ordered  - Instruct and encourage patient and family to use good hand hygiene technique  - Identify and instruct in appropriate isolation precautions for identified infection/condition  8/2/2022 2127 by Yair Babocck RN  Outcome: Progressing  8/2/2022 0730 by Yair Babcock RN  Outcome: Progressing  Goal: Absence of fever/infection during neutropenic period  Description: INTERVENTIONS:  - Monitor WBC    8/2/2022 2127 by Yair Babcock RN  Outcome: Progressing  8/2/2022 0730 by Yair Babcock RN  Outcome: Progressing     Problem: SAFETY ADULT  Goal: Patient will remain free of falls  Description: INTERVENTIONS:  - Educate patient/family on patient safety including physical limitations  - Instruct patient to call for assistance with activity   - Consult OT/PT to assist with strengthening/mobility   - Keep Call bell within reach  - Keep bed low and locked with side rails adjusted as appropriate  - Keep care items and personal belongings within reach  - Initiate and maintain comfort rounds  - Make Fall Risk Sign visible to staff  - Offer Toileting every 2 Hours, in advance of need  - Apply yellow socks and bracelet for high fall risk patients  - Consider moving patient to room near nurses station  8/2/2022 2127 by Yair Babcock RN  Outcome: Progressing  8/2/2022 0730 by Yair Babcock RN  Outcome: Progressing  Goal: Maintain or return to baseline ADL function  Description: INTERVENTIONS:  -  Assess patient's ability to carry out ADLs; assess patient's baseline for ADL function and identify physical deficits which impact ability to perform ADLs (bathing, care of mouth/teeth, toileting, grooming, dressing, etc )  - Assess/evaluate cause of self-care deficits   - Assess range of motion  - Assess patient's mobility; develop plan if impaired  - Assess patient's need for assistive devices and provide as appropriate  - Encourage maximum independence but intervene and supervise when necessary  - Involve family in performance of ADLs  - Assess for home care needs following discharge   - Consider OT consult to assist with ADL evaluation and planning for discharge  - Provide patient education as appropriate  8/2/2022 2127 by Jet Ceja RN  Outcome: Progressing  8/2/2022 0730 by Jet Ceja RN  Outcome: Progressing  Goal: Maintains/Returns to pre admission functional level  Description: INTERVENTIONS:  - Perform BMAT or MOVE assessment daily    - Set and communicate daily mobility goal to care team and patient/family/caregiver  - Collaborate with rehabilitation services on mobility goals if consulted  - Perform Range of Motion 10 times a day  - Reposition patient every 2 hours    - Dangle patient 5 times a day  - Stand patient 5 times a day  - Ambulate patient 5 times a day  - Out of bed to chair 3 times a day   - Out of bed for meals 3 times a day  - Out of bed for toileting  - Record patient progress and toleration of activity level   8/2/2022 2127 by Jet Ceja RN  Outcome: Progressing  8/2/2022 0730 by Jet Ceja RN  Outcome: Progressing     Problem: DISCHARGE PLANNING  Goal: Discharge to home or other facility with appropriate resources  Description: INTERVENTIONS:  - Identify barriers to discharge w/patient and caregiver  - Arrange for needed discharge resources and transportation as appropriate  - Identify discharge learning needs (meds, wound care, etc )  - Arrange for interpretive services to assist at discharge as needed  - Refer to Case Management Department for coordinating discharge planning if the patient needs post-hospital services based on physician/advanced practitioner order or complex needs related to functional status, cognitive ability, or social support system  8/2/2022 2127 by Marianela Hunter RN  Outcome: Progressing  8/2/2022 0730 by Marianela Hunter RN  Outcome: Progressing     Problem: Knowledge Deficit  Goal: Patient/family/caregiver demonstrates understanding of disease process, treatment plan, medications, and discharge instructions  Description: Complete learning assessment and assess knowledge base    Interventions:  - Provide teaching at level of understanding  - Provide teaching via preferred learning methods  8/2/2022 2127 by Marianela Hunter RN  Outcome: Progressing  8/2/2022 0730 by Marianela Hunter RN  Outcome: Progressing     Problem: Prexisting or High Potential for Compromised Skin Integrity  Goal: Skin integrity is maintained or improved  Description: INTERVENTIONS:  - Identify patients at risk for skin breakdown  - Assess and monitor skin integrity  - Assess and monitor nutrition and hydration status  - Monitor labs   - Assess for incontinence   - Turn and reposition patient  - Assist with mobility/ambulation  - Relieve pressure over bony prominences  - Avoid friction and shearing  - Provide appropriate hygiene as needed including keeping skin clean and dry  - Evaluate need for skin moisturizer/barrier cream  - Collaborate with interdisciplinary team   - Patient/family teaching  - Consider wound care consult   8/2/2022 2127 by Marianela Hunter RN  Outcome: Progressing  8/2/2022 0730 by Marianela Hunter RN  Outcome: Progressing

## 2022-08-03 NOTE — CASE MANAGEMENT
Case Management Discharge Planning Note    Patient name Naif Phillips  Location 5T DETOX 511/5T DETOX 46* MRN [id number]  : 1990 Date 8/3/2022       Current Admission Date: 2022  Current Admission Diagnosis:Benzodiazepine withdrawal with complication [facility])   Patient Active Problem List    Diagnosis Date Noted    Benzodiazepine dependence ([facility] ) 2022    Opioid use disorder, severe, in sustained remission, on maintenance therapy ([facility] ) 2022    Cellulitis of left lower extremity 2022    Attention deficit disorder (ADD) without hyperactivity 01/10/2022    Benzodiazepine withdrawal with complication ([facility] )     Methamphetamine use ([place] [address] ) 2019    Tobacco abuse 2019    Urinary retention 2019    Hepatitis C 2019    Elevated AST (SGOT) 2019    Intentional methadone overdose ([place] [address] )     Sedative overdose     Suicidal ideation     Severe episode of recurrent major depressive disorder, without psychotic features ([facility] ) 2019    Spinal stenosis 2019      LOS (days): 3  Geometric Mean LOS (GMLOS) (days):   Days to GMLOS:     OBJECTIVE:  Risk of Unplanned Readmission Score: 18 03         Current admission status: Inpatient   Preferred Pharmacy:   [address] [place] [address]  Phone: [phone] Fax: [phone]    Primary Care Provider: Isabel Sharpe MD    Primary Insurance: [employer] FIRST  Secondary Insurance:     DISCHARGE DETAILS: Pt to be transferred to inpatient rehab at [facility] for today at 1300  Worker did speak with pt's mother, Joan Roberts to inform her of pt's transfer to inpatient rehab  Worker completed pre-cert with [facility] at [employer], pended 16 days for 3 5 inpatient rehab  Pt left with medications         Discharge planning discussed with[de-identified] Patient  Freedom of Choice: Yes                   Contacts  Patient Contacts: [employer] [facility]  Relationship to Patient[de-identified] Treatment Provider  Contact Method: Phone  Phone Number: [phone]  Reason/Outcome: Continuity of Care, Referral, Discharge Planning              Other Referral/Resources/Interventions Provided:  Post Acute Placement to[de-identified] Substance Abuse Treatment [facility])

## 2022-08-03 NOTE — NURSING NOTE
Discharge instructions provided to patient, all questions answered at time of D/C  IV removed, prescription provided to take to rehab  Patient ambulated off unit in stable condition
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
## 2022-08-03 NOTE — PLAN OF CARE
Problem: Potential for Falls  Goal: Patient will remain free of falls  Description: INTERVENTIONS:  - Educate patient/family on patient safety including physical limitations  - Instruct patient to call for assistance with activity   - Consult OT/PT to assist with strengthening/mobility   - Keep Call bell within reach  - Keep bed low and locked with side rails adjusted as appropriate  - Keep care items and personal belongings within reach  - Initiate and maintain comfort rounds  - Make Fall Risk Sign visible to staff  - Apply yellow socks and bracelet for high fall risk patients  - Consider moving patient to room near nurses station  8/3/2022 1315 by Rony Villar, RN  Outcome: Adequate for Discharge  8/3/2022 0723 by Rony Villar, RN  Outcome: Progressing

## 2022-08-04 NOTE — UTILIZATION REVIEW
Notification of Discharge   This is a Notification of Discharge from our facility [address]  Please be advised that this patient has been discharge from our facility  Below you will find the admission and discharge date and time including the patients disposition  UTILIZATION REVIEW CONTACT:  Chivo Pinedo MA  Utilization   Network Utilization Review Department  Phone: [phone] x carefully listen to the prompts  All voicemails are confidential   Email: [email or web address]     PHYSICIAN ADVISORY SERVICES:  FOR TRXV-WG-FMOV REVIEW - MEDICAL NECESSITY DENIAL  Phone: [phone]  Fax: [phone]  Email: [email or web address]     PRESENTATION DATE: 7/31/2022  3:08 PM  OBERVATION ADMISSION DATE:   INPATIENT ADMISSION DATE: 7/31/22  3:08 PM   DISCHARGE DATE: 8/3/2022  1:00 PM  DISPOSITION: Home/Self Care Home/Self Care      IMPORTANT INFORMATION:  Send all requests for admission clinical reviews, approved or denied determinations and any other requests to dedicated fax number below belonging to the campus where the patient is receiving treatment   List of dedicated fax numbers:  [address] DENIALS (Administrative/Medical Necessity) [phone]   [address] [place] [address] (Maternity/NICU/Pediatrics) [phone]   [facility] [phone]   [facility] [phone]   [address] [place] [address] [phone]   [address] [place] [address] [address] [place] [address],[address] [phone]   [facility]  [phone]   [address] [place], [place] [address] [facility] [phone]

## 2022-08-05 LAB
BACTERIA BLD CULT: NORMAL
BACTERIA BLD CULT: NORMAL

## 2022-11-15 ENCOUNTER — TELEPHONE (OUTPATIENT)
Dept: FAMILY MEDICINE CLINIC | Facility: CLINIC | Age: 32
End: 2022-11-15

## 2022-11-15 NOTE — TELEPHONE ENCOUNTER
From: Justin Anderson  To: Office of Amanda Morrison  Sent: 11/15/2022 7:33 AM EST  Subject: Medication Renewal Request    Refills have been requested for the following medications:    Other - Clonazepam 1mg 1 tablet prn TID    Preferred pharmacy: [address] [place] [address], [address] [place] [address]

## 2023-02-17 NOTE — PROGRESS NOTES
08/14/19 0655   Team Meeting   Meeting Type Daily Rounds   Team Members Present   Team Members Present Physician;Nurse;;Occupational Therapist   Physician Team Member Dr Grimes Kidney Team Member EMMA [facility] Management Team Member Char/ [address]   OT Team Member Hamida   Patient/Family Present   Patient Present No   Patient's Family Present No     Pt is for dc today  Pt received Soma PRN last night  CM discussed referrals in place for CRR, [address]  Pt will be referred back to Methadone Maintenance and will live with his mother at time of dc  Pt will also continue to work with his CPS  Pt will go home on a Lyft as pt does not have any transport options  Minocycline Counseling: Patient advised regarding possible photosensitivity and discoloration of the teeth, skin, lips, tongue and gums.  Patient instructed to avoid sunlight, if possible.  When exposed to sunlight, patients should wear protective clothing, sunglasses, and sunscreen.  The patient was instructed to call the office immediately if the following severe adverse effects occur:  hearing changes, easy bruising/bleeding, severe headache, or vision changes.  The patient verbalized understanding of the proper use and possible adverse effects of minocycline.  All of the patient's questions and concerns were addressed.

## 2023-02-23 ENCOUNTER — HOSPITAL ENCOUNTER (EMERGENCY)
Facility: HOSPITAL | Age: 33
End: 2023-02-24
Attending: EMERGENCY MEDICINE

## 2023-02-23 DIAGNOSIS — F32.A DEPRESSION: ICD-10-CM

## 2023-02-23 DIAGNOSIS — R45.851 SUICIDAL IDEATIONS: Primary | ICD-10-CM

## 2023-02-23 DIAGNOSIS — F19.10 POLYSUBSTANCE ABUSE (HCC): ICD-10-CM

## 2023-02-23 LAB
AMPHETAMINES SERPL QL SCN: POSITIVE
ATRIAL RATE: 84 BPM
BARBITURATES UR QL: NEGATIVE
BENZODIAZ UR QL: POSITIVE
COCAINE UR QL: POSITIVE
ETHANOL EXG-MCNC: 0 MG/DL
FLUAV RNA RESP QL NAA+PROBE: NEGATIVE
FLUBV RNA RESP QL NAA+PROBE: NEGATIVE
METHADONE UR QL: NEGATIVE
OPIATES UR QL SCN: NEGATIVE
OXYCODONE+OXYMORPHONE UR QL SCN: NEGATIVE
P AXIS: 82 DEGREES
PCP UR QL: POSITIVE
PR INTERVAL: 172 MS
QRS AXIS: 81 DEGREES
QRSD INTERVAL: 120 MS
QT INTERVAL: 390 MS
QTC INTERVAL: 460 MS
RSV RNA RESP QL NAA+PROBE: NEGATIVE
SARS-COV-2 RNA RESP QL NAA+PROBE: NEGATIVE
T WAVE AXIS: 71 DEGREES
THC UR QL: NEGATIVE
VENTRICULAR RATE: 84 BPM

## 2023-02-23 NOTE — ED NOTES
Insurance Authorization for admission:   Phone call placed to [employer]  Phone number: [phone]     Spoke to [place] K      04 days approved  Level of care: Inpatient Behavioral Health  Review on **  Authorization # To be given when admitted to facility  EVS (Eligibility Verification System) called - [phone]  Automated system indicates:     [id number] FIRST 02/23/2023 02/23/2023  KEM High 02/23/2023 02/23/2023    Insurance Authorization for Transportation:    Phone call placed to **  Phone number **  Spoke to **     Authorization #: **

## 2023-02-23 NOTE — ED NOTES
Pt declined bed at [facility] when accepted and would prefer [facility]   CC reviewed that bed availability is beyond crisis's control and that there would be not guarantee of placement to [address] [place] [address] expressed understanding

## 2023-02-23 NOTE — ED NOTES
PC from [facility]  Discussed possible admission but w/ the injectable a script would be needed to prove administration of the medication  [facility] is unable to give this medication  Yeny asked if Pt would be able to forgo the medication  When Pt was asked he stated that it would be more prudent for him to get the shot and did not want to commit to inpatient due to the fact that he does not wish to be without the shot  This worker stated that he would relay the information to ERNA Saini made aware that Pt would prefer to get the shot as he stated he needs it and would have to decline bed at this time

## 2023-02-23 NOTE — ED NOTES
Voicemail left for nurse's line at [place] [address] to inquire about medications for pt and obtaining letter for pt's inpatient stay  Pt signed release for clinic and awaiting call back to send release

## 2023-02-23 NOTE — ED NOTES
[facility]    ID# [id number]    [id number] PATEL 02/23/2023 02/23/2023  [address] Nazario Shanellmai Ray 02/23/2023 02/23/2023

## 2023-02-23 NOTE — ED NOTES
Pt receives a shot in his stomach for the suboxone, he stated that he receives this every ten days  Pt stated that he just had the shot while in rehab maybe two Thursdays ago  Pt stated that he is going to be starting w/ "[address] [place] [address]" for subsequent shots  Pt stated that he is about due for the shot at this time

## 2023-02-23 NOTE — ED NOTES
Bed Search (Dual):    [facility]ip[facility]ito Miguel): no bed  [facility]): no bed  [place]: no answer  Rashad Villalpando): no bed  Evelin Torres): no bed  [address] [place] [address] Svetlana Nelson): clinical faxed  Petra Delgadocal): no bed  [place]: can not accomodate  Ned Lora Car): clinical faxed

## 2023-02-23 NOTE — ED NOTES
Clark left for [address] [place] [address] regarding pt and requesting a letter for pt ability to return and suboxone dosage

## 2023-02-23 NOTE — ED NOTES
Pt presents in the ED after reporting a relapse after discharge from a rehab program on 2/18  Pt reported relapsing and then feeling like he would just be better off if he was not around  This worker introduced self and role of the evaluation for LOC planning, Pt stated that he would be as helpful as he could be  This worker asked Pt to explain in his own words what brought him to the ED and he stated "I relapsed, I just got out of treatment on Friday, yesterday I really started feeling like I didn't want to wake up anymore " Pt stated that he had an interaction w/ law enforcement yesterday which he took as an encouragement to ask for help  Pt stated that he did have an attempt in 2016 by what appeared to be a potential OD on medications  Pt reports that he is currently prescribed Remeron and Buproprion, Pt is prescribed these medications by PCP  Pt reports not seeing a therapist  Pt denied being inpatient in the past for behavioral health issues  Pt's main stressors are trying to maintain sobriety and worrying about his employment  Pt stated that his mood is positive for depression/anxiety  Pt reported that his appetite and sleep are both tied to his drug usage and have not been good  Pt reports relapsing on benzodiazepines and Cocaine  Pt denies any AV hallucinations or persecutory/command voices but stated that he has a sensitivity to light and sound  Pt reports that judgment/impulse control/attention span are all in the fair category  Pt denies any HI or aggressive tendencies  Pt reports living in a recovery house and that he is employed as a   Pt reports that he vapes but is trying to quit  Pt denies any access to firearms but stated that he is due to face charges for a 2020 DUI  [place] options were discussed w/ Pt and stated that he would like to pursue a DUAL placement under a [facility]  This was discussed w/ attending physician who was in agreement w/ [facility]

## 2023-02-23 NOTE — ED NOTES
Pt at 100 per cent of supper, showered, denies any thoughts of self harm at this time, pleasant and cooperative, has mild anxiety, will call staff, if any issues arise, will continue continual observation     Jane Pratt RN  02/23/23 1620

## 2023-02-23 NOTE — ED NOTES
Patient is accepted at [facility]  Patient is accepted by Dr Willson Care per     Transportation is arranged with [facility]   Transportation is scheduled for **  Patient may go to the floor at anytime but not at [phone]  Nurse report is to be called to [phone] prior to patient transfer

## 2023-02-23 NOTE — ED NOTES
Bed Search    [facility] In[facility]) no beds  [facility] Hudson) faxed clinical  [place] (no answer)  [place] Velma Leyden) no beds  [facility] Som[facility]) no beds  [address] [address]) no beds  [place] Yudy Kaur) no beds  [facility] (no answer)  [place] Fletcher Barclay) no beds

## 2023-02-23 NOTE — ED PROVIDER NOTES
History  Chief Complaint   Patient presents with   • Psychiatric Evaluation     Pt c/o anxiety/depression, got out of detox on Friday and relapsed on benzo's and cocaine yesterday at 1000  Pt reports he has thoughts of "not waking up and thinking that would be nice " Denies HI       79-year-old male history of multisubstance drug use recently got out of 17 days of rehab and shortly after relapsed on using Xanax and snorting cocaine this Wednesday  That he feels hopeless and thoughts of harming himself  No specific plan  Comes in requesting help with his mental health as well as drug use          Prior to Admission Medications   Prescriptions Last Dose Informant Patient Reported? Taking?    buprenorphine-naloxone (Suboxone) 8-2 mg   No No   Sig: Place 1 Film (8 mg total) under the tongue 2 (two) times a day for 14 days   mirtazapine (REMERON) 7 5 MG tablet   No No   Sig: Take 1 tablet (7 5 mg total) by mouth daily at bedtime   tamsulosin (FLOMAX) 0 4 mg   No No   Sig: Take 1 capsule (0 4 mg total) by mouth daily with dinner for 14 days      Facility-Administered Medications: None       Past Medical History:   Diagnosis Date   • Addiction to drug [facility])    • Allergic    • Anxiety    • Benzodiazepine withdrawal ([facility] [address] ) 10/31/2019   • Chronic pain disorder    • Depression    • Elevated AST (SGOT) 8/3/2019   • Hepatitis C 8/4/2019   • Hepatitis C    • Methamphetamine abuse ([facility] [address] ) 8/7/2019   • Psychiatric disorder    • Severe episode of recurrent major depressive disorder, without psychotic features ([address] ) 7/17/2019   • Spinal stenosis    • Substance abuse ([address] )    • Suicide attempt ([facility] [address] )    • Urinary retention        Past Surgical History:   Procedure Laterality Date   • BACK SURGERY     • FRACTURE SURGERY      left foot reconstruted    • SPINE SURGERY      hernated disc        Family History   Problem Relation Age of Onset   • Hypertension Mother    • Hyperlipidemia Father    • Heart disease Father    • Depression Maternal Grandmother    • Hypertension Maternal Grandmother    • Squamous cell carcinoma Maternal Grandfather    • Alcohol abuse Maternal Grandfather    • Alzheimer's disease Paternal Grandmother    • Dementia Paternal Grandmother    • Alcohol abuse Paternal Uncle    • Colon cancer Neg Hx    • Colon polyps Neg Hx      I have reviewed and agree with the history as documented  E-Cigarette/Vaping   • E-Cigarette Use Former User      E-Cigarette/Vaping Substances   • Nicotine Yes    • THC No    • CBD No    • Flavoring No    • Other No    • Unknown No      Social History     Tobacco Use   • Smoking status: Former     Packs/day: 1 00     Types: Cigarettes     Quit date: 2017     Years since quittin 4   • Smokeless tobacco: Current   • Tobacco comments:     Pt uses a "vape" pen - use is equivalent to approximately 1 pack per day   Vaping Use   • Vaping Use: Former   • Substances: Nicotine   Substance Use Topics   • Alcohol use: Not Currently   • Drug use: Yes     Frequency: 7 0 times per week     Types: Methamphetamines, Benzodiazepines     Comment: 22 uses daily       Review of Systems    Physical Exam  Physical Exam  Vitals and nursing note reviewed  Constitutional:       Appearance: He is well-developed  HENT:      Head: Normocephalic and atraumatic  Cardiovascular:      Rate and Rhythm: Normal rate and regular rhythm  Heart sounds: Normal heart sounds  Pulmonary:      Effort: Pulmonary effort is normal       Breath sounds: Normal breath sounds  Abdominal:      General: There is no distension  Palpations: Abdomen is soft  Tenderness: There is no abdominal tenderness  Skin:     General: Skin is warm and dry  Neurological:      General: No focal deficit present  Mental Status: He is alert and oriented to person, place, and time  Mental status is at baseline  Psychiatric:         Speech: Speech is delayed  Thought Content: Thought content includes suicidal ideation   Thought content does not include homicidal ideation  Thought content does not include homicidal or suicidal plan  Vital Signs  ED Triage Vitals   Temperature Pulse Respirations Blood Pressure SpO2   02/23/23 0151 02/23/23 0151 02/23/23 0151 02/23/23 0151 02/23/23 0151   97 5 °F (36 4 °C) 89 18 129/85 99 %      Temp Source Heart Rate Source Patient Position - Orthostatic VS BP Location FiO2 (%)   02/23/23 0151 02/23/23 0151 02/23/23 1041 02/23/23 0151 --   Temporal Monitor Lying Right arm       Pain Score       02/23/23 0151       No Pain           Vitals:    02/23/23 0151 02/23/23 1041 02/23/23 1850 02/24/23 0802   BP: 129/85 109/60 119/80 126/68   Pulse: 89 80  91   Patient Position - Orthostatic VS:  Lying Sitting Lying         Visual Acuity      ED Medications  Medications - No data to display    Diagnostic Studies  Results Reviewed     Procedure Component Value Units Date/Time    Rapid drug screen, urine [[id number]]  (Abnormal) Collected: 02/23/23 0410    Lab Status: Final result Specimen: Urine, Clean Catch Updated: 02/23/23 0431     Amph/Meth UR Positive     Barbiturate Ur Negative     Benzodiazepine Urine Positive     Cocaine Urine Positive     Methadone Urine Negative     Opiate Urine Negative     PCP Ur Positive     THC Urine Negative     Oxycodone Urine Negative    Narrative:      Presumptive report  If requested, specimen will be sent to reference lab for confirmation  FOR MEDICAL PURPOSES ONLY  IF CONFIRMATION NEEDED PLEASE CONTACT THE LAB WITHIN 5 DAYS      Drug Screen Cutoff Levels:  AMPHETAMINE/METHAMPHETAMINES  1000 ng/mL  BARBITURATES     200 ng/mL  BENZODIAZEPINES     200 ng/mL  COCAINE      300 ng/mL  METHADONE      300 ng/mL  OPIATES      300 ng/mL  PHENCYCLIDINE     25 ng/mL  THC       50 ng/mL  OXYCODONE      100 ng/mL    FLU/RSV/COVID - if FLU/RSV clinically relevant [[id number]]  (Normal) Collected: 02/23/23 0228    Lab Status: Final result Specimen: Nares from Nose Updated: 02/23/23 9640 SARS-CoV-2 Negative     INFLUENZA A PCR Negative     INFLUENZA B PCR Negative     RSV PCR Negative    Narrative:      FOR PEDIATRIC PATIENTS - copy/paste COVID Guidelines URL to browser: [email or web address] [email or web address]  [email or web address]    SARS-CoV-2 assay is a Nucleic Acid Amplification assay intended for the  qualitative detection of nucleic acid from SARS-CoV-2 in nasopharyngeal  swabs  Results are for the presumptive identification of SARS-CoV-2 RNA  Positive results are indicative of infection with SARS-CoV-2, the virus  causing COVID-19, but do not rule out bacterial infection or co-infection  with other viruses  Laboratories within the [place] and its  territories are required to report all positive results to the appropriate  public health authorities  Negative results do not preclude SARS-CoV-2  infection and should not be used as the sole basis for treatment or other  patient management decisions  Negative results must be combined with  clinical observations, patient history, and epidemiological information  This test has not been FDA cleared or approved  This test has been authorized by FDA under an Emergency Use Authorization  (EUA)  This test is only authorized for the duration of time the  declaration that circumstances exist justifying the authorization of the  emergency use of an in vitro diagnostic tests for detection of SARS-CoV-2  virus and/or diagnosis of COVID-19 infection under section 564(b)(1) of  the Act, 21 U  S C  657EGF-3(M)(7), unless the authorization is terminated  or revoked sooner  The test has been validated but independent review by FDA  and CLIA is pending  Test performed using [employer] GeneXpert: This RT-PCR assay targets N2,  a region unique to SARS-CoV-2  A conserved region in the E-gene was chosen  for pan-Sarbecovirus detection which includes SARS-CoV-2      According to CMS-2020-01-R, this platform meets the definition of high-throughput technology  POCT alcohol breath test [[id number]]  (Normal) Resulted: 02/23/23 0217    Lab Status: Final result Updated: 02/23/23 0218     EXTBreath Alcohol 0 000                 No orders to display              Procedures  Procedures         ED Course  ED Course as of 02/26/23 2203   Thu Feb 23, 2023   0230 Procedure Note: EKG  Date/Time: 02/23/23 2:30 AM   Performed by: Shelley Tovar  Authorized by: Shelley Tovar  Indications / Diagnosis: CP  ECG reviewed by me, the ED Provider: yes   The EKG demonstrates:  Rhythm: normal sinus  Intervals: normal intervals  Axis: normal axis  QRS/Blocks: normal QRS  ST Changes: No acute ST Changes, no STD/MARCI        0301 Signed 12                                             Medical Decision Making  40-year-old male presents for evaluation after relapsing with his polysubstance abuse    Does report some midsternal discomfort which he describes as "not pain" in setting of cocaine use we will obtain EKG to evaluate for ischemic changes, arrhythmias  Otherwise we will have crisis evaluate patient    Amount and/or Complexity of Data Reviewed  Labs: ordered  Disposition  Final diagnoses:   Suicidal ideations   Depression   Polysubstance abuse ([facility] [address] )     Time reflects when diagnosis was documented in both MDM as applicable and the Disposition within this note     Time User Action Codes Description Comment    2/23/2023  3:28 AM Rin Coronur Add [R45 851] Suicidal ideations     2/23/2023  3:28 AM Rin Cork Add Aren Bi  A] Depression     2/23/2023  3:28 AM Rin Coronur Add [F19 10] Polysubstance abuse [facility])       ED Disposition     ED Disposition   Transfer to [facility]    Condition   --    Date/Time   Thu Feb 23, 2023  3:28 AM    Comment   Vincent Palomares should be transferred out to [facility] and has been medically cleared             MD Documentation    Flowsheet Row Most Recent Value   Patient Condition The patient has been stabilized such that within reasonable medical probability, no material deterioration of the patient condition or the condition of the unborn child(mali) is likely to result from the transfer   Reason for Transfer Level of Care needed not available at this facility   Benefits of Transfer Specialized equipment and/or services available at the receiving facility (Include comment)________________________   Risks of Transfer Potential for delay in receiving treatment   Accepting Physician Dr Fabiola Pina Name, [address] [facility]    (Name & Tel number) [facility]   Transported by [employer] and Unit #) [place]   Sending MD Dr Ada Noel   Provider Certification The patient is stable for psychiatric transfer because they are medically stable, and is protected from harming him/herself or others during transport      RN Documentation    [address] Name, [address] [facility]    (Name & Tel number) [facility]   Transported by [employer] and Unit #) [facility]      Follow-up Information    None         Discharge Medication List as of 2/24/2023  8:54 AM      CONTINUE these medications which have NOT CHANGED    Details   buprenorphine-naloxone (Suboxone) 8-2 mg Place 1 Film (8 mg total) under the tongue 2 (two) times a day for 14 days, Starting Wed 8/3/2022, Until Wed 8/17/2022, Normal      mirtazapine (REMERON) 7 5 MG tablet Take 1 tablet (7 5 mg total) by mouth daily at bedtime, Starting Wed 8/3/2022, Until Fri 9/2/2022, Normal      tamsulosin (FLOMAX) 0 4 mg Take 1 capsule (0 4 mg total) by mouth daily with dinner for 14 days, Starting Wed 8/3/2022, Until Wed 8/17/2022, Normal             No discharge procedures on file      PDMP Review       Value Time User    PDMP Reviewed  Yes 6/9/2022  4:03 PM Sahil Mendosa MD          ED Provider  Electronically Signed by           Compa Grey DO  02/26/23 8526

## 2023-02-24 VITALS
TEMPERATURE: 97.5 F | BODY MASS INDEX: 25.48 KG/M2 | HEIGHT: 70 IN | SYSTOLIC BLOOD PRESSURE: 126 MMHG | OXYGEN SATURATION: 98 % | DIASTOLIC BLOOD PRESSURE: 68 MMHG | WEIGHT: 178 LBS | HEART RATE: 91 BPM | RESPIRATION RATE: 20 BRPM

## 2023-02-24 NOTE — ED NOTES
Patient is accepted at [facility]  Patient is accepted by [facility] per Aroldo Packer  Transportation is arranged with [facility]  Transportation is scheduled for 0830/2/24/23  Patient may go to the floor at 0900 2/24/23

## 2023-02-24 NOTE — ED NOTES
Bed Search    [facility] (rosanna) no beds  [facility]  (philippe) fax clinicals after mid-night  [facility]  -no beds  [facility] Gottlie[facility]) clinicals faxed  [facility] no answer  [facility] (Raymond Bias) clinicals faxed  [address] [address] accommodate  [place] (Lin Clutter) clinicals faxed

## 2023-02-24 NOTE — EMTALA/ACUTE CARE TRANSFER
[facility] EMERGENCY DEPARTMENT  [address] [facility] [address]  Dept: [phone]      DAAJRT TRANSFER CONSENT    NAME Amador DIGGS 1990                              MRN [id number]    I have been informed of my rights regarding examination, treatment, and transfer   by Dr Gorman att  providers found    Benefits: Specialized equipment and/or services available at the receiving facility (Include comment)________________________    Risks: Potential for delay in receiving treatment      Consent for Transfer:  I acknowledge that my medical condition has been evaluated and explained to me by the emergency department physician or other qualified medical person and/or my attending physician, who has recommended that I be transferred to the service of  Accepting Physician: Dr Uriel Spears at [address] Name, [address] : [place]  The above potential benefits of such transfer, the potential risks associated with such transfer, and the probable risks of not being transferred have been explained to me, and I fully understand them  The doctor has explained that, in my case, the benefits of transfer outweigh the risks  I agree to be transferred  I authorize the performance of emergency medical procedures and treatments upon me in both transit and upon arrival at the receiving facility  Additionally, I authorize the release of any and all medical records to the receiving facility and request they be transported with me, if possible  I understand that the safest mode of transportation during a medical emergency is an ambulance and that the Hospital advocates the use of this mode of transport  Risks of traveling to the receiving facility by car, including absence of medical control, life sustaining equipment, such as oxygen, and medical personnel has been explained to me and I fully understand them      ([address])  [  ]  I consent to the stated transfer and to be transported by ambulance/helicopter  [  ]  I consent to the stated transfer, but refuse transportation by ambulance and accept full responsibility for my transportation by car  I understand the risks of non-ambulance transfers and I exonerate the Hospital and its staff from any deterioration in my condition that results from this refusal     X___________________________________________    DATE  23  TIME________  Signature of patient or legally responsible individual signing on patient behalf           RELATIONSHIP TO PATIENT_________________________          Provider Certification    NAME Abril Pruitt                                         1990                              MRN [id number]    A medical screening exam was performed on the above named patient  Based on the examination:    Condition Necessitating Transfer The primary encounter diagnosis was Suicidal ideations  Diagnoses of Depression and [facility]ubstance [facility] ) were also pertinent to this visit  Patient Condition: The patient has been stabilized such that within reasonable medical probability, no material deterioration of the patient condition or the condition of the unborn child(mali) is likely to result from the transfer    Reason for Transfer: Level of Care needed not available at this facility    Transfer Requirements: [address]   · Space available and qualified personnel available for treatment as acknowledged by Marichuy  · Agreed to accept transfer and to provide appropriate medical treatment as acknowledged by       Dr Argelia Longoria  · Appropriate medical records of the examination and treatment of the patient are provided at the time of transfer   [facility], [address] _______  · Transfer will be performed by qualified personnel from [address] [place] [address]  and appropriate transfer equipment as required, including the use of necessary and appropriate life support measures      Provider Certification: I have examined the patient and explained the following risks and benefits of being transferred/refusing transfer to the patient/family:  The patient is stable for psychiatric transfer because they are medically stable, and is protected from harming him/herself or others during transport      Based on these reasonable risks and benefits to the patient and/or the unborn child(mali), and based upon the information available at the time of the patient’s examination, I certify that the medical benefits reasonably to be expected from the provision of appropriate medical treatments at another medical facility outweigh the increasing risks, if any, to the individual’s medical condition, and in the case of labor to the unborn child, from effecting the transfer      X____________________________________________ DATE 02/24/23        TIME_______      ORIGINAL - SEND TO MEDICAL RECORDS   COPY - SEND WITH PATIENT DURING TRANSFER

## 2023-03-23 ENCOUNTER — TELEPHONE (OUTPATIENT)
Dept: OTHER | Facility: OTHER | Age: 33
End: 2023-03-23

## 2023-03-23 NOTE — TELEPHONE ENCOUNTER
Pt called in to set up a kidney and bladder ultrasound appt  He is requesting a call back at [phone]

## 2023-03-29 NOTE — TELEPHONE ENCOUNTER
Third Attempt - Called the patient, unable to leave a message due to not having a voicemail set up (per recording)

## 2023-04-04 ENCOUNTER — HOSPITAL ENCOUNTER (OUTPATIENT)
Dept: ULTRASOUND IMAGING | Facility: HOSPITAL | Age: 33
Discharge: HOME | End: 2023-04-04

## 2023-04-04 DIAGNOSIS — R33.9 URINARY RETENTION: ICD-10-CM

## 2023-10-19 NOTE — PROGRESS NOTES
Detail Level: Generalized Remains seclusive to room sleeps constantly only out for meals and meds Detail Level: Simple

## 2024-01-05 ENCOUNTER — TELEPHONE (OUTPATIENT)
Age: 34
End: 2024-01-05

## 2024-01-22 ENCOUNTER — HOSPITAL ENCOUNTER (INPATIENT)
Facility: HOSPITAL | Age: 34
LOS: 3 days | Discharge: OTHER INSTITUTION | DRG: 896 | End: 2024-01-25
Attending: EMERGENCY MEDICINE | Admitting: EMERGENCY MEDICINE
Payer: COMMERCIAL

## 2024-01-22 ENCOUNTER — HOSPITAL ENCOUNTER (EMERGENCY)
Facility: HOSPITAL | Age: 34
End: 2024-01-22
Attending: EMERGENCY MEDICINE | Admitting: EMERGENCY MEDICINE
Payer: COMMERCIAL

## 2024-01-22 VITALS
HEIGHT: 70 IN | SYSTOLIC BLOOD PRESSURE: 126 MMHG | RESPIRATION RATE: 16 BRPM | TEMPERATURE: 98.6 F | HEART RATE: 58 BPM | DIASTOLIC BLOOD PRESSURE: 76 MMHG | OXYGEN SATURATION: 99 % | BODY MASS INDEX: 24.34 KG/M2 | WEIGHT: 170 LBS

## 2024-01-22 DIAGNOSIS — F33.2 SEVERE EPISODE OF RECURRENT MAJOR DEPRESSIVE DISORDER, WITHOUT PSYCHOTIC FEATURES (HCC): ICD-10-CM

## 2024-01-22 DIAGNOSIS — R45.851 SUICIDAL IDEATION: ICD-10-CM

## 2024-01-22 DIAGNOSIS — F19.10 POLYSUBSTANCE ABUSE (HCC): Primary | ICD-10-CM

## 2024-01-22 DIAGNOSIS — R33.9 URINARY RETENTION: ICD-10-CM

## 2024-01-22 DIAGNOSIS — F13.20 BENZODIAZEPINE DEPENDENCE (HCC): ICD-10-CM

## 2024-01-22 DIAGNOSIS — E43 SEVERE PROTEIN-CALORIE MALNUTRITION (HCC): Primary | ICD-10-CM

## 2024-01-22 DIAGNOSIS — F11.21 OPIOID USE DISORDER, SEVERE, IN SUSTAINED REMISSION (HCC): ICD-10-CM

## 2024-01-22 PROBLEM — F17.200 TOBACCO USE DISORDER: Status: ACTIVE | Noted: 2019-08-04

## 2024-01-22 LAB
ALBUMIN SERPL BCP-MCNC: 4.4 G/DL (ref 3.5–5)
ALP SERPL-CCNC: 51 U/L (ref 34–104)
ALT SERPL W P-5'-P-CCNC: 13 U/L (ref 7–52)
ANION GAP SERPL CALCULATED.3IONS-SCNC: 5 MMOL/L
APAP SERPL-MCNC: <2 UG/ML (ref 10–20)
AST SERPL W P-5'-P-CCNC: 21 U/L (ref 13–39)
BASOPHILS # BLD AUTO: 0.05 THOUSANDS/ÂΜL (ref 0–0.1)
BASOPHILS NFR BLD AUTO: 1 % (ref 0–1)
BILIRUB SERPL-MCNC: 0.58 MG/DL (ref 0.2–1)
BUN SERPL-MCNC: 12 MG/DL (ref 5–25)
CALCIUM SERPL-MCNC: 9.3 MG/DL (ref 8.4–10.2)
CHLORIDE SERPL-SCNC: 102 MMOL/L (ref 96–108)
CO2 SERPL-SCNC: 30 MMOL/L (ref 21–32)
CREAT SERPL-MCNC: 0.96 MG/DL (ref 0.6–1.3)
EOSINOPHIL # BLD AUTO: 0.14 THOUSAND/ÂΜL (ref 0–0.61)
EOSINOPHIL NFR BLD AUTO: 2 % (ref 0–6)
ERYTHROCYTE [DISTWIDTH] IN BLOOD BY AUTOMATED COUNT: 12.2 % (ref 11.6–15.1)
ETHANOL SERPL-MCNC: <10 MG/DL
GFR SERPL CREATININE-BSD FRML MDRD: 102 ML/MIN/1.73SQ M
GLUCOSE SERPL-MCNC: 87 MG/DL (ref 65–140)
HCT VFR BLD AUTO: 36.9 % (ref 36.5–49.3)
HGB BLD-MCNC: 12.5 G/DL (ref 12–17)
IMM GRANULOCYTES # BLD AUTO: 0.01 THOUSAND/UL (ref 0–0.2)
IMM GRANULOCYTES NFR BLD AUTO: 0 % (ref 0–2)
LYMPHOCYTES # BLD AUTO: 1.92 THOUSANDS/ÂΜL (ref 0.6–4.47)
LYMPHOCYTES NFR BLD AUTO: 32 % (ref 14–44)
MCH RBC QN AUTO: 29.1 PG (ref 26.8–34.3)
MCHC RBC AUTO-ENTMCNC: 33.9 G/DL (ref 31.4–37.4)
MCV RBC AUTO: 86 FL (ref 82–98)
MONOCYTES # BLD AUTO: 0.43 THOUSAND/ÂΜL (ref 0.17–1.22)
MONOCYTES NFR BLD AUTO: 7 % (ref 4–12)
NEUTROPHILS # BLD AUTO: 3.51 THOUSANDS/ÂΜL (ref 1.85–7.62)
NEUTS SEG NFR BLD AUTO: 58 % (ref 43–75)
NRBC BLD AUTO-RTO: 0 /100 WBCS
PLATELET # BLD AUTO: 261 THOUSANDS/UL (ref 149–390)
PMV BLD AUTO: 10.7 FL (ref 8.9–12.7)
POTASSIUM SERPL-SCNC: 4 MMOL/L (ref 3.5–5.3)
PROT SERPL-MCNC: 7.2 G/DL (ref 6.4–8.4)
RBC # BLD AUTO: 4.29 MILLION/UL (ref 3.88–5.62)
SALICYLATES SERPL-MCNC: <5 MG/DL (ref 3–20)
SODIUM SERPL-SCNC: 137 MMOL/L (ref 135–147)
WBC # BLD AUTO: 6.06 THOUSAND/UL (ref 4.31–10.16)

## 2024-01-22 PROCEDURE — 82077 ASSAY SPEC XCP UR&BREATH IA: CPT | Performed by: EMERGENCY MEDICINE

## 2024-01-22 PROCEDURE — 80053 COMPREHEN METABOLIC PANEL: CPT | Performed by: EMERGENCY MEDICINE

## 2024-01-22 PROCEDURE — 80143 DRUG ASSAY ACETAMINOPHEN: CPT | Performed by: EMERGENCY MEDICINE

## 2024-01-22 PROCEDURE — 99284 EMERGENCY DEPT VISIT MOD MDM: CPT

## 2024-01-22 PROCEDURE — 36415 COLL VENOUS BLD VENIPUNCTURE: CPT | Performed by: EMERGENCY MEDICINE

## 2024-01-22 PROCEDURE — 99285 EMERGENCY DEPT VISIT HI MDM: CPT | Performed by: EMERGENCY MEDICINE

## 2024-01-22 PROCEDURE — 80179 DRUG ASSAY SALICYLATE: CPT | Performed by: EMERGENCY MEDICINE

## 2024-01-22 PROCEDURE — 99223 1ST HOSP IP/OBS HIGH 75: CPT | Performed by: EMERGENCY MEDICINE

## 2024-01-22 PROCEDURE — 85025 COMPLETE CBC W/AUTO DIFF WBC: CPT | Performed by: EMERGENCY MEDICINE

## 2024-01-22 PROCEDURE — 93005 ELECTROCARDIOGRAM TRACING: CPT

## 2024-01-22 PROCEDURE — HZ2ZZZZ DETOXIFICATION SERVICES FOR SUBSTANCE ABUSE TREATMENT: ICD-10-PCS | Performed by: EMERGENCY MEDICINE

## 2024-01-22 RX ORDER — TRAZODONE HYDROCHLORIDE 50 MG/1
50 TABLET ORAL
Status: DISCONTINUED | OUTPATIENT
Start: 2024-01-22 | End: 2024-01-22

## 2024-01-22 RX ORDER — ONDANSETRON 2 MG/ML
4 INJECTION INTRAMUSCULAR; INTRAVENOUS EVERY 6 HOURS PRN
Status: DISCONTINUED | OUTPATIENT
Start: 2024-01-22 | End: 2024-01-25 | Stop reason: HOSPADM

## 2024-01-22 RX ORDER — LANOLIN ALCOHOL/MO/W.PET/CERES
100 CREAM (GRAM) TOPICAL DAILY
Status: CANCELLED | OUTPATIENT
Start: 2024-01-22

## 2024-01-22 RX ORDER — BUPRENORPHINE AND NALOXONE 8; 2 MG/1; MG/1
8 FILM, SOLUBLE BUCCAL; SUBLINGUAL 2 TIMES DAILY
Status: DISCONTINUED | OUTPATIENT
Start: 2024-01-22 | End: 2024-01-25 | Stop reason: HOSPADM

## 2024-01-22 RX ORDER — TAMSULOSIN HYDROCHLORIDE 0.4 MG/1
0.4 CAPSULE ORAL ONCE
Status: COMPLETED | OUTPATIENT
Start: 2024-01-22 | End: 2024-01-22

## 2024-01-22 RX ORDER — NICOTINE 21 MG/24HR
1 PATCH, TRANSDERMAL 24 HOURS TRANSDERMAL DAILY
Status: DISCONTINUED | OUTPATIENT
Start: 2024-01-22 | End: 2024-01-22

## 2024-01-22 RX ORDER — ONDANSETRON 2 MG/ML
4 INJECTION INTRAMUSCULAR; INTRAVENOUS EVERY 6 HOURS PRN
Status: CANCELLED | OUTPATIENT
Start: 2024-01-22

## 2024-01-22 RX ORDER — NICOTINE 21 MG/24HR
1 PATCH, TRANSDERMAL 24 HOURS TRANSDERMAL DAILY
Status: CANCELLED | OUTPATIENT
Start: 2024-01-22

## 2024-01-22 RX ORDER — LORATADINE 10 MG/1
10 TABLET ORAL DAILY
Status: DISCONTINUED | OUTPATIENT
Start: 2024-01-22 | End: 2024-01-25 | Stop reason: HOSPADM

## 2024-01-22 RX ORDER — TAMSULOSIN HYDROCHLORIDE 0.4 MG/1
0.4 CAPSULE ORAL
Status: DISCONTINUED | OUTPATIENT
Start: 2024-01-22 | End: 2024-01-25 | Stop reason: HOSPADM

## 2024-01-22 RX ORDER — TRAZODONE HYDROCHLORIDE 50 MG/1
50 TABLET ORAL
Status: CANCELLED | OUTPATIENT
Start: 2024-01-22

## 2024-01-22 RX ORDER — FOLIC ACID 1 MG/1
1 TABLET ORAL DAILY
Status: DISCONTINUED | OUTPATIENT
Start: 2024-01-22 | End: 2024-01-25 | Stop reason: HOSPADM

## 2024-01-22 RX ORDER — ACETAMINOPHEN 325 MG/1
650 TABLET ORAL EVERY 6 HOURS PRN
Status: CANCELLED | OUTPATIENT
Start: 2024-01-22

## 2024-01-22 RX ORDER — ENOXAPARIN SODIUM 100 MG/ML
40 INJECTION SUBCUTANEOUS DAILY
Status: CANCELLED | OUTPATIENT
Start: 2024-01-22

## 2024-01-22 RX ORDER — MIRTAZAPINE 15 MG/1
15 TABLET, FILM COATED ORAL
Status: DISCONTINUED | OUTPATIENT
Start: 2024-01-22 | End: 2024-01-22

## 2024-01-22 RX ORDER — ACETAMINOPHEN 325 MG/1
650 TABLET ORAL EVERY 6 HOURS PRN
Status: DISCONTINUED | OUTPATIENT
Start: 2024-01-22 | End: 2024-01-25 | Stop reason: HOSPADM

## 2024-01-22 RX ORDER — LANOLIN ALCOHOL/MO/W.PET/CERES
100 CREAM (GRAM) TOPICAL DAILY
Status: DISCONTINUED | OUTPATIENT
Start: 2024-01-22 | End: 2024-01-25 | Stop reason: HOSPADM

## 2024-01-22 RX ORDER — PHENOBARBITAL SODIUM 130 MG/ML
260 INJECTION, SOLUTION INTRAMUSCULAR; INTRAVENOUS ONCE
Status: COMPLETED | OUTPATIENT
Start: 2024-01-22 | End: 2024-01-22

## 2024-01-22 RX ORDER — ENOXAPARIN SODIUM 100 MG/ML
40 INJECTION SUBCUTANEOUS DAILY
Status: DISCONTINUED | OUTPATIENT
Start: 2024-01-22 | End: 2024-01-25 | Stop reason: HOSPADM

## 2024-01-22 RX ORDER — MIRTAZAPINE 15 MG/1
15 TABLET, FILM COATED ORAL
Status: DISCONTINUED | OUTPATIENT
Start: 2024-01-22 | End: 2024-01-23

## 2024-01-22 RX ORDER — FOLIC ACID 1 MG/1
1 TABLET ORAL DAILY
Status: CANCELLED | OUTPATIENT
Start: 2024-01-22

## 2024-01-22 RX ADMIN — LORATADINE 10 MG: 10 TABLET ORAL at 11:03

## 2024-01-22 RX ADMIN — BUPRENORPHINE AND NALOXONE 8 MG: 8; 2 FILM BUCCAL; SUBLINGUAL at 10:47

## 2024-01-22 RX ADMIN — PHENOBARBITAL SODIUM 260 MG: 130 INJECTION INTRAMUSCULAR at 20:15

## 2024-01-22 RX ADMIN — BUPRENORPHINE AND NALOXONE 8 MG: 8; 2 FILM BUCCAL; SUBLINGUAL at 20:15

## 2024-01-22 RX ADMIN — NICOTINE POLACRILEX 2 MG: 2 GUM, CHEWING BUCCAL at 19:47

## 2024-01-22 RX ADMIN — TAMSULOSIN HYDROCHLORIDE 0.4 MG: 0.4 CAPSULE ORAL at 02:10

## 2024-01-22 NOTE — ED PROVIDER NOTES
"History  Chief Complaint   Patient presents with    Detox Evaluation     Pt reports he is looking to get a detox eval and to be sent inpatient for detox. Pt stated \"if I was to not wake up in the morning, I'd be okay with that\" when asked if he was suicidal. Denies HI.      Patient is a 33-year-old male with a past medical history significant for polysubstance abuse.  Patient states that he uses oral methamphetamines and 10-12 Xanax bars daily.  He states that last year he had a benzodiazepine withdrawal seizure.  He takes daily Suboxone.  He has gone through multiple detox programs, but has relapsed.  States that he has no suicidal ideations, homicidal ideations, auditory visual hallucinations.  He last used both methamphetamines and Xanax an hour or 2 prior to arrival to the emergency department.  He states that he is sad, depressed, and hopeless due to his situation, and if something were to happen to him, he would not mind, but he has no thoughts of wanting to harm himself.        Prior to Admission Medications   Prescriptions Last Dose Informant Patient Reported? Taking?   buprenorphine-naloxone (Suboxone) 8-2 mg   No No   Sig: Place 1 Film (8 mg total) under the tongue 2 (two) times a day for 14 days   tamsulosin (FLOMAX) 0.4 mg   No No   Sig: Take 1 capsule (0.4 mg total) by mouth daily with dinner for 14 days      Facility-Administered Medications: None       Past Medical History:   Diagnosis Date    Addiction to drug (HCC)     Allergic     Anxiety     Benzodiazepine withdrawal (HCC) 10/31/2019    Chronic pain disorder     Depression     Elevated AST (SGOT) 8/3/2019    Hepatitis C 8/4/2019    Hepatitis C     Methamphetamine abuse (HCC) 8/7/2019    Psychiatric disorder     Severe episode of recurrent major depressive disorder, without psychotic features (HCC) 7/17/2019    Spinal stenosis     Substance abuse (HCC)     Suicide attempt (HCC)     Urinary retention        Past Surgical History:   Procedure " "Laterality Date    BACK SURGERY      FRACTURE SURGERY      left foot reconstruted     SPINE SURGERY      hernated disc        Family History   Problem Relation Age of Onset    Hypertension Mother     Hyperlipidemia Father     Heart disease Father     Depression Maternal Grandmother     Hypertension Maternal Grandmother     Squamous cell carcinoma Maternal Grandfather     Alcohol abuse Maternal Grandfather     Alzheimer's disease Paternal Grandmother     Dementia Paternal Grandmother     Alcohol abuse Paternal Uncle     Colon cancer Neg Hx     Colon polyps Neg Hx      I have reviewed and agree with the history as documented.    E-Cigarette/Vaping    E-Cigarette Use Current Every Day User      E-Cigarette/Vaping Substances    Nicotine Yes     THC No     CBD No     Flavoring No     Other No     Unknown No      Social History     Tobacco Use    Smoking status: Former     Current packs/day: 0.00     Types: Cigarettes     Quit date: 2017     Years since quittin.3    Smokeless tobacco: Current    Tobacco comments:     Pt uses a \"vape\" pen - use is equivalent to approximately 1 pack per day   Vaping Use    Vaping status: Every Day    Substances: Nicotine   Substance Use Topics    Alcohol use: Not Currently    Drug use: Yes     Frequency: 7.0 times per week     Types: Methamphetamines, Benzodiazepines, Heroin     Comment: Last heroin use 2016       Review of Systems   Constitutional:  Negative for chills and fever.   Respiratory:  Negative for shortness of breath.    Cardiovascular:  Negative for chest pain.   Gastrointestinal:  Negative for nausea and vomiting.   Neurological:  Negative for dizziness, light-headedness and headaches.   Psychiatric/Behavioral:  Negative for suicidal ideas.        Physical Exam  Physical Exam  Vitals and nursing note reviewed.   Constitutional:       General: He is not in acute distress.     Appearance: Normal appearance. He is not ill-appearing, toxic-appearing or diaphoretic.   HENT: "      Head: Normocephalic and atraumatic.      Mouth/Throat:      Mouth: Mucous membranes are moist.   Eyes:      Extraocular Movements: Extraocular movements intact.      Conjunctiva/sclera: Conjunctivae normal.      Pupils: Pupils are equal, round, and reactive to light.   Cardiovascular:      Rate and Rhythm: Normal rate and regular rhythm.      Pulses: Normal pulses.      Heart sounds: Normal heart sounds. No murmur heard.  Pulmonary:      Effort: Pulmonary effort is normal. No respiratory distress.      Breath sounds: Normal breath sounds. No stridor. No wheezing, rhonchi or rales.   Chest:      Chest wall: No tenderness.   Abdominal:      General: Bowel sounds are normal. There is no distension.      Palpations: Abdomen is soft.      Tenderness: There is no abdominal tenderness. There is no guarding or rebound.   Skin:     General: Skin is warm and dry.   Neurological:      General: No focal deficit present.      Mental Status: He is alert and oriented to person, place, and time. Mental status is at baseline.   Psychiatric:         Mood and Affect: Mood normal. Affect is flat.         Behavior: Behavior normal.         Thought Content: Thought content does not include homicidal or suicidal ideation. Thought content does not include homicidal or suicidal plan.         Vital Signs  ED Triage Vitals   Temperature Pulse Respirations Blood Pressure SpO2   01/22/24 0034 01/22/24 0034 01/22/24 0034 01/22/24 0034 01/22/24 0034   98.6 °F (37 °C) 88 16 146/82 100 %      Temp Source Heart Rate Source Patient Position - Orthostatic VS BP Location FiO2 (%)   01/22/24 0034 01/22/24 0034 01/22/24 0045 01/22/24 0034 --   Temporal Monitor Sitting Right arm       Pain Score       01/22/24 0034       No Pain           Vitals:    01/22/24 0034 01/22/24 0045 01/22/24 0100 01/22/24 0200   BP: 146/82 146/82 121/78 122/80   Pulse: 88 88 88 69   Patient Position - Orthostatic VS:  Sitting Sitting Sitting         Visual Acuity      ED  Medications  Medications   tamsulosin (FLOMAX) capsule 0.4 mg (0.4 mg Oral Given 1/22/24 0210)       Diagnostic Studies  Results Reviewed       Procedure Component Value Units Date/Time    Comprehensive metabolic panel [[id number]] Collected: 01/22/24 0131    Lab Status: Final result Specimen: Blood from Arm, Right Updated: 01/22/24 0210     Sodium 137 mmol/L      Potassium 4.0 mmol/L      Chloride 102 mmol/L      CO2 30 mmol/L      ANION GAP 5 mmol/L      BUN 12 mg/dL      Creatinine 0.96 mg/dL      Glucose 87 mg/dL      Calcium 9.3 mg/dL      AST 21 U/L      ALT 13 U/L      Alkaline Phosphatase 51 U/L      Total Protein 7.2 g/dL      Albumin 4.4 g/dL      Total Bilirubin 0.58 mg/dL      eGFR 102 ml/min/1.73sq m     Narrative:      National Kid[employer] Foundation guidelines for Chronic Kidney Disease (CKD):     Stage 1 with normal or high GFR (GFR > 90 mL/min/1.73 square meters)    Stage 2 Mild CKD (GFR = 60-89 mL/min/1.73 square meters)    Stage 3A Moderate CKD (GFR = 45-59 mL/min/1.73 square meters)    Stage 3B Moderate CKD (GFR = 30-44 mL/min/1.73 square meters)    Stage 4 Severe CKD (GFR = 15-29 mL/min/1.73 square meters)    Stage 5 End Stage CKD (GFR <15 mL/min/1.73 square meters)  Note: GFR calculation is accurate only with a steady state creatinine    Salicylate level [[id number]]  (Normal) Collected: 01/22/24 0131    Lab Status: Final result Specimen: Blood from Arm, Right Updated: 01/22/24 0210     Salicylate Lvl <5 mg/dL     Acetaminophen level-If concentration is detectable, please discuss with medical  on call. [[id number]]  (Abnormal) Collected: 01/22/24 0131    Lab Status: Final result Specimen: Blood from Arm, Right Updated: 01/22/24 0210     Acetaminophen Level <2 ug/mL     Ethanol [[id number]]  (Normal) Collected: 01/22/24 0131    Lab Status: Final result Specimen: Blood from Arm, Right Updated: 01/22/24 0207     Ethanol Lvl <10 mg/dL     CBC and differential [[id number]] Collected:  01/22/24 0131    Lab Status: Final result Specimen: Blood from Arm, Right Updated: 01/22/24 0151     WBC 6.06 Thousand/uL      RBC 4.29 Million/uL      Hemoglobin 12.5 g/dL      Hematocrit 36.9 %      MCV 86 fL      MCH 29.1 pg      MCHC 33.9 g/dL      RDW 12.2 %      MPV 10.7 fL      Platelets 261 Thousands/uL      nRBC 0 /100 WBCs      Neutrophils Relative 58 %      Immat GRANS % 0 %      Lymphocytes Relative 32 %      Monocytes Relative 7 %      Eosinophils Relative 2 %      Basophils Relative 1 %      Neutrophils Absolute 3.51 Thousands/µL      Immature Grans Absolute 0.01 Thousand/uL      Lymphocytes Absolute 1.92 Thousands/µL      Monocytes Absolute 0.43 Thousand/µL      Eosinophils Absolute 0.14 Thousand/µL      Basophils Absolute 0.05 Thousands/µL     Rapid drug screen, urine [[id number]]     Lab Status: No result Specimen: Urine                    No orders to display              Procedures  ECG 12 Lead Documentation Only    Date/Time: 1/22/2024 1:18 AM    Performed by: Alpa Alberts DO  Authorized by: Alpa Alberts DO    Indications / Diagnosis:  Medical clearance for detox program  ECG reviewed by me, the ED Provider: yes    Patient location:  ED  Previous ECG:     Previous ECG:  Compared to current    Comparison ECG info:  2/23/23    Similarity:  No change  Interpretation:     Interpretation: normal    Rate:     ECG rate:  64    ECG rate assessment: normal    Rhythm:     Rhythm: sinus rhythm    Ectopy:     Ectopy: none    QRS:     QRS axis:  Normal    QRS intervals:  Normal  Conduction:     Conduction: normal    ST segments:     ST segments:  Normal  T waves:     T waves: normal    Comments:      Qtc 396           ED Course  ED Course as of 01/22/24 0230   Mon Jan 22, 2024   0103 10-12 xanax bars daily  Oral meth daily     H/o benzo withdrawal seizure   0131 Patient is accepted to \[facility]\"" detox unit pending lab results.   0229 Patient accepted formally to [facility] detox unit.                                              Medical Decision Making  Assessment and plan:  Request for detox from methamphetamines and benzodiazepine.  Patient already on Suboxone.  Patient has history of withdrawal seizure.  Will discuss with [facility] due to complex past medical history.  Check labs to evaluate for leukocytosis, anemia, electrolyte amenities, kidney and liver function; EKG to rule out QRS widening/QT prolongation; urine drug screen.  Patient last took methamphetamine and benzos 1 to 2 hours ago and is currently not actively withdrawing.    Amount and/or Complexity of Data Reviewed  Labs: ordered.    Risk  Prescription drug management.             Disposition  Final diagnoses:   Polysubstance abuse (HCC)     Time reflects when diagnosis was documented in both MDM as applicable and the Disposition within this note       Time User Action Codes Description Comment    1/22/2024  1:20 AM Alpa Alberts Add [F19.10] Polysubstance abuse (HCC)           ED Disposition       ED Disposition   Transfer to Another Facility-In Network    Condition   --    Date/Time   Mon Jan 22, 2024  1:20 AM    Comment   Gus Spring should be transferred out to [facility].               MD Documentation      Flowsheet Row Most Recent Value   Patient Condition The patient has been stabilized such that within reasonable medical probability, no material deterioration of the patient condition or the condition of the unborn child(mail) is likely to result from the transfer   Reason for Transfer Level of Care needed not available at this facility   Benefits of Transfer Specialized equipment and/or services available at the receiving facility (Include comment)________________________   Risks of Transfer Potential for delay in receiving treatment, Potential deterioration of medical condition, Loss of IV, Increased discomfort during transfer, Possible worsening of condition or death during transfer   Accepting Physician   Baker   Accepting Facility Name, [facility]   Sending [address]   Provider Certification General risk, such as traffic hazards, adverse weather conditions, rough terrain or turbulence, possible failure of equipment (including vehicle or aircraft), or consequences of actions of persons outside the control of the transport personnel, Unanticipated needs of medical equipment and personnel during transport, Risk of worsening condition, The possibility of a transport vehicle being unavailable          RN Documentation      Flowsheet Row Most Recent Value   Accepting Facility Name, [facility]          Follow-up Information    None         Patient's Medications   Discharge Prescriptions    No medications on file       No discharge procedures on file.    PDMP Review         Value Time User    PDMP Reviewed  Yes 6/9/2022  4:03 PM Luis F Arenas MD            ED Provider  Electronically Signed by             Alpa Alberts DO  01/22/24 [id number]

## 2024-01-22 NOTE — EMTALA/ACUTE CARE TRANSFER
[facility] EMERGENCY DEPARTMENT  [address] [address]  Dept: [phone]      EMTALA TRANSFER CONSENT    NAME Gus Spring                                         1990                              MRN [id number]    I have been informed of my rights regarding examination, treatment, and transfer   by Dr. Alpa Alberts DO    Benefits: Specialized equipment and/or services available at the receiving facility (Include comment)________________________    Risks: Potential for delay in receiving treatment, Potential deterioration of medical condition, Loss of IV, Increased discomfort during transfer, Possible worsening of condition or death during transfer      Consent for Transfer:  I acknowledge that my medical condition has been evaluated and explained to me by the emergency department physician or other qualified medical person and/or my attending physician, who has recommended that I be transferred to the service of  Accepting Physician: Dr. Fountain at Accepting Facility Name, City & State : [facility]. The above potential benefits of such transfer, the potential risks associated with such transfer, and the probable risks of not being transferred have been explained to me, and I fully understand them.  The doctor has explained that, in my case, the benefits of transfer outweigh the risks.  I agree to be transferred.    I authorize the performance of emergency medical procedures and treatments upon me in both transit and upon arrival at the receiving facility.  Additionally, I authorize the release of any and all medical records to the receiving facility and request they be transported with me, if possible.  I understand that the safest mode of transportation during a medical emergency is an ambulance and that the Hospital advocates the use of this mode of transport. Risks of traveling to the receiving facility by car, including absence of medical control, life  sustaining equipment, such as oxygen, and medical personnel has been explained to me and I fully understand them.    (CLARISSA CORRECT BOX BELOW)  [  ]  I consent to the stated transfer and to be transported by ambulance/helicopter.  [  ]  I consent to the stated transfer, but refuse transportation by ambulance and accept full responsibility for my transportation by car.  I understand the risks of non-ambulance transfers and I exonerate the Hospital and its staff from any deterioration in my condition that results from this refusal.    X___________________________________________    DATE  24  TIME________  Signature of patient or legally responsible individual signing on patient behalf           RELATIONSHIP TO PATIENT_________________________          Provider Certification    NAME Gus Spring                                         1990                              MRN [id number]    A medical screening exam was performed on the above named patient.  Based on the examination:    Condition Necessitating Transfer The encounter diagnosis was Polysubstance abuse (HCC).    Patient Condition: The patient has been stabilized such that within reasonable medical probability, no material deterioration of the patient condition or the condition of the unborn child(mali) is likely to result from the transfer    Reason for Transfer: Level of Care needed not available at this facility    Transfer Requirements: Facility [facility]   Space available and qualified personnel available for treatment as acknowledged by    Agreed to accept transfer and to provide appropriate medical treatment as acknowledged by       Dr. Fountain  Appropriate medical records of the examination and treatment of the patient are provided at the time of transfer   STAFF INITIAL WHEN COMPLETED _______  Transfer will be performed by qualified personnel from    and appropriate transfer equipment as required, including the use of necessary and appropriate life  support measures.    Provider Certification: I have examined the patient and explained the following risks and benefits of being transferred/refusing transfer to the patient/family:  General risk, such as traffic hazards, adverse weather conditions, rough terrain or turbulence, possible failure of equipment (including vehicle or aircraft), or consequences of actions of persons outside the control of the transport personnel, Unanticipated needs of medical equipment and personnel during transport, Risk of worsening condition, The possibility of a transport vehicle being unavailable      Based on these reasonable risks and benefits to the patient and/or the unborn child(mali), and based upon the information available at the time of the patient’s examination, I certify that the medical benefits reasonably to be expected from the provision of appropriate medical treatments at another medical facility outweigh the increasing risks, if any, to the individual’s medical condition, and in the case of labor to the unborn child, from effecting the transfer.    X____________________________________________ DATE 01/22/24        TIME_______      ORIGINAL - SEND TO MEDICAL RECORDS   COPY - SEND WITH PATIENT DURING TRANSFER

## 2024-01-22 NOTE — ASSESSMENT & PLAN NOTE
Hx of ADD, prior meds include Adderal 20 mg bid  would avoid stimulant medications given methamphetamine abuse hx  Medications per psychiatry

## 2024-01-22 NOTE — ASSESSMENT & PLAN NOTE
"Reports using 10-12 xanax bars/day, last use 02:30 am on 1/22/24  Hx of benzo withdrawal seizure (approx 1 yr ago)  Previous detox at [facility] in 7/2022  SEWS protocol with symptom-triggered phenobarbital followed for medical management of benzodiazepine withdrawal  received a total of 260 mg phenobarbital, with last dose administered 1/22/24 at 2015 and subsequent resolution of w/d symptoms  reported a phenobarb allergy on admission (says it causes \"skin irritation\" and skin blotches); however patient tolerated phenobarbital without issue during this admission  Acute withdrawal resolved- Patient is medically cleared for inpatient rehabilitation  "

## 2024-01-22 NOTE — ED NOTES
Pt reports last use of benzos and meth approximately 1 hour ago.        Jyoti Guthrie, MARCELINO  01/22/24 005

## 2024-01-22 NOTE — ASSESSMENT & PLAN NOTE
Hx of MDD, on Welbutrin XL, for depression and prn Remeron 15 mg qhs, for sleep  On admission- Reported passive SI without plan  Currently denies SI/thoughts of self-harm   Psychiatry consulted, appreciate recommendations  Per psychiatry, patient does not meet criteria for inpatient psychiatry admission at this time  Continual observation discontinued per psych  Medications per psychiatry

## 2024-01-22 NOTE — ASSESSMENT & PLAN NOTE
"Reports using 10-12 xanax bars/day, last use 02:30 am on 1/22/24  Hx of benzo withdrawal seizure (approx 1 yr ago)  Previous detox at  in 7/2022    - Continue [facility] protocol for benzo withdrawal, reports phenobarb allergy (says it causes \"skin irritation\" and skin blotches)      "

## 2024-01-22 NOTE — LETTER
[facility]   [address] [place] [address]  [phone]  Dept: [phone]    January 25, 2024     Patient: Gus Spring   YOB: 1990   Date of Visit: 1/22/2024       To Whom it May Concern:    Gus Spring is under my professional care. He was seen in the hospital from 1/22/2024 to 01/25/24.     If you have any questions or concerns, please don't hesitate to call.         Sincerely,          Jennifer Blake PA-C

## 2024-01-22 NOTE — QUICK NOTE
Examined the patient at bedside for psychiatric evaluation. Patient was lethargic and frequently falling asleep during initial assessment. The decision was made to come back at a later time for assessment when the patient is more alert.

## 2024-01-22 NOTE — ASSESSMENT & PLAN NOTE
Hx of MDD, on Welbutrin XL, for depression and prn Remeron 15 mg qhs, for sleep  Reports passive SI without plan  Continue home Remeron 15 mg qhs prn, for sleep  Hold Wellbutrin XL, due to seizure risk  Psych consult placed, placed on virtual monitoring

## 2024-01-22 NOTE — ASSESSMENT & PLAN NOTE
Hx of OUD, on Subxone 8 mg bid (confirmed from PDMP),  states he has not had Suboxone in the past two days due to being in the hospital    - Continue Suboxone 8 mg bid

## 2024-01-22 NOTE — ASSESSMENT & PLAN NOTE
Withdrawal managed as above   Consult case management- interested in inpatient rehab at this time

## 2024-01-22 NOTE — ASSESSMENT & PLAN NOTE
Hx of OUD, on Subxone 8 mg bid (confirmed from PDMP),  Per PDMP, last prescription Suboxone 8-2 mg filled 12/27/2023  Continue Suboxone 8 mg BID  Case management consulted

## 2024-01-22 NOTE — H&P
"[facility]  H&P  Name: Gus Brunner y.o. male I MRN: [id number]  Unit/Bed#: 5T DETOX 518-01 I Date of Admission: 1/22/2024   Date of Service: 1/22/2024 I Hospital Day: 0    HISTORY & PHYSICAL EXAM  DEPARTMENT OF MEDICAL TOXICOLOGY  LEVEL 4 MEDICAL DETOX UNIT  Gus Spring 33 y.o. male MRN: [id number]  Unit/Bed#: 5T DETOX 518-01 Encounter: [id number]      Reason for Admission/Principal Problem: Ethanol withdrawal, Ethanol use disorder  Admitting Provider: Malcolm Briceño MD  Attending Provider: Ann Lousi*   1/22/2024  8:03 AM        * Benzodiazepine withdrawal with complication (HCC)  Assessment & Plan  Reports using 10-12 xanax bars/day, last use 02:30 am on 1/22/24  Hx of benzo withdrawal seizure (approx 1 yr ago)  Previous detox at  in 7/2022    - Continue [facility] protocol for benzo withdrawal, reports phenobarb allergy (says it causes \"skin irritation\" and skin blotches)        Benzodiazepine dependence (HCC)  Assessment & Plan  Continue [facility] protocol  Consult case management    Opioid use disorder, severe, in sustained remission (HCC)  Assessment & Plan  Hx of OUD, on Subxone 8 mg bid (confirmed from PDMP),  states he has not had Suboxone in the past two days due to being in the hospital    - Continue Suboxone 8 mg bid        Methamphetamine abuse (HCC)  Assessment & Plan  Reports daily oral methamphetamine use  Consult case management    Attention deficit disorder (ADD) without hyperactivity  Assessment & Plan  Hx of ADD, prior meds include Adderal 20 mg bid  - would avoid stimulant medications given methamphetamine abuse hx  - Rec outpatient psych f/u    Urinary retention  Assessment & Plan  Continue home flomax 0.4 mg qd    Tobacco abuse  Assessment & Plan  Hx of tobacco abuse, using nicotine replacement, states he prefers lozenges or gum over patch    - give prn Nicotine gum    Severe episode of recurrent major depressive disorder, without psychotic features " ([facility])  Assessment & Plan  Hx of MDD, on Welbutrin XL, for depression and prn Remeron 15 mg qhs, for sleep  Reports passive SI without plan  Continue home Remeron 15 mg qhs prn, for sleep  Hold Wellbutrin XL, due to seizure risk  Psych consult placed, placed on virtual monitoring               VTE Prophylaxis: Enoxaparin (Lovenox)  / sequential compression device   Code Status: Level 1 Full Code      Anticipated Length of Stay:  Patient will be admitted on an Inpatient basis with an anticipated length of stay of  2 midnights.   Justification for Hospital Stay: benzodiazepine withdrawal      For any questions or concerns, please Tiger Text the advanced practitioner in the role of [facility] On Call      This patient qualifies for Level IV medically managed intensive inpatient services under the criteria set by the American Society of Addiction Medicine, including dimensions 1-3. The patient is in withdrawal (or is intoxicated with high risk of withdrawal), with severe and unstable medical and/or psychiatric (dual diagnosis) problems, requiring requires 24-hour medical and nursing care and the full resources of a licensed hospital.          SEWS PHENOBARBITAL PROTOCOL FOR ALCOHOL WITHDRAWAL    Admit patient to medical detox unit and continue supportive care and stabilization of acute ethanol withdrawal per medical toxicology/detox treatment pathway. Monitor ethanol withdrawal severity via the Severity of Ethanol Withdrawal Scale (SEWS) Q4 hours and then hourly if/when SEWS > 6. Treat withdrawal per pathway and reassess Q30-60 minutes.           Mild SEWS Score 1-6  Administer medications* (IV or PO; PO preferred):  If initial SEWS score: diazepam 10mg PO/IV x 1 AND phenobarbital 65 mg PO/IV x 1  If repeat SEWS score 1-6: phenobarbital 65 mg PO/IV q1 hour x 5 doses maximum   Reassessment:   SEWS q1 hour after each dose until SEWS 0 x 2 hours  VS q1 hours (until SEWS 0, then q4 hours)  Notify provider for bedside  evaluation if 5-dose maximum is reached, RASS of -3 to -5, or SEWS score escalates to moderate or severe.   Moderate SEWS Score 7-12  Administer medications* (IV):  If initial SEWS score: diazepam 10mg IV x 1 AND phenobarbital 260 mg IV x 1  If repeat SEWS score 7-12 or score escalated from mild: phenobarbital 130 mg IV q30 minutes x 5 doses maximum   Reassessment:  SEWS q30 minutes after each dose until SEWS < 7 (then hourly until SEWS 0 x 2 hours)  VS q30 minutes until SEWS < 7 (then hourly until SEWS 0, then q4 hours)  Notify provider for bedside evaluation if 5-dose maximum is reached, RASS of -3 to -5, or SEWS score escalates to severe.   Severe SEWS Score ? 13  Administer medications* (IV):  If initial SEWS score: Diazepam 10 mg IV x 1 AND phenobarbital 650 mg IV piggyback x 1 over 15-30 minutes  If repeat SEWS score ? 13 or score escalated from mild or moderate: phenobarbital 130 mg IV q30 minutes x 5 doses maximum   Reassessment:  SEWS q30 minutes after each dose until SEWS < 7 (then hourly until SEWS 0 x 2 hours)   VS q30 minutes until SEWS < 7 (then hourly until SEWS 0, then q4 hours)  Notify provider for bedside evaluation if 5-dose maximum is reached or RASS of -3 to -5   *Hold medications and notify provider if CNS depression, respirations < 10/min, or RASS of -3 to -5.         Medications to be administered adjunctively if more than 2 grams of phenobarbital is needed for stabilization of withdrawal; require attending approval.   Dexmedetomidine infusion 0.1-1mcg/kg/hr IV infusion, titratable to reduced agitation (Goal: RASS -2)  Ketamine   Acute agitated delirium: 1-2 mg/kg IV or 4-5 mg/kg IM  Refractory withdrawal: 0.1-1mg/kg/hr IV infusion, titratable to reduced agitation (Goal: RASS -2)    Further evaluation, screening and treatment:  Evaluate complete metabolic panel, transaminases, INR, and lipase. Assess hepatic ultrasound for any sign of alcoholic liver disease or cirrhosis, and ultimately refer  for further hepatic evaluation and care as/if indicated.    Additional medications for ethanol associated malnutrition:  Thiamine 100 mg IV daily, increase to 500 mg TID for signs/symptoms of Wernicke's Encephalopathy or Wernicke Korsakoff Syndrome   Folic acid 1 mg IV daily   Multivitamin PO daily      Will offer first monthly injection of Naltrexone 380 mg IM, once patient is stabilized, as it has been shown to assist in decreasing cravings for ethanol.     Evaluate and treat for coexisting substance use, such as opioids and nicotine. Discuss risk factors for infectious disease, such as history of intravenous drug abuse, and offer hepatitis and HIV screening if indicated.     Case management consultation to assist with coordination of subsequent treatment after discharge.          Hx and PE limited by: patient's somnolence    HPI: Gus Spring is a 33 y.o. year old male with a PMH of polysubstance abuse, specifically sedative-hypnotic use disorder-severe (benzodiazepines), currently active, methamphetamine abuse and OUD-severe, in sustained remission (on Suboxone) along with inattentive ADD, MDD, and JOAO who presents to the detox unit for management of benzodiazapine withdrawal. Patient has a hx of benzodiazapine abuse and dependency. He reports using 10-12 xanax bars daily with his last use being around 2:30 am on 1/22/24. He also reports daily oral methamphetamine use. Patient states he takes Suboxone 8 mg bid for opiod dependence (confirmed on PDMP) and denies any other opioid use recently. He denies any recent alcohol use or other drug use and states he uses nicotine lozenges for his tobacco addiction. Previously, patient was prescribed Klonopin 1 mg tid followed by Xanax 1 mg tid for generalized anxiety disorder by his PCP. He was also prescribed Adderall 20 mg bid for ADD. Patient was previously admitted to [facility] detox unit in 7/2022 for previous benzodiazapine withdrawal episode. Currently, he reports chills  "as his only symptom, which he attributes to not receiving his Suboxone in the past two days while being in the hospital.    Preferred benzodiazepine(s): Xanax  Quantity and frequency of benzodiazapine intake: 10-12 xanax bars daily  Date/Time of last benzodiazapine intake: 2:30 am on 24  Current signs and symptoms of benzodiazapine withdrawal: none    SEWS Total Score: 0 (2024  9:52 AM)      Benzodiazapine Withdrawal History  Previous benzodiazapine withdrawal? Yes, reports one episode of benzodiazepine withdrawal approximately 1 yr ago  Prior inpatient treatment for benzodiazapine withdrawal? Yes, was at  Detox unit in 2022  Prior outpatient treatment for benzodiazapine withdrawal? yes  History of seizures with prior benzodiazapine withdrawal? yes  Prior treatment for benzodiazapine use disorder? Yes, states he received phenobarbital previously  Current treatment for benzodiazapine use disorder? no  Co-existing substance use? Yes, daily oral methamphetamine abuse    Review of PDMP: yes     Social History     Substance and Sexual Activity   Alcohol Use Not Currently     Social History     Substance and Sexual Activity   Drug Use Yes    Frequency: 7.0 times per week    Types: Methamphetamines, Benzodiazepines, Heroin    Comment: Last heroin use      Social History     Tobacco Use   Smoking Status Former    Current packs/day: 0.00    Types: Cigarettes    Quit date: 2017    Years since quittin.3   Smokeless Tobacco Current   Tobacco Comments    Pt uses a \"vape\" pen - use is equivalent to approximately 1 pack per day       Review of Systems   Constitutional:  Positive for chills. Negative for fever.   HENT:  Negative for ear pain and sore throat.    Eyes:  Negative for pain and visual disturbance.   Respiratory:  Negative for cough and shortness of breath.    Cardiovascular:  Negative for chest pain and palpitations.   Gastrointestinal:  Negative for abdominal pain and vomiting.   Genitourinary: " " Negative for dysuria and hematuria.   Musculoskeletal:  Negative for arthralgias and back pain.   Skin:  Negative for color change and rash.   Neurological:  Negative for seizures and syncope.   Psychiatric/Behavioral:          Depressed mood, passive SI w/o plan   All other systems reviewed and are negative.      Historical Information   Past Medical History:   Diagnosis Date    Addiction to drug (HCC)     Allergic     Anxiety     Benzodiazepine withdrawal (HCC) 10/31/2019    Chronic pain disorder     Depression     Elevated AST (SGOT) 8/3/2019    Hepatitis C 8/4/2019    Hepatitis C     Methamphetamine abuse (HCC) 8/7/2019    Psychiatric disorder     Severe episode of recurrent major depressive disorder, without psychotic features (HCC) 7/17/2019    Spinal stenosis     Substance abuse (HCC)     Suicide attempt (HCC)     Urinary retention      Past Surgical History:   Procedure Laterality Date    BACK SURGERY      FRACTURE SURGERY      left foot reconstruted     SPINE SURGERY      hernated disc      Family History   Problem Relation Age of Onset    Hypertension Mother     Hyperlipidemia Father     Heart disease Father     Depression Maternal Grandmother     Hypertension Maternal Grandmother     Squamous cell carcinoma Maternal Grandfather     Alcohol abuse Maternal Grandfather     Alzheimer's disease Paternal Grandmother     Dementia Paternal Grandmother     Alcohol abuse Paternal Uncle     Colon cancer Neg Hx     Colon polyps Neg Hx      Social History   Marital Status: Single   Occupation:   Patient Pre-hospital Living Situation: Recovery House  Patient Pre-hospital Level of Mobility: normal ambulation  Patient Pre-hospital Diet Restrictions: None    Allergies   Allergen Reactions    Geodon [Ziprasidone]      Tardivdiskinesea    Phenobarbital Other (See Comments)     \"Skin gets weird\" \"I've had a seizure before.\"         Prior to Admission medications    Medication Sig Start Date End Date Taking? " Authorizing Provider   buprenorphine-naloxone (Suboxone) 8-2 mg Place 1 Film (8 mg total) under the tongue 2 (two) times a day for 14 days 8/3/22 8/17/22  Corine Velasquez PA-C   tamsulosin (FLOMAX) 0.4 mg Take 1 capsule (0.4 mg total) by mouth daily with dinner for 14 days 8/3/22 8/17/22  Corine Velasquez PA-C   fluticasone (FLONASE) 50 mcg/act nasal spray PLACE 1 SPRAY INTO EACH NOSTRIL EVERY DAY 6/18/23 1/22/24  Aidan Foley MD   mirtazapine (REMERON) 7.5 MG tablet Take 1 tablet (7.5 mg total) by mouth daily at bedtime 8/3/22 1/22/24  Corine Velasquez PA-C       Current Facility-Administered Medications   Medication Dose Route Frequency    acetaminophen (TYLENOL) tablet 650 mg  650 mg Oral Q6H PRN    buprenorphine-naloxone (Suboxone) film 8 mg  8 mg Sublingual BID    enoxaparin (LOVENOX) subcutaneous injection 40 mg  40 mg Subcutaneous Daily    folic acid (FOLVITE) tablet 1 mg  1 mg Oral Daily    loratadine (CLARITIN) tablet 10 mg  10 mg Oral Daily    mirtazapine (REMERON) tablet 15 mg  15 mg Oral HS PRN    multivitamin-minerals (CENTRUM) tablet 1 tablet  1 tablet Oral Daily    nicotine polacrilex (NICORETTE) gum 2 mg  2 mg Oral Q2H PRN    ondansetron (ZOFRAN) injection 4 mg  4 mg Intravenous Q6H PRN    tamsulosin (FLOMAX) capsule 0.4 mg  0.4 mg Oral Daily With Dinner    thiamine tablet 100 mg  100 mg Oral Daily    traZODone (DESYREL) tablet 50 mg  50 mg Oral HS PRN       Continuous Infusions:  none        Objective     No intake or output data in the 24 hours ending 01/22/24 1140    Invasive Devices:   Peripheral IV 01/22/24 Right;Ventral (anterior) Wrist (Active)   Site Assessment WDL;Clean;Dry;Intact 01/22/24 0131   Dressing Type Transparent 01/22/24 0131   Line Status Blood return noted;Flushed 01/22/24 0131   Dressing Status Clean;Dry;Intact 01/22/24 0131       Vitals   Vitals:    01/22/24 0805 01/22/24 1110   BP: 107/78 115/64   TempSrc: Temporal Temporal   Pulse: 61    Resp: 15 16    Patient Position - Orthostatic VS: Lying Lying   Temp: 97.8 °F (36.6 °C) 97.5 °F (36.4 °C)       Physical Exam  Vitals and nursing note reviewed.   Constitutional:       Appearance: He is normal weight.   HENT:      Head: Normocephalic.      Nose: Nose normal.      Mouth/Throat:      Mouth: Mucous membranes are dry.   Eyes:      General: Lids are normal.      Extraocular Movements: Extraocular movements intact.      Conjunctiva/sclera: Conjunctivae normal.   Cardiovascular:      Rate and Rhythm: Normal rate and regular rhythm.      Pulses: Normal pulses.      Heart sounds: Normal heart sounds, S1 normal and S2 normal.   Pulmonary:      Effort: Pulmonary effort is normal. No respiratory distress.      Breath sounds: Normal breath sounds. No stridor. No wheezing.   Abdominal:      General: Bowel sounds are normal.      Palpations: Abdomen is soft.      Tenderness: There is no abdominal tenderness.   Musculoskeletal:         General: Normal range of motion.      Right lower leg: No edema.      Left lower leg: No edema.   Skin:     General: Skin is warm.   Neurological:      Mental Status: He is oriented to person, place, and time. He is lethargic.   Psychiatric:         Mood and Affect: Mood is depressed.         Speech: Speech is slurred.             Data:    EKG, Pathology, and Other Studies: I have personally reviewed pertinent reports.    EKG: NSR, HR 64    Lab Results:  CBC ETOH     Lab Results   Component Value Date    WBC 6.06 01/22/2024    RBC 4.29 01/22/2024    HGB 12.5 01/22/2024    HCT 36.9 01/22/2024    MCV 86 01/22/2024    MCH 29.1 01/22/2024    MCHC 33.9 01/22/2024    RDW 12.2 01/22/2024     01/22/2024    MPV 10.7 01/22/2024      Lab Results   Component Value Date    LACTICACID 0.5 07/31/2022      CMP UA         Component Value Date/Time    K 4.0 01/22/2024 0131    K 4.7 07/22/2020 1027     01/22/2024 0131    CL 98 07/22/2020 1027    CO2 30 01/22/2024 0131    CO2 27 07/22/2020 1027    BUN  "12 01/22/2024 0131    BUN 12 07/22/2020 1027    CREATININE 0.96 01/22/2024 0131         Component Value Date/Time    CALCIUM 9.3 01/22/2024 0131    ALKPHOS 51 01/22/2024 0131    AST 21 01/22/2024 0131    AST 39 07/22/2020 1027    ALT 13 01/22/2024 0131    ALT 44 07/22/2020 1027      Lab Results   Component Value Date    CLARITYU Clear 10/04/2019    COLORU Straw 10/04/2019    SPECGRAV 1.010 10/04/2019    PHUR 6.0 10/04/2019    GLUCOSEU Negative 10/04/2019    KETONESU Negative 10/04/2019    BLOODU Negative 10/04/2019    PROTEIN UA Negative 10/04/2019    NITRITE Negative 10/04/2019    BILIRUBINUR Negative 10/04/2019    UROBILINOGEN 0.2 10/04/2019    LEUKOCYTESUR Negative 10/04/2019    WBCUA 4-10 (A) 08/03/2019    RBCUA 2-4 (A) 08/03/2019    HYALINE 0-1 (A) 08/03/2019    BACTERIA Occasional 08/03/2019    EPIS Occasional 08/03/2019        Liver Function Test: ASA     Lab Results   Component Value Date    TBILI 0.58 01/22/2024    TBILI 0.5 07/22/2020    BILIDIR 0.12 07/25/2019    ALKPHOS 51 01/22/2024    AST 21 01/22/2024    AST 39 07/22/2020    ALT 13 01/22/2024    ALT 44 07/22/2020    TP 7.2 01/22/2024    TP 7.9 07/22/2020    ALB 4.4 01/22/2024      Lab Results   Component Value Date    SALICYLATE <5 01/22/2024      Troponin APAP     Lab Results   Component Value Date    TROPONINI <0.02 10/03/2019      Lab Results   Component Value Date    ACTMNPHEN <2 (L) 01/22/2024      VBG HCG     No results found for: \"PHVEN\", \"BRM9PDX\", \"PO2VEN\", \"FDI3ZHE\", \"BEVEN\", \"D2SLJLWFQ\", \"V5GJRTC\"   No results found for: \"HCGQUANT\"   ABG Urine Drug Screen     No results found for: \"PHART\", \"TIW7CTG\", \"PO2ART\", \"VDO3ANQ\", \"BEART\", \"E7PIDTROT\", \"O2HGB\", \"SOURC\", \"NIRAJ\", \"VTAC\", \"ACRATE\", \"INSPIREDAIR\", \"PEEP\"   Lab Results   Component Value Date    AMPMETHUR Positive (A) 02/23/2023    BARBTUR Negative 02/23/2023    BDZUR Positive (A) 02/23/2023    COCAINEUR Positive (A) 02/23/2023    METHADONEUR Negative 02/23/2023    OPIATEUR Negative " 02/23/2023    PCPUR Positive (A) 02/23/2023    THCUR Negative 02/23/2023    OXYCODONEUR Negative 02/23/2023      Lactate INR     Lab Results   Component Value Date    LACTICACID 0.5 07/31/2022      Lab Results   Component Value Date    INR 1.14 07/31/2022      PTT Protime     Lab Results   Component Value Date/Time    PTT 33 07/31/2022 12:32 PM        Lab Results   Component Value Date/Time    PROTIME 15.4 (H) 07/31/2022 12:32 PM              Imaging Studies: I have personally reviewed pertinent reports.        Counseling / Coordination of Care  Total floor / unit time spent today 45 minutes. Greater than 50% of total time was spent with the patient and / or family counseling and / or coordination of care.       Minutes of critical care time 25  -Critical care time was exclusive of separately billable procedures and teaching time.   -Critical care was necessary to treat or prevent imminent or life-threatening deterioration of the following condition: CNS failure/compromise, toxidrome (ethanol withdrawal), benzodiazepine withdrawal  -Critical care time was spent personally by me on the following activities as well as the above as per the course and rest of chart: obtaining history from patient/surrogate, development of a treatment plan, discussions with referring provider(s), evaluation of patient's response to the treatment, examination of the patient, performing treatments and interventions, re-evaluation of the patient's condition, review of old charts, ordering/interpreting laboratory studies, ordering/interpreting of radiographic studies.      ** Please Note: This note has been constructed using a voice recognition system. **

## 2024-01-22 NOTE — ED NOTES
Employer for HIPAA release: Sandra Smyth [phone], Director of HR, [employer].  Hero Andrade [phone],   Dr. Osei Ramon, [facility], [phone]     Shirley Torres  01/22/24 0152

## 2024-01-22 NOTE — ASSESSMENT & PLAN NOTE
Hx of ADD, prior meds include Adderal 20 mg bid  - would avoid stimulant medications given methamphetamine abuse hx  - Rec outpatient psych f/u

## 2024-01-22 NOTE — ASSESSMENT & PLAN NOTE
Hx of tobacco abuse, using nicotine replacement, states he prefers lozenges or gum over patch    - give prn Nicotine gum

## 2024-01-22 NOTE — ED NOTES
Pt transport scheduled for 0645, bed 518. Call report to [phone]     Shirley Torres  01/22/24 9149

## 2024-01-23 LAB
ANION GAP SERPL CALCULATED.3IONS-SCNC: 7 MMOL/L
ATRIAL RATE: 64 BPM
BUN SERPL-MCNC: 13 MG/DL (ref 5–25)
CALCIUM SERPL-MCNC: 8.6 MG/DL (ref 8.4–10.2)
CHLORIDE SERPL-SCNC: 105 MMOL/L (ref 96–108)
CO2 SERPL-SCNC: 30 MMOL/L (ref 21–32)
CREAT SERPL-MCNC: 0.89 MG/DL (ref 0.6–1.3)
GFR SERPL CREATININE-BSD FRML MDRD: 111 ML/MIN/1.73SQ M
GLUCOSE SERPL-MCNC: 113 MG/DL (ref 65–140)
MAGNESIUM SERPL-MCNC: 2 MG/DL (ref 1.9–2.7)
P AXIS: 71 DEGREES
POTASSIUM SERPL-SCNC: 3.7 MMOL/L (ref 3.5–5.3)
PR INTERVAL: 160 MS
QRS AXIS: 87 DEGREES
QRSD INTERVAL: 114 MS
QT INTERVAL: 384 MS
QTC INTERVAL: 396 MS
SODIUM SERPL-SCNC: 142 MMOL/L (ref 135–147)
T WAVE AXIS: 63 DEGREES
VENTRICULAR RATE: 64 BPM

## 2024-01-23 PROCEDURE — 99232 SBSQ HOSP IP/OBS MODERATE 35: CPT | Performed by: EMERGENCY MEDICINE

## 2024-01-23 PROCEDURE — 83735 ASSAY OF MAGNESIUM: CPT | Performed by: PHYSICIAN ASSISTANT

## 2024-01-23 PROCEDURE — 80048 BASIC METABOLIC PNL TOTAL CA: CPT | Performed by: PHYSICIAN ASSISTANT

## 2024-01-23 RX ORDER — MIRTAZAPINE 15 MG/1
15 TABLET, FILM COATED ORAL
Status: DISCONTINUED | OUTPATIENT
Start: 2024-01-23 | End: 2024-01-24

## 2024-01-23 RX ADMIN — LORATADINE 10 MG: 10 TABLET ORAL at 09:07

## 2024-01-23 RX ADMIN — MULTIPLE VITAMINS W/ MINERALS TAB 1 TABLET: TAB ORAL at 09:07

## 2024-01-23 RX ADMIN — TAMSULOSIN HYDROCHLORIDE 0.4 MG: 0.4 CAPSULE ORAL at 17:36

## 2024-01-23 RX ADMIN — BUPRENORPHINE AND NALOXONE 8 MG: 8; 2 FILM BUCCAL; SUBLINGUAL at 09:08

## 2024-01-23 RX ADMIN — BUPRENORPHINE AND NALOXONE 8 MG: 8; 2 FILM BUCCAL; SUBLINGUAL at 21:08

## 2024-01-23 RX ADMIN — NICOTINE POLACRILEX 4 MG: 4 GUM, CHEWING BUCCAL at 09:11

## 2024-01-23 RX ADMIN — MIRTAZAPINE 15 MG: 15 TABLET, FILM COATED ORAL at 21:08

## 2024-01-23 RX ADMIN — FOLIC ACID 1 MG: 1 TABLET ORAL at 09:07

## 2024-01-23 RX ADMIN — NICOTINE POLACRILEX 4 MG: 4 GUM, CHEWING BUCCAL at 21:20

## 2024-01-23 RX ADMIN — THIAMINE HCL TAB 100 MG 100 MG: 100 TAB at 09:07

## 2024-01-23 NOTE — UTILIZATION REVIEW
NOTIFICATION OF INPATIENT MEDICAL ADMISSION   AUTHORIZATION REQUEST   SERVICING FACILITY:   [address] [address]  Tax ID: [id number]  NPI: [id number] ATTENDING PROVIDER:  Attending Name and NPI#: David Tuttle Do [[id number]]  Address: [address] [place] [address]  Phone: [phone]     ADMISSION INFORMATION:  Place of Service: Inpatient [facility]  Place of Service Code: 21  Inpatient Admission Date/Time: 1/22/24  8:03 AM  Discharge Date/Time: No discharge date for patient encounter.  Admitting Diagnosis Code/Description:  Polysubstance abuse (HCC) [F19.10]     UTILIZATION REVIEW CONTACT:  Diana Bourgeois Utilization   Network Utilization Review Department  Phone: [phone]  Fax [phone]  Email: [email or web address]  Contact for approvals/pending authorizations, clinical reviews, and discharge.     PHYSICIAN ADVISORY SERVICES:  Medical Necessity Denial & Byzv-hn-Auk[facility] Review  Phone: [phone]  Fax: [phone]  Email: [email or web address]     DISCHARGE SUPPORT TEAM:  For Patients Discharge Needs & Updates  Phone: [phone] opt. 2 Fax: [phone]  Email: [email or web address]

## 2024-01-23 NOTE — PROGRESS NOTES
"PROGRESS NOTE  DEPARTMENT OF MEDICAL TOXICOLOGY  LEVEL 4 MEDICAL DETOX UNIT  Gus Spring 33 y.o. male MRN: [id number]  Unit/Bed#: 5T DETOX 518-01 Encounter: [id number]      Reason for Admission/Principal Problem: Benzodiazepine withdrawal  Rounding Provider: Malcolm Briceño MD  Attending Provider: David Tuttle,    1/22/2024  8:03 AM         * Benzodiazepine withdrawal with complication (HCC)  Assessment & Plan  Reports using 10-12 xanax bars/day, last use 02:30 am on 1/22/24  Hx of benzo withdrawal seizure (approx 1 yr ago)  Previous detox at  in 7/2022    - Continue SEWS protocol for benzo withdrawal, reports phenobarb allergy (says it causes \"skin irritation\" and skin blotches), may consider benzos as an alternative, if needed  - current subjective symptoms: anxiety, nausea, chills/night sweats (1/23)  - no current objective withdrawal symptoms (1/23)        Benzodiazepine dependence (HCC)  Assessment & Plan  Continue SEWS protocol  Consult case management    Opioid use disorder, severe, in sustained remission (HCC)  Assessment & Plan  Hx of OUD, on Subxone 8 mg bid (confirmed from PDMP),  states he has not had Suboxone in the past two days due to being in the hospital    - Continue Suboxone 8 mg bid        Methamphetamine abuse (HCC)  Assessment & Plan  Reports daily oral methamphetamine use  Consult case management    Attention deficit disorder (ADD) without hyperactivity  Assessment & Plan  Hx of ADD, prior meds include Adderal 20 mg bid  - would avoid stimulant medications given methamphetamine abuse hx  - Rec outpatient psych f/u    Urinary retention  Assessment & Plan  Continue home flomax 0.4 mg qd    Tobacco abuse  Assessment & Plan  Hx of tobacco abuse, using nicotine replacement, states he prefers lozenges or gum over patch    - give prn Nicotine gum    Severe episode of recurrent major depressive disorder, without psychotic features (HCC)  Assessment & Plan  Hx of MDD, on Welbutrin XL, for depression " and prn Remeron 15 mg qhs, for sleep  Reports passive SI without plan  Continue home Remeron 15 mg qhs prn, for sleep  Hold Wellbutrin XL, due to seizure risk  Psych consult placed, placed on virtual monitoring        VTE Pharmacologic Prophylaxis:   Pharmacologic: Enoxaparin (Lovenox)  Mechanical VTE Prophylaxis in Place: yes    Code Status: Level 1 - Full Code    Patient Centered Rounds: I have performed bedside rounds with nursing staff today.    Discussions with Specialists or Other Care Team Provider: Psychiatry     Education and Discussions with Family / Patient: Yes    Time Spent for Care: 30 minutes.  More than 50% of total time spent on counseling and coordination of care as described above.    Current Length of Stay: 1 day(s)    Current Patient Status: Inpatient     Certification Statement: The patient will continue to require additional inpatient hospital stay due to benzodiazepine withdrawal  Discharge Plan: inpatient rehab        Subjective:   Patient reports somnolence, anxiety, nausea, photosensitivity, sweating and night chills over the past day. He denies any tremor, vomiting, chest pain, shortness of breath or heart palpitations.    Objective:     Clinical Opiate Withdrawal Scale  Pulse: 65    SEWS Total Score: 0 (1/23/2024  8:00 AM)        Last 24 Hours Medication List:   Current Facility-Administered Medications   Medication Dose Route Frequency Provider Last Rate    acetaminophen  650 mg Oral Q6H PRN Manda Black PA-C      buprenorphine-naloxone  8 mg Sublingual BID Ann Nieto MD      enoxaparin  40 mg Subcutaneous Daily Manda Black PA-C      folic acid  1 mg Oral Daily Manda Black PA-C      loratadine  10 mg Oral Daily Malcolm Briceño MD      mirtazapine  15 mg Oral HS PRN Malcolm Briceño MD      multivitamin-minerals  1 tablet Oral Daily Manda Black PA-C      nicotine polacrilex  4 mg Oral Q2H PRN Angeles Friedman PA-C      ondansetron  4 mg Intravenous Q6H PRN  Manda Black PA-C      tamsulosin  0.4 mg Oral Daily With Dinner Malcolm Briceño MD      thiamine  100 mg Oral Daily Manda Black PA-C           Vitals:   Temp (24hrs), Av.6 °F (36.4 °C), Min:97.3 °F (36.3 °C), Max:98.2 °F (36.8 °C)    Temp:  [97.3 °F (36.3 °C)-98.2 °F (36.8 °C)] 97.5 °F (36.4 °C)  HR:  [65-74] 65  Resp:  [16] 16  BP: ()/(53-64) 104/61  SpO2:  [96 %-100 %] 100 %  Body mass index is 24.39 kg/m².     Input and Output Summary (last 24 hours):No intake or output data in the 24 hours ending 24 1048    Physical Exam:   Physical Exam  Vitals and nursing note reviewed.   Constitutional:       General: He is not in acute distress.     Appearance: He is well-developed.   HENT:      Head: Normocephalic and atraumatic.   Eyes:      Conjunctiva/sclera: Conjunctivae normal.   Cardiovascular:      Rate and Rhythm: Normal rate and regular rhythm.      Heart sounds: Normal heart sounds, S1 normal and S2 normal. No murmur heard.  Pulmonary:      Effort: Pulmonary effort is normal. No respiratory distress.      Breath sounds: Normal breath sounds.   Abdominal:      Palpations: Abdomen is soft.      Tenderness: There is no abdominal tenderness.   Musculoskeletal:         General: No swelling.      Cervical back: Neck supple.   Skin:     General: Skin is warm and dry.      Capillary Refill: Capillary refill takes less than 2 seconds.   Neurological:      Mental Status: He is lethargic.      Motor: No tremor.   Psychiatric:         Mood and Affect: Mood normal.         Additional Data:     Labs:   Results from last 7 days   Lab Units 24  0131   WBC Thousand/uL 6.06   HEMOGLOBIN g/dL 12.5   HEMATOCRIT % 36.9   PLATELETS Thousands/uL 261   NEUTROS PCT % 58   LYMPHS PCT % 32   MONOS PCT % 7   EOS PCT % 2      Results from last 7 days   Lab Units 24  0512 24  0131   SODIUM mmol/L 142 137   POTASSIUM mmol/L 3.7 4.0   CHLORIDE mmol/L 105 102   CO2 mmol/L 30 30   BUN mg/dL 13 12   CREATININE  mg/dL 0.89 0.96   ANION GAP mmol/L 7 5   CALCIUM mg/dL 8.6 9.3   ALBUMIN g/dL  --  4.4   TOTAL BILIRUBIN mg/dL  --  0.58   ALK PHOS U/L  --  51   ALT U/L  --  13   AST U/L  --  21   GLUCOSE RANDOM mg/dL 113 87                              * I Have Reviewed All Lab Data Listed Above.  * Additional Pertinent Lab Tests Reviewed: All Labs For Current Hospital Admission Reviewed      Imaging Studies: I have personally reviewed pertinent reports.        Recent Cultures (last 7 days):          Today, Patient Was Seen By: Malcolm Briceño MD    ** Please Note: Dictation voice to text software may have been used in the creation of this document. **

## 2024-01-23 NOTE — UTILIZATION REVIEW
Initial Clinical Review    Pt initially presented to [facility] ED. Pt was transferred by EMS to [facility] for its Level IV medically managed intensive inpatient detox unit, not available at [facility].    Admission: Date/Time/Statement:   Admission Orders (From admission, onward)       Ordered        01/22/24 0804  Inpatient Admission  Once                          Orders Placed This Encounter   Procedures    Inpatient Admission     Standing Status:   Standing     Number of Occurrences:   1     Order Specific Question:   Level of Care     Answer:   Med Surg [16]     Order Specific Question:   Estimated length of stay     Answer:   More than 2 Midnights     Order Specific Question:   Certification     Answer:   I certify that inpatient services are medically necessary for this patient for a duration of greater than two midnights. See H&P and MD Progress Notes for additional information about the patient's course of treatment.       Initial Presentation: 33 y.o. male who presented to medical detox. Inpatient admission for evaluation and treatment of benzodiazepine withdrawal syndrome. Presented w/ need for detox from benzos. Reports 10-12 bars Xanax daily, last used on 1/22 @ 0230. Notes daily oral methamphetamine use. On 8 mg Suboxone BID. Has prior rehab treatment for withdrawal. Reports hx of withdrawal seizures. On exam, dry mucous membranes, lethargic, depressed mood w/ passive suicidal ideation, slurred speech, hallucinations, anxiety, nausea. SEWS 7. Plan: SEWS monitoring w/ phenobarbital management, PO thiamine/folic acid supplement, IVF, telemetry, continuous pulse ox, hold Wellbutrin XL, continue other PTA meds, trend labs, replete electrolytes as needed; q15 minute patient safety checks. Psychiatry consulted.       Date: 01/23/24       Day 2: Pt reports somnolence, anxiety, nausea, photosensitivity, chills. On exam, diaphoresis, lethargic. SEWS 0. Plan: continue  SEWS monitoring w/ phenobarbital management, PO thiamine/folic acid supplement, telemetry, continuous pulse ox, continue current meds, trend labs, replete electrolytes as needed. Continual observation for suicidal ideation.    Psychiatry: Discontinue Wellbutrin and gabapentin during acute phase of withdrawal and during rehab. Schedule Remeron 15 mg qHS. Encourage outpatient follow-up w/ psychiatry. Does not require inpatient psychiatric admission at this time.      Wt Readings from Last 1 Encounters:   01/22/24 77.1 kg (170 lb)     Vital Signs:   Date/Time Temp Pulse Resp BP MAP (mmHg) SpO2 O2 Device   01/23/24 0705 97.5 °F (36.4 °C) 65 16 104/61 75 100 % None (Room air)   01/23/24 0500 97.5 °F (36.4 °C) 72 16 104/56 72 99 % None (Room air)   01/22/24 2305 97.8 °F (36.6 °C) 74 16 102/55 70 96 % None (Room air)   01/22/24 1900 98.2 °F (36.8 °C) 68 16 107/61 76 97 % None (Room air)   01/22/24 1531 97.3 °F (36.3 °C) Abnormal  66 16 98/53 68 97 % None (Room air)   01/22/24 1110 97.5 °F (36.4 °C) -- 16 115/64 81 -- None (Room air)   01/22/24 0805 97.8 °F (36.6 °C) 61 15 107/78 -- 100 % None (Room air)       Severity of Ethanol Withdrawal Scale (SEWS):    01/23/24 0800 01/23/24 0500 01/22/24 2300 01/22/24 2000 01/22/24 1530   Severity of Ethanol Withdrawal Scale (SEWS)   ANXIETY: Do you feel that something bad is about to happen to you right now? 0  -BH 0  -NR 0  -NR 3  -NR 0  -BH   NAUSEA and DRY HEAVES or VOMITING? 0  -BH 0  -NR 0  -NR 3  -NR 0  -BH   SWEATING: (includes moist palms, sweating now)? Score 0 or 2 0  -BH 0  -NR 0  -NR 0  -NR 0  -BH   TREMOR: with arms extended eyes closed? 0  -BH 0  -NR 0  -NR 0  -NR 0  -BH   AGITATION: Fidgety, restless, pacing? 0  -BH 0  -NR 0  -NR 0  -NR 0  -BH   DISORIENTATION: 0  -BH 0  -NR 0  -NR 0  -NR 0  -BH   HALLUCINATIONS: 0  -BH 0  -NR 0  -NR 1  -NR 0  -BH   VITAL SIGNS: ANY (Pulse >110, Diastolic BP >90, Temp >99.6) 0  -BH 0  -NR 0  -NR 0  -NR 0  -BH   SEWS Total Score 0  -BH  0  -NR 0  -NR 7  -NR 0  -BH   [place] Agitation Sedation Scale (RASS) -1  -BH -1  -NR -1  -NR -- -2  -BH    01/22/24 1100 01/22/24 0952      ANXIETY: Do you feel that something bad is about to happen to you right now? 0  -BH 0  -BH      NAUSEA and DRY HEAVES or VOMITING? 0  -BH 0  -BH      SWEATING: (includes moist palms, sweating now)? Score 0 or 2 0  -BH 0  -BH      TREMOR: with arms extended eyes closed? 0  -BH 0  -BH      AGITATION: Fidgety, restless, pacing? 0  -BH 0  -BH      DISORIENTATION: 0  -BH 0  -BH      HALLUCINATIONS: 0  -BH 0  -BH      VITAL SIGNS: ANY (Pulse >110, Diastolic BP >90, Temp >99.6) 0  -BH 0  -BH      SEWS Total Score 0  -BH 0  -BH      [place] Agitation Sedation Scale (RASS)   [place] Agitation Sedation Scale (RASS) -1  -BH --            Pertinent Labs/Diagnostic Test Results:   Results from last 7 days   Lab Units 01/22/24  0131   WBC Thousand/uL 6.06   HEMOGLOBIN g/dL 12.5   HEMATOCRIT % 36.9   PLATELETS Thousands/uL 261   NEUTROS ABS Thousands/µL 3.51         Results from last 7 days   Lab Units 01/23/24  0512 01/22/24  0131   SODIUM mmol/L 142 137   POTASSIUM mmol/L 3.7 4.0   CHLORIDE mmol/L 105 102   CO2 mmol/L 30 30   ANION GAP mmol/L 7 5   BUN mg/dL 13 12   CREATININE mg/dL 0.89 0.96   EGFR ml/min/1.73sq m 111 102   CALCIUM mg/dL 8.6 9.3   MAGNESIUM mg/dL 2.0  --      Results from last 7 days   Lab Units 01/22/24  0131   AST U/L 21   ALT U/L 13   ALK PHOS U/L 51   TOTAL PROTEIN g/dL 7.2   ALBUMIN g/dL 4.4   TOTAL BILIRUBIN mg/dL 0.58         Results from last 7 days   Lab Units 01/23/24  0512 01/22/24  0131   GLUCOSE RANDOM mg/dL 113 87     Results from last 7 days   Lab Units 01/22/24  0131   ETHANOL LVL mg/dL <10   ACETAMINOPHEN LVL ug/mL <2*   SALICYLATE LVL mg/dL <5                Past Medical History:   Diagnosis Date    Addiction to drug (HCC)     Allergic     Anxiety     Benzodiazepine withdrawal (HCC) 10/31/2019    Chronic pain disorder     Depression     Elevated AST  (SGOT) 8/3/2019    Hepatitis C 8/4/2019    Hepatitis C     Methamphetamine abuse (HCC) 8/7/2019    Psychiatric disorder     Severe episode of recurrent major depressive disorder, without psychotic features (HCC) 7/17/2019    Spinal stenosis     Substance abuse (HCC)     Suicide attempt (HCC)     Urinary retention      Present on Admission:   Severe episode of recurrent major depressive disorder, without psychotic features (HCC)   Opioid use disorder, severe, in sustained remission (HCC)   Benzodiazepine withdrawal with complication (HCC)   Benzodiazepine dependence (HCC)   Attention deficit disorder (ADD) without hyperactivity   Methamphetamine abuse (HCC)   Tobacco abuse   Urinary retention      Admitting Diagnosis: Polysubstance abuse (HCC) [F19.10]  Age/Sex: 33 y.o. male  Admission Orders:  Regular Diet. SCDs.  Fall & Seizure Precautions.  Q15 minute patient safety checks. -- Continual observation for suicidal ideation.  SEWS monitoring.  Telemetry & Continuous Pulse Ox.    Scheduled Medications:  buprenorphine-naloxone, 8 mg, Sublingual, BID  enoxaparin, 40 mg, Subcutaneous, Daily  folic acid, 1 mg, Oral, Daily  loratadine, 10 mg, Oral, Daily  multivitamin-minerals, 1 tablet, Oral, Daily  tamsulosin, 0.4 mg, Oral, Daily With Dinner  thiamine, 100 mg, Oral, Daily    Continuous IV Infusions: none    PRN Meds:  acetaminophen, 650 mg, Oral, Q6H PRN  mirtazapine, 15 mg, Oral, HS PRN  nicotine polacrilex, 2 mg, Oral, 1/22 x1  nicotine polacrilex, 4 mg, Oral, Q2H PRN; 1/23 x1  ondansetron, 4 mg, Intravenous, Q6H PRN  phenobarbital, 260 mg, Intravenous; 1/22 x1        IP CONSULT TO CASE MANAGEMENT  IP CONSULT TO PSYCHIATRY    Network Utilization Review Department  ATTENTION: Please call with any questions or concerns to [phone] and carefully listen to the prompts so that you are directed to the right person. All voicemails are confidential.   For Discharge needs, contact Care Management DC Support Team at  [phone] opt. 2  Send all requests for admission clinical reviews, approved or denied determinations and any other requests to dedicated fax number below belonging to the campus where the patient is receiving treatment. List of dedicated fax numbers for the Facilities:  FACILITY NAME UR FAX NUMBER   ADMISSION DENIALS (Administrative/Medical Necessity) [phone]   DISCHARGE SUPPORT TEAM (NETWORK) [phone]   PARENT CHILD HEALTH (Maternity/NICU/Pediatrics) [phone]   [facility] [phone]   [facility] [phone]   [facility] [phone]   [facility] [phone]   [facility] [phone]   [facility] [phone]   [facility] [phone]   [facility] [phone]   [facility] [phone]   [facility] [phone]   [facility] [phone]
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
## 2024-01-24 PROBLEM — E43 SEVERE PROTEIN-CALORIE MALNUTRITION (HCC): Status: ACTIVE | Noted: 2024-01-24

## 2024-01-24 PROBLEM — F13.939 BENZODIAZEPINE WITHDRAWAL WITH COMPLICATION (HCC): Status: RESOLVED | Noted: 2019-10-31 | Resolved: 2024-01-24

## 2024-01-24 PROCEDURE — 99232 SBSQ HOSP IP/OBS MODERATE 35: CPT | Performed by: PHYSICIAN ASSISTANT

## 2024-01-24 RX ORDER — HYDROXYZINE 50 MG/1
50 TABLET, FILM COATED ORAL EVERY 6 HOURS PRN
Status: DISCONTINUED | OUTPATIENT
Start: 2024-01-24 | End: 2024-01-25 | Stop reason: HOSPADM

## 2024-01-24 RX ORDER — MIRTAZAPINE 15 MG/1
15 TABLET, FILM COATED ORAL
Status: DISCONTINUED | OUTPATIENT
Start: 2024-01-24 | End: 2024-01-25 | Stop reason: HOSPADM

## 2024-01-24 RX ADMIN — TAMSULOSIN HYDROCHLORIDE 0.4 MG: 0.4 CAPSULE ORAL at 16:34

## 2024-01-24 RX ADMIN — NICOTINE POLACRILEX 4 MG: 4 GUM, CHEWING BUCCAL at 09:48

## 2024-01-24 RX ADMIN — LORATADINE 10 MG: 10 TABLET ORAL at 09:37

## 2024-01-24 RX ADMIN — FOLIC ACID 1 MG: 1 TABLET ORAL at 09:37

## 2024-01-24 RX ADMIN — BUPRENORPHINE AND NALOXONE 8 MG: 8; 2 FILM BUCCAL; SUBLINGUAL at 20:43

## 2024-01-24 RX ADMIN — THIAMINE HCL TAB 100 MG 100 MG: 100 TAB at 09:37

## 2024-01-24 RX ADMIN — NICOTINE POLACRILEX 4 MG: 4 GUM, CHEWING BUCCAL at 16:43

## 2024-01-24 RX ADMIN — HYDROXYZINE HYDROCHLORIDE 50 MG: 50 TABLET, FILM COATED ORAL at 16:43

## 2024-01-24 RX ADMIN — ACETAMINOPHEN 650 MG: 325 TABLET ORAL at 09:39

## 2024-01-24 RX ADMIN — NICOTINE POLACRILEX 4 MG: 4 GUM, CHEWING BUCCAL at 13:35

## 2024-01-24 RX ADMIN — MIRTAZAPINE 15 MG: 15 TABLET, FILM COATED ORAL at 20:44

## 2024-01-24 RX ADMIN — MULTIPLE VITAMINS W/ MINERALS TAB 1 TABLET: TAB ORAL at 09:37

## 2024-01-24 RX ADMIN — BUPRENORPHINE AND NALOXONE 8 MG: 8; 2 FILM BUCCAL; SUBLINGUAL at 09:38

## 2024-01-24 RX ADMIN — HYDROXYZINE HYDROCHLORIDE 50 MG: 50 TABLET, FILM COATED ORAL at 10:55

## 2024-01-24 RX ADMIN — NICOTINE POLACRILEX 4 MG: 4 GUM, CHEWING BUCCAL at 20:51

## 2024-01-24 NOTE — ASSESSMENT & PLAN NOTE
Hx of OUD, on Subxone 8 mg bid (confirmed from PDMP),  Per PDMP, last prescription Suboxone 8-2 mg filled (60 films, 30 days) filled 12/27/2023  Continue Suboxone 8 mg BID  Case management consulted - will be discharged to inpatient rehab

## 2024-01-24 NOTE — PROGRESS NOTES
Psychiatric Progress Note - Department of Behavioral Health   Gus Spring 33 y.o. male MRN: [id number]  Unit/Bed#: 5T DETOX 518-01 Encounter: [id number]    ASSESSMENT & PLAN     Diagnoses:   Active Problems:    Severe episode of recurrent major depressive disorder, without psychotic features (HCC)    Tobacco abuse    Urinary retention    Methamphetamine abuse (HCC)    Attention deficit disorder (ADD) without hyperactivity    Benzodiazepine dependence (HCC)    Opioid use disorder, severe, in sustained remission (HCC)    Treatment Recommendations/Precautions:  Continue medical management per primary team.  Collaborate with collaterals for baseline assessment and disposition as necessary.  Continue previously prescribed psychotropic medications at present dosing.  Defer upward titration per patient preference.  Presently, patient does not adhere to criteria for inpatient behavioral health admission.  Patient anticipates involvement in inpatient rehabilitation, preferably through [facility], although is amenable to alternative placement if necessary.  Discharge date per primary team.   Consultation appreciated. Psychiatry to sign off. Please do not hesitate to call/contact our service with any additional comments/concerns. Please call/contact on-call Psychiatry via [employer] including on-call psychiatric service via "[employer] ([phone]) with any comments/concerns if after hours/Fri/Sat/Sunday.Thank you!    SUBJECTIVE     Patient evaluated this a.m. for continuity of care. Patient was discussed at length with treatment team.  In the interim, patient remains calm, cooperative, compliant with his medications less any acute adverse effects including reintroduction of low-dose Remeron without incident, although states that he prefers additional psychotropic medication adjustment through his inpatient rehabilitation. No acute distress is noted throughout evaluation. Gus Spring adamantly denies suicidal/homicidal  "ideation in addition to thoughts of self-injury, receptive to crisis planning provided by this writer, contacting for safety in the inpatient setting, admitting to an ability to appropriately confide in staff including this writer, stating that the 's inquiries including attempts to procure personal information were \"off putting\", stating that it is likely that his previous comments pertaining to hopelessness and suicidality were interpreted out of context, although patient admits to dysphoric mood including depression and depressive signs/symptoms. He remains forward thinking pertaining to involvement in inpatient rehabilitation to adequately address his polysubstance abuse in addition to citing his relatives and profession as deterrents against self harm.    PSYCHIATRIC REVIEW OF SYSTEMS     Behavior over the last 24 hours:  slight improvement  Sleep: slight improvement  Appetite: adequate  Medication side effects: No    REVIEW OF SYSTEMS   Review of systems: no complaints    OBJECTIVE     Vital Signs in Past 24 Hours:  Temp:  [97.4 °F (36.3 °C)-98.6 °F (37 °C)] 97.9 °F (36.6 °C)  HR:  [68-85] 82  Resp:  [18] 18  BP: ()/(52-73) 93/52    Intake/Output in Past 24 hours:  No intake/output data recorded.  No intake/output data recorded.        Laboratory Results:  I have personally reviewed all pertinent laboratory/tests results.  Most Recent Labs:   Lab Results   Component Value Date    WBC 6.06 01/22/2024    RBC 4.29 01/22/2024    HGB 12.5 01/22/2024    HCT 36.9 01/22/2024     01/22/2024    RDW 12.2 01/22/2024    NEUTROABS 3.51 01/22/2024    SODIUM 142 01/23/2024    K 3.7 01/23/2024     01/23/2024    CO2 30 01/23/2024    BUN 13 01/23/2024    CREATININE 0.89 01/23/2024    GLUC 113 01/23/2024    CALCIUM 8.6 01/23/2024    AST 21 01/22/2024    ALT 13 01/22/2024    ALKPHOS 51 01/22/2024    TP 7.2 01/22/2024    ALB 4.4 01/22/2024    TBILI 0.58 01/22/2024    CHOLESTEROL 141 04/19/2020    HDL " 79 04/19/2020    TRIG 33 04/19/2020    LDLCALC 55 04/19/2020    NONHDLC 62 04/19/2020    [id number] 1.583 07/31/2022    HGBA1C 4.9 05/01/2019    EAG 94 05/01/2019     Labs in last 72 hours:   Recent Labs     01/22/24  0131 01/23/24  0512   WBC 6.06  --    RBC 4.29  --    HGB 12.5  --    HCT 36.9  --      --    RDW 12.2  --    NEUTROABS 3.51  --    SODIUM 137 142   K 4.0 3.7    105   CO2 30 30   BUN 12 13   CREATININE 0.96 0.89   GLUC 87 113   CALCIUM 9.3 8.6   AST 21  --    ALT 13  --    ALKPHOS 51  --    TP 7.2  --    ALB 4.4  --    TBILI 0.58  --      Admission Labs:   Admission on 01/22/2024   Component Date Value    Sodium 01/23/2024 142     Potassium 01/23/2024 3.7     Chloride 01/23/2024 105     CO2 01/23/2024 30     ANION GAP 01/23/2024 7     BUN 01/23/2024 13     Creatinine 01/23/2024 0.89     Glucose 01/23/2024 113     Calcium 01/23/2024 8.6     eGFR 01/23/2024 111     Magnesium 01/23/2024 2.0        Behavioral Health Medications: all current active meds have been reviewed, continue current psychiatric medications, and current meds:   Current Facility-Administered Medications   Medication Dose Route Frequency    acetaminophen (TYLENOL) tablet 650 mg  650 mg Oral Q6H PRN    buprenorphine-naloxone (Suboxone) film 8 mg  8 mg Sublingual BID    enoxaparin (LOVENOX) subcutaneous injection 40 mg  40 mg Subcutaneous Daily    folic acid (FOLVITE) tablet 1 mg  1 mg Oral Daily    loratadine (CLARITIN) tablet 10 mg  10 mg Oral Daily    mirtazapine (REMERON) tablet 15 mg  15 mg Oral HS    multivitamin-minerals (CENTRUM) tablet 1 tablet  1 tablet Oral Daily    nicotine polacrilex (NICORETTE) gum 4 mg  4 mg Oral Q2H PRN    ondansetron (ZOFRAN) injection 4 mg  4 mg Intravenous Q6H PRN    tamsulosin (FLOMAX) capsule 0.4 mg  0.4 mg Oral Daily With Dinner    thiamine tablet 100 mg  100 mg Oral Daily   .    Risks, benefits and possible side effects of Medications:   Risks, benefits, and possible side effects of  medications explained to patient and patient verbalizes understanding.      Mental Status Evaluation:  Appearance:  age appropriate, casually dressed, and possessing appropriate hygiene, although slightly disheveled   Behavior:  Calm and cooperative, conversational possessing appropriate eye contact   Speech:  normal pitch and normal volume   Mood:  depressed   Affect:  constricted although brightens on approach   Language naming objects and repeating phrases   Thought Process:  goal directed, logical, and linear   Thought Content:  no overt obsessions or delusions   Perceptual Disturbances: None not appearing internally preoccupied or distracted   Risk Potential: Suicidal Ideations none, Homicidal Ideations none, and Potential for Aggression No   Sensorium:  person, place, time/date, and situation   Cognition:  recent and remote memory grossly intact   Consciousness:  alert and awake    Attention: attention span and concentration were age appropriate   Insight:  fair   Judgment: fair   Intellect Not assessed   Gait/Station: Not assessed; sitting upright in bed   Motor Activity: no abnormal movements     Memory: Short and long term memory fair     Progress Toward Goals: progressing    Recommended Treatment:   See above for assessment and plan.    Counseling/Coordination of Care    Total unit time spent today was greater than 15 minutes. Greater than 50% of total time was spent with the patient and/or patient's relatives and/or coordination of patient's care.     Patient's rights, confidentiality, exceptions to confidentiality, use of electronic medical record including appropriate staff access to medical record regarding behavioral health services and consent to treatment were reviewed.    Note Share:     This note was not shared with the patient due to reasonable likelihood of causing patient harm     This note has been constructed using a voice recognition system.    There may be translation, syntax, or  grammatical errors. If you have any questions, please contact the dictating provider.    Jordan Christopher Holter,  01/24/24

## 2024-01-24 NOTE — PROGRESS NOTES
"[facility]  Progress Note  Name: Gus Spring I  MRN: [id number]  Unit/Bed#: 5T DETOX 518-01 I Date of Admission: 1/22/2024   Date of Service: 1/24/2024 I Hospital Day: 2      MEDICAL DETOX UNIT, LEVEL 4  Department of Medical Toxicology  Reason for Admission/Principal Problem: benzodiazepine withdrawal   Rounding Provider: Jennifer Blake PA-C, Ann Louis*     * Benzodiazepine withdrawal with complication (HCC)-resolved as of 1/24/2024  Assessment & Plan  Reports using 10-12 xanax bars/day, last use 02:30 am on 1/22/24  Hx of benzo withdrawal seizure (approx 1 yr ago)  Previous detox at [facility] in 7/2022  SEWS protocol with symptom-triggered phenobarbital followed for medical management of benzodiazepine withdrawal  received a total of 260 mg phenobarbital, with last dose administered 1/22/24 at 2015 and subsequent resolution of w/d symptoms  reported a phenobarb allergy on admission (says it causes \"skin irritation\" and skin blotches); however patient tolerated phenobarbital without issue during this admission  Acute withdrawal resolved- Patient is medically cleared for inpatient rehabilitation    Opioid use disorder, severe, in sustained remission (HCC)  Assessment & Plan  Hx of OUD, on Subxone 8 mg bid (confirmed from PDMP),  Per PDMP, last prescription Suboxone 8-2 mg filled 12/27/2023  Continue Suboxone 8 mg BID  Case management consulted     Severe episode of recurrent major depressive disorder, without psychotic features (HCC)  Assessment & Plan  Hx of MDD, on Welbutrin XL, for depression and prn Remeron 15 mg qhs, for sleep  On admission- Reported passive SI without plan  Currently denies SI/thoughts of self-harm   Psychiatry consulted, appreciate recommendations  Per psychiatry, patient does not meet criteria for inpatient psychiatry admission at this time  Continual observation discontinued per psych  Medications per psychiatry    Benzodiazepine dependence " ([facility])  Assessment & Plan  Withdrawal managed as above   Consult case management- interested in inpatient rehab at this time     Attention deficit disorder (ADD) without hyperactivity  Assessment & Plan  Hx of ADD, prior meds include Adderal 20 mg bid  would avoid stimulant medications given methamphetamine abuse hx  Medications per psychiatry    Methamphetamine abuse (HCC)  Assessment & Plan  Reports daily oral methamphetamine use  Encourage cessation   Consult case management    Urinary retention  Assessment & Plan  Continue home flomax 0.4 mg qd    Tobacco abuse  Assessment & Plan  Hx of tobacco abuse  Offer NRT  Encourage cessation      VTE Pharmacologic Prophylaxis:   Pharmacologic: Patient has refused VTE prophylaxis  Mechanical VTE Prophylaxis in Place: yes    Code Status: Level 1 - Full Code    Patient Centered Rounds: I have performed bedside rounds with nursing staff today.    Discussions with Specialists or Other Care Team Provider: Dr. Emilia CM    Education and Discussions with Family / Patient: I personally did not discuss patient with family at this time. Discussed current plan with patient, answered all questions to best of my ability.     Time Spent for Care: 20 minutes.  More than 50% of total time spent on counseling and coordination of care as described above.    Current Length of Stay: 2 day(s)    Current Patient Status: Inpatient     Certification Statement: The patient will continue to require additional inpatient hospital stay due to medically clear for discharge to inpatient rehab   Discharge Plan: inpatient rehab       Total time spent today 20 minutes. Greater than 50% of total time was spent with the patient and / or family counseling and / or coordination of care. A description of the counseling / coordination of care: benzodiazepine withdrawal     Subjective:   Patient seen and examined bedside this morning. Reports that he feels bad this morning because he did not sleep.  Currently  denies headaches, lightheadedness/dizziness, chest pain, SOB/dyspnea, abdominal pain, N/V/C/D. Remains interested in inpatient rehab.     Objective:     Clinical Opiate Withdrawal Scale  Pulse: 82    SEWS Total Score: 0 (2024  4:00 PM)        Last 24 Hours Medication List:   Current Facility-Administered Medications   Medication Dose Route Frequency Provider Last Rate    acetaminophen  650 mg Oral Q6H PRN Manda Black PA-C      buprenorphine-naloxone  8 mg Sublingual BID Ann Nieto MD      enoxaparin  40 mg Subcutaneous Daily Manda Black PA-C      folic acid  1 mg Oral Daily Manda Black PA-C      loratadine  10 mg Oral Daily Malcolm Briceño MD      mirtazapine  15 mg Oral HS Silvia Arredondo MD      multivitamin-minerals  1 tablet Oral Daily Manda Black PA-C      nicotine polacrilex  4 mg Oral Q2H PRN Angeles Friedman PA-C      ondansetron  4 mg Intravenous Q6H PRN Manda Black PA-C      tamsulosin  0.4 mg Oral Daily With Dinner Malcolm Briceño MD      thiamine  100 mg Oral Daily Manda Black PA-C           Vitals:   Temp (24hrs), Av.9 °F (36.6 °C), Min:97.4 °F (36.3 °C), Max:98.6 °F (37 °C)    Temp:  [97.4 °F (36.3 °C)-98.6 °F (37 °C)] 97.9 °F (36.6 °C)  HR:  [68-85] 82  Resp:  [18] 18  BP: ()/(52-73) 93/52  SpO2:  [96 %-99 %] 97 %  Body mass index is 24.39 kg/m².     Input and Output Summary (last 24 hours):No intake or output data in the 24 hours ending 24 0943    Physical Exam:   Physical Exam  Vitals and nursing note reviewed.   Constitutional:       General: He is not in acute distress.     Appearance: He is well-developed and normal weight. He is not ill-appearing or diaphoretic.   HENT:      Head: Normocephalic and atraumatic.   Eyes:      Conjunctiva/sclera: Conjunctivae normal.   Cardiovascular:      Rate and Rhythm: Normal rate and regular rhythm.      Pulses: Normal pulses.      Heart sounds: Normal heart sounds. No murmur heard.     No friction  rub. No gallop.   Pulmonary:      Effort: Pulmonary effort is normal. No respiratory distress.      Breath sounds: Normal breath sounds. No wheezing, rhonchi or rales.   Abdominal:      General: Abdomen is flat. Bowel sounds are normal. There is no distension.      Palpations: Abdomen is soft.      Tenderness: There is no abdominal tenderness. There is no guarding.   Musculoskeletal:         General: No swelling.      Cervical back: Normal range of motion.      Right lower leg: No edema.      Left lower leg: No edema.   Skin:     General: Skin is warm and dry.   Neurological:      General: No focal deficit present.      Mental Status: He is alert and oriented to person, place, and time. Mental status is at baseline.      Motor: No tremor.   Psychiatric:         Attention and Perception: Attention normal.         Mood and Affect: Mood is depressed. Affect is flat.         Speech: Speech normal.         Behavior: Behavior is cooperative.         Thought Content: Thought content does not include homicidal or suicidal ideation. Thought content does not include homicidal or suicidal plan.         Additional Data:     Labs: keep all most recent labs as listed on admission templates   Results from last 7 days   Lab Units 01/22/24  0131   WBC Thousand/uL 6.06   HEMOGLOBIN g/dL 12.5   HEMATOCRIT % 36.9   PLATELETS Thousands/uL 261   NEUTROS PCT % 58   LYMPHS PCT % 32   MONOS PCT % 7   EOS PCT % 2      Results from last 7 days   Lab Units 01/23/24  0512 01/22/24  0131   SODIUM mmol/L 142 137   POTASSIUM mmol/L 3.7 4.0   CHLORIDE mmol/L 105 102   CO2 mmol/L 30 30   BUN mg/dL 13 12   CREATININE mg/dL 0.89 0.96   ANION GAP mmol/L 7 5   CALCIUM mg/dL 8.6 9.3   ALBUMIN g/dL  --  4.4   TOTAL BILIRUBIN mg/dL  --  0.58   ALK PHOS U/L  --  51   ALT U/L  --  13   AST U/L  --  21   GLUCOSE RANDOM mg/dL 113 87       * I Have Reviewed All Lab Data Listed Above.  * Additional Pertinent Lab Tests Reviewed: No New Labs Available For  Today      Imaging Studies: I have personally reviewed pertinent reports.        Recent Cultures (last 7 days):          Today, Patient Was Seen By: Jennifer Blake PA-C    ** Please Note: Dictation voice to text software may have been used in the creation of this document. **

## 2024-01-24 NOTE — ASSESSMENT & PLAN NOTE
Hx of MDD, on Welbutrin XL, for depression and prn Remeron 15 mg qhs, for sleep  On admission- Reported passive SI without plan  Currently denies SI/thoughts of self-harm   Psychiatry consulted, appreciate recommendations  Per psychiatry, patient does not meet criteria for inpatient psychiatry admission at this time  Continual observation discontinued per psych  Restarted scheduled Remeron 15 mg qhs, for depression and anxiety- continue on discharge

## 2024-01-24 NOTE — PLAN OF CARE
Problem: DISCHARGE PLANNING  Goal: Discharge to home or other facility with appropriate resources  Description: INTERVENTIONS:  - Identify barriers to discharge w/patient and caregiver  - Arrange for needed discharge resources and transportation as appropriate  - Identify discharge learning needs (meds, wound care, etc.)  - Arrange for interpretive services to assist at discharge as needed  - Refer to Case Management Department for coordinating discharge planning if the patient needs post-hospital services based on physician/advanced practitioner order or complex needs related to functional status, cognitive ability, or social support system  Outcome: Progressing     Problem: Knowledge Deficit  Goal: Patient/family/caregiver demonstrates understanding of disease process, treatment plan, medications, and discharge instructions  Description: Complete learning assessment and assess knowledge base.  Interventions:  - Provide teaching at level of understanding  - Provide teaching via preferred learning methods  Outcome: Progressing     Problem: SUBSTANCE USE/ABUSE  Goal: By discharge, will develop insight into their chemical dependency and sustain motivation to continue in recovery  Description: INTERVENTIONS:  - Attends all daily group sessions and scheduled AA groups  - Actively practices coping skills through participation in the therapeutic community and adherence to program rules  - Reviews and completes assignments from individual treatment plan  - Assist patient development of understanding of their personal cycle of addiction and relapse triggers  Outcome: Progressing  Goal: By discharge, patient will have ongoing treatment plan addressing chemical dependency  Description: INTERVENTIONS:  - Assist patient with resources and/or appointments for ongoing recovery based living  Outcome: Progressing

## 2024-01-24 NOTE — QUICK NOTE
Notified by CM that patient is now requesting to sign 201. Dr. Holter with psychiatry notified, psychiatry will re-evaluate patient this morning.

## 2024-01-24 NOTE — MALNUTRITION/BMI
This medical record reflects one or more clinical indicators suggestive of malnutrition.    Malnutrition Findings:   Adult Malnutrition type: Social/enviromental  Adult Degree of Malnutrition: Other severe protein calorie malnutrition  Malnutrition Characteristics: Muscle loss, Weight loss, Fat loss                  360 Statement: related to inadequate energy intake as evidenced by 8% weight loss last 3 months(10/31-1/22/24), sunken orbital protruding clavicle, wasted buccal pads. Treatment Regular diet, medical food supplements TID.    BMI Findings:           Body mass index is 24.39 kg/m².     See Nutrition note dated 1/24/2024 for additional details.  Completed nutrition assessment is viewable in the nutrition documentation.
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
## 2024-01-24 NOTE — DISCHARGE SUMMARY
"[facility]  Discharge- Gus Spring 1990, 33 y.o. male MRN: [id number]  Unit/Bed#: 5T DETOX 518-01 Encounter: [id number]  Primary Care Provider: Aidan Foley MD   Date and time admitted to hospital: 1/22/2024  8:03 AM    MEDICAL DETOX UNIT, LEVEL 4  Department of Medical Toxicology  Reason for Admission/Principal Problem: benzodiazepine withdrawal, benzodiazepine use disorder   Admitting provider: BRITTANY Arroyo DO   1/22/2024  8:03 AM     Discharging Physician / Practitioner: Jennifer Blake PA-C  PCP: Aidan Foley MD  Admission Date:   Admission Orders (From admission, onward)       Ordered        01/22/24 0804  Inpatient Admission  Once                          Discharge Date: 01/25/24    Medical Problems       Resolved Problems  Date Reviewed: 1/24/2024            Resolved    * (Principal) Benzodiazepine withdrawal with complication (HCC) 1/24/2024     Resolved by  Jennifer Blake PA-C          * Benzodiazepine withdrawal with complication (HCC)-resolved as of 1/24/2024  Assessment & Plan  Reports using 10-12 xanax bars/day, last use 02:30 am on 1/22/24  Hx of benzo withdrawal seizure (approx 1 yr ago)  Previous detox at [facility] in 7/2022  SEWS protocol with symptom-triggered phenobarbital followed for medical management of benzodiazepine withdrawal  received a total of 260 mg phenobarbital, with last dose administered 1/22/24 at 2015 and subsequent resolution of w/d symptoms  reported a phenobarb allergy on admission (says it causes \"skin irritation\" and skin blotches); however patient tolerated phenobarbital without issue during this admission  Has remained stable off phenobarbital - VSS, no tremors   Acute withdrawal resolved- Patient is medically cleared for inpatient rehabilitation    Opioid use disorder, severe, in sustained remission (HCC)  Assessment & Plan  Hx of OUD, on Subxone 8 mg bid (confirmed from PDMP),  Per PDMP, last " prescription Suboxone 8-2 mg filled (60 films, 30 days) filled 12/27/2023  Continue Suboxone 8 mg BID  Case management consulted - will be discharged to inpatient rehab     Severe episode of recurrent major depressive disorder, without psychotic features (HCC)  Assessment & Plan  Hx of MDD, on Welbutrin XL, for depression and prn Remeron 15 mg qhs, for sleep  On admission- Reported passive SI without plan  Currently denies SI/thoughts of self-harm   Psychiatry consulted, appreciate recommendations  Per psychiatry, patient does not meet criteria for inpatient psychiatry admission at this time  Continual observation discontinued per psych  Restarted scheduled Remeron 15 mg qhs, for depression and anxiety- continue on discharge     Severe protein-calorie malnutrition (HCC)  Assessment & Plan  Malnutrition Findings:   Adult Malnutrition type: Social/enviromental  Adult Degree of Malnutrition: Other severe protein calorie malnutrition  Malnutrition Characteristics: Muscle loss, Weight loss, Fat loss                  360 Statement: related to inadequate energy intake as evidenced by 8% weight loss last 3 months(10/31-1/22/24), sunken orbital protruding clavicle, wasted buccal pads. Treatment Regular diet, medical food supplements TID.    BMI Findings:           Body mass index is 24.39 kg/m².       Benzodiazepine dependence (HCC)  Assessment & Plan  Withdrawal managed as above   Consult case management- will be discharged to inpatient rehab     Attention deficit disorder (ADD) without hyperactivity  Assessment & Plan  Hx of ADD, prior meds include Adderal 20 mg bid  Would avoid stimulant medications given methamphetamine abuse hx  Medications per psychiatry    Methamphetamine abuse (HCC)  Assessment & Plan  Reports daily oral methamphetamine use  Encourage cessation   Consult case management    Urinary retention  Assessment & Plan  Continue home flomax 0.4 mg qd    Tobacco abuse  Assessment & Plan  Hx of tobacco  abuse  Offer NRT  Encourage cessation        Consultations During Hospital Stay:  Case management     Procedures Performed:   None    Significant Findings / Test Results:   None     Incidental Findings:   None     Test Results Pending at Discharge (will require follow up):   None      Outpatient Tests / Follow Up Requested:  Recommend f/u with PCP within 1-2 weeks of discharge   Recommend OP f/u with Psychiatry     Complications:  none     Reason for Admission: benzodiazepine withdrawal, benzodiazepine use disorder     Hospital Course:     Gus Spring is a 33 y.o. male patient PMH benzodiazepine abuse, OUD on suboxone, MDD, ADD who originally presented to the hospital on 1/22/2024 due to benzodiazepine withdrawal. Patient initially presented to the [facility] requesting detoxification from benzodiazepines. Patient was admitted to the [facility]\"" medical detox unit under [facility] protocol for medically assisted benzodiazepine withdrawal and received a total of 260 mg phenobarbital, with last dose administered 1/22/24 at 2015. Patient's withdrawal symptoms subsequently resolved, and he has remained without objective evidence of benzodiazepine withdrawal at this time. His home Suboxone for OUD maintenance therapy was continued during admission without complication.  Case management was consulted for assistance with rehab resources, and patient will be discharged to inpatient [facility] rehabilitation at [facility].      Please see above list of diagnoses and related plan for additional information.     Condition at Discharge: fair     Discharge Day Visit / Exam:     Subjective:  Patient seen and examined bedside this morning. Reports that he was able to sleep somewhat better overnight compared to the previous night. Also notes ongoing anxiety.  Currently denies headaches, lightheadedness/dizziness, chest pain, SOB/dyspnea, abdominal pain, N/V/C/D. Appetite is adequate. Remains interested in inpatient rehab.     Vitals:  "Blood Pressure: 100/61 (01/25/24 0736)  Pulse: 73 (01/25/24 0736)  Temperature: 97.6 °F (36.4 °C) (01/25/24 0736)  Temp Source: Temporal (01/25/24 0736)  Respirations: 18 (01/25/24 0736)  Height: 5' 10\" (177.8 cm) (01/22/24 0805)  Weight - Scale: 77.1 kg (170 lb) (01/22/24 0805)  SpO2: 100 % (01/25/24 0736)  Exam:   Physical Exam  Vitals and nursing note reviewed.   Constitutional:       General: He is not in acute distress.     Appearance: Normal appearance. He is well-developed and normal weight. He is not ill-appearing, toxic-appearing or diaphoretic.   HENT:      Head: Normocephalic and atraumatic.   Eyes:      Conjunctiva/sclera: Conjunctivae normal.   Cardiovascular:      Rate and Rhythm: Normal rate and regular rhythm.      Pulses: Normal pulses.      Heart sounds: Normal heart sounds, S1 normal and S2 normal. No murmur heard.     No friction rub. No gallop.   Pulmonary:      Effort: Pulmonary effort is normal. No respiratory distress.      Breath sounds: Normal breath sounds. No wheezing, rhonchi or rales.   Abdominal:      General: Abdomen is flat. Bowel sounds are normal. There is no distension.      Palpations: Abdomen is soft.      Tenderness: There is no abdominal tenderness. There is no guarding.   Musculoskeletal:         General: No swelling.      Cervical back: Normal range of motion and neck supple.      Right lower leg: No edema.      Left lower leg: No edema.   Skin:     General: Skin is warm and dry.      Capillary Refill: Capillary refill takes less than 2 seconds.   Neurological:      General: No focal deficit present.      Mental Status: He is alert and oriented to person, place, and time. Mental status is at baseline.      Motor: No tremor.   Psychiatric:         Attention and Perception: Attention normal.         Mood and Affect: Mood is anxious.         Speech: Speech normal.         Behavior: Behavior is cooperative.         Thought Content: Thought content does not include homicidal or " suicidal ideation. Thought content does not include homicidal or suicidal plan.       Discussion with Family: I personally did not discuss this case with the patient's family.  I reviewed the discharge plan with the patient and answered all questions to the best of my ability.     Discharge instructions/Information to patient and family:   See after visit summary for information provided to patient and family.      Provisions for Follow-Up Care:  See after visit summary for information related to follow-up care and any pertinent home health orders.      Disposition:     Other: inpatient [facility] rehabilitation at  [facility]    For Discharges to [facility] SNF:   Not Applicable to this Patient - Not Applicable to this Patient    Planned Readmission: n/a     Discharge Statement:  I spent 35 minutes discharging the patient. This time was spent on the day of discharge. I had direct contact with the patient on the day of discharge. Greater than 50% of the total time was spent examining patient, answering all patient questions, arranging and discussing plan of care with patient as well as directly providing post-discharge instructions.  Additional time then spent on discharge activities.    Discharge Medications:  See after visit summary for reconciled discharge medications provided to patient and family.      ** Please Note: This note has been constructed using a voice recognition system **

## 2024-01-24 NOTE — ASSESSMENT & PLAN NOTE
"Reports using 10-12 xanax bars/day, last use 02:30 am on 1/22/24  Hx of benzo withdrawal seizure (approx 1 yr ago)  Previous detox at [facility] in 7/2022  SEWS protocol with symptom-triggered phenobarbital followed for medical management of benzodiazepine withdrawal  received a total of 260 mg phenobarbital, with last dose administered 1/22/24 at 2015 and subsequent resolution of w/d symptoms  reported a phenobarb allergy on admission (says it causes \"skin irritation\" and skin blotches); however patient tolerated phenobarbital without issue during this admission  Has remained stable off phenobarbital - VSS, no tremors   Acute withdrawal resolved- Patient is medically cleared for inpatient rehabilitation  "

## 2024-01-24 NOTE — CASE MANAGEMENT
Case Management Assessment & Discharge Planning Note    Patient name Gus Spring  Location 5T DETOX 518/5T DETOX 51* MRN [id number]  : 1990 Date 2024       Current Admission Date: 2024  Current Admission Diagnosis:Severe episode of recurrent major depressive disorder, without psychotic features (HCC)   Patient Active Problem List    Diagnosis Date Noted    Benzodiazepine dependence (HCC) 2022    Opioid use disorder, severe, in sustained remission (HCC) 2022    Cellulitis of left lower extremity 2022    Attention deficit disorder (ADD) without hyperactivity 01/10/2022    Methamphetamine abuse (HCC) 2019    Tobacco abuse 2019    Urinary retention 2019    Hepatitis C 2019    Elevated AST (SGOT) 2019    Intentional methadone overdose (HCC)     Sedative overdose     Suicidal ideation     Severe episode of recurrent major depressive disorder, without psychotic features (HCC) 2019    Spinal stenosis 2019      LOS (days): 2  Geometric Mean LOS (GMLOS) (days): 3.4  Days to GMLOS:1.1     OBJECTIVE:    Risk of Unplanned Readmission Score: 9.45         Current admission status: Inpatient  Referral Reason: Drug/Alcohol Abuse    Preferred Pharmacy:   [id number] [place] - [address]  [address] [place] [address]  Phone: [phone] Fax: [phone]    Primary Care Provider: Aidan Foley MD    Primary Insurance: AETNA  Secondary Insurance:     ASSESSMENT:  Active Health Care Proxies    There are no active Health Care Proxies on file.        Pt [facility].  Pt declined by [facility] as they have no bed available.  Clinicals remain under review with [facility].  CM to f/u on 24.  LAYTON Daigle                             Patient Information Continued  History of Withdrawal Symptoms: Other withdrawal symptoms (specify in comment) (Chills, shakes)                 Housing Stability: Not At Risk (3/28/2023)    Received from [facility]    Housing Stability     Are you worried that in the next 2 months you may not have stable housing?: No   Food Insecurity: Not At Risk (3/28/2023)    Received from [facility]    Food Insecurity     In the last 12 months did you ever eat less than you felt you should because there wasn't enough money for food?: No   Transportation Needs: Not At Risk (3/28/2023)    Received from [facility]    Transporation     In the last 12 months, have you ever had to go without healthcare because you didn't have a way to get there?: No   Utilities: Not At Risk (3/28/2023)    Received from [facility]    Utilites     In the last 12 months has the electric, gas, oil, or water company threatened to shut off services in your home?: No       DISCHARGE DETAILS:

## 2024-01-25 VITALS
OXYGEN SATURATION: 99 % | SYSTOLIC BLOOD PRESSURE: 121 MMHG | DIASTOLIC BLOOD PRESSURE: 75 MMHG | WEIGHT: 170 LBS | HEIGHT: 70 IN | TEMPERATURE: 97.6 F | BODY MASS INDEX: 24.34 KG/M2 | HEART RATE: 97 BPM | RESPIRATION RATE: 18 BRPM

## 2024-01-25 PROCEDURE — 99239 HOSP IP/OBS DSCHRG MGMT >30: CPT | Performed by: PHYSICIAN ASSISTANT

## 2024-01-25 RX ORDER — MIRTAZAPINE 15 MG/1
15 TABLET, FILM COATED ORAL
Qty: 30 TABLET | Refills: 0 | Status: SHIPPED | OUTPATIENT
Start: 2024-01-25

## 2024-01-25 RX ORDER — TAMSULOSIN HYDROCHLORIDE 0.4 MG/1
0.4 CAPSULE ORAL
Qty: 30 CAPSULE | Refills: 0 | Status: SHIPPED | OUTPATIENT
Start: 2024-01-25

## 2024-01-25 RX ORDER — FOLIC ACID 1 MG/1
1 TABLET ORAL DAILY
Start: 2024-01-26

## 2024-01-25 RX ORDER — LANOLIN ALCOHOL/MO/W.PET/CERES
6 CREAM (GRAM) TOPICAL
Status: DISCONTINUED | OUTPATIENT
Start: 2024-01-25 | End: 2024-01-25 | Stop reason: HOSPADM

## 2024-01-25 RX ORDER — LANOLIN ALCOHOL/MO/W.PET/CERES
100 CREAM (GRAM) TOPICAL DAILY
Start: 2024-01-26

## 2024-01-25 RX ORDER — BUPRENORPHINE AND NALOXONE 8; 2 MG/1; MG/1
8 FILM, SOLUBLE BUCCAL; SUBLINGUAL 2 TIMES DAILY
Qty: 60 FILM | Refills: 0 | Status: SHIPPED | OUTPATIENT
Start: 2024-01-25 | End: 2024-02-24

## 2024-01-25 RX ADMIN — MELATONIN TAB 3 MG 6 MG: 3 TAB at 00:33

## 2024-01-25 RX ADMIN — LORATADINE 10 MG: 10 TABLET ORAL at 08:20

## 2024-01-25 RX ADMIN — THIAMINE HCL TAB 100 MG 100 MG: 100 TAB at 08:20

## 2024-01-25 RX ADMIN — TAMSULOSIN HYDROCHLORIDE 0.4 MG: 0.4 CAPSULE ORAL at 15:53

## 2024-01-25 RX ADMIN — NICOTINE POLACRILEX 4 MG: 4 GUM, CHEWING BUCCAL at 09:00

## 2024-01-25 RX ADMIN — NICOTINE POLACRILEX 4 MG: 4 GUM, CHEWING BUCCAL at 00:33

## 2024-01-25 RX ADMIN — NICOTINE POLACRILEX 4 MG: 4 GUM, CHEWING BUCCAL at 13:01

## 2024-01-25 RX ADMIN — FOLIC ACID 1 MG: 1 TABLET ORAL at 08:20

## 2024-01-25 RX ADMIN — MULTIPLE VITAMINS W/ MINERALS TAB 1 TABLET: TAB ORAL at 08:20

## 2024-01-25 RX ADMIN — BUPRENORPHINE AND NALOXONE 8 MG: 8; 2 FILM BUCCAL; SUBLINGUAL at 08:20

## 2024-01-25 NOTE — ASSESSMENT & PLAN NOTE
Malnutrition Findings:   Adult Malnutrition type: Social/enviromental  Adult Degree of Malnutrition: Other severe protein calorie malnutrition  Malnutrition Characteristics: Muscle loss, Weight loss, Fat loss                  360 Statement: related to inadequate energy intake as evidenced by 8% weight loss last 3 months(10/31-1/22/24), sunken orbital protruding clavicle, wasted buccal pads. Treatment Regular diet, medical food supplements TID.    BMI Findings:           Body mass index is 24.39 kg/m².

## 2024-01-25 NOTE — PLAN OF CARE
Problem: DISCHARGE PLANNING  Goal: Discharge to home or other facility with appropriate resources  Description: INTERVENTIONS:  - Identify barriers to discharge w/patient and caregiver  - Arrange for needed discharge resources and transportation as appropriate  - Identify discharge learning needs (meds, wound care, etc.)  - Arrange for interpretive services to assist at discharge as needed  - Refer to Case Management Department for coordinating discharge planning if the patient needs post-hospital services based on physician/advanced practitioner order or complex needs related to functional status, cognitive ability, or social support system  1/25/2024 0825 by Ector Zuñiga RN  Outcome: Adequate for Discharge  1/25/2024 0825 by Ector Zuñiga RN  Outcome: Adequate for Discharge     Problem: Knowledge Deficit  Goal: Patient/family/caregiver demonstrates understanding of disease process, treatment plan, medications, and discharge instructions  Description: Complete learning assessment and assess knowledge base.  Interventions:  - Provide teaching at level of understanding  - Provide teaching via preferred learning methods  1/25/2024 0825 by Ector Zuñiga RN  Outcome: Adequate for Discharge  1/25/2024 0825 by Ector Zuñiga RN  Outcome: Adequate for Discharge     Problem: SUBSTANCE USE/ABUSE  Goal: By discharge, will develop insight into their chemical dependency and sustain motivation to continue in recovery  Description: INTERVENTIONS:  - Attends all daily group sessions and scheduled AA groups  - Actively practices coping skills through participation in the therapeutic community and adherence to program rules  - Reviews and completes assignments from individual treatment plan  - Assist patient development of understanding of their personal cycle of addiction and relapse triggers  Outcome: Adequate for Discharge  Goal: By discharge, patient will have ongoing treatment plan addressing chemical  dependency  Description: INTERVENTIONS:  - Assist patient with resources and/or appointments for ongoing recovery based living  Outcome: Adequate for Discharge

## 2024-01-25 NOTE — PROGRESS NOTES
0900  SOFIE calls [facility] Admissions Department to f/u on referral.  Per Savannah was was declined admission to [facility] but accepted to  [facility] located at [address] [place] [address] .  CM informed that bed will be available for admission on 1/26/24.  CM met with Pt and provided update.  Pt in agreement with continued referrals for IP THOMAS Tx.  LAYTON Daigle    0991  REYNA signed for [facility] and clinicals sent for review.  LAYTON Daigle    1003  SOFIE receives a call from Savannah of [facility] stating that there has been a cancellation with a planned admission and Pt may be accepted today.  Savannah states that CM will be updated today after 12pm.  LAYTON Daigle    1112  SOFIE speaks to Bill from [facility] whom states that he completed screening with Pt over phone and Pt has indicated that he has a place that is willing to accept him on 1/26/2024, and he would like to wait for that facility.      1203  SOFIE notified by Heather Farah that Pt is medically accepted for admission today.   Estimated stay is 4-5 weeks depending on Pt needs.  30 day supply of medication requested.   CM informed that facility is about 2 hours away and estimated p/u time is 2pm.   Pt  and clinical team provided with update.    LAYTON Daigle

## 2024-01-25 NOTE — PROGRESS NOTES
Social Determinants:   01/25/24 1133   Housing Stability   In the last 12 months, was there a time when you were not able to pay the mortgage or rent on time? Y   In the last 12 months, how many places have you lived?   (I've lived in a bunch of places)   In the last 12 months, was there a time when you did not have a steady place to sleep or slept in a shelter (including now)? Y   Transportation Needs   In the past 12 months, has lack of transportation kept you from medical appointments or from getting medications? yes   In the past 12 months, has lack of transportation kept you from meetings, work, or from getting things needed for daily living? Yes  (I just bought a car so I can get around easier now.)   Food Insecurity   Within the past 12 months, you worried that your food would run out before you got the money to buy more. Sometimes   Within the past 12 months, the food you bought just didn't last and you didn't have money to get more. Sometimes   Intimate Partner Violence   Within the last year, have you been afraid of your partner or ex-partner? No   Within the last year, have you been humiliated or emotionally abused in other ways by your partner or ex-partner? No   Within the last year, have you been kicked, hit, slapped, or otherwise physically hurt by your partner or ex-partner? No   Within the last year, have you been raped or forced to have any kind of sexual activity by your partner or ex-partner? No   Alcohol Use   Q1: How often do you have a drink containing alcohol? Never   Q2: How many drinks containing alcohol do you have on a typical day when you are drinking? None   Q3: How often do you have six or more drinks on one occasion? Never   Utilities   In the past 12 months has the electric, gas, oil, or water company threatened to shut off services in your home? Yes

## 2024-01-25 NOTE — PROGRESS NOTES
Case Review:  CM met with Pt to discuss disposition plan.  Pt requesting IP program and provided CM with commercial insurance card.  Referral sent to [facility].  Pt declined as there is no bed available.    Referral sent to [facility] program.  CM provided Pt contact # for completion of screening.  Case will be reviewed with clinical team.  LAYTON Daigle

## 2024-01-25 NOTE — DISCHARGE INSTR - OTHER ORDERS
Crisis Services  If you or someone you know is in crisis, please call [phone] 988 has been designated as the new three-digit dialing code that will route callers to the National Suicide Prevention Lifeline. You can call, text, or chat 330, and be connected to trained counselors that are part of the existing National Suicide Prevention Lifeline network. The current Lifeline phone number (1-343.720.1048) will always remain available to people in emotional distress or suicidal crisis.  [place] Suicide Prevention Hotline: Call, Text or Chat 264 or  1-333.962.9779   [facility] Crisis:  353.814.5075  [facility] Crisis:  [phone]  [facility] Crisis:  [phone]  [place] Drug & Alcohol Helpline:  1-877.996.9632        [facility]  RESOURCES FOR RESIDENTS  The information below will help guide you in connecting with drug and/or alcohol treatment. [facility] [employer] is available to help with any questions, concerns and needed resources. Please contact our [facility] of Care Department at [phone] (Monday - Friday from 8:30 am to 4:30 pm).   The first step in accessing treatment is to have a drug and alcohol assessment to determine the type of treatment and recovery plan needed. Assessment sites are listed in the box to the left. If treatment is needed after-hours, you or your family member can go to or call [place] 24/7 at [phone].  To access treatment for those who have private health insurance, call the number on the back of the insurance card listed under Mental Health/Substance Abuse to find an in-network provider.   To access treatment if you have Medical Assistance or do not have insurance, please call one of the agencies listed to the left. Individuals who are considered a priority population - such as pregnant women who inject drugs, pregnant women who use substances, persons who inject drugs, overdose survivors, Veterans  and adolescents - are exempt from any treatment limitations. Preferential treatment is given to pregnant females    [facility]   [address], [address] [place] [address] [phone]  [email or web address]   M-F 8 am - 8 pm In-Person or Telehealth     [facility]   [employer] [address] [address]  [phone]   [email or web address]   M-F 6 am-12 pm  In-Person     [address] [address] [phone]   [email or web address]   M-F 8 am-4 pm  In-Person

## 2024-01-26 NOTE — UTILIZATION REVIEW
NOTIFICATION OF ADMISSION DISCHARGE   This is a Notification of Discharge from [facility]. Please be advised that this patient has been discharge from our facility. Below you will find the admission and discharge date and time including the patient’s disposition.   UTILIZATION REVIEW CONTACT:  Diana Bourgeois MA  Utilization   Network Utilization Review Department  Phone: [phone] x carefully listen to the prompts. All voicemails are confidential.  Email: [email or web address]     ADMISSION INFORMATION  PRESENTATION DATE: 1/22/2024  8:03 AM  OBERVATION ADMISSION DATE:   INPATIENT ADMISSION DATE: 1/22/24  8:03 AM   DISCHARGE DATE: 1/25/2024  5:33 PM   DISPOSITION:PRA - Other Facility Not Defined    Network Utilization Review Department  ATTENTION: Please call with any questions or concerns to [phone] and carefully listen to the prompts so that you are directed to the right person. All voicemails are confidential.   For Discharge needs, contact Care Management DC Support Team at [phone] opt. 2  Send all requests for admission clinical reviews, approved or denied determinations and any other requests to dedicated fax number below belonging to the campus where the patient is receiving treatment. List of dedicated fax numbers for the Facilities:  FACILITY NAME UR FAX NUMBER   ADMISSION DENIALS (Administrative/Medical Necessity) [phone]   DISCHARGE SUPPORT TEAM ([facility]) [phone]   PARENT CHILD HEALTH (Maternity/NICU/Pediatrics) [phone]   [facility] [phone]   [facility] [phone]   [facility] [phone]   [facility] [phone]   [facility] [phone]   [facility] [phone]   [facility] [phone]   [facility]  [place] [phone]   [facility] [phone]   [facility] [phone]   [facility] [phone]   [facility] [phone]

## 2024-01-29 NOTE — UTILIZATION REVIEW
Continued Stay Review    Date: 1/24/24                     Patient Class: Inpatient   Current Level of Care: Med Surg    HPI:33 y.o. male initially admitted on 1/22/24.     1/24 Medical Toxicology: Acute withdrawal resolved. Patient is medically cleared for inpatient rehab.  Behavorial Health: Continue medical management per primary team. Collaborate with collaterals for baseline assessment and disposition as necessary. Continue previously prescribed psychotropic medications at present dosing.  Defer upward titration per patient preference. Presently, patient does not adhere to criteria for inpatient behavioral health admission.  Patient anticipates involvement in inpatient rehabilitation, preferably through [facility], although is amenable to alternative placement if necessary. Case Management:  Pt referred to [facility] and [facility].  Pt declined by [facility] as they have no bed available.  Clinicals remain under review with [facility].     Vital Signs:       Date/Time Temp Pulse Resp BP MAP (mmHg) SpO2 O2 Device   01/24/24 2040 97.6 °F (36.4 °C) 90 18 108/64 -- 98 % None (Room air)   01/24/24 1114 -- 85 18 104/57 72 99 % None (Room air)   01/24/24 0725 97.9 °F (36.6 °C) 82 18 93/52 65 97 % None (Room air)   01/23/24 1900 97.4 °F (36.3 °C) Abnormal  68 18 107/57 -- 99 % None (Room air)   01/23/24 1500 97.7 °F (36.5 °C) 77 18 101/53 -- 98 % None (Room air)   01/23/24 1200 98.6 °F (37 °C) 85 18 126/73 90 96 % None (Room air)       SEWS    Row Name 01/23/24 1600 01/23/24 1200   Severity of Ethanol Withdrawal Scale (SEWS)   ANXIETY: Do you feel that something bad is about to happen to you right now? 0  -BH 0  -BH   NAUSEA and DRY HEAVES or VOMITING? 0  -BH 0  -BH   SWEATING: (includes moist palms, sweating now)? Score 0 or 2 0  -BH 0  -BH   TREMOR: with arms extended eyes closed? 0  -BH 0  -BH   AGITATION: Fidgety, restless, pacing? 0  -BH 0  -BH   DISORIENTATION: 0  -BH 0   -BH   HALLUCINATIONS: 0  -BH 0  -BH   VITAL SIGNS: ANY (Pulse >110, Diastolic BP >90, Temp >99.6) 0  -BH 0  -BH   SEWS Total Score 0  -BH 0  -BH         Pertinent Labs/Diagnostic Results:             Results from last 7 days   Lab Units 01/23/24  0512   SODIUM mmol/L 142   POTASSIUM mmol/L 3.7   CHLORIDE mmol/L 105   CO2 mmol/L 30   ANION GAP mmol/L 7   BUN mg/dL 13   CREATININE mg/dL 0.89   EGFR ml/min/1.73sq m 111   CALCIUM mg/dL 8.6   MAGNESIUM mg/dL 2.0             Results from last 7 days   Lab Units 01/23/24  0512   GLUCOSE RANDOM mg/dL 113       Medications:   Scheduled Medications:             Current Facility-Administered Medications   Medication Dose Route Frequency    acetaminophen  650 mg Oral Q6H PRN    buprenorphine-naloxone  8 mg Sublingual BID    enoxaparin  40 mg Subcutaneous Daily    folic acid  1 mg Oral Daily    loratadine  10 mg Oral Daily    mirtazapine  15 mg Oral HS    multivitamin-minerals  1 tablet Oral Daily    nicotine polacrilex  4 mg Oral Q2H PRN    ondansetron  4 mg Intravenous Q6H PRN    tamsulosin  0.4 mg Oral Daily With Dinner    thiamine  100 mg Oral Daily         PRN Medications 01/24   acetaminophen (TYLENOL) tablet 650 mg  Dose: 650 mg  Freq: Every 6 hours PRN Route: PO  PRN Reasons: fever,mild pain,headaches  Indications of Use: FEVER  Start: 01/22/24 0804 End: 01/25/24 1933    0939        hydrOXYzine HCL (ATARAX) tablet 50 mg  Dose: 50 mg  Freq: Every 6 hours PRN Route: PO  PRN Reason: anxiety  Start: 01/24/24 1048 End: 01/25/24 1933   Admin Instructions:       1055     1643        nicotine polacrilex (NICORETTE) gum 4 mg  Dose: 4 mg  Freq: Every 2 hour PRN Route: PO  PRN Reason: nicotine cravings  Start: 01/22/24 2045 End: 01/25/24 1933   Admin Instructions:       0948     1335     1643     2051                Network Utilization Review Department  ATTENTION: Please call with any questions or concerns to [phone] and carefully listen to the prompts so that you are  directed to the right person. All voicemails are confidential.   For Discharge needs, contact Care Management DC Support Team at [phone] opt. 2  Send all requests for admission clinical reviews, approved or denied determinations and any other requests to dedicated fax number below belonging to the [place] where the patient is receiving treatment. List of dedicated fax numbers for the Facilities:  FACILITY NAME UR FAX NUMBER   ADMISSION DENIALS (Administrative/Medical Necessity) [phone]   DISCHARGE SUPPORT TEAM (NETWORK) [phone]   PARENT CHILD HEALTH (Maternity/NICU/Pediatrics) [phone]   [facility] [phone]   [facility] [phone]   Critical access hospital [phone]   [facility] [phone]   [facility] [phone]   [facility] [phone]   [facility] [phone]   [facility] [phone]   [facility] [phone]   [facility] [phone]   [facility] [phone]   [facility] [phone]

## 2024-01-29 NOTE — CASE MANAGEMENT
Case Management Discharge Planning Note    Patient name Gus Spring  Location 5T DETOX 518/5T DETOX 51* MRN [id number]  : 1990 Date 2024       Current Admission Date: 2024  Current Admission Diagnosis:Severe episode of recurrent major depressive disorder, without psychotic features (HCC)   Patient Active Problem List    Diagnosis Date Noted    Severe protein-calorie malnutrition (HCC) 2024    Benzodiazepine dependence (HCC) 2022    Opioid use disorder, severe, in sustained remission (HCC) 2022    Cellulitis of left lower extremity 2022    Attention deficit disorder (ADD) without hyperactivity 01/10/2022    Methamphetamine abuse (HCC) 2019    Tobacco abuse 2019    Urinary retention 2019    Hepatitis C 2019    Elevated AST (SGOT) 2019    Intentional methadone overdose (HCC)     Sedative overdose     Suicidal ideation     Severe episode of recurrent major depressive disorder, without psychotic features (HCC) 2019    Spinal stenosis 2019      LOS (days): 3  Geometric Mean LOS (GMLOS) (days): 5  Days to GMLOS:1.6     OBJECTIVE:  Risk of Unplanned Readmission Score: 10.06         Current admission status: Inpatient   Preferred Pharmacy:   [id number] - [place] - [address]  [facility] [place] [address]  Phone: [phone] Fax: [phone]    Primary Care Provider: Aidan Foley MD    Primary Insurance: AETNA  Secondary Insurance:     DISCHARGE DETAILS:     CM was notified by medical provider that Pt is medically cleared for discharge.  Pt denies withdrawal symptoms at this time.  Pt will discharge to IP [facility]OMAS treatment at [facility].  30 day supply of Medications filled at [facility] and sent with Pt.  [facility] providing transport.   Pt encouraged to schedule an appt with his PCP as soon as possible.  LAYTON Daigle

## 2024-07-26 NOTE — PROGRESS NOTES
Verified [employer] order with [facility]  Last dosed yesterday 165mg once a day  Spoke with Jennifer to fax script over  all other ROS negative except as per HPI